# Patient Record
Sex: FEMALE | Race: WHITE | NOT HISPANIC OR LATINO | Employment: FULL TIME | URBAN - METROPOLITAN AREA
[De-identification: names, ages, dates, MRNs, and addresses within clinical notes are randomized per-mention and may not be internally consistent; named-entity substitution may affect disease eponyms.]

---

## 2018-06-21 NOTE — PROGRESS NOTES
Subjective:      Patient ID: Cheryl Ramirez is a 73 y.o. female.    Chief Complaint: Low-back Pain    Referred by: SelfIraj     Ms ramirez is a 72 yo female here for evaluation of back pain.  She has not been seen at [facility] since 2013.  She has had back pain off and on for the past 15 years.  Then 5 years ago she was working out and doing hamstring exercises and had left leg pain after a pop.  She tore her hamstring off her ischial tuberosity.  She feels like the back pain has been worse since then and could not get out of bed for 3 weeks and she had steroid injections which helped.  Her current back pain has been worse over the past year.  She has been helping her daughter with kids.  It is hard to hold the baby, and lift the baby.  She cannot stand too long, and the mornings are the worse.  She feels like it takes a long time too loosen up.  The pain is in the back and it is intermittent, worse right more than left.  The pain will go down the right leg at times, across the thigh to inner knee  and the outside of the calf.  It hurts to get up in the middle of the night, morning, and lifting and stairs.  The pain will go away with sitting.  The pain is an ache in the butt, and it will grab her.  She feels like it is different muscles.  The pain is 0/10 now, worst 8/10 in the middle of the night getting out of the bed, best 0/10.  She has been to PT twice this year which helped some.  She has been to over 20 visits this year.  She does some of the exercises at home.  She had some injections, she had caudal in the fall, and it didn't help.  She has not been to chiropractor.  She has had imaging but cannot find the report.  It was done at [facility].      Past Medical History:  No date: Diabetes mellitus, type 2  No date: High blood pressure  No date: Scoliosis    History reviewed. No pertinent surgical history.    History reviewed.  No pertinent family history.      Social History    Marital status:               Spouse name:                       Years of education:                 Number of children:               Social History Main Topics    Smoking status: Former Smoker                                                                Packs/day: 0.00      Years: 0.00        Smokeless tobacco: Never Used                          Current Outpatient Prescriptions:  ACCU-CHEK SMARTVIEW TEST STRIP Strp, , Disp: , Rfl:   alendronate (FOSAMAX) 70 MG tablet, , Disp: , Rfl:   ALPRAZolam (XANAX) 0.5 MG tablet, Take 0.5 mg by mouth 3 (three) times daily., Disp: , Rfl:   amLODIPine (NORVASC) 10 MG tablet, , Disp: , Rfl:   aspirin (ECOTRIN) 81 MG EC tablet, Take 81 mg by mouth once daily., Disp: , Rfl:   buPROPion (WELLBUTRIN SR) 150 MG TBSR 12 hr tablet, , Disp: , Rfl:   BYSTOLIC 5 mg Tab, , Disp: , Rfl:   clopidogrel (PLAVIX) 75 mg tablet, , Disp: , Rfl:   fish oil-omega-3 fatty acids 300-1,000 mg capsule, Take by mouth once daily., Disp: , Rfl:   insulin lispro (HUMALOG) 100 unit/mL injection, Inject into the skin 3 (three) times daily before meals., Disp: , Rfl:   JUBLIA 10 % Marilyn, APPLY ONE DROP TO ALL SMALLER TOES AND 2 DROPS TO GREATER TOES DAILY, Disp: , Rfl: 10  levothyroxine (SYNTHROID) 50 MCG tablet, Take 50 mcg by mouth once daily., Disp: , Rfl:   losartan (COZAAR) 50 MG tablet, , Disp: , Rfl:   MULTIVIT-IRON-MIN-FOLIC ACID 3,500-18-0.4 UNIT-MG-MG ORAL CHEW, Take by mouth., Disp: , Rfl:   OZEMPIC 1 mg/0.75 mL (2 mg/1.5 mL) PnIj, INJECT 1MG EVERY WEEK, Disp: , Rfl: 5  pantoprazole (PROTONIX) 40 MG tablet, Take 40 mg by mouth once daily., Disp: , Rfl:   risedronate (ACTONEL) 150 MG Tab, , Disp: , Rfl:   rosuvastatin (CRESTOR) 10 MG tablet, , Disp: , Rfl:     No current facility-administered medications for this visit.       Review of patient's allergies indicates:   -- Pcn (penicillins)                 Review of Systems   Constitution: Negative for weight gain and weight loss.   Cardiovascular: Negative for chest  pain.   Respiratory: Negative for shortness of breath.    Musculoskeletal: Positive for back pain (right leg pain). Negative for joint pain and joint swelling.   Gastrointestinal: Negative for abdominal pain and bowel incontinence.   Genitourinary: Negative for bladder incontinence.   Neurological: Negative for numbness.           Objective:          General    Vitals reviewed.  Constitutional: She is oriented to person, place, and time. She appears well-developed and well-nourished.   HENT:   Head: Normocephalic and atraumatic.   Pulmonary/Chest: Effort normal.   Neurological: She is alert and oriented to person, place, and time.   Psychiatric: She has a normal mood and affect. Her behavior is normal. Judgment and thought content normal.     General Musculoskeletal Exam   Gait: abnormal (flexed posture and lack of arm swing)     Right Ankle/Foot Exam     Tests   Heel Walk: able to perform  Tiptoe Walk: able to perform    Left Ankle/Foot Exam     Tests   Heel Walk: able to perform  Tiptoe Walk: able to perform  Back (L-Spine & T-Spine) / Neck (C-Spine) Exam     Tenderness Right paramedian tenderness of the Sacrum. Left paramedian tenderness of the Sacrum.     Back (L-Spine & T-Spine) Range of Motion   Extension: 20   Flexion: 90   Lateral Bend Right: 20   Lateral Bend Left: 20   Rotation Right: 40   Rotation Left: 40     Spinal Sensation   Right Side Sensation  C-Spine Level: normal   L-Spine Level: normal  S-Spine Level: normal  Left Side Sensation  C-Spine Level: normal  L-Spine Level: normal  S-Spine Level: normal    Back (L-Spine & T-Spine) Tests   Right Side Tests  Straight leg raise:      Sitting SLR: > 70 degrees      Left Side Tests  Straight leg raise:     Sitting SLR: > 70 degrees          Other She has no scoliosis .  Spinal Kyphosis:  Absent    Comments:  Neg RICA  Groin pain with piriformis stretch on right      Muscle Strength   Right Upper Extremity   Biceps: 5/5/5   Deltoid:  5/5  Triceps:   5/5  Wrist Extension: 5/5/5   Finger Flexors:  5/5  Left Upper Extremity  Biceps: 5/5/5   Deltoid:  5/5  Triceps:  5/5  Wrist Extension: 5/5/5   Finger Flexors:  5/5  Right Lower Extremity   Hip Flexion: 5/5   Quadriceps:  5/5   Anterior tibial:  5/5/5  EHL:  5/5  Left Lower Extremity   Hip Flexion: 5/5   Quadriceps:  5/5   Anterior tibial:  5/5/5   EHL:  5/5    Reflexes     Left Side  Biceps:  2+  Triceps:  2+  Brachioradialis:  2+  Quadriceps:  2+  Achilles:  2+  Left Steele's Sign:  Absent  Babinski Sign:  absent    Right Side   Biceps:  2+  Triceps:  2+  Brachioradialis:  2+  Quadriceps:  2+  Achilles:  2+  Right Steele's Sign:  absent  Babinski Sign:  absent    Vascular Exam     Right Pulses        Carotid:                  2+    Left Pulses        Carotid:                  2+        Assessment:       Encounter Diagnosis   Name Primary?    Chronic bilateral low back pain with right-sided sciatica Yes         Plan:       Cheryl was seen today for low-back pain.    Diagnoses and all orders for this visit:    Chronic bilateral low back pain with right-sided sciatica  -     X-Ray Lumbar Complete With Flex And Ext; Future  -     X-Ray Hips Bilateral 2 View Inc AP Pelvis; Future  -     Ambulatory Referral to Physical/Occupational Therapy    Other orders  -     tiZANidine (ZANAFLEX) 2 MG tablet; Take 1-2 tablets (2-4 mg total) by mouth every 8 (eight) hours as needed.         More than 50% of the total time of 45 minutes was spent in counseling on diagnosis and treatment options. We discussed back pain and the nature of back pain.  We discussed that it is not one thing that causes the pain but an accumulation of multiple things that we do.  We discussed her slightly flexed posture and not swinging her arms when she walks.  We discussed that can happen in parkinsons.  She wants a good exercise program, she has been to PT twice for a total of 24 visits, but is not doing the exercises.  We discussed doing the program.   We did discuss the healthy back program, but has already done a lot of PT this year.  We discussed posture sitting and the importance of trying to sit better.  She feels better when she sits straight.  We discussed the benefits of therapy and exercise and continuing to move.  I encourage her to join and gym, do pool exercises, and stay active.    1.  PT for core and back strengthening, and flexion exercises to be done at home.  Mamie davenport  2.  Tizanidine 2-4 TID  3.  X-ray of the lumbar spine and pelvis  4.  Discuss gait with PCP, she does have lack of arm swing and masked expression  5.  MRI from outside, she will bring copy:    6.  RTC 4-6 weeks

## 2018-06-22 ENCOUNTER — HOSPITAL ENCOUNTER (OUTPATIENT)
Dept: RADIOLOGY | Facility: OTHER | Age: 73
Discharge: HOME | End: 2018-06-22
Attending: PHYSICAL MEDICINE & REHABILITATION
Payer: MEDICARE

## 2018-06-22 ENCOUNTER — OFFICE VISIT (OUTPATIENT)
Dept: SPINE | Facility: CLINIC | Age: 73
End: 2018-06-22
Attending: PHYSICAL MEDICINE & REHABILITATION
Payer: MEDICARE

## 2018-06-22 VITALS
HEART RATE: 88 BPM | WEIGHT: 152 LBS | DIASTOLIC BLOOD PRESSURE: 70 MMHG | BODY MASS INDEX: 25.95 KG/M2 | HEIGHT: 64 IN | SYSTOLIC BLOOD PRESSURE: 133 MMHG

## 2018-06-22 DIAGNOSIS — G89.29 CHRONIC BILATERAL LOW BACK PAIN WITH RIGHT-SIDED SCIATICA: ICD-10-CM

## 2018-06-22 DIAGNOSIS — M54.41 CHRONIC BILATERAL LOW BACK PAIN WITH RIGHT-SIDED SCIATICA: Primary | ICD-10-CM

## 2018-06-22 DIAGNOSIS — M54.41 CHRONIC BILATERAL LOW BACK PAIN WITH RIGHT-SIDED SCIATICA: ICD-10-CM

## 2018-06-22 DIAGNOSIS — G89.29 CHRONIC BILATERAL LOW BACK PAIN WITH RIGHT-SIDED SCIATICA: Primary | ICD-10-CM

## 2018-06-22 PROCEDURE — 99203 OFFICE O/P NEW LOW 30 MIN: CPT | Mod: PBBFAC | Performed by: PHYSICAL MEDICINE & REHABILITATION

## 2018-06-22 PROCEDURE — 72114 X-RAY EXAM L-S SPINE BENDING: CPT | Mod: 26,,, | Performed by: INTERNAL MEDICINE

## 2018-06-22 PROCEDURE — 72114 X-RAY EXAM L-S SPINE BENDING: CPT | Mod: TC,FY

## 2018-06-22 PROCEDURE — 99999 PR PBB SHADOW E&M-NEW PATIENT-LVL III: CPT | Mod: PBBFAC,,, | Performed by: PHYSICAL MEDICINE & REHABILITATION

## 2018-06-22 PROCEDURE — 99204 OFFICE O/P NEW MOD 45 MIN: CPT | Mod: S$GLB,,, | Performed by: PHYSICAL MEDICINE & REHABILITATION

## 2018-06-22 PROCEDURE — 73521 X-RAY EXAM HIPS BI 2 VIEWS: CPT | Mod: 26,,, | Performed by: INTERNAL MEDICINE

## 2018-06-22 PROCEDURE — 73521 X-RAY EXAM HIPS BI 2 VIEWS: CPT | Mod: TC,FY

## 2018-06-22 RX ORDER — LIRAGLUTIDE 6 MG/ML
INJECTION SUBCUTANEOUS
COMMUNITY
Start: 2018-03-16 | End: 2018-06-22

## 2018-06-22 RX ORDER — CLOPIDOGREL BISULFATE 75 MG/1
75 TABLET ORAL DAILY
Status: ON HOLD | COMMUNITY
Start: 2018-06-02 | End: 2021-02-26 | Stop reason: HOSPADM

## 2018-06-22 RX ORDER — LEVOTHYROXINE SODIUM 50 UG/1
50 TABLET ORAL DAILY
COMMUNITY

## 2018-06-22 RX ORDER — SEMAGLUTIDE 1.34 MG/ML
INJECTION, SOLUTION SUBCUTANEOUS
Refills: 5 | COMMUNITY
Start: 2018-06-15 | End: 2019-03-07

## 2018-06-22 RX ORDER — ASPIRIN 81 MG/1
81 TABLET ORAL DAILY
Status: ON HOLD | COMMUNITY
End: 2021-02-26 | Stop reason: HOSPADM

## 2018-06-22 RX ORDER — AMOXICILLIN 500 MG
CAPSULE ORAL DAILY
COMMUNITY
End: 2020-08-18

## 2018-06-22 RX ORDER — INSULIN LISPRO 100 [IU]/ML
INJECTION, SOLUTION INTRAVENOUS; SUBCUTANEOUS
COMMUNITY

## 2018-06-22 RX ORDER — PANTOPRAZOLE SODIUM 40 MG/1
40 TABLET, DELAYED RELEASE ORAL DAILY
COMMUNITY
End: 2018-07-02

## 2018-06-22 RX ORDER — ALPRAZOLAM 0.5 MG/1
0.5 TABLET ORAL 3 TIMES DAILY
COMMUNITY
End: 2020-08-18

## 2018-06-22 RX ORDER — ROSUVASTATIN CALCIUM 10 MG/1
10 TABLET, COATED ORAL NIGHTLY
COMMUNITY
Start: 2018-06-02

## 2018-06-22 RX ORDER — EFINACONAZOLE 100 MG/ML
SOLUTION TOPICAL
Refills: 10 | COMMUNITY
Start: 2018-06-11 | End: 2019-03-07

## 2018-06-22 RX ORDER — LOSARTAN POTASSIUM 50 MG/1
50 TABLET ORAL 2 TIMES DAILY
COMMUNITY
Start: 2018-06-02

## 2018-06-22 RX ORDER — BLOOD SUGAR DIAGNOSTIC
STRIP MISCELLANEOUS
Status: ON HOLD | COMMUNITY
Start: 2018-06-04 | End: 2021-02-26 | Stop reason: HOSPADM

## 2018-06-22 RX ORDER — AMLODIPINE BESYLATE 10 MG/1
10 TABLET ORAL DAILY
COMMUNITY
Start: 2018-06-02

## 2018-06-22 RX ORDER — NEBIVOLOL HYDROCHLORIDE 5 MG/1
5 TABLET ORAL DAILY
COMMUNITY
Start: 2018-06-02

## 2018-06-22 RX ORDER — DICLOFENAC SODIUM 1 MG/ML
1 SOLUTION/ DROPS OPHTHALMIC 4 TIMES DAILY
COMMUNITY
End: 2018-06-22

## 2018-06-22 RX ORDER — TIZANIDINE 2 MG/1
2-4 TABLET ORAL EVERY 8 HOURS PRN
Qty: 180 TABLET | Refills: 2 | Status: SHIPPED | OUTPATIENT
Start: 2018-06-22 | End: 2018-10-07 | Stop reason: SDUPTHER

## 2018-06-22 RX ORDER — BUPROPION HYDROCHLORIDE 150 MG/1
150 TABLET, EXTENDED RELEASE ORAL 2 TIMES DAILY
COMMUNITY
Start: 2018-06-02

## 2018-06-22 RX ORDER — ALENDRONATE SODIUM 70 MG/1
70 TABLET ORAL
COMMUNITY
Start: 2018-03-16 | End: 2020-11-30

## 2018-06-22 RX ORDER — RISEDRONATE SODIUM 150 MG/1
TABLET, FILM COATED ORAL
COMMUNITY
Start: 2018-05-18 | End: 2018-08-03

## 2018-06-25 ENCOUNTER — TELEPHONE (OUTPATIENT)
Dept: SPINE | Facility: CLINIC | Age: 73
End: 2018-06-25

## 2018-06-25 NOTE — TELEPHONE ENCOUNTER
Called patient results given.  ----- Message from Brisa Boyle MD sent at 6/25/2018  7:59 AM CDT -----  Please let her know there is some arthritis in the lower back and scoliosis.  There is good alignment when bend and extend

## 2018-07-02 ENCOUNTER — OFFICE VISIT (OUTPATIENT)
Dept: RHEUMATOLOGY | Facility: CLINIC | Age: 73
End: 2018-07-02
Payer: MEDICARE

## 2018-07-02 VITALS
SYSTOLIC BLOOD PRESSURE: 140 MMHG | HEART RATE: 82 BPM | WEIGHT: 151.88 LBS | HEIGHT: 65 IN | BODY MASS INDEX: 25.3 KG/M2 | DIASTOLIC BLOOD PRESSURE: 82 MMHG

## 2018-07-02 DIAGNOSIS — M81.0 OSTEOPOROSIS, UNSPECIFIED OSTEOPOROSIS TYPE, UNSPECIFIED PATHOLOGICAL FRACTURE PRESENCE: ICD-10-CM

## 2018-07-02 DIAGNOSIS — Z79.4 TYPE 2 DIABETES MELLITUS WITH COMPLICATION, WITH LONG-TERM CURRENT USE OF INSULIN: ICD-10-CM

## 2018-07-02 DIAGNOSIS — E11.8 TYPE 2 DIABETES MELLITUS WITH COMPLICATION, WITH LONG-TERM CURRENT USE OF INSULIN: ICD-10-CM

## 2018-07-02 DIAGNOSIS — M47.26 OSTEOARTHRITIS OF SPINE WITH RADICULOPATHY, LUMBAR REGION: Primary | ICD-10-CM

## 2018-07-02 DIAGNOSIS — M79.672 PAIN IN BOTH FEET: ICD-10-CM

## 2018-07-02 DIAGNOSIS — M79.671 PAIN IN BOTH FEET: ICD-10-CM

## 2018-07-02 DIAGNOSIS — R53.83 FATIGUE, UNSPECIFIED TYPE: ICD-10-CM

## 2018-07-02 PROBLEM — E11.9 TYPE 2 DIABETES MELLITUS, WITH LONG-TERM CURRENT USE OF INSULIN: Status: ACTIVE | Noted: 2018-07-02

## 2018-07-02 PROCEDURE — 99999 PR PBB SHADOW E&M-EST. PATIENT-LVL IV: CPT | Mod: PBBFAC,,, | Performed by: INTERNAL MEDICINE

## 2018-07-02 PROCEDURE — 99204 OFFICE O/P NEW MOD 45 MIN: CPT | Mod: S$GLB,,, | Performed by: INTERNAL MEDICINE

## 2018-07-02 ASSESSMENT — ROUTINE ASSESSMENT OF PATIENT INDEX DATA (RAPID3)
FATIGUE SCORE: 1
MDHAQ FUNCTION SCORE: .9
PATIENT GLOBAL ASSESSMENT SCORE: 4.5
WHEN YOU AWAKENED IN THE MORNING OVER THE LAST WEEK, PLEASE INDICATE THE AMOUNT OF TIME IT TAKES UNTIL YOU ARE AS LIMBER AS YOU WILL BE FOR THE DAY: 3 HRS
AM STIFFNESS SCORE: 1, YES
PSYCHOLOGICAL DISTRESS SCORE: 3.3
TOTAL RAPID3 SCORE: 4
PAIN SCORE: 4.5

## 2018-07-02 NOTE — PROGRESS NOTES
"Subjective:       Patient ID: Cheryl Abreu is a 73 y.o. female.    Chief Complaint: bilateral hip pain    HPI:  Cheryl Abreu is a 73 y.o. female with history of renal artery stenosis requiring stent at age 62, osteoporosis treated with Forteo and now takes Actonel  arthritis in hips, left thumb and back.  She has trouble getting out of bed due to pain.  She had back pains since 2013 or so and was referred to me but did not come since had to wait.  She had a pop in her back while working out.  She tore her hamstring on left from ischial tuberosity.  She has since received injection in her back.  She had 2 additional injection in the back.  She is unable to go for walks due to severe pain.   She started a muscle relaxant from Dr. Boyle which helped.    She has bilateral foot pain that ortho wanted to do surgery on due to calcium build up. As well as Dr. Shaffer.  She has 3 hours of morning stiffness.  Saw Dr. Doss (rheumatologist) 2012 or 2013 who found inflammation on labs but no specific disease.     Review of Systems   Constitutional: Negative for fever and unexpected weight change.   HENT: Negative for trouble swallowing.    Eyes: Negative for redness.   Respiratory: Negative for cough and shortness of breath.    Cardiovascular: Negative for chest pain.   Gastrointestinal: Negative for constipation and diarrhea.        Heartburn   Endocrine: Negative.    Genitourinary: Negative for dysuria and genital sores.   Musculoskeletal: Positive for back pain.   Skin: Negative for rash.   Allergic/Immunologic: Negative.    Neurological: Negative for headaches.   Hematological: Bruises/bleeds easily.   Psychiatric/Behavioral: Negative.          Objective:   BP (!) 140/82   Pulse 82   Ht 5' 4.8" (1.646 m)   Wt 68.9 kg (151 lb 14.4 oz)   BMI 25.43 kg/m²      Physical Exam   Constitutional: She is oriented to person, place, and time and well-developed, well-nourished, and in no distress.   HENT:   Head: " Normocephalic and atraumatic.   Eyes: Conjunctivae and EOM are normal.   Neck: Neck supple.   Cardiovascular: Normal rate, regular rhythm and normal heart sounds.    Pulmonary/Chest: Effort normal and breath sounds normal.   Abdominal: Soft. Bowel sounds are normal.   Neurological: She is alert and oriented to person, place, and time. Gait normal.   Skin: Skin is warm and dry.     Psychiatric: Mood and affect normal.   Musculoskeletal:   Prominent right 1st MTP and deviation of feet       CMP and CBC reviewed and scanned  MRI 5/19/2017 at outside facility report reviewed and scanned into system  Assessment:       1.  Back pain.    2.  Osteoporosis.  Treated with Forteo in the past and now on Actonel.  3.  Elevated inflammatory markers  4.  Foot pain bilateral  5.  Fatigue  6.  Multiple medical problems. Diabetes, HTN, elevated cholesterol, hypothyroidism  Plan:       1.  Continue tizanidine  2.  Proceed with PT  3.  Follow with Dr. Boyle   4.  Check ESR, CRP, RF, CCP and FRANCISCO with [facility].    5.  Tylenol not to exceed 3,000 mg   6.  Avoid NSAID  7.  Trial capsaicin    RTO 4 months/prn

## 2018-07-02 NOTE — PATIENT INSTRUCTIONS
Capsaicin topical cream, lotion, solution  What is this medicine?  CAPSAICIN (cap SAY sin) is a pain reliever. It is used to treat pain in muscles or joints.  How should I use this medicine?  Use this medicine on the skin. Do not take by mouth. Follow the directions on the package or prescription label. Rub into painful area until there is little or no visible medicine left on the skin surface. Wash hands with soap and water after applying. If you are using this medicine for pain in the hands, do not wash your hands for at least 30 minutes after using this medicine. After using this medicine do not use a bandage, a heating pad, or expose the affected area to direct sun. Use your medicine at regular intervals. Do not use your medicine more often than directed.  Talk to your pediatrician regarding the use of this medicine in children. Special care may be needed.  What side effects may I notice from receiving this medicine?  Side effects that you should report to your doctor or health care professional as soon as possible:  · allergic reactions like skin rash, itching or hives, swelling of the face, lips, or tongue  · burning pain, redness that does not go away  · cough  · skin sores or thinning  Side effects that usually do not require medical attention (report to your doctor or health care professional if they continue or are bothersome):  · mild stinging or warmth where used  What may interact with this medicine?  Interactions are not expected. Do not use any other skin products on the affected area without asking your doctor or health care professional.  What if I miss a dose?  If you miss a dose, use it as soon as you can. If it is almost time for your next dose, use only that dose. Do not use double or extra doses.  Where should I keep my medicine?  Keep out of the reach of young children.  Store at room temperature, between 15 and 30 degrees C (59 and 86 degrees F). Do not freeze. Protect from light. Throw  away any unused medicine after the expiration date.  What should I tell my health care provider before I take this medicine?  They need to know if you have any of these conditions:  · broken or irritated skin  · an unusual or allergic reaction to capsaicin, hot peppers, other medicines, foods, dyes, or preservatives  · pregnant or trying to get pregnant  · breast-feeding  What should I watch for while using this medicine?  Tell your doctor or healthcare professional if your symptoms do not start to get better or if they get worse.  NOTE:This sheet is a summary. It may not cover all possible information. If you have questions about this medicine, talk to your doctor, pharmacist, or health care provider. Copyright© 2017 [employer]

## 2018-07-02 NOTE — PROGRESS NOTES
Rapid3 Question Responses and Scores 7/1/2018   MDHAQ Score 0.9   Psychologic Score 3.3   Pain Score 4.5   When you awakened in the morning OVER THE LAST WEEK, did you feel stiff? Yes   If Yes, please indicate the number of hours until you are as limber as you will be for the day 3   Fatigue Score 1   Global Health Score 4.5   RAPID3 Score 4       Answers for HPI/ROS submitted by the patient on 7/1/2018   fever: No  eye redness: No  headaches: No  shortness of breath: No  chest pain: No  trouble swallowing: No  diarrhea: No  constipation: No  unexpected weight change: No  genital sore: No  dysuria: No  During the last 3 days, have you had a skin rash?: No  Bruises or bleeds easily: Yes  cough: No

## 2018-07-03 ENCOUNTER — PATIENT MESSAGE (OUTPATIENT)
Dept: RHEUMATOLOGY | Facility: CLINIC | Age: 73
End: 2018-07-03

## 2018-07-11 ENCOUNTER — CLINICAL SUPPORT (OUTPATIENT)
Dept: REHABILITATION | Facility: HOSPITAL | Age: 73
End: 2018-07-11
Attending: PHYSICAL MEDICINE & REHABILITATION
Payer: MEDICARE

## 2018-07-11 ENCOUNTER — PATIENT MESSAGE (OUTPATIENT)
Dept: RHEUMATOLOGY | Facility: CLINIC | Age: 73
End: 2018-07-11

## 2018-07-11 DIAGNOSIS — M53.86 DECREASED ROM OF LUMBAR SPINE: ICD-10-CM

## 2018-07-11 DIAGNOSIS — M62.81 MUSCLE WEAKNESS: ICD-10-CM

## 2018-07-11 DIAGNOSIS — G89.29 CHRONIC BILATERAL LOW BACK PAIN WITHOUT SCIATICA: ICD-10-CM

## 2018-07-11 DIAGNOSIS — M62.89 MUSCLE TIGHTNESS: ICD-10-CM

## 2018-07-11 DIAGNOSIS — M54.50 CHRONIC BILATERAL LOW BACK PAIN WITHOUT SCIATICA: ICD-10-CM

## 2018-07-11 PROCEDURE — 97110 THERAPEUTIC EXERCISES: CPT | Mod: PN

## 2018-07-11 PROCEDURE — 97161 PT EVAL LOW COMPLEX 20 MIN: CPT | Mod: PN

## 2018-07-11 PROCEDURE — G8979 MOBILITY GOAL STATUS: HCPCS | Mod: CJ,PN

## 2018-07-11 PROCEDURE — G8978 MOBILITY CURRENT STATUS: HCPCS | Mod: CK,PN

## 2018-07-11 NOTE — PLAN OF CARE
Physical Therapy Initial Evaluation     Name: Cheryl Abreu  Clinic Number: [id number]    Diagnosis:    Encounter Diagnoses   Name Primary?    Chronic bilateral low back pain without sciatica     Muscle weakness     Muscle tightness     Decreased ROM of lumbar spine      Physician: Brisa Boyle, *  Treatment Orders: PT Eval and Treat  Past Medical History:   Diagnosis Date    Diabetes mellitus, type 2     High blood pressure     Hyperlipidemia     Osteoporosis     Scoliosis     Thyroid disease      Current Outpatient Prescriptions   Medication Sig    ACCU-CHEK SMARTVIEW TEST STRIP Strp     alendronate (FOSAMAX) 70 MG tablet     ALPRAZolam (XANAX) 0.5 MG tablet Take 0.5 mg by mouth 3 (three) times daily.    amLODIPine (NORVASC) 10 MG tablet     aspirin (ECOTRIN) 81 MG EC tablet Take 81 mg by mouth once daily.    buPROPion (WELLBUTRIN SR) 150 MG TBSR 12 hr tablet     BYSTOLIC 5 mg Tab     clopidogrel (PLAVIX) 75 mg tablet     fish oil-omega-3 fatty acids 300-1,000 mg capsule Take by mouth once daily.    insulin lispro (HUMALOG) 100 unit/mL injection Inject into the skin 3 (three) times daily before meals.    JUBLIA 10 % Marilyn APPLY ONE DROP TO ALL SMALLER TOES AND 2 DROPS TO GREATER TOES DAILY    levothyroxine (SYNTHROID) 50 MCG tablet Take 50 mcg by mouth once daily.    losartan (COZAAR) 50 MG tablet     MULTIVIT-IRON-MIN-FOLIC ACID 3,500-18-0.4 UNIT-MG-MG ORAL CHEW Take by mouth.    OZEMPIC 1 mg/0.75 mL (2 mg/1.5 mL) PnIj INJECT 1MG EVERY WEEK    risedronate (ACTONEL) 150 MG Tab     rosuvastatin (CRESTOR) 10 MG tablet     tiZANidine (ZANAFLEX) 2 MG tablet Take 1-2 tablets (2-4 mg total) by mouth every 8 (eight) hours as needed.     No current facility-administered medications for this visit.      Review of patient's allergies indicates:   Allergen Reactions    Nickel Itching and Other (See Comments)    Pcn [penicillins]   "    Precautions: Fall    Subjective     Patient states:  Low back pain 5+ years, began when she was exercising at gym with  2-3x/week. Was doing hamstring strengthening and heard a "pop" and was unable to use R LE afterwards. She began to sit on L side, felt "grapefruit sized bruise on R buttocks". Initially felt deep ache R buttocks, R anterior thigh, and R lateral calf; no longer feeling distal leg pain, however still has R buttocks pain primarily with weight-bearing/walking. Feels pain in lumbar spine while standing/walking with erect trunk posture. Pt denies numbness/tightness. Denies surgeries to lumbar spine/LE. Had cortisone injection last year, was performed at [facility], no relief. Had previous PT episode at another location, with last treatment session performed in beginning of March 2018. Believed she was "50% functional level" after last session. Believes she is at 30% now. Occasionally performs HEP. Has L knee issues previously, with L meniscectomy performed. Has to sit while doing garden due to difficulty bending. Outside MRI report shows scoliosis, moderate facet arthropathy, foraminal stenosis, and multi-level posterior disc bulging. Reports mild impairments in balance while walking recently. Acknowledges importance of HEP adherence following this episode of care.    X-ray 6/22/18  "There is an S shaped scoliosis which is moderate. Moderate degenerative changes"    Pain Scale: Cheryl rates pain on a scale of 0-10 to be 9 at worst; 0 currently; 0 at best .  Onset: gradual  Radicular symptoms:  Yes - R LE pain  Aggravating factors:   Standing, walking, lifting, bending  Easing factors:  Lying on back, sitting, resting  Prior Therapy: Yes - Last session in March 2018  Home Environment (Steps/Adaptations): 1-story house, 1 step threshold, no difficulty; does have difficulty with stairs  Functional Deficits Leading to Referral: Difficulty walking, exercising, weight-bearing  Prior functional " "status: Independent  DME owned/used: None  Occupation:  Retired - 3-4 years; primarily walking and desk-work                       Pts goals:  Reduce pain, improve strength in LE and back, establish HEP    Objective     Posture Alignment: slouched posture, R iliac crest higher than L, L LE longer than R LE in supine    LUMBAR SPINE AROM:   Flexion: 105/80 - 25 p! R buttocks   Extension: 20/10 - 10 p! R buttocks   Left Sidebend: 15 p! R   Right Sidebend: 25 p! R    Left Rotation: Decreased   Right Rotation: Decreased     SEGMENTAL MOBILITY: Joint Hypomobility L1-S1    LOWER EXTREMITY STRENGTH:   Left Right   Quadriceps 4+/5 4+/5   Hamstrings 4+/5 4+/5     Iliopsoas 4+/5 4/5   PGM 4-/5 4-/5   Hip IR 4+/5 4+/5   Hip ER 4+/5 4+/5   Hip Ext 4-/5 4-/5   Ankle DF 5/5 5/5   Ankle PF 5/5 5/5     Dermatomes: Sensation: Light Touch: Intact  Saddle Sensation: Intact  Myotomes: Intact  Flexibility: Decreased B Hip Flexor, Gluteals, Good HS flexibility    Special Tests:   Left Right   Slump Negative Negative   SLR Negative Negative   Crossed SLR Negative Negative   Sacral Thrust Negative Negative   RICA Negative Negative     GAIT: Cheryl ambulates with no assistive device with independently.     GAIT DEVIATIONS: Cheryl displays decreased stride length, decreased gait speed, no unsteadiness noted    Pt/family was provided educational information, including: role of PT, goals for PT, scheduling - pt verbalized understanding. Discussed insurance limitations with pt.     TREATMENT     Time In: 900  Time Out: 1000    PT Evaluation Completed? Yes  Discussed Plan of Care with patient: Yes    Cheryl received 10 minutes of therapeutic exercise & instruction including:    Piriformis Str 2x30" Ea  DKTC 5x 15" hold  LTR 10x ea  Post Pelvic Tilt 10x  Bridge 10x  TrA Brace 10x    Cheryl received 0 minutes of manual therapy including:    Written Home Exercises Provided: Yes - Piriformis, DKTC, LTR, PPT, TrA Brace, Bridge  Cheryl demo good " understanding of the education provided. Patient demo good return demo of skill of exercises.    Assessment     Patient presents to Physical Therapy Evaluation with diagnosis of Low Back Pain, with signs and symptoms including: increased low back and LE pain, decreased lumbar ROM, decreased LE strength, impaired joint mobility, impaired posture, decreased flexibility, and decreased tolerance to functional activity. Pt with pain primarily localized at lumbar spine and B gluteal musculature this session, no significant distal LE pain provocation. Pt with no signs of neural tension this session, negative SLR/Slump testing noted during. Fair maintenance of knee/ankle strength bilaterally, significant deficits in hip musculature including hip abd/ext; no significant strength asymmetries noted. Pt with light touch intact, no sensory abnormalities noted. Pt with thoracolumbar flexion preference during mobility testing, with pain exacerbation and significant motion restriction in extension plane. Pt educated on importance of HEP adherence for optimal results; Pt understood and agreed. Pt with good motivation to perform physical activity and responds well to cueing.    Pt prognosis is Good.  Pt will benefit from skilled outpatient physical therapy to address the above stated deficits, provide pt/family education and to maximize pt's level of independence.     Medical necessity is demonstrated by the following IMPAIRMENTS/PROBLEMS:  weakness, impaired endurance, impaired self care skills, impaired functional mobility, gait instability, impaired balance, decreased coordination, decreased lower extremity function, decreased safety awareness, pain, abnormal tone, decreased ROM, impaired joint extensibility and impaired muscle length    History  Co-morbidities and personal factors that may impact the plan of care Examination  Body Structures and Functions, activity limitations and participation restrictions that may impact the  plan of care Clinical Presentation   Decision Making/ Complexity Score   Co-morbidities:       T2DM  HTN          Personal Factors:    Body Regions: BLE, trunk/core     Body Systems: musculoskeletal (ROM, strength, endurance, flexibility, gait); neuromuscular (balance, posture, motor control, coordination)          Activity limitations: walking, standing, bending, lifting, carrying, reaching, pushing/pulling      Participation Restrictions: heavy household activities, traveling, exercising, gardening, community interaction, social participation               Stable, uncomplicated         Low Complexity    FOTO Lumbar Survey  Score: 53% Limitation     Pt's spiritual, cultural and educational needs considered and pt agreeable to plan of care and goals as stated below:     Short Term GOALS: 4 weeks. Pt agrees with goals set.  1. Patient demonstrates independence with HEP.   2. Patient demonstrates independence with Postural Awareness.   3. Patient demonstrates independence with body mechanics.   4. Patient will report pain of 5/10 at worst, on 0-10 pain scale, with all activity  5. Patient demonstrates increase thoracolumbar ROM by 5 degrees in all planes to improve tolerance to functional activities pain free.   6. Patient demonstrates increased strength BLE's by 1/3 muscle grade or greater to improve tolerance to functional activities pain free.     Long Term GOALS: 8 weeks. Pt agrees with goals set.  1. Patient demonstrates increased thoracolumbar ROM by 10 degrees in all planes to improve tolerance to functional activities pain free.   2. Patient demonstrates increased strength BLE's to 4+/5 or greater to improve tolerance to functional activities pain free.   3. Patient demonstrates improved overall function per FOTO Lumbar Survey to 40% Limitation or less.   4. Patient will report pain of 2/10 at worst, on 0-10 pain scale, with all activity  5. Patient demonstrates ability to walk 1/2 mile, appropriate gait  pattern, no pain provocation  6. Patient demonstrates ability to climb 1 flight of stairs, use of rail, reciprocal step pattern, no pain provocation    Functional Limitations Reports - G Codes  Category: Mobility  Tool: FOTO Lumbar Survey  Score: 53% Limitation   Modifier  Impairment Limitation Restriction    CH  0 % impaired, limited or restricted    CI  @ least 1% but less than 20% impaired, limited or restricted    CJ  @ least 20%<40% impaired, limited or restricted    CK  @ least 40%<60% impaired, limited or restricted    CL  @ least 60% <80% impaired, limited or restricted    CM  @ least 80%<100% impaired limited or restricted    CN  100% impaired, limited or restricted     Current/: CK = 40-60% Limitation   Goal/ : CJ = 20-40% Limitation    PLAN     Certification Period: 7/11/18 - 10/11/18    Outpatient physical therapy 1-2 times weekly to include: pt ed, hep, therapeutic exercises, neuromuscular re-education/ balance exercises, dry needling, manual therapy, joint mobilizations, aquatic therapy and modalities prn. Cont PT for 10-12 weeks. Pt may be seen by PTA as part of the rehabilitation team.     Therapist: Abisai Payne, PT  7/11/2018    I have seen the patient, reviewed the therapist's plan of care, and I agree with the plan of care.      I certify the need for these services furnished under this plan of treatment and while under my care.     ___________________ ________ Physician/Referring Practitioner            ___________________________ Date of Signature    ___________________________ Date of Signature l

## 2018-07-11 NOTE — PROGRESS NOTES
"  Please See Full Physical Therapy Evaluation in Plan of Care                                                      Physical Therapy Initial Evaluation     Name: Cheryl Abreu  Clinic Number: [id number]    Diagnosis:    Encounter Diagnoses   Name Primary?    Chronic bilateral low back pain without sciatica     Muscle weakness     Muscle tightness     Decreased ROM of lumbar spine      Physician: Brisa Boyle, *  Treatment Orders: PT Eval and Treat    Precautions: Fall    TREATMENT     Time In: 900  Time Out: 1000    PT Evaluation Completed? Yes  Discussed Plan of Care with patient: Yes    Cheryl received 10 minutes of therapeutic exercise & instruction including:    Piriformis Str 2x30" Ea  DKTC 5x 15" hold  LTR 10x ea  Post Pelvic Tilt 10x  Bridge 10x  TrA Brace 10x    Cheryl received 0 minutes of manual therapy including:    Written Home Exercises Provided: Yes - Piriformis, DKTC, LTR, PPT, TrA Brace, Bridge  Cheryl demo good understanding of the education provided. Patient demo good return demo of skill of exercises.    Assessment     Pt's spiritual, cultural and educational needs considered and pt agreeable to plan of care and goals as stated below:     Short Term GOALS: 4 weeks. Pt agrees with goals set.  1. Patient demonstrates independence with HEP.   2. Patient demonstrates independence with Postural Awareness.   3. Patient demonstrates independence with body mechanics.   4. Patient will report pain of 5/10 at worst, on 0-10 pain scale, with all activity  5. Patient demonstrates increase thoracolumbar ROM by 5 degrees in all planes to improve tolerance to functional activities pain free.   6. Patient demonstrates increased strength BLE's by 1/3 muscle grade or greater to improve tolerance to functional activities pain free.     Long Term GOALS: 8 weeks. Pt agrees with goals set.  1. Patient demonstrates increased thoracolumbar ROM by 10 degrees in all planes to improve tolerance to functional " activities pain free.   2. Patient demonstrates increased strength BLE's to 4+/5 or greater to improve tolerance to functional activities pain free.   3. Patient demonstrates improved overall function per FOTO Lumbar Survey to 40% Limitation or less.   4. Patient will report pain of 2/10 at worst, on 0-10 pain scale, with all activity  5. Patient demonstrates ability to walk 1/2 mile, appropriate gait pattern, no pain provocation  6. Patient demonstrates ability to climb 1 flight of stairs, use of rail, reciprocal step pattern, no pain provocation    Functional Limitations Reports - G Codes  Category: Mobility  Tool: FOTO Lumbar Survey  Score: 53% Limitation   Modifier  Impairment Limitation Restriction    CH  0 % impaired, limited or restricted    CI  @ least 1% but less than 20% impaired, limited or restricted    CJ  @ least 20%<40% impaired, limited or restricted    CK  @ least 40%<60% impaired, limited or restricted    CL  @ least 60% <80% impaired, limited or restricted    CM  @ least 80%<100% impaired limited or restricted    CN  100% impaired, limited or restricted     Current/: CK = 40-60% Limitation   Goal/ : CJ = 20-40% Limitation    PLAN     Certification Period: 7/11/18 - 10/11/18    Outpatient physical therapy 1-2 times weekly to include: pt ed, hep, therapeutic exercises, neuromuscular re-education/ balance exercises, dry needling, manual therapy, joint mobilizations, aquatic therapy and modalities prn. Cont PT for 10-12 weeks. Pt may be seen by PTA as part of the rehabilitation team.     Therapist: Abisai Payne, PT  7/11/2018    I have seen the patient, reviewed the therapist's plan of care, and I agree with the plan of care.      I certify the need for these services furnished under this plan of treatment and while under my care.     ___________________ ________ Physician/Referring Practitioner            ___________________________ Date of Signature    ___________________________ Date  of Signature l

## 2018-07-16 ENCOUNTER — CLINICAL SUPPORT (OUTPATIENT)
Dept: REHABILITATION | Facility: HOSPITAL | Age: 73
End: 2018-07-16
Attending: PHYSICAL MEDICINE & REHABILITATION
Payer: MEDICARE

## 2018-07-16 DIAGNOSIS — G89.29 CHRONIC BILATERAL LOW BACK PAIN WITHOUT SCIATICA: ICD-10-CM

## 2018-07-16 DIAGNOSIS — M62.81 MUSCLE WEAKNESS: ICD-10-CM

## 2018-07-16 DIAGNOSIS — M53.86 DECREASED ROM OF LUMBAR SPINE: ICD-10-CM

## 2018-07-16 DIAGNOSIS — M54.50 CHRONIC BILATERAL LOW BACK PAIN WITHOUT SCIATICA: ICD-10-CM

## 2018-07-16 DIAGNOSIS — M62.89 MUSCLE TIGHTNESS: ICD-10-CM

## 2018-07-16 PROCEDURE — 97110 THERAPEUTIC EXERCISES: CPT | Mod: PN

## 2018-07-16 NOTE — PROGRESS NOTES
"                                                    Physical Therapy Daily Note     Name: Cheryl Abreu  Clinic Number: [id number]  Diagnosis:   Encounter Diagnoses   Name Primary?    Chronic bilateral low back pain without sciatica     Muscle weakness     Muscle tightness     Decreased ROM of lumbar spine      Physician: Brisa Boyle, *    Visit #: 2 of 20  LI: 12/31/18    Time In: 1000  Time Out: 1110  Total Treatment Time 1:1: 70 min     Subjective     Pt reports: Has increase soreness in low back following busy weekend/lots of walking. Denies pain currently, only painful with increased activity  Pain Scale: Cheryl rates pain on a scale of 0-10 to be 1 currently.    Objective     Cheryl received individual therapeutic exercises to develop strength, endurance, ROM, flexibility, posture and core stabilization for 60 minutes including:    Upright Bike 6 min  Calf Str c/ incline board 2x30"  Trunk Flexion 3-way c/ ball 3 min    Shuttle B LE Squat 1-black 3x10    HS Str c/ strap 3x30" ea  Piriformis Str 3x30" Ea  DKTC c/ ball 3x10  LTR c/ ball 2x10 ea  Post Pelvic Tilt 2x10  Bridge 2x10  TrA Brace 15x  Open book 10x ea    Cheryl received the following manual therapy techniques: Myofacial release and Soft tissue Mobilization were applied to the: B Gluteal for 5 minutes including:  STM roller to B gluteal     Written Home Exercises Provided: Reviewed - Piriformis, DKTC, LTR, PPT, TrA Brace, Bridge  Pt demo good understanding of the education provided. Cheryl demonstrated good return demonstration of activities.     PT/PTA face to face conference performed during this session    Assessment     Patient tolerated treatment session very well. Pt with no significant symptom exacerbation throughout treatment session. Cueing provided for increase glute activation and decreased lumbar lordosis during bridging activity, with improved movement pattern noted. Greater tenderness/soft tissue restriction in L gluteal " compared to R during manual therapy.    This is a 73 y.o. female referred to outpatient physical therapy and presents with a medical diagnosis of Low Back Pain and demonstrates limitations as described in the problem list. Pt prognosis is Good. Pt will continue to benefit from skilled outpatient physical therapy to address the deficits listed in the problem list, provide pt/family education and to maximize pt's level of independence in the home and community environment.     Goals as follows:  Short Term GOALS: 4 weeks. Pt agrees with goals set.  1. Patient demonstrates independence with HEP.   2. Patient demonstrates independence with Postural Awareness.   3. Patient demonstrates independence with body mechanics.   4. Patient will report pain of 5/10 at worst, on 0-10 pain scale, with all activity  5. Patient demonstrates increase thoracolumbar ROM by 5 degrees in all planes to improve tolerance to functional activities pain free.   6. Patient demonstrates increased strength BLE's by 1/3 muscle grade or greater to improve tolerance to functional activities pain free.      Plan     Certification Period: 7/11/18 - 10/11/18    Continue with established Plan of Care towards PT goals.    Therapist: Abisai Payne, PT  7/16/2018

## 2018-07-20 ENCOUNTER — CLINICAL SUPPORT (OUTPATIENT)
Dept: REHABILITATION | Facility: HOSPITAL | Age: 73
End: 2018-07-20
Attending: PHYSICAL MEDICINE & REHABILITATION
Payer: MEDICARE

## 2018-07-20 DIAGNOSIS — G89.29 CHRONIC BILATERAL LOW BACK PAIN WITHOUT SCIATICA: ICD-10-CM

## 2018-07-20 DIAGNOSIS — M54.50 CHRONIC BILATERAL LOW BACK PAIN WITHOUT SCIATICA: ICD-10-CM

## 2018-07-20 DIAGNOSIS — M53.86 DECREASED ROM OF LUMBAR SPINE: ICD-10-CM

## 2018-07-20 DIAGNOSIS — M62.81 MUSCLE WEAKNESS: ICD-10-CM

## 2018-07-20 DIAGNOSIS — M62.89 MUSCLE TIGHTNESS: ICD-10-CM

## 2018-07-20 PROCEDURE — 97110 THERAPEUTIC EXERCISES: CPT | Mod: PN

## 2018-07-20 NOTE — PROGRESS NOTES
"                                                    Physical Therapy Daily Note     Name: Cheryl Abreu  Clinic Number: [id number]  Diagnosis:   Encounter Diagnoses   Name Primary?    Chronic bilateral low back pain without sciatica     Muscle weakness     Muscle tightness     Decreased ROM of lumbar spine      Physician: Brisa Boyle, *    Visit #: 2 of 20  LI: 12/31/18    Time In: 0934  Time Out: 1030  Total Treatment Time 1:1: 56 minutes (1:1 with PTA 30 minutes of treatment session)     Subjective     Pt reports: that her lower back is a little sore this morning. Patient states her feet are the most painful.   Pain Scale: Cheryl rates pain on a scale of 0-10 to be 1 currently.    Objective     Cheryl received individual therapeutic exercises to develop strength, endurance, ROM, flexibility, posture and core stabilization for 60 minutes including:    Upright Bike 6 min  Calf Str c/ incline board 2x30"  Trunk Flexion 3-way c/ ball x 3 minutes    Shuttle B LE Squat 2-black 3x10    HS Str c/ strap 3x30" ea  Piriformis Str 3x30" Ea  DKTC c/ ball 3x10  LTR c/ ball 2x10 ea  Post Pelvic Tilt 2x10  Bridge 2x10  TrA Brace 20x (+with mini march)  +Hip adduction squeeze x 2 minutes  +Hip abduction (GTB) x 2 minutes   +SL clamshells (GTB) 2x10 ea   Open book 10x ea    Cheryl received the following manual therapy techniques: Myofacial release and Soft tissue Mobilization were applied to the: B Gluteal for 5 minutes including:  STM roller to B gluteal     Written Home Exercises Provided: Reviewed - Piriformis, DKTC, LTR, PPT, TrA Brace, Bridge  Pt demo good understanding of the education provided. Cheryl demonstrated good return demonstration of activities.     PT/PTA face to face conference performed during this session    Assessment     Cheryl tolerated treatment well today. Patient with very good tolerance to hip stabilization exercises today with appropriate training effect achieved especially in lateral glut " musculature. Patient able to perform all exercises without complaints of lower back pain.     This is a 73 y.o. female referred to outpatient physical therapy and presents with a medical diagnosis of Low Back Pain and demonstrates limitations as described in the problem list. Pt prognosis is Good. Pt will continue to benefit from skilled outpatient physical therapy to address the deficits listed in the problem list, provide pt/family education and to maximize pt's level of independence in the home and community environment.     Goals as follows:  Short Term GOALS: 4 weeks. Pt agrees with goals set.  1. Patient demonstrates independence with HEP.   2. Patient demonstrates independence with Postural Awareness.   3. Patient demonstrates independence with body mechanics.   4. Patient will report pain of 5/10 at worst, on 0-10 pain scale, with all activity  5. Patient demonstrates increase thoracolumbar ROM by 5 degrees in all planes to improve tolerance to functional activities pain free.   6. Patient demonstrates increased strength BLE's by 1/3 muscle grade or greater to improve tolerance to functional activities pain free.      Plan     Certification Period: 7/11/18 - 10/11/18    Continue with established Plan of Care towards PT goals.    Therapist: Gwendolyn Cuello, PTA  7/20/2018

## 2018-07-23 ENCOUNTER — CLINICAL SUPPORT (OUTPATIENT)
Dept: REHABILITATION | Facility: HOSPITAL | Age: 73
End: 2018-07-23
Attending: PHYSICAL MEDICINE & REHABILITATION
Payer: MEDICARE

## 2018-07-23 DIAGNOSIS — M62.81 MUSCLE WEAKNESS: ICD-10-CM

## 2018-07-23 DIAGNOSIS — M54.50 CHRONIC BILATERAL LOW BACK PAIN WITHOUT SCIATICA: ICD-10-CM

## 2018-07-23 DIAGNOSIS — M53.86 DECREASED ROM OF LUMBAR SPINE: ICD-10-CM

## 2018-07-23 DIAGNOSIS — G89.29 CHRONIC BILATERAL LOW BACK PAIN WITHOUT SCIATICA: ICD-10-CM

## 2018-07-23 DIAGNOSIS — M62.89 MUSCLE TIGHTNESS: ICD-10-CM

## 2018-07-23 PROCEDURE — 97110 THERAPEUTIC EXERCISES: CPT | Mod: PN

## 2018-07-23 NOTE — PROGRESS NOTES
"                                                    Physical Therapy Daily Note     Name: Cheryl Abreu  Clinic Number: [id number]  Diagnosis:   Encounter Diagnoses   Name Primary?    Chronic bilateral low back pain without sciatica     Muscle weakness     Muscle tightness     Decreased ROM of lumbar spine      Physician: Brisa Boyle, *    Visit #: 4 of 20  LI: 12/31/18    Time In: 0930  Time Out: 1030  Total Treatment Time 1:1: 70 minutes (1:1 with PT 30 minutes of treatment session; 10 min heat)     Subjective     Pt reports: woke up this morning with pain/stiffness.  Occasionally feels pain down R LE  Pain Scale: Cheryl rates pain on a scale of 0-10 to be 4 currently.    Objective     Cheryl received individual therapeutic exercises to develop strength, endurance, ROM, flexibility, posture and core stabilization for 60 minutes including:    Upright Bike 6 min  Calf Str c/ incline board 2x30"  Trunk Flexion 3-way c/ ball x 3 minutes    Shuttle B LE Squat 2-black 3x10    HS Str c/ strap 3x30" ea  Piriformis Str 3x30" Ea  DKTC c/ ball 3x10  LTR c/ ball 2x10 ea  Post Pelvic Tilt 2x10  Bridge 2x10  TrA Brace 20x (+with mini march)  +Hip adduction squeeze x 2 minutes  +Hip abduction (GTB) x 2 minutes   +SL clamshells (GTB) 2x10 ea   +Prone Hip Ext 2x10 ea  Open book 10x ea    Cheryl received the following manual therapy techniques: Myofacial release and Soft tissue Mobilization were applied to the: B Gluteal for 5 minutes including:  STM roller to B gluteal     Written Home Exercises Provided: Reviewed - Piriformis, DKTC, LTR, PPT, TrA Brace, Bridge  Pt demo good understanding of the education provided. Cheryl demonstrated good return demonstration of activities.     PT/PTA face to face conference performed during this session    Assessment     Cheryl tolerated treatment well today. Pt with mild radiation of symptoms into distal LE during weight-bearing activities, Pt with good response to Sciatic N flossing " activity, no pain provoked, full motion excursion performed. Pt with good response to prone hip ext, appropriate movement pattern, no pain provoked.    This is a 73 y.o. female referred to outpatient physical therapy and presents with a medical diagnosis of Low Back Pain and demonstrates limitations as described in the problem list. Pt prognosis is Good. Pt will continue to benefit from skilled outpatient physical therapy to address the deficits listed in the problem list, provide pt/family education and to maximize pt's level of independence in the home and community environment.     Goals as follows:  Short Term GOALS: 4 weeks. Pt agrees with goals set.  1. Patient demonstrates independence with HEP.   2. Patient demonstrates independence with Postural Awareness.   3. Patient demonstrates independence with body mechanics.   4. Patient will report pain of 5/10 at worst, on 0-10 pain scale, with all activity  5. Patient demonstrates increase thoracolumbar ROM by 5 degrees in all planes to improve tolerance to functional activities pain free.   6. Patient demonstrates increased strength BLE's by 1/3 muscle grade or greater to improve tolerance to functional activities pain free.      Plan     Certification Period: 7/11/18 - 10/11/18    Continue with established Plan of Care towards PT goals.    Therapist: Abisai Payne, PT  7/23/2018

## 2018-07-26 ENCOUNTER — CLINICAL SUPPORT (OUTPATIENT)
Dept: REHABILITATION | Facility: HOSPITAL | Age: 73
End: 2018-07-26
Attending: PHYSICAL MEDICINE & REHABILITATION
Payer: MEDICARE

## 2018-07-26 DIAGNOSIS — G89.29 CHRONIC BILATERAL LOW BACK PAIN WITHOUT SCIATICA: ICD-10-CM

## 2018-07-26 DIAGNOSIS — M62.89 MUSCLE TIGHTNESS: ICD-10-CM

## 2018-07-26 DIAGNOSIS — M53.86 DECREASED ROM OF LUMBAR SPINE: ICD-10-CM

## 2018-07-26 DIAGNOSIS — M62.81 MUSCLE WEAKNESS: ICD-10-CM

## 2018-07-26 DIAGNOSIS — M54.50 CHRONIC BILATERAL LOW BACK PAIN WITHOUT SCIATICA: ICD-10-CM

## 2018-07-26 PROCEDURE — 97110 THERAPEUTIC EXERCISES: CPT | Mod: PN

## 2018-07-26 NOTE — PROGRESS NOTES
"                                                    Physical Therapy Daily Note     Name: Cheryl Abreu  Clinic Number: [id number]  Diagnosis:   Encounter Diagnoses   Name Primary?    Chronic bilateral low back pain without sciatica     Muscle weakness     Muscle tightness     Decreased ROM of lumbar spine      Physician: Brisa Boyle, *    Visit #: 5 of 20  LI: 12/31/18    Time In: 0930  Time Out: 1030  Total Treatment Time 1:1: 70 minutes (1:1 with PT 30 minutes of treatment session; 10 min heat)     Subjective     Pt reports: significant pain expereinced in R gluteal musculature and deep into posterior R hip joint.  Pain Scale: Cheryl rates pain on a scale of 0-10 to be 4 currently.    Objective     Cheryl received individual therapeutic exercises to develop strength, endurance, ROM, flexibility, posture and core stabilization for 60 minutes including:    Upright Bike 6 min  Calf Str c/ incline board 2x30"  Trunk Flexion 3-way c/ ball x 3 minutes  Shuttle B LE Squat 2-black 3x10  +Shuttle Piriformis Str 3x30" ea    HS Str c/ strap 3x30" ea  +Sciatic N Glide R LE 30x  DKTC c/ ball 3x10  LTR c/ ball 2x10 ea  Post Pelvic Tilt 2x10  Bridge 2x10  TrA Brace 20x (+with mini march)  Hip adduction squeeze  3 x 10  Hip abduction (GTB)  2x10 ea  SL clamshells (GTB) 2x10 ea   Prone Hip Ext 2x10 ea  Open book 10x ea  Piriformis Str 3x30" Ea    Cheryl received the following manual therapy techniques: Myofacial release and Soft tissue Mobilization were applied to the: B Gluteal for 5 minutes including:  STM roller to B gluteal     Written Home Exercises Provided: Reviewed - Piriformis, DKTC, LTR, PPT, TrA Brace, Bridge  Pt demo good understanding of the education provided. Cheryl demonstrated good return demonstration of activities.     PT/PTA face to face conference performed during this session    Assessment     Cheryl tolerated treatment well today. Mild R posterior hip pain presented at initiation of session, with " relief of symptoms as therex program progressed. No significant motion restriction noted during HS stretch, minimal tissue stretch reported during piriformis stretching on shuttle. No signs of neural tension during Sciatic N glide on R LE. Symptoms likely related to muscle imbalance throughout core and posterior chain    This is a 73 y.o. female referred to outpatient physical therapy and presents with a medical diagnosis of Low Back Pain and demonstrates limitations as described in the problem list. Pt prognosis is Good. Pt will continue to benefit from skilled outpatient physical therapy to address the deficits listed in the problem list, provide pt/family education and to maximize pt's level of independence in the home and community environment.     Goals as follows:  Short Term GOALS: 4 weeks. Pt agrees with goals set.  1. Patient demonstrates independence with HEP.   2. Patient demonstrates independence with Postural Awareness.   3. Patient demonstrates independence with body mechanics.   4. Patient will report pain of 5/10 at worst, on 0-10 pain scale, with all activity  5. Patient demonstrates increase thoracolumbar ROM by 5 degrees in all planes to improve tolerance to functional activities pain free.   6. Patient demonstrates increased strength BLE's by 1/3 muscle grade or greater to improve tolerance to functional activities pain free.      Plan     Certification Period: 7/11/18 - 10/11/18    Continue with established Plan of Care towards PT goals.    Therapist: Abisai Payne, PT  7/26/2018

## 2018-07-31 ENCOUNTER — CLINICAL SUPPORT (OUTPATIENT)
Dept: REHABILITATION | Facility: HOSPITAL | Age: 73
End: 2018-07-31
Attending: PHYSICAL MEDICINE & REHABILITATION
Payer: MEDICARE

## 2018-07-31 DIAGNOSIS — M62.81 MUSCLE WEAKNESS: ICD-10-CM

## 2018-07-31 DIAGNOSIS — M53.86 DECREASED ROM OF LUMBAR SPINE: ICD-10-CM

## 2018-07-31 DIAGNOSIS — G89.29 CHRONIC BILATERAL LOW BACK PAIN WITHOUT SCIATICA: ICD-10-CM

## 2018-07-31 DIAGNOSIS — M62.89 MUSCLE TIGHTNESS: ICD-10-CM

## 2018-07-31 DIAGNOSIS — M54.50 CHRONIC BILATERAL LOW BACK PAIN WITHOUT SCIATICA: ICD-10-CM

## 2018-07-31 PROCEDURE — 97110 THERAPEUTIC EXERCISES: CPT | Mod: PN

## 2018-07-31 NOTE — PROGRESS NOTES
"                                                    Physical Therapy Daily Note     Name: Cheryl Abreu  Clinic Number: [id number]  Diagnosis:   Encounter Diagnoses   Name Primary?    Chronic bilateral low back pain without sciatica     Muscle weakness     Muscle tightness     Decreased ROM of lumbar spine      Physician: Brisa Boyle, *    Visit #: 6 of 20  LI: 12/31/18    Time In: 1000  Time Out: 1105  Total Treatment Time: 55 minutes (1:1 with PTA for duration of treatment session)     Subjective     Pt reports: that she has noticed improvement since beginning therapy. Patient reports some pain today, but states it will feel better once she starts exercising. Patient reports that she has one more visit with us and then will be going out of town for a month. Patient states that she would like to continue with us when she returns.   Pain Scale: Cheryl rates pain on a scale of 0-10 to be 2-3 currently.    Objective     Cheryl received individual therapeutic exercises to develop strength, endurance, ROM, flexibility, posture and core stabilization for 55 minutes including:    Upright Bike x 6 min  Calf Str c/ incline board 2 x 30"  Trunk Flexion 3-way c/ ball x 3 minutes  Shuttle B LE Squat 2-black 3 x 10  Shuttle Piriformis Str 3 x 30" ea    HS Str c/ strap 3 x 30" ea  Sciatic N Glide R LE 30x  DKTC c/ ball 3 x 10  LTR c/ ball 2 x 10 ea  Post Pelvic Tilt 2 x 10  Bridge 2 x 10  TrA Brace 20x (+with mini march)  Hip adduction squeeze 3 x 10  Hip abduction x Green TB x 2 x 10 ea  SL clamshells x Green TB x 2 x 10 ea   Prone Hip Ext 2 x 10 ea  Open book 10x ea  Piriformis Str 3 x 30" Ea    Cheryl received the following manual therapy techniques: Myofacial release and Soft tissue Mobilization were applied to the: B Gluteal for 00 minutes including:  STM roller to B gluteal     Written Home Exercises Provided: Reviewed - Piriformis, DKTC, LTR, PPT, TrA Brace, Bridge  Pt demo good understanding of the education " provided. Cheryl demonstrated good return demonstration of activities.     Assessment     Patient tolerated treatment session well today. Good tolerance to exercises performed reporting no exacerbation of pain. Patient requires cueing to maintain TrA activation with dual activity.     This is a 73 y.o. female referred to outpatient physical therapy and presents with a medical diagnosis of Low Back Pain and demonstrates limitations as described in the problem list. Pt prognosis is Good. Pt will continue to benefit from skilled outpatient physical therapy to address the deficits listed in the problem list, provide pt/family education and to maximize pt's level of independence in the home and community environment.     Goals as follows:  Short Term GOALS: 4 weeks. Pt agrees with goals set.  1. Patient demonstrates independence with HEP.   2. Patient demonstrates independence with Postural Awareness.   3. Patient demonstrates independence with body mechanics.   4. Patient will report pain of 5/10 at worst, on 0-10 pain scale, with all activity  5. Patient demonstrates increase thoracolumbar ROM by 5 degrees in all planes to improve tolerance to functional activities pain free.   6. Patient demonstrates increased strength BLE's by 1/3 muscle grade or greater to improve tolerance to functional activities pain free.      Plan     Certification Period: 7/11/18 - 10/11/18    Continue with established Plan of Care towards PT goals.    Therapist: Lisandra Abernathy, PTA  7/31/2018

## 2018-08-02 ENCOUNTER — TELEPHONE (OUTPATIENT)
Dept: SPINE | Facility: CLINIC | Age: 73
End: 2018-08-02

## 2018-08-02 ENCOUNTER — CLINICAL SUPPORT (OUTPATIENT)
Dept: REHABILITATION | Facility: HOSPITAL | Age: 73
End: 2018-08-02
Attending: PHYSICAL MEDICINE & REHABILITATION
Payer: MEDICARE

## 2018-08-02 DIAGNOSIS — M62.89 MUSCLE TIGHTNESS: ICD-10-CM

## 2018-08-02 DIAGNOSIS — M62.81 MUSCLE WEAKNESS: ICD-10-CM

## 2018-08-02 DIAGNOSIS — M53.86 DECREASED ROM OF LUMBAR SPINE: ICD-10-CM

## 2018-08-02 DIAGNOSIS — G89.29 CHRONIC BILATERAL LOW BACK PAIN WITHOUT SCIATICA: ICD-10-CM

## 2018-08-02 DIAGNOSIS — M54.50 CHRONIC BILATERAL LOW BACK PAIN WITHOUT SCIATICA: ICD-10-CM

## 2018-08-02 PROCEDURE — 97110 THERAPEUTIC EXERCISES: CPT | Mod: PN

## 2018-08-02 NOTE — PATIENT INSTRUCTIONS
Top 10 tips for back and neck pain    The spine is the pillar of the body, providing the foundation for the upper and lower extremities to attach.  Our spines withstand significant forces all day long.  There are many ways in which we can take care of our backs.  Here are a couple tips to help you keep your back in action.    1. Watch your posture in sitting.     Sit in chairs with back supports, and use a lumbar roll to maintain the normal curve of your low back. Ensure the height of your chair is such that your feet rest flat on the floor with your knees and hips level.  The average American sits 9 hours a day.  Do not sit longer than 1 hour without getting up to stretch or move.     2. Watch your posture in standing.   Maintain the normal curves of your spine in standing.   When standing tall, you should be able to draw a line down through your ear, shoulder, hip, and ankle.  Wear good shoes and consider using a standing desk mat if you stand a lot during work.  Take breaks from standing.    3. Lift correctly   Lift objects by using the strong muscles in your legs.  Get close to the object, bend your knees and your hips, and maintain the normal curve of your low back. Do not twist when lifting or carrying items. Think about the tasks you perform daily at work or home, and minimize lifting and carrying objects.  Use rolling carts or other strategies to reduce back strain.    4. Exercise regularly  Individuals who exercise regularly generally experience better health, reduced back pain, and less stress.  A good exercise program has a stretching component, a strengthening component, and an aerobic component.   Maintain the mobility of your spine by stretching daily. Strengthen your core and extremities several times a week.   Get regular cardiovascular exercise, 3-5/week.  Choose activities you like such as walking, swimming, dancing, or riding a bike.     5. Quit Smoking  Smoking increases the likelihood of back  pain.  It is thought that smoking reduces the blood supply to the discs between the vertebrae and this may lead to degeneration of these discs.  Talk to your Physician about quitting.  There are many smoking cessation options that may work for you.    6. Keep moving even when you have pain  Motion is lotion.   The majority of back pain is mechanical in nature, and will likely reduce with gentle movements, stretching, and walking.  As tempting as it may be to stay in bed when you are hurting, remember that you will likely feel better by getting up and gently moving and walking.  Limit bed rest.      7. Maintain a healthy diet  Try to maintain a healthy diet and weight.        8. Stay hydrated  The average adult is approximately 60 % water.  Staying hydrated is beneficial for all aspects of health.  In general, an adult should drink half of their body weight in ounces.  For example, if you weight 180 lbs, you should drink 90 ounces of water daily.     9. Get regular sleep   Ensure that you get a good nights rest on a regular basis. The discs in your spine hydrate when you lie down to sleep. Your spine needs the rest too.     10. See Your Physician    Make an appointment to see your Physician for back pain that is progressively worsening, and for back pain that is no better or worse with changing positions and activities.    Z LIE POSITION  Z Lie is a position that you can use to unload your back and assist with pain reduction.  Lie on your back and rest your calves on the seat on a chair or a bench.  Viewed from the side, you should resemble a Z.  Your therapist may suggest sliding the chair closer to you, so your knees are over your stomach.   Your therapist may also suggest a pillow under your buttock if needed.  Follow the directions from your therapist.  The goal of this position is to reduce your symptoms.    Z lie can be done in a variety of ways.  It can be done on a bed resting your legs on a light and  easy to lift chair          Bending        Bend at hips and knees, not back. Keep feet shoulder-width apart.    Childcare - Carrying        Keep baby close and as upright as possible.      Childcare - Holding        Use pillows to help maintain position during feeding.    Childcare - In / Out of Car        Stand close and keep back straight. Bend knees to put baby in or take baby out of car seat.    Childcare - In / Out of Tub        Squat or kneel down close to edge of tub to lower child into tub or to lift out. Be sure there is a safety mat inside.      Childcare - Picking Up from Floor        Squat down to  baby, and bring close before standing up. Use knees and keep back straight.      Copyright © [employer]. All rights reserved.                 Copyright © 1493-0201 [employer]

## 2018-08-02 NOTE — PROGRESS NOTES
Subjective:      Patient ID: Cheryl Ramirez is a 73 y.o. female.    Chief Complaint: Low-back Pain    Referred by: No ref. provider found     Ms ramirez is a 74 yo female here for follow up of back pain.  She has had back pain off and on for the past 15 years.  Then 5 years ago she was working out and doing hamstring exercises and had left leg pain after a pop.  She tore her hamstring off her ischial tuberosity.  She feels like the back pain has been worse since then and could not get out of bed for 3 weeks and she had steroid injections which helped.  She was last seen by me on 6/22/2018 and her current back pain had been worse over the past year.  She was sent to PT.  She was going to bring MRI and we did x-rays.  Today, she feels better.  She feels like PT helped.  She has all of her instructions to do at home.  She will be out of town for a month.  She still has back pain in the morning when she gets up with an hour too loosen up.  The pain is worse on right hip.  She still has some pain all the time.  She has pain with walking and has to stop and then can continue going.  She is not having leg pain.  She will have it sporadically.  She is taking the tizanidine.  She feels like that helps her sleep.      MRI lumbar 5/2017 outside report  L1-2 moderate broad based disc bulge and left greater than right facet arthropathy  L2-3 DDD with left greater than right moderate facet arthopathy with NF narrowing  L3-4 broad based posterior disc bulge with left paracentral disc protrusion and moderate facet arthropathy with moderate central stenosis and moderate left NF narrowing  L4-5 disc dessication and moderate facet arthropathy with moderate right formainal narrowing  L5-S1 disc dessication and severe facet arthropathy and moderate central stenosis and moderate left NF narrowing    X-ray lumbar 6/22/2018  There is an S shaped scoliosis which is moderate.  The vertebral bodies are normal in height.  There is disc space  narrowing throughout and mild to moderate osteophytic spurring.  Overall moderate facet degenerative changes are present in the mid to lower spine.  There is no significant alignment abnormality or change in alignment with flexion and extension.    Incidental note is made of a small vascular stent overlying the spine likely relating to the left renal artery.  Vascular calcification present along the wall of the aorta.    Impression      Scoliosis and moderate degenerative changes      X-ray hips 6/22/2018  There is degenerative change within the lower spine.  The hips demonstrate no significant degenerative change.  There is no evidence of fracture.    Impression      As above      Past Medical History:  No date: Diabetes mellitus, type 2  No date: High blood pressure  No date: Scoliosis    History reviewed. No pertinent surgical history.    History reviewed.  No pertinent family history.      Social History    Marital status:              Spouse name:                       Years of education:                 Number of children:               Social History Main Topics    Smoking status: Former Smoker                                                                Packs/day: 0.00      Years: 0.00        Smokeless tobacco: Never Used                          Current Outpatient Prescriptions:  ACCU-CHEK SMARTVIEW TEST STRIP Strp, , Disp: , Rfl:   alendronate (FOSAMAX) 70 MG tablet, , Disp: , Rfl:   ALPRAZolam (XANAX) 0.5 MG tablet, Take 0.5 mg by mouth 3 (three) times daily., Disp: , Rfl:   amLODIPine (NORVASC) 10 MG tablet, , Disp: , Rfl:   aspirin (ECOTRIN) 81 MG EC tablet, Take 81 mg by mouth once daily., Disp: , Rfl:   buPROPion (WELLBUTRIN SR) 150 MG TBSR 12 hr tablet, , Disp: , Rfl:   BYSTOLIC 5 mg Tab, , Disp: , Rfl:   clopidogrel (PLAVIX) 75 mg tablet, , Disp: , Rfl:   fish oil-omega-3 fatty acids 300-1,000 mg capsule, Take by mouth once daily., Disp: , Rfl:   insulin lispro (HUMALOG) 100 unit/mL  injection, Inject into the skin 3 (three) times daily before meals., Disp: , Rfl:   JUBLIA 10 % Marilyn, APPLY ONE DROP TO ALL SMALLER TOES AND 2 DROPS TO GREATER TOES DAILY, Disp: , Rfl: 10  levothyroxine (SYNTHROID) 50 MCG tablet, Take 50 mcg by mouth once daily., Disp: , Rfl:   losartan (COZAAR) 50 MG tablet, , Disp: , Rfl:   MULTIVIT-IRON-MIN-FOLIC ACID 3,500-18-0.4 UNIT-MG-MG ORAL CHEW, Take by mouth., Disp: , Rfl:   OZEMPIC 1 mg/0.75 mL (2 mg/1.5 mL) PnIj, INJECT 1MG EVERY WEEK, Disp: , Rfl: 5  pantoprazole (PROTONIX) 40 MG tablet, Take 40 mg by mouth once daily., Disp: , Rfl:   risedronate (ACTONEL) 150 MG Tab, , Disp: , Rfl:   rosuvastatin (CRESTOR) 10 MG tablet, , Disp: , Rfl:     No current facility-administered medications for this visit.       Review of patient's allergies indicates:   -- Pcn (penicillins)                 Review of Systems   Constitution: Negative for weight gain and weight loss.   Cardiovascular: Negative for chest pain.   Respiratory: Negative for shortness of breath.    Musculoskeletal: Positive for back pain (right leg pain). Negative for joint pain and joint swelling.   Gastrointestinal: Negative for abdominal pain and bowel incontinence.   Genitourinary: Negative for bladder incontinence.   Neurological: Negative for numbness.           Objective:          General    Vitals reviewed.  Constitutional: She is oriented to person, place, and time. She appears well-developed and well-nourished.   HENT:   Head: Normocephalic and atraumatic.   Pulmonary/Chest: Effort normal.   Neurological: She is alert and oriented to person, place, and time.   Psychiatric: She has a normal mood and affect. Her behavior is normal. Judgment and thought content normal.     General Musculoskeletal Exam   Gait: abnormal (flexed posture and lack of arm swing)     Right Ankle/Foot Exam     Tests   Heel Walk: able to perform  Tiptoe Walk: able to perform    Left Ankle/Foot Exam     Tests   Heel Walk: able to  perform  Tiptoe Walk: able to perform  Back (L-Spine & T-Spine) / Neck (C-Spine) Exam     Tenderness Right paramedian tenderness of the Sacrum. Left paramedian tenderness of the Sacrum.     Back (L-Spine & T-Spine) Range of Motion   Extension: 20   Flexion: 90   Lateral Bend Right: 20   Lateral Bend Left: 20   Rotation Right: 40   Rotation Left: 40     Spinal Sensation   Right Side Sensation  C-Spine Level: normal   L-Spine Level: normal  S-Spine Level: normal  Left Side Sensation  C-Spine Level: normal  L-Spine Level: normal  S-Spine Level: normal    Back (L-Spine & T-Spine) Tests   Right Side Tests  Straight leg raise:      Sitting SLR: > 70 degrees      Left Side Tests  Straight leg raise:     Sitting SLR: > 70 degrees          Other She has no scoliosis .  Spinal Kyphosis:  Absent    Comments:  Neg RICA        Muscle Strength   Right Upper Extremity   Biceps: 5/5/5   Deltoid:  5/5  Triceps:  5/5  Wrist Extension: 5/5/5   Finger Flexors:  5/5  Left Upper Extremity  Biceps: 5/5/5   Deltoid:  5/5  Triceps:  5/5  Wrist Extension: 5/5/5   Finger Flexors:  5/5  Right Lower Extremity   Hip Flexion: 5/5   Quadriceps:  5/5   Anterior tibial:  5/5/5  EHL:  5/5  Left Lower Extremity   Hip Flexion: 5/5   Quadriceps:  5/5   Anterior tibial:  5/5/5   EHL:  5/5    Reflexes     Left Side  Biceps:  2+  Triceps:  2+  Brachioradialis:  2+  Quadriceps:  2+  Achilles:  2+  Left Steele's Sign:  Absent  Babinski Sign:  absent    Right Side   Biceps:  2+  Triceps:  2+  Brachioradialis:  2+  Quadriceps:  2+  Achilles:  2+  Right Steele's Sign:  absent  Babinski Sign:  absent    Vascular Exam     Right Pulses        Carotid:                  2+    Left Pulses        Carotid:                  2+        Assessment:       Encounter Diagnoses   Name Primary?    Chronic bilateral low back pain with right-sided sciatica Yes    Spondylosis of lumbar region without myelopathy or radiculopathy     Spinal stenosis of lumbar region without  neurogenic claudication          Plan:       Cheryl was seen today for low-back pain.    Diagnoses and all orders for this visit:    Chronic bilateral low back pain with right-sided sciatica    Spondylosis of lumbar region without myelopathy or radiculopathy    Spinal stenosis of lumbar region without neurogenic claudication           1.  Continue PT HEP, she will be out of town for a month.  She will continue PT when she gets back.  We discussed the importance of exercise and strengthening.  She wants to walk more we discussed walking more  2.  Tizanidine 2-4 TID, taking 1 at night  3.  X-ray lumbar spine was reviewed and showed DJD. No significant arthritis in hips  4.  Discuss gait with PCP, she does have lack of arm swing and masked expression  5.  MRI report of the lumbar spine was reviewed.  She does have facet arthropathy and spinal stenosis  6.  We discussed injections.  Would try facet injections first, but might also need FAM due to spinal stenosis, but no leg pain  7.  Continue tylenol as needed  8.  RTC 3 month

## 2018-08-02 NOTE — PROGRESS NOTES
"                                                    Physical Therapy Daily Note     Name: Cheryl Abreu  Clinic Number: [id number]  Diagnosis:   Encounter Diagnoses   Name Primary?    Chronic bilateral low back pain without sciatica     Muscle weakness     Muscle tightness     Decreased ROM of lumbar spine      Physician: Brisa Boyle, *    Visit #: 7 of 20  LI: 12/31/18    Time In: 1030  Time Out: 1140  Total Treatment Time: 60 minutes (1:1 with PTA for duration of treatment session)     Subjective     Pt reports: that she woke up this morning and moved around a little bit so she has minimal pain. Patient notes getting an injection in her foot yesterday. Patient is going out of town for a month and will return to PT after.  Pain Scale: Cheryl rates pain on a scale of 0-10 to be 1-2 currently.    Objective     Cheryl received individual therapeutic exercises to develop strength, endurance, ROM, flexibility, posture and core stabilization for 60 minutes including:    Upright Bike x 7 min  Calf Str c/ incline board 2 x 30" - performed at the wall today for HEP education  Trunk Flexion 3-way c/ ball x 3 minutes  Shuttle B LE Squat 2-black 3 x 10  Shuttle Piriformis Str 3 x 30" ea - performed supine on mat today for HEP education    HS Str c/ strap 3 x 30" ea  Sciatic N Glide R LE 30x  DKTC c/ ball 3 x 10  LTR c/ ball 2 x 10 ea  Post Pelvic Tilt 2 x 10  Bridge 2 x 10  TrA Brace 20x (+with mini march)  Hip adduction squeeze 3 x 10  Hip abduction x Green TB x 2 x 10   SL clamshells x Green TB x 2 x 10 ea   Prone Hip Ext 2 x 10 ea.  Open book 10x ea  Piriformis Str 3 x 30" Ea    Cheryl received the following manual therapy techniques: Myofacial release and Soft tissue Mobilization were applied to the: B Gluteal for 00 minutes including:  STM roller to B gluteal     Written Home Exercises Provided: 8/2/2018: Patient was given an updated HEP including: calf stretch, piriformis stretch, hamstring stretch, sciatic " nerve glide, DKTC, LTR, posterior pelvic tilt, bridges, TrA brace with march, hip adduction ball squeeze, hip abduction, SL clamshells, prone hip extension. See EMR for printout provided.   Pt demo good understanding of the education provided. Cheryl demonstrated good return demonstration of activities.     Eduction Provided: 8/2/2018: Patient was provided a handout regarding: Top 10 tips for back and neck pain, proper body mechanics for bending, carrying a child, holding a child, putting a child in/out of a car, getting a child in/out of tub, and picking a child up from the floor. See EMR for printout provided.    CMS Impairment/Limitation/Restriction for FOTO Lumbar Spine Survey  Status Limitation G-Code CMS Severity Modifier  Intake 47% 53%  Predicted 56% 44% Goal Status+ CK - At least 40 percent but less than 60 percent  8/2/2018 49% 51% Current Status CK - At least 40 percent but less than 60 percent    Assessment     Patient tolerated treatment session well today. Good tolerance to exercises performed with no exacerbation of low back pain. Patient was given an updated HEP this visit and encouraged to continuing performing exercises while out of town. Patient educated on importance of a walking program and proper body mechanics for holding and picking up her grandchildren. Patient verbal understanding of education provided.     This is a 73 y.o. female referred to outpatient physical therapy and presents with a medical diagnosis of Low Back Pain and demonstrates limitations as described in the problem list. Pt prognosis is Good. Pt will continue to benefit from skilled outpatient physical therapy to address the deficits listed in the problem list, provide pt/family education and to maximize pt's level of independence in the home and community environment.     Goals as follows:  Short Term GOALS: 4 weeks. Pt agrees with goals set.  1. Patient demonstrates independence with HEP.   2. Patient demonstrates  independence with Postural Awareness.   3. Patient demonstrates independence with body mechanics.   4. Patient will report pain of 5/10 at worst, on 0-10 pain scale, with all activity  5. Patient demonstrates increase thoracolumbar ROM by 5 degrees in all planes to improve tolerance to functional activities pain free.   6. Patient demonstrates increased strength BLE's by 1/3 muscle grade or greater to improve tolerance to functional activities pain free.      Plan     Certification Period: 7/11/18 - 10/11/18    Continue with established Plan of Care towards PT goals.    Therapist: Lisandra Abernathy, PTA  8/2/2018

## 2018-08-03 ENCOUNTER — OFFICE VISIT (OUTPATIENT)
Dept: SPINE | Facility: CLINIC | Age: 73
End: 2018-08-03
Attending: PHYSICAL MEDICINE & REHABILITATION
Payer: MEDICARE

## 2018-08-03 VITALS
BODY MASS INDEX: 26.12 KG/M2 | SYSTOLIC BLOOD PRESSURE: 139 MMHG | HEART RATE: 83 BPM | WEIGHT: 153 LBS | DIASTOLIC BLOOD PRESSURE: 74 MMHG | HEIGHT: 64 IN

## 2018-08-03 DIAGNOSIS — M47.816 SPONDYLOSIS OF LUMBAR REGION WITHOUT MYELOPATHY OR RADICULOPATHY: ICD-10-CM

## 2018-08-03 DIAGNOSIS — M48.061 SPINAL STENOSIS OF LUMBAR REGION WITHOUT NEUROGENIC CLAUDICATION: ICD-10-CM

## 2018-08-03 DIAGNOSIS — M54.41 CHRONIC BILATERAL LOW BACK PAIN WITH RIGHT-SIDED SCIATICA: Primary | ICD-10-CM

## 2018-08-03 DIAGNOSIS — G89.29 CHRONIC BILATERAL LOW BACK PAIN WITH RIGHT-SIDED SCIATICA: Primary | ICD-10-CM

## 2018-08-03 PROCEDURE — 99214 OFFICE O/P EST MOD 30 MIN: CPT | Mod: S$GLB,,, | Performed by: PHYSICAL MEDICINE & REHABILITATION

## 2018-08-03 PROCEDURE — 99999 PR PBB SHADOW E&M-EST. PATIENT-LVL III: CPT | Mod: PBBFAC,,, | Performed by: PHYSICAL MEDICINE & REHABILITATION

## 2018-08-03 RX ORDER — GLUCOSAMINE/CHONDR SU A SOD 750-600 MG
1 TABLET ORAL DAILY
COMMUNITY

## 2018-10-08 RX ORDER — TIZANIDINE 2 MG/1
2-4 TABLET ORAL EVERY 8 HOURS PRN
Qty: 180 TABLET | Refills: 2 | Status: SHIPPED | OUTPATIENT
Start: 2018-10-08 | End: 2019-10-01 | Stop reason: SDUPTHER

## 2018-10-29 ENCOUNTER — OFFICE VISIT (OUTPATIENT)
Dept: RHEUMATOLOGY | Facility: CLINIC | Age: 73
End: 2018-10-29
Payer: MEDICARE

## 2018-10-29 VITALS
HEART RATE: 89 BPM | HEIGHT: 65 IN | BODY MASS INDEX: 25.86 KG/M2 | DIASTOLIC BLOOD PRESSURE: 65 MMHG | SYSTOLIC BLOOD PRESSURE: 112 MMHG

## 2018-10-29 DIAGNOSIS — M81.0 OSTEOPOROSIS, UNSPECIFIED OSTEOPOROSIS TYPE, UNSPECIFIED PATHOLOGICAL FRACTURE PRESENCE: ICD-10-CM

## 2018-10-29 DIAGNOSIS — R29.6 MULTIPLE FALLS: ICD-10-CM

## 2018-10-29 DIAGNOSIS — M47.26 OSTEOARTHRITIS OF SPINE WITH RADICULOPATHY, LUMBAR REGION: Primary | ICD-10-CM

## 2018-10-29 DIAGNOSIS — R53.83 FATIGUE, UNSPECIFIED TYPE: ICD-10-CM

## 2018-10-29 PROCEDURE — 99999 PR PBB SHADOW E&M-EST. PATIENT-LVL III: CPT | Mod: PBBFAC,,, | Performed by: INTERNAL MEDICINE

## 2018-10-29 PROCEDURE — 99214 OFFICE O/P EST MOD 30 MIN: CPT | Mod: S$PBB,,, | Performed by: INTERNAL MEDICINE

## 2018-10-29 PROCEDURE — 99213 OFFICE O/P EST LOW 20 MIN: CPT | Mod: PBBFAC | Performed by: INTERNAL MEDICINE

## 2018-10-29 PROCEDURE — 1101F PT FALLS ASSESS-DOCD LE1/YR: CPT | Mod: CPTII,,, | Performed by: INTERNAL MEDICINE

## 2018-10-29 ASSESSMENT — ROUTINE ASSESSMENT OF PATIENT INDEX DATA (RAPID3)
AM STIFFNESS SCORE: 1, YES
PAIN SCORE: 2
MDHAQ FUNCTION SCORE: .5
TOTAL RAPID3 SCORE: 1.56
PSYCHOLOGICAL DISTRESS SCORE: 0
PATIENT GLOBAL ASSESSMENT SCORE: 1
WHEN YOU AWAKENED IN THE MORNING OVER THE LAST WEEK, PLEASE INDICATE THE AMOUNT OF TIME IT TAKES UNTIL YOU ARE AS LIMBER AS YOU WILL BE FOR THE DAY: 1 HOUR
FATIGUE SCORE: .5

## 2018-10-29 NOTE — PROGRESS NOTES
"Subjective:       Patient ID: Cheryl Abreu is a 73 y.o. female.    Chief Complaint: joint pain    HPI:  Cheryl Abreu is a 73 y.o. female with history of renal artery stenosis requiring stent at age 62, osteoporosis treated with Forteo and now takes Actonel  arthritis in hips, left thumb and back.  She has trouble getting out of bed due to pain.  She had back pains since 2013 or so and was referred to me but did not come since had to wait.  She had a pop in her back while working out.  She tore her hamstring on left from ischial tuberosity.  She received injection in her back.  She had 2 additional injection in the back.       Interval History:  Fell a couple months ago after carrying baby upstairs.  Dr. Boyle put her in PT which helped.    Pain today is 1/10 ache that is worse after walking 1/2 mile.    She has 30 minutes of morning stiffness.  Saw Dr. Doss (rheumatologist) 2012 or 2013 who found inflammation on labs but no specific disease.     Review of Systems   Constitutional: Negative for fever and unexpected weight change.   HENT: Negative for trouble swallowing.    Eyes: Negative for redness.   Respiratory: Negative for cough and shortness of breath.    Cardiovascular: Negative for chest pain.   Gastrointestinal: Negative for constipation and diarrhea.        Heartburn   Endocrine: Negative.    Genitourinary: Negative for dysuria and genital sores.   Musculoskeletal: Positive for back pain.   Skin: Negative for rash.   Allergic/Immunologic: Negative.    Neurological: Negative for headaches.   Hematological: Bruises/bleeds easily.   Psychiatric/Behavioral: Negative.          Objective:   /65   Pulse 89   Ht 5' 4.5" (1.638 m)   BMI 25.86 kg/m²      Physical Exam   Constitutional: She is oriented to person, place, and time and well-developed, well-nourished, and in no distress.   HENT:   Head: Normocephalic and atraumatic.   Eyes: Conjunctivae and EOM are normal.   Neck: Neck supple. "   Cardiovascular: Normal rate, regular rhythm and normal heart sounds.    Pulmonary/Chest: Effort normal and breath sounds normal.   Abdominal: Soft. Bowel sounds are normal.   Neurological: She is alert and oriented to person, place, and time. Gait normal.   Skin: Skin is warm and dry.     Psychiatric: Mood and affect normal.   Musculoskeletal:   Prominent right 1st MTP and deviation of feet       LABS    Component      Latest Ref Rng & Units 7/2/2018   Anti-SSA Antibody      0.00 - 19.99 EU 0.54   Anti-SSA Interpretation      Negative Negative   Sed Rate      0 - 20 mm/Hr 20   CRP      0.0 - 8.2 mg/L 5.3   Uric Acid      2.4 - 5.7 mg/dL 4.1   CCP Antibodies      <5.0 U/mL <0.5   Rheumatoid Factor      0.0 - 15.0 IU/mL 11.0   FRANCISCO Screen      Negative <1:160 Negative <1:160       Assessment:       1.  Back pain.    2.  Osteoporosis.  Treated with Forteo in the past and still on Actonel.  3.  Elevated inflammatory markers  4.  Foot pain bilateral.  Patient considering surgery (fusion of toe)  5.  Fatigue  6.  Falls.  Two to three in last 12 months  7.  Multiple medical problems. Diabetes, HTN, elevated cholesterol, hypothyroidism  Plan:       1.  Continue tizanidine  2.  Proceed with home exercises from PT  3.  Follow with Dr. Boyle   4.  Tylenol not to exceed 3,000 mg   5.  Avoid NSAID  6.  Trial capsaicin  7.   Gait analysis for falls    RTO 12 months/prn

## 2018-10-29 NOTE — PROGRESS NOTES
Rapid3 Question Responses and Scores 10/28/2018   MDHAQ Score 0.5   Psychologic Score 0   Pain Score 2   When you awakened in the morning OVER THE LAST WEEK, did you feel stiff? Yes   If Yes, please indicate the number of hours until you are as limber as you will be for the day 1   Fatigue Score 0.5   Global Health Score 1   RAPID3 Score 1.55       Answers for HPI/ROS submitted by the patient on 10/28/2018   fever: No  eye redness: No  headaches: No  shortness of breath: No  chest pain: No  trouble swallowing: No  diarrhea: No  constipation: Yes  unexpected weight change: No  genital sore: No  dysuria: No  During the last 3 days, have you had a skin rash?: No  Bruises or bleeds easily: Yes  cough: Yes

## 2018-11-06 ENCOUNTER — TELEPHONE (OUTPATIENT)
Dept: SPINE | Facility: CLINIC | Age: 73
End: 2018-11-06

## 2018-11-06 NOTE — PROGRESS NOTES
Subjective:      Patient ID: Cheryl Ramirez is a 73 y.o. female.    Chief Complaint: Low-back Pain    Referred by: No ref. provider found     Ms ramirez is a 74 yo female here for follow up of back pain.  She has had back pain off and on for the past 15 years.  Then 5 years ago she was working out and doing hamstring exercises and had left leg pain after a pop.  She tore her hamstring off her ischial tuberosity.  She feels like the back pain has been worse since then and could not get out of bed for 3 weeks and she had steroid injections which helped.  She was last seen by me on 8/3/2018 and she felt better after PT.  Today, she is doing good.  She was doing really well, but then slacked off on her HEP.  She has started doing it again.  She is sleeping better.  She has only had leg cramps 2 x and they were not bad.  She feels like the tizanidine helps.  She taking one to one and a half, but does have dry mouth.  She is comfortable with her HEP.  She is not a member of gym, but looking in to.  The pain is 3/10, the pain is not constat. She feels like she does not have strength.  She has pain across the back.      MRI lumbar 5/2017 outside report  L1-2 moderate broad based disc bulge and left greater than right facet arthropathy  L2-3 DDD with left greater than right moderate facet arthopathy with NF narrowing  L3-4 broad based posterior disc bulge with left paracentral disc protrusion and moderate facet arthropathy with moderate central stenosis and moderate left NF narrowing  L4-5 disc dessication and moderate facet arthropathy with moderate right formainal narrowing  L5-S1 disc dessication and severe facet arthropathy and moderate central stenosis and moderate left NF narrowing    X-ray lumbar 6/22/2018  There is an S shaped scoliosis which is moderate.  The vertebral bodies are normal in height.  There is disc space narrowing throughout and mild to moderate osteophytic spurring.  Overall moderate facet  degenerative changes are present in the mid to lower spine.  There is no significant alignment abnormality or change in alignment with flexion and extension.    Incidental note is made of a small vascular stent overlying the spine likely relating to the left renal artery.  Vascular calcification present along the wall of the aorta.    Impression      Scoliosis and moderate degenerative changes      X-ray hips 6/22/2018  There is degenerative change within the lower spine.  The hips demonstrate no significant degenerative change.  There is no evidence of fracture.    Impression      As above      Past Medical History:  No date: Diabetes mellitus, type 2  No date: High blood pressure  No date: Scoliosis    History reviewed. No pertinent surgical history.    History reviewed.  No pertinent family history.      Social History    Marital status:              Spouse name:                       Years of education:                 Number of children:               Social History Main Topics    Smoking status: Former Smoker                                                                Packs/day: 0.00      Years: 0.00        Smokeless tobacco: Never Used                          Current Outpatient Prescriptions:  ACCU-CHEK SMARTVIEW TEST STRIP Strp, , Disp: , Rfl:   alendronate (FOSAMAX) 70 MG tablet, , Disp: , Rfl:   ALPRAZolam (XANAX) 0.5 MG tablet, Take 0.5 mg by mouth 3 (three) times daily., Disp: , Rfl:   amLODIPine (NORVASC) 10 MG tablet, , Disp: , Rfl:   aspirin (ECOTRIN) 81 MG EC tablet, Take 81 mg by mouth once daily., Disp: , Rfl:   buPROPion (WELLBUTRIN SR) 150 MG TBSR 12 hr tablet, , Disp: , Rfl:   BYSTOLIC 5 mg Tab, , Disp: , Rfl:   clopidogrel (PLAVIX) 75 mg tablet, , Disp: , Rfl:   fish oil-omega-3 fatty acids 300-1,000 mg capsule, Take by mouth once daily., Disp: , Rfl:   insulin lispro (HUMALOG) 100 unit/mL injection, Inject into the skin 3 (three) times daily before meals., Disp: , Rfl:   JUBLIA 10 %  Marilyn, APPLY ONE DROP TO ALL SMALLER TOES AND 2 DROPS TO GREATER TOES DAILY, Disp: , Rfl: 10  levothyroxine (SYNTHROID) 50 MCG tablet, Take 50 mcg by mouth once daily., Disp: , Rfl:   losartan (COZAAR) 50 MG tablet, , Disp: , Rfl:   MULTIVIT-IRON-MIN-FOLIC ACID 3,500-18-0.4 UNIT-MG-MG ORAL CHEW, Take by mouth., Disp: , Rfl:   OZEMPIC 1 mg/0.75 mL (2 mg/1.5 mL) PnIj, INJECT 1MG EVERY WEEK, Disp: , Rfl: 5  pantoprazole (PROTONIX) 40 MG tablet, Take 40 mg by mouth once daily., Disp: , Rfl:   risedronate (ACTONEL) 150 MG Tab, , Disp: , Rfl:   rosuvastatin (CRESTOR) 10 MG tablet, , Disp: , Rfl:     No current facility-administered medications for this visit.       Review of patient's allergies indicates:   -- Pcn (penicillins)             Review of Systems   Constitution: Negative for weight gain and weight loss.   Cardiovascular: Negative for chest pain.   Respiratory: Negative for shortness of breath.    Musculoskeletal: Positive for back pain (right leg pain). Negative for joint pain and joint swelling.   Gastrointestinal: Negative for abdominal pain and bowel incontinence.   Genitourinary: Negative for bladder incontinence.   Neurological: Negative for numbness.           Objective:          General    Vitals reviewed.  Constitutional: She is oriented to person, place, and time. She appears well-developed and well-nourished.   HENT:   Head: Normocephalic and atraumatic.   Pulmonary/Chest: Effort normal.   Neurological: She is alert and oriented to person, place, and time.   Psychiatric: She has a normal mood and affect. Her behavior is normal. Judgment and thought content normal.     General Musculoskeletal Exam   Gait: abnormal (flexed posture and lack of arm swing)     Back (L-Spine & T-Spine) / Neck (C-Spine) Exam     Tenderness Right paramedian tenderness of the Sacrum. Left paramedian tenderness of the Sacrum.     Back (L-Spine & T-Spine) Range of Motion   Extension: 20   Flexion: 90   Lateral bend right: 20    Lateral bend left: 20   Rotation right: 40   Rotation left: 40     Spinal Sensation   Right Side Sensation  C-Spine Level: normal   L-Spine Level: normal  S-Spine Level: normal  Left Side Sensation  C-Spine Level: normal  L-Spine Level: normal  S-Spine Level: normal    Back (L-Spine & T-Spine) Tests   Right Side Tests  Straight leg raise:      Sitting SLR: > 70 degrees      Left Side Tests  Straight leg raise:     Sitting SLR: > 70 degrees          Other She has no scoliosis .  Spinal Kyphosis:  Absent    Comments:  Neg RICA        Muscle Strength   Right Upper Extremity   Biceps: 5/5/5   Deltoid:  5/5  Triceps:  5/5  Wrist extension: 5/5/5   Finger Flexors:  5/5  Left Upper Extremity  Biceps: 5/5/5   Deltoid:  5/5  Triceps:  5/5  Wrist extension: 5/5/5   Finger Flexors:  5/5  Right Lower Extremity   Hip Flexion: 5/5   Quadriceps:  5/5   Anterior tibial:  5/5/5  EHL:  5/5  Left Lower Extremity   Hip Flexion: 5/5   Quadriceps:  5/5   Anterior tibial:  5/5/5   EHL:  5/5    Reflexes     Left Side  Biceps:  2+  Triceps:  2+  Brachioradialis:  2+  Quadriceps:  2+  Achilles:  2+  Left Steele's Sign:  Absent  Babinski Sign:  absent    Right Side   Biceps:  2+  Triceps:  2+  Brachioradialis:  2+  Quadriceps:  2+  Achilles:  2+  Right Steele's Sign:  absent  Babinski Sign:  absent    Vascular Exam     Right Pulses        Carotid:                  2+    Left Pulses        Carotid:                  2+        Assessment:       Encounter Diagnoses   Name Primary?    Chronic bilateral low back pain with right-sided sciatica Yes    Spondylosis of lumbar region without myelopathy or radiculopathy     Spinal stenosis of lumbar region without neurogenic claudication          Plan:       Cheryl was seen today for low-back pain.    Diagnoses and all orders for this visit:    Chronic bilateral low back pain with right-sided sciatica  -     Ambulatory consult to [facility]    Spondylosis of lumbar region without  myelopathy or radiculopathy  -     Ambulatory consult to [facility]    Spinal stenosis of lumbar region without neurogenic claudication  -     Ambulatory consult to [facility]           1.  Continue PT HEP, she  Was doing well and then stopped her HEP, she has restarted.  She is also considering going back to yoga.  She does feel like she needs to be stronger.  [facility] will [facility]  2.  Tizanidine 2-4 TID, taking 1 at night  3. PT for progressive resistance exercise pattern 1  4.  Discuss gait with PCP, she does have lack of arm swing and masked expression  5.  MRI report of the lumbar spine was reviewed.  She does have facet arthropathy and spinal stenosis  6.  We discussed injections.  Would try facet injections first, but might also need FAM due to spinal stenosis, but no leg pain  7.  Continue tylenol as needed  8.  She brought her outside labs, will scan the report into media  9.  RTC 4 months

## 2018-11-07 ENCOUNTER — OFFICE VISIT (OUTPATIENT)
Dept: SPINE | Facility: CLINIC | Age: 73
End: 2018-11-07
Attending: PHYSICAL MEDICINE & REHABILITATION
Payer: MEDICARE

## 2018-11-07 VITALS
WEIGHT: 147 LBS | SYSTOLIC BLOOD PRESSURE: 114 MMHG | DIASTOLIC BLOOD PRESSURE: 59 MMHG | BODY MASS INDEX: 25.1 KG/M2 | HEART RATE: 93 BPM | HEIGHT: 64 IN

## 2018-11-07 DIAGNOSIS — M48.061 SPINAL STENOSIS OF LUMBAR REGION WITHOUT NEUROGENIC CLAUDICATION: ICD-10-CM

## 2018-11-07 DIAGNOSIS — G89.29 CHRONIC BILATERAL LOW BACK PAIN WITH RIGHT-SIDED SCIATICA: Primary | ICD-10-CM

## 2018-11-07 DIAGNOSIS — M54.41 CHRONIC BILATERAL LOW BACK PAIN WITH RIGHT-SIDED SCIATICA: Primary | ICD-10-CM

## 2018-11-07 DIAGNOSIS — M47.816 SPONDYLOSIS OF LUMBAR REGION WITHOUT MYELOPATHY OR RADICULOPATHY: ICD-10-CM

## 2018-11-07 PROCEDURE — 99214 OFFICE O/P EST MOD 30 MIN: CPT | Mod: S$GLB,,, | Performed by: PHYSICAL MEDICINE & REHABILITATION

## 2018-11-07 PROCEDURE — 99999 PR PBB SHADOW E&M-EST. PATIENT-LVL III: CPT | Mod: PBBFAC,,, | Performed by: PHYSICAL MEDICINE & REHABILITATION

## 2018-11-07 PROCEDURE — 1101F PT FALLS ASSESS-DOCD LE1/YR: CPT | Mod: CPTII,S$GLB,, | Performed by: PHYSICAL MEDICINE & REHABILITATION

## 2018-11-08 ENCOUNTER — CLINICAL SUPPORT (OUTPATIENT)
Dept: REHABILITATION | Facility: HOSPITAL | Age: 73
End: 2018-11-08
Attending: PHYSICAL MEDICINE & REHABILITATION
Payer: MEDICARE

## 2018-11-08 DIAGNOSIS — M62.89 MUSCLE TIGHTNESS: ICD-10-CM

## 2018-11-08 DIAGNOSIS — G89.29 CHRONIC BILATERAL LOW BACK PAIN WITHOUT SCIATICA: ICD-10-CM

## 2018-11-08 DIAGNOSIS — M62.81 MUSCLE WEAKNESS: ICD-10-CM

## 2018-11-08 DIAGNOSIS — M53.86 DECREASED ROM OF LUMBAR SPINE: ICD-10-CM

## 2018-11-08 DIAGNOSIS — M54.50 CHRONIC BILATERAL LOW BACK PAIN WITHOUT SCIATICA: ICD-10-CM

## 2018-11-08 PROCEDURE — 97161 PT EVAL LOW COMPLEX 20 MIN: CPT | Mod: PN

## 2018-11-08 PROCEDURE — G8982 BODY POS GOAL STATUS: HCPCS | Mod: CJ,PN

## 2018-11-08 PROCEDURE — G8981 BODY POS CURRENT STATUS: HCPCS | Mod: CK,PN

## 2018-11-08 PROCEDURE — 97110 THERAPEUTIC EXERCISES: CPT | Mod: PN

## 2018-11-08 NOTE — PLAN OF CARE
[facility] - PHYSICAL THERAPY EVALUATION     Name: Cheryl Abreu  Clinic Number: [id number]      Diagnosis:   Encounter Diagnoses   Name Primary?    Decreased ROM of lumbar spine     Muscle tightness     Chronic bilateral low back pain without sciatica     Muscle weakness      Physician: Brisa Boyle, *  Treatment Orders: PT Eval and Treat    Past Medical History:   Diagnosis Date    Diabetes mellitus, type 2     High blood pressure     Hyperlipidemia     Osteoporosis     Scoliosis     Thyroid disease      Current Outpatient Medications   Medication Sig    ACCU-CHEK SMARTVIEW TEST STRIP Strp     alendronate (FOSAMAX) 70 MG tablet     ALPRAZolam (XANAX) 0.5 MG tablet Take 0.5 mg by mouth 3 (three) times daily.    amLODIPine (NORVASC) 10 MG tablet     aspirin (ECOTRIN) 81 MG EC tablet Take 81 mg by mouth once daily.    biotin 2,500 mcg Cap     buPROPion (WELLBUTRIN SR) 150 MG TBSR 12 hr tablet     BYSTOLIC 5 mg Tab     clopidogrel (PLAVIX) 75 mg tablet     fish oil-omega-3 fatty acids 300-1,000 mg capsule Take by mouth once daily.    insulin lispro (HUMALOG) 100 unit/mL injection Inject into the skin 3 (three) times daily before meals.    JUBLIA 10 % Marilyn APPLY ONE DROP TO ALL SMALLER TOES AND 2 DROPS TO GREATER TOES DAILY    levothyroxine (SYNTHROID) 50 MCG tablet Take 50 mcg by mouth once daily.    losartan (COZAAR) 50 MG tablet     MULTIVIT-IRON-MIN-FOLIC ACID 3,500-18-0.4 UNIT-MG-MG ORAL CHEW Take by mouth.    OZEMPIC 1 mg/0.75 mL (2 mg/1.5 mL) PnIj INJECT 1MG EVERY WEEK    rosuvastatin (CRESTOR) 10 MG tablet     tiZANidine (ZANAFLEX) 2 MG tablet TAKE 1-2 TABLETS (2-4 MG TOTAL) BY MOUTH EVERY 8 (EIGHT) HOURS AS NEEDED.     No current facility-administered medications for this visit.      Review of patient's allergies indicates:   Allergen Reactions    Nickel Itching and Other (See Comments)    Pcn [penicillins]        Precautions: Fall     Pattern of pain  "determined: 1 PEN    Evaluation Date: 11/8/2018  Authorization Period Expiration: 12/31/18  Plan of Care Expiration: 2/8/19  Reassessment Due: 12/8/19  Visit # / Visits authorized: 1/ 20    Time In: 0730   Time Out: 0850  Total Billable Time: 80 minutes     HISTORY   History of Present Illness: Pt has been dx'd with arthritis, DDD, and sciatica which has been going on for around 10 years.  Pt was doing hamstring exercises around that time with , heard pop while doing one leg and while sitting off to one side due to swelling she pinched nerve in low back.  Pt has tried several injections which help at times, most recent 1 year ago.  Pt has also had PT for current complaint 1 month ago at this clinic; reports that pain is coming back because she has not been completing stretching.  Pt current c/o "sciatica" that travels from hip to lateral thigh that is worse when picking up something heavy, walking to restroom in the middle of the night.      Diagnostic Tests: From EPIC none    Pain Scale: Cheryl rates pain on a scale of 0-10 to be 8 at worst; 0 currently; 0 at best using VAS.   Pain location: B low back described as grabbing, tightening    Aggravating factors: walking, vacuuming, cooking/dishes in kitchen, standing putting on makeup or fixing hair  Easing Factors: rest, tylenol arthritis strength  Disturbed Sleep: no    Pattern of pain questions:  1.  Where is your pain the worst? back  2.  Is your pain constant or intermittent? Intermittent   3.  Does bending forward make your typical pain worse? yes  4.  Since the start of your back pain, has there been a change in your bowel or bladder? no  5.  What can't you do now that you use to be able to do? Walking around block, playing with grandchildren    Prior Treatment: PT 1 month ago  Prior functional status: independent  DME owned/used: none  Occupation:  retired   Leisure: watching grandchildren, walking, exercising                     Pts goals:  " Walk around block, play with grandchildren    Red Flag Screening:   Cough  Sneeze  Strain: (--)  Bladder/ bowel: (--)  Falls: (+) 1 month ago into car outside, 3 months ago carrying granddaughter up stairs  Night pain: (--)  Unexplained weight loss: (--)  General health: good    OBJECTIVE     Postural examination/scapula alignment: Rounded shoulder and Head forward, lumbar L concave scoliosis mild, L iliac crest height increased in standing and sitting  Joint integrity: hypomobility noted globally lumbar spine  Skin integrity: no deficits  Edema: none  Sitting: B UE forward/IR, slouched  Standing: decreased lumbar lordosis  Correction of posture: better with lumbar roll    MOVEMENT LOSS    ROM Loss   Flexion minimal loss   Extension moderate loss   Side bending Right minimal loss   Side bending Left minimal loss   Rotation Right minimal loss   Rotation Left minimal loss     Lower Extremity Strength  Right LE  Left LE    Hip flexion: 4/5 Hip flexion: 4+/5   Hip extension:  4/5 Hip extension: 4/5   Hip abduction: 4/5 Hip abduction: 4/5   Hip adduction:  4/5 Hip adduction:  4+/5   Hip Internal rotation   4-/5 Hip Internal rotation 4/5   Knee Flexion 4+/5 Knee Flexion 4+/5   Knee Extension 5/5 Knee Extension 5/5   Ankle dorsiflexion: 5/5 Ankle dorsiflexion: 5/5   Ankle plantarflexion: 5/5 Ankle plantarflexion: 5/5       GAIT:  Assistive Device used: none  Level of Assistance: independent  Patient displays the following gait deviations:  decreased step length and decreased B foot clearance intermittently.     Special Tests:   Test Name  Test Result   Prone Instability Test (--)   SI Joint Provocation Test (--)   Straight Leg Raise (--)   Neural Tension Test (--)   Crossed Straight Leg Raise (--)   Walking on toes (--)   Walking on heels  (--)       NEUROLOGICAL SCREENING     Sensory deficit: SILT    Reflexes:    Left Right   Patella Tendon 2+ 2+   Achilles Tendon 2+ 2+   Babinski  (--) (--)   Clonus (--) (--)      REPEATED TEST MOVEMENTS:  Repeated Flexion in Standing no effect   Repeated Extension in Standing no effect   Repeated Flexion in lying no effect   Repeated Extension in lying  no effect       STATIC TESTS   Sitting slouched  no worse   Sitting erect no better   Standing slouched no worse   Standing erect  no better   Lying prone in extension  worse, stiffness       Baseline Isometric Testing on Med X equipment: Testing administered by PT  Date of testin18  ROM 0-48 deg   Max Peak Torque 89    Min Peak Torque 19    Flex/Ext Ratio 4.6/1   % below normative data 44   Counter weight 160   femur 5   Seat pad 2           CMS Impairment/Limitation/Restriction for FOTO Survey    Therapist reviewed FOTO scores for Cheryl Abreu on 2018.   FOTO documents entered into [employer] - see Media section.    CMS Impairment/Limitation/Restriction for FOTO Lumbar Spine Survey  Status Limitation G-Code CMS Severity Modifier  Intake 50% 50% Current Status CK - At least 40 percent but less than 60 percent  Predicted 60% 40% Goal Status+ CK - At least 40 percent but less than 60 percent  D/C Status CK **only report if this is a one time visit  +Based on FOTO predicted change score         Treatment   Time In: 730  Time Out: 0850    PT Evaluation Completed? Yes  Discussed Plan of Care with patient: Yes      Home Exercise Program as follows:   Handouts were given to the patient. Pt demo good understanding of the education provided. Cheryl demonstrated good return demonstration of activities.   -ITB stretch, supine with strap  -glute stretch, figure 4 with pull across  -bridging  -SL hip abduction    - Patient received education regarding proper posture and body mechanics.  Patient was given top 10 tips handout which discusses posture seated, standing, lifting correctly, components of exercise, importance of nutrition and hydration, and importance of sleep.  - [place] cao tried, recommended, and purchase information was  provided.    - Patient received a handout regarding anticipated muscular soreness following the isometric test and strategies for management were reviewed with patient including stretching, using ice and scheduled rest.       Pt was instructed in and performed the following:   Cardiovascular exercise and therapeutic exercise to improve posture, lumbar/cervical ROM, strength, and muscular endurance as follows:     Cheryl received therapeutic exercises to develop/improve posture, lumbar/cervical ROM, strength and muscular endurance for 10 minutes including the following exercises:   -ITB stretch, supine with strap  -glute stretch, figure 4 with pull across  -bridging  -SL hip abduction    Cheryl received the following manual therapy techniques: Joint mobilizations, Myofacial release and Soft tissue Mobilization were applied to the: lumbar spine for 0 minutes.     Assessment   This is a 73 y.o. female referred to [facility] and presents with a medical diagnosis of   Encounter Diagnoses   Name Primary?    Decreased ROM of lumbar spine     Muscle tightness     Chronic bilateral low back pain without sciatica     Muscle weakness     and demonstrates limitations as described below in the problem list. Pt rehab potential is Good. Pt presents with impaired posture (increased height L iliac crest in sitting and standing) most likely caused by mild lumbar scoliosis (L concave lumbar), decreased spinal ROM, decreased global strength, and decreased spinal strength as measured on MedX lumbar extension machine.  Pt demonstrates max torque of 89 with min torque of 19 placing pt 44% below normative data.    Pain Pattern: 1 PEN       Patient received education on the [facility] program, purpose of the isometric test, progression of back strengthening as well as wellness approach and systemic strengthening.  Details of the program were discussed.  Reviewed that patient should feel support/pressure from med ex restraints  but no pain or discomfort and patient expressed understanding.    Based on the above history and physical examination an active physical therapy program is recommended.  Pt will continue to benefit from skilled outpatient physical therapy to address the deficits listed below in the chart, provide pt/family education and to maximize pt's level of independence in the home and community environment.     No environmental, cultural, spiritual, developmental or education needs expressed or noted    Medical necessity is demonstrated by the following problem list.    Pt presents with the following impairments:     History  Co-morbidities and personal factors that may impact the plan of care Co-morbidities:   diabetes    Personal Factors:   no deficits     low   Examination  Body Structures and Functions, activity limitations and participation restrictions that may impact the plan of care Body Regions:   back  lower extremities  trunk    Body Systems:    ROM  strength  gross coordinated movement  motor control    Participation Restrictions:   Standing, walking, lifting, bending    Activity limitations:   Learning and applying knowledge  no deficits    General Tasks and Commands  no deficits    Communication  no deficits    Mobility  lifting and carrying objects  walking    Self care  no deficits    Domestic Life  shopping  cooking  doing house work (cleaning house, washing dishes, laundry)    Interactions/Relationships  no deficits    Life Areas  no deficits    Community and Social Life  community life  recreation and leisure         low   Clinical Presentation stable and uncomplicated low   Decision Making/ Complexity Score: low       GOALS: Pt is in agreement with the following goals.    Short term goals:  6 weeks or 10 visits   1.  Pt will demonstrate increased lumbar ROM by at least 3 degrees from the initial ROM value with improvements noted in functional ROM and ability to perform ADLs  2.  Pt will demonstrate  "increased maximum isometric torque value by 20% when compared to the initial value resulting in improved ability to perform bending, lifting, and carrying activities safely, confidently.    3.  Patient report a reduction in worst pain score by 1-2 points for improved tolerance during work and recreational activities  4.  Pt able to perform HEP correctly with minimal cueing or supervision for therapist      Long term goals: 13 weeks or 20 visits   1. Pt will demonstrate increased lumbar ROM by at least 6 degrees from initial ROM value, resulting in improved ability to perform functional fwd bending while standing and sitting.   2. Pt will demonstrate increased maximum isometric torque value by 45% when compared to the initial value resulting in improved ability to perform bending, lifting, and carrying activities safely, confidently.  3. Pt to demonstrate ability to independently control and reduce their pain through posture positioning and mechanical movements throughout a typical day.  4.  Patient will demonstrate improved overall function per FOTO Survey to CJ = at least 20% but < 40% impaired, limited or restricted score or less.      Plan   Outpatient physical therapy 2x week for 13 weeks or 20 visits to include the following:   - Patient education  - Therapeutic exercise  - Manual therapy  - Performance testing   - Neuromuscular Re-education  - Therapeutic activity   - Modalities    Pt may be seen by PTA as part of the rehabilitation team.     Therapist: Anyi Schmidt, PT  11/8/2018    "I certify the need for these services furnished under this plan of treatment and while under my care."    ____________________________________  Physician/Referring Practitioner    _______________  Date of Signature          "

## 2018-11-08 NOTE — PATIENT INSTRUCTIONS
Top 10 tips for back and neck pain    The spine is the pillar of the body, providing the foundation for the upper and lower extremities to attach.  Our spines withstand significant forces all day long.  There are many ways in which we can take care of our backs.  Here are a couple tips to help you keep your back in action.    1. Watch your posture in sitting.     Sit in chairs with back supports, and use a lumbar roll to maintain the normal curve of your low back. Ensure the height of your chair is such that your feet rest flat on the floor with your knees and hips level.  The average American sits 9 hours a day.  Do not sit longer than 1 hour without getting up to stretch or move.     2. Watch your posture in standing.   Maintain the normal curves of your spine in standing.   When standing tall, you should be able to draw a line down through your ear, shoulder, hip, and ankle.  Wear good shoes and consider using a standing desk mat if you stand a lot during work.  Take breaks from standing.    3. Lift correctly   Lift objects by using the strong muscles in your legs.  Get close to the object, bend your knees and your hips, and maintain the normal curve of your low back. Do not twist when lifting or carrying items. Think about the tasks you perform daily at work or home, and minimize lifting and carrying objects.  Use rolling carts or other strategies to reduce back strain.    4. Exercise regularly  Individuals who exercise regularly generally experience better health, reduced back pain, and less stress.  A good exercise program has a stretching component, a strengthening component, and an aerobic component.   Maintain the mobility of your spine by stretching daily. Strengthen your core and extremities several times a week.   Get regular cardiovascular exercise, 3-5/week.  Choose activities you like such as walking, swimming, dancing, or riding a bike.     5. Quit Smoking  Smoking increases the likelihood of  back pain.  It is thought that smoking reduces the blood supply to the discs between the vertebrae and this may lead to degeneration of these discs.  Talk to your Physician about quitting.  There are many smoking cessation options that may work for you.    6. Keep moving even when you have pain  Motion is lotion.   The majority of back pain is mechanical in nature, and will likely reduce with gentle movements, stretching, and walking.  As tempting as it may be to stay in bed when you are hurting, remember that you will likely feel better by getting up and gently moving and walking.  Limit bed rest.      7. Maintain a healthy diet  Try to maintain a healthy diet and weight.        8. Stay hydrated  The average adult is approximately 60 % water.  Staying hydrated is beneficial for all aspects of health.  In general, an adult should drink half of their body weight in ounces.  For example, if you weight 180 lbs, you should drink 90 ounces of water daily.     9. Get regular sleep   Ensure that you get a good nights rest on a regular basis. The discs in your spine hydrate when you lie down to sleep. Your spine needs the rest too.     10. See Your Physician    Make an appointment to see your Physician for back pain that is progressively worsening, and for back pain that is no better or worse with changing positions and activities.        Z LIE POSITION  Z Lie is a position that you can use to unload your back and assist with pain reduction.  Lie on your back and rest your calves on the seat on a chair or a bench.  Viewed from the side, you should resemble a Z.  Your therapist may suggest sliding the chair closer to you, so your knees are over your stomach.   Your therapist may also suggest a pillow under your buttock if needed.  Follow the directions from your therapist.  The goal of this position is to reduce your symptoms.    Z lie can be done in a variety of ways.  It can be done on a bed resting your legs on a  light and easy to lift chair

## 2018-11-15 ENCOUNTER — CLINICAL SUPPORT (OUTPATIENT)
Dept: REHABILITATION | Facility: HOSPITAL | Age: 73
End: 2018-11-15
Attending: INTERNAL MEDICINE
Payer: MEDICARE

## 2018-11-15 DIAGNOSIS — M62.89 MUSCLE TIGHTNESS: ICD-10-CM

## 2018-11-15 DIAGNOSIS — M62.81 MUSCLE WEAKNESS: ICD-10-CM

## 2018-11-15 DIAGNOSIS — G89.29 CHRONIC BILATERAL LOW BACK PAIN WITHOUT SCIATICA: ICD-10-CM

## 2018-11-15 DIAGNOSIS — M53.86 DECREASED ROM OF LUMBAR SPINE: ICD-10-CM

## 2018-11-15 DIAGNOSIS — M54.50 CHRONIC BILATERAL LOW BACK PAIN WITHOUT SCIATICA: ICD-10-CM

## 2018-11-15 PROCEDURE — 97110 THERAPEUTIC EXERCISES: CPT | Mod: PN

## 2018-11-15 NOTE — PROGRESS NOTES
[facility] Back Physical Therapy Treatment      Name: Cheryl Abreu  Clinic Number: [id number]    Date of Treatment: 2018     Diagnosis:   Encounter Diagnoses   Name Primary?    Decreased ROM of lumbar spine     Muscle tightness     Chronic bilateral low back pain without sciatica     Muscle weakness      Physician: Brisa Boyle, *    Precautions: standard     Pattern of pain determined: 1    Evaluation Date: 2018  Authorization Period Expiration: 18  Plan of Care Expiration: 19  Reassessment Due: 19  Visit # / Visits authorized:     Time In: 1130  Time Out: 1230  Total Billable Time: 60 minutes     Subjective   Cheryl reports improvement of symptoms since initial evaluation.  Pt completing exercises which have seemed to help a little.  No new complaints.     Patient reports tolerating previous visit well  Patient reports their pain to be 5/10 on a 0-10 scale with 0 being no pain and 10 being the worst pain imaginable.  Pain Location: lumbar spine     Occupation:  retired   Leisure: watching grandchildren, walking, exercising                     Pts goals:  Walk around block, play with grandchildren      Objective   Baseline Isometric Testing on Med X equipment: Testing administered by PT  Date of testin18  ROM 0-48 deg   Max Peak Torque 89    Min Peak Torque 19    Flex/Ext Ratio 4.6/1   % below normative data 44   Counter weight 160   femur 5   Seat pad 2     CMS Impairment/Limitation/Restriction for FOTO Survey     Therapist reviewed FOTO scores for Cheryl Abreu on 2018.   FOTO documents entered into [employer] - see Media section.     CMS Impairment/Limitation/Restriction for FOTO Lumbar Spine Survey  Status Limitation G-Code CMS Severity Modifier  Intake 50% 50% Current Status CK - At least 40 percent but less than 60 percent  Predicted 60% 40% Goal Status+ CK - At least 40 percent but less than 60 percent  D/C Status CK **only report if this is a one  "time visit  +Based on FOTO predicted change score      Treatment    Pt was instructed in and performed the following:     Cheryl received therapeutic exercises to develop/improved posture, cardiovascular endurance, muscular endurance, lumbar/cervical ROM, strength and muscular endurance for 50 minutes including the following exercises:   -ITB stretch with strap, 3x15"  -figure 4 glute stretch 3x15"  -DKTC x10 with PB  -LTR x10 to each side with PB  -bridges x10, push through heels    [facility] 11/16/2018   Visit Number 2   Lumbar Extension Seat Pad 2   Femur Restraint 5   Top Dead Center 24   Counterweight 160   Lumbar Flexion 48   Lumbar Extension 0   Lumbar Peak Torque 89   Min Torque 19   Test Percent Below Normative Data 44   Lumbar Weight 44   Repetitions 20   Rating of Perceived Exertion 3   Ice - Z Lie (in min.) 10     Peripheral muscle strengthening which included 1 set of 15-20 repetitions at a slow, controlled 7 second per rep pace focused on strengthening supporting musculature for improved body mechanics and functional mobility.  Pt and therapist focused on proper form during treatment to ensure optimal strengthening of each targeted muscle group.  Machines were utilized including torso rotation, leg extension, leg curl, chest press, upright row. Tricep extension, bicep curl, leg press, and hip abduction added visit 3    Cheryl received the following manual therapy techniques: Joint mobilizations, Myofacial release and Soft tissue Mobilization were applied to the: lumbar spine for 0 minutes.     Home Exercise Program as follows:   Handouts were given to the patient. Pt demo good understanding of the education provided. Cheryl demonstrated good return demonstration of activities.   Lumbar roll use compliance: has not trialed at this time    Assessment   Pt shows good tolerance to treatment this visit with performance of warm up and stretching per Med ex extension machine.  Pt initiated " strengthening at 44ft.lbs with ability to complete 20 reps at an RPE of 3.  UE peripheral strengthening initiated this visit without complaint.  Cont per POC.     Patient is making good progress towards established goals.  Pt will continue to benefit from skilled outpatient physical therapy to address the deficits stated in the impairment chart, provide pt/family education and to maximize pt's level of independence in the home and community environment.       Pt's spiritual, cultural and educational needs considered and pt agreeable to plan of care and goals as stated below:     Medical necessity is demonstrated by the following problem list.    Pt presents with the following impairments:      History  Co-morbidities and personal factors that may impact the plan of care Co-morbidities:   diabetes     Personal Factors:   no deficits       low   Examination  Body Structures and Functions, activity limitations and participation restrictions that may impact the plan of care Body Regions:   back  lower extremities  trunk     Body Systems:    ROM  strength  gross coordinated movement  motor control     Participation Restrictions:   Standing, walking, lifting, bending     Activity limitations:   Learning and applying knowledge  no deficits     General Tasks and Commands  no deficits     Communication  no deficits     Mobility  lifting and carrying objects  walking     Self care  no deficits     Domestic Life  shopping  cooking  doing house work (cleaning house, washing dishes, laundry)     Interactions/Relationships  no deficits     Life Areas  no deficits     Community and Social Life  community life  recreation and leisure             low   Clinical Presentation stable and uncomplicated low   Decision Making/ Complexity Score: low         GOALS: Pt is in agreement with the following goals.     Short term goals:  6 weeks or 10 visits   1.  Pt will demonstrate increased lumbar ROM by at least 3 degrees from the initial ROM  value with improvements noted in functional ROM and ability to perform ADLs  2.  Pt will demonstrate increased maximum isometric torque value by 20% when compared to the initial value resulting in improved ability to perform bending, lifting, and carrying activities safely, confidently.     3.  Patient report a reduction in worst pain score by 1-2 points for improved tolerance during work and recreational activities  4.  Pt able to perform HEP correctly with minimal cueing or supervision for therapist        Long term goals: 13 weeks or 20 visits   1. Pt will demonstrate increased lumbar ROM by at least 6 degrees from initial ROM value, resulting in improved ability to perform functional fwd bending while standing and sitting.   2. Pt will demonstrate increased maximum isometric torque value by 45% when compared to the initial value resulting in improved ability to perform bending, lifting, and carrying activities safely, confidently.  3. Pt to demonstrate ability to independently control and reduce their pain through posture positioning and mechanical movements throughout a typical day.  4.  Patient will demonstrate improved overall function per FOTO Survey to CJ = at least 20% but < 40% impaired, limited or restricted score or less.         Anyi Schmidt, PT DPT       Plan   Continue with established Plan of Care towards established PT goals.

## 2018-11-21 ENCOUNTER — CLINICAL SUPPORT (OUTPATIENT)
Dept: REHABILITATION | Facility: HOSPITAL | Age: 73
End: 2018-11-21
Attending: INTERNAL MEDICINE
Payer: MEDICARE

## 2018-11-21 DIAGNOSIS — M54.50 CHRONIC BILATERAL LOW BACK PAIN WITHOUT SCIATICA: ICD-10-CM

## 2018-11-21 DIAGNOSIS — M62.81 MUSCLE WEAKNESS: ICD-10-CM

## 2018-11-21 DIAGNOSIS — M53.86 DECREASED ROM OF LUMBAR SPINE: ICD-10-CM

## 2018-11-21 DIAGNOSIS — M62.89 MUSCLE TIGHTNESS: ICD-10-CM

## 2018-11-21 DIAGNOSIS — G89.29 CHRONIC BILATERAL LOW BACK PAIN WITHOUT SCIATICA: ICD-10-CM

## 2018-11-21 PROCEDURE — 97110 THERAPEUTIC EXERCISES: CPT | Mod: PN

## 2018-11-21 NOTE — PROGRESS NOTES
[facility] Back Physical Therapy Treatment      Name: Cheryl Abreu  Clinic Number: [id number]    Date of Treatment: 2018     Diagnosis:   Encounter Diagnoses   Name Primary?    Decreased ROM of lumbar spine     Muscle tightness     Chronic bilateral low back pain without sciatica     Muscle weakness      Physician: Brisa Boyle, *    Precautions: Standard     Pattern of pain determined: 1    Evaluation Date: 2018  Authorization Period Expiration: 18  Plan of Care Expiration: 19  Reassessment Due: 18  Visit # / Visits authorized: 3 / 20    Time In: 0900  Time Out: 1010  Total Billable Time: 35 minutes     Subjective     Cheryl reports that she felt so good after last visit that she was able to go home and trim her hedges.    Patient reports tolerating previous visit well.  Patient reports their pain to be 3/10 on a 0-10 scale with 0 being no pain and 10 being the worst pain imaginable.  Pain Location: lumbar spine     Occupation:  retired   Leisure: watching grandchildren, walking, exercising                     Pts goals:  Walk around block, play with grandchildren    Objective     Baseline Isometric Testing on Med X equipment: Testing administered by PT  Date of testin18  ROM 0-48 deg   Max Peak Torque 89    Min Peak Torque 19    Flex/Ext Ratio 4.6/1   % below normative data 44   Counter weight 160   femur 5   Seat pad 2     Therapist reviewed FOTO scores for Cheryl Abreu on 2018.   FOTO documents entered into [employer] - see Media section.     CMS Impairment/Limitation/Restriction for FOTO Lumbar Spine Survey  Status Limitation G-Code CMS Severity Modifier  Intake 50% 50% Current Status CK - At least 40 percent but less than 60 percent  Predicted 60% 40% Goal Status+ CK - At least 40 percent but less than 60 percent  D/C Status CK **only report if this is a one time visit  +Based on FOTO predicted change score    Treatment      Pt was instructed in and  "performed the following:     Cheryl received therapeutic exercises to develop/improved posture, cardiovascular endurance, muscular endurance, lumbar/cervical ROM, strength and muscular endurance for 60 minutes including the following exercises:     ITB stretch with strap, 3 x 15"  Figure 4 glute stretch, 3 x 15"  DKTC x 10 with PB  LTR x 10 to each side with PB  Bridges x 10, push through heels    [facility] 11/21/2018   Visit Number 3   VAS Pain Rating 3   Recumbent Bike Seat Pos. 2   Time 10   Flexion in Lying 10   Manual Therapy 0   Lumbar Weight 46   Repetitions 20   Rating of Perceived Exertion 3   Ice - Z Lie (in min.) 10     Peripheral muscle strengthening which included 1 set of 15-20 repetitions at a slow, controlled 7 second per rep pace focused on strengthening supporting musculature for improved body mechanics and functional mobility.  Pt and therapist focused on proper form during treatment to ensure optimal strengthening of each targeted muscle group.  Machines were utilized including torso rotation, leg extension, leg curl, chest press, upright row, tricep extension, bicep curl, leg press, and hip abduction/adduction.    Cheryl received the following manual therapy techniques: Joint mobilizations, Myofacial release and Soft tissue Mobilization were applied to the: lumbar spine for 0 minutes.     Home Exercise Program as follows:   Handouts were given to the patient. Pt demo good understanding of the education provided. Cheryl demonstrated good return demonstration of activities.   -ITB stretch, supine with strap  -glute stretch, figure 4 with pull across  -bridging  -SL hip abduction    Lumbar roll use compliance: has not trialed at this time    Assessment     Patient tolerated treatment session well today. Good tolerance to 5% increase in ft/lbs on MedX Lumbar Extension machine reporting appropriate muscle fatigue after 20 repetitions. Patient able to complete all peripheral machines reporting " appropriate muscle response at end of session. Continue progressing patient per POC.     Patient is making good progress towards established goals.  Pt will continue to benefit from skilled outpatient physical therapy to address the deficits stated in the impairment chart, provide pt/family education and to maximize pt's level of independence in the home and community environment.     Pt's spiritual, cultural and educational needs considered and pt agreeable to plan of care and goals as stated below:     Medical necessity is demonstrated by the following problem list.    Pt presents with the following impairments:      History  Co-morbidities and personal factors that may impact the plan of care Co-morbidities:   diabetes     Personal Factors:   no deficits       low   Examination  Body Structures and Functions, activity limitations and participation restrictions that may impact the plan of care Body Regions:   back  lower extremities  trunk     Body Systems:    ROM  strength  gross coordinated movement  motor control     Participation Restrictions:   Standing, walking, lifting, bending     Activity limitations:   Learning and applying knowledge  no deficits     General Tasks and Commands  no deficits     Communication  no deficits     Mobility  lifting and carrying objects  walking     Self care  no deficits     Domestic Life  shopping  cooking  doing house work (cleaning house, washing dishes, laundry)     Interactions/Relationships  no deficits     Life Areas  no deficits     Community and Social Life  community life  recreation and leisure             low   Clinical Presentation stable and uncomplicated low   Decision Making/ Complexity Score: low      GOALS: Pt is in agreement with the following goals.     Short term goals:  6 weeks or 10 visits   1.  Pt will demonstrate increased lumbar ROM by at least 3 degrees from the initial ROM value with improvements noted in functional ROM and ability to perform  ADLs  2.  Pt will demonstrate increased maximum isometric torque value by 20% when compared to the initial value resulting in improved ability to perform bending, lifting, and carrying activities safely, confidently.  3.  Patient report a reduction in worst pain score by 1-2 points for improved tolerance during work and recreational activities  4.  Pt able to perform HEP correctly with minimal cueing or supervision for therapist     Long term goals: 13 weeks or 20 visits   1. Pt will demonstrate increased lumbar ROM by at least 6 degrees from initial ROM value, resulting in improved ability to perform functional fwd bending while standing and sitting.   2. Pt will demonstrate increased maximum isometric torque value by 45% when compared to the initial value resulting in improved ability to perform bending, lifting, and carrying activities safely, confidently.  3. Pt to demonstrate ability to independently control and reduce their pain through posture positioning and mechanical movements throughout a typical day.  4.  Patient will demonstrate improved overall function per FOTO Survey to CJ = at least 20% but < 40% impaired, limited or restricted score or less.    Plan     Continue with established Plan of Care towards established PT goals.     Therapist: Lisandra Abernathy, PTA  11/21/2018

## 2018-11-27 ENCOUNTER — CLINICAL SUPPORT (OUTPATIENT)
Dept: REHABILITATION | Facility: HOSPITAL | Age: 73
End: 2018-11-27
Attending: INTERNAL MEDICINE
Payer: MEDICARE

## 2018-11-27 DIAGNOSIS — M62.81 MUSCLE WEAKNESS: ICD-10-CM

## 2018-11-27 DIAGNOSIS — G89.29 CHRONIC BILATERAL LOW BACK PAIN WITHOUT SCIATICA: ICD-10-CM

## 2018-11-27 DIAGNOSIS — M62.89 MUSCLE TIGHTNESS: ICD-10-CM

## 2018-11-27 DIAGNOSIS — M53.86 DECREASED ROM OF LUMBAR SPINE: ICD-10-CM

## 2018-11-27 DIAGNOSIS — M54.50 CHRONIC BILATERAL LOW BACK PAIN WITHOUT SCIATICA: ICD-10-CM

## 2018-11-27 PROCEDURE — 97110 THERAPEUTIC EXERCISES: CPT | Mod: PN

## 2018-11-27 NOTE — PROGRESS NOTES
[facility]      Name: Cheryl Abreu  Clinic Number: [id number]    Date of Treatment: 2018     Diagnosis:   Encounter Diagnoses   Name Primary?    Decreased ROM of lumbar spine     Muscle tightness     Chronic bilateral low back pain without sciatica     Muscle weakness      Physician: Brisa Boyle, *    Precautions: Standard     Pattern of pain determined: 1    Evaluation Date: 2018  Authorization Period Expiration: 18  Plan of Care Expiration: 19  Reassessment Due: 18  Visit # / Visits authorized: 3 / 20    Time In: 1130  Time Out: 1230  Total Billable Time: minutes     Subjective     Cheryl reports that she continues to feel well.  Improvement in low back sx.  Pt reports that she has been stretching at home on her floor which feels good.     Patient reports tolerating previous visit well.  Patient reports their pain to be 2/10 on a 0-10 scale with 0 being no pain and 10 being the worst pain imaginable.  Pain Location: lumbar spine     Occupation:  retired   Leisure: watching grandchildren, walking, exercising                     Pts goals:  Walk around block, play with grandchildren    Objective     Baseline Isometric Testing on Med X equipment: Testing administered by PT  Date of testin18  ROM 0-48 deg   Max Peak Torque 89    Min Peak Torque 19    Flex/Ext Ratio 4.6/1   % below normative data 44   Counter weight 160   femur 5   Seat pad 2     Therapist reviewed FOTO scores for Cheryl Abreu on 2018.   FOTO documents entered into [employer] - see Media section.     CMS Impairment/Limitation/Restriction for FOTO Lumbar Spine Survey  Status Limitation G-Code CMS Severity Modifier  Intake 50% 50% Current Status CK - At least 40 percent but less than 60 percent  Predicted 60% 40% Goal Status+ CK - At least 40 percent but less than 60 percent  D/C Status CK **only report if this is a one time visit  +Based on FOTO predicted change  "score    Treatment      Pt was instructed in and performed the following:     Cheryl received therapeutic exercises to develop/improved posture, cardiovascular endurance, muscular endurance, lumbar/cervical ROM, strength and muscular endurance for 60 minutes including the following exercises:     ITB stretch with strap, 3 x 15"  Figure 4 glute stretch, 3 x 15"  DKTC x 10 with PB  LTR x 10 to each side with PB  Bridges x 15, push through heels    [facility] 11/27/2018   Visit Number 4   VAS Pain Rating 3   Treadmill Time (in min.) 10   Speed 1.8   Lumbar Weight 48   Repetitions 16   Rating of Perceived Exertion 6   Ice - Z Lie (in min.) 10     Peripheral muscle strengthening which included 1 set of 15-20 repetitions at a slow, controlled 7 second per rep pace focused on strengthening supporting musculature for improved body mechanics and functional mobility.  Pt and therapist focused on proper form during treatment to ensure optimal strengthening of each targeted muscle group.  Machines were utilized including torso rotation, leg extension, leg curl, chest press, upright row, tricep extension, bicep curl, leg press, and hip abduction/adduction.    Cheryl received the following manual therapy techniques: Joint mobilizations, Myofacial release and Soft tissue Mobilization were applied to the: lumbar spine for 0 minutes.     Home Exercise Program as follows:   Handouts were given to the patient. Pt demo good understanding of the education provided. Cheryl demonstrated good return demonstration of activities.   -ITB stretch, supine with strap  -glute stretch, figure 4 with pull across  -bridging  -SL hip abduction    Lumbar roll use compliance: has not trialed at this time    Assessment     Patient showing improved sx as well as tolerance to medX extension and peripheral strengthening program.  Pt able to complete 20 repetitions for peripheral strengthening at all UE and LE machines today without complaint and " appropriate challenge.  Able to complete 16 repetitions at 48ft.lbs which was increased from 46ft.lbs last session; much difficulty reported with performance.  Pt to continue as indicated with progression.     Patient is making good progress towards established goals.  Pt will continue to benefit from skilled outpatient physical therapy to address the deficits stated in the impairment chart, provide pt/family education and to maximize pt's level of independence in the home and community environment.     Pt's spiritual, cultural and educational needs considered and pt agreeable to plan of care and goals as stated below:     Medical necessity is demonstrated by the following problem list.    Pt presents with the following impairments:      History  Co-morbidities and personal factors that may impact the plan of care Co-morbidities:   diabetes     Personal Factors:   no deficits       low   Examination  Body Structures and Functions, activity limitations and participation restrictions that may impact the plan of care Body Regions:   back  lower extremities  trunk     Body Systems:    ROM  strength  gross coordinated movement  motor control     Participation Restrictions:   Standing, walking, lifting, bending     Activity limitations:   Learning and applying knowledge  no deficits     General Tasks and Commands  no deficits     Communication  no deficits     Mobility  lifting and carrying objects  walking     Self care  no deficits     Domestic Life  shopping  cooking  doing house work (cleaning house, washing dishes, laundry)     Interactions/Relationships  no deficits     Life Areas  no deficits     Community and Social Life  community life  recreation and leisure             low   Clinical Presentation stable and uncomplicated low   Decision Making/ Complexity Score: low      GOALS: Pt is in agreement with the following goals.     Short term goals:  6 weeks or 10 visits   1.  Pt will demonstrate increased lumbar ROM  by at least 3 degrees from the initial ROM value with improvements noted in functional ROM and ability to perform ADLs  2.  Pt will demonstrate increased maximum isometric torque value by 20% when compared to the initial value resulting in improved ability to perform bending, lifting, and carrying activities safely, confidently.  3.  Patient report a reduction in worst pain score by 1-2 points for improved tolerance during work and recreational activities  4.  Pt able to perform HEP correctly with minimal cueing or supervision for therapist     Long term goals: 13 weeks or 20 visits   1. Pt will demonstrate increased lumbar ROM by at least 6 degrees from initial ROM value, resulting in improved ability to perform functional fwd bending while standing and sitting.   2. Pt will demonstrate increased maximum isometric torque value by 45% when compared to the initial value resulting in improved ability to perform bending, lifting, and carrying activities safely, confidently.  3. Pt to demonstrate ability to independently control and reduce their pain through posture positioning and mechanical movements throughout a typical day.  4.  Patient will demonstrate improved overall function per FOTO Survey to CJ = at least 20% but < 40% impaired, limited or restricted score or less.    Plan     Continue with established Plan of Care towards established PT goals.     Therapist: Anyi Schmidt, PT  11/27/2018

## 2018-11-29 ENCOUNTER — CLINICAL SUPPORT (OUTPATIENT)
Dept: REHABILITATION | Facility: HOSPITAL | Age: 73
End: 2018-11-29
Attending: INTERNAL MEDICINE
Payer: MEDICARE

## 2018-11-29 DIAGNOSIS — M62.81 MUSCLE WEAKNESS: ICD-10-CM

## 2018-11-29 DIAGNOSIS — M53.86 DECREASED ROM OF LUMBAR SPINE: ICD-10-CM

## 2018-11-29 DIAGNOSIS — M62.89 MUSCLE TIGHTNESS: ICD-10-CM

## 2018-11-29 DIAGNOSIS — M54.50 CHRONIC BILATERAL LOW BACK PAIN WITHOUT SCIATICA: ICD-10-CM

## 2018-11-29 DIAGNOSIS — G89.29 CHRONIC BILATERAL LOW BACK PAIN WITHOUT SCIATICA: ICD-10-CM

## 2018-11-29 NOTE — PROGRESS NOTES
[facility]      Name: Cheryl Abreu  Clinic Number: [id number]    Date of Treatment: 2018     Diagnosis:   Encounter Diagnoses   Name Primary?    Decreased ROM of lumbar spine     Muscle tightness     Chronic bilateral low back pain without sciatica     Muscle weakness      Physician: Brisa Boyle, *    Precautions: Standard     Pattern of pain determined: 1    Evaluation Date: 2018  Authorization Period Expiration: 18  Plan of Care Expiration: 19  Reassessment Due: 18  Visit # / Visits authorized: 3 / 20    Time In: 0900  Time Out: 1010  Total Billable Time: 70 minutes     Subjective     Cheryl reports that her back is a little sore today.  Seems like it has gotten more sore daily since last visit.  Pt reports improvement in sx after treadmill walking.     Patient reports tolerating previous visit well.  Patient reports their pain to be 4/10 on a 0-10 scale with 0 being no pain and 10 being the worst pain imaginable.  Pain Location: lumbar spine     Occupation:  retired   Leisure: watching grandchildren, walking, exercising                     Pts goals:  Walk around block, play with grandchildren    Objective     Baseline Isometric Testing on Med X equipment: Testing administered by PT  Date of testin18  ROM 0-48 deg   Max Peak Torque 89    Min Peak Torque 19    Flex/Ext Ratio 4.6/1   % below normative data 44   Counter weight 160   femur 5   Seat pad 2     Therapist reviewed FOTO scores for Cheryl Abreu on 2018.   FOTO documents entered into [employer] - see Media section.     CMS Impairment/Limitation/Restriction for FOTO Lumbar Spine Survey  Status Limitation G-Code CMS Severity Modifier  Intake 50% 50% Current Status CK - At least 40 percent but less than 60 percent  Predicted 60% 40% Goal Status+ CK - At least 40 percent but less than 60 percent  D/C Status CK **only report if this is a one time visit  +Based on FOTO  "predicted change score    Treatment      Pt was instructed in and performed the following:     Cheryl received therapeutic exercises to develop/improved posture, cardiovascular endurance, muscular endurance, lumbar/cervical ROM, strength and muscular endurance for 60 minutes including the following exercises:     ITB stretch with strap, 3 x 15"  Figure 4 glute stretch, 3 x 15"  DKTC x 10 with PB  LTR x 10 to each side with PB  Bridges x 20, push through heels    [facility] 11/29/2018   Visit Number 5   VAS Pain Rating 4   Treadmill Time (in min.) 10   Speed 2   Lumbar Weight 48   Repetitions 18   Rating of Perceived Exertion 4   Ice - Z Lie (in min.) 10     Peripheral muscle strengthening which included 1 set of 15-20 repetitions at a slow, controlled 7 second per rep pace focused on strengthening supporting musculature for improved body mechanics and functional mobility.  Pt and therapist focused on proper form during treatment to ensure optimal strengthening of each targeted muscle group.  Machines were utilized including torso rotation, leg extension, leg curl, chest press, upright row, tricep extension, bicep curl, leg press, and hip abduction/adduction.    Cheryl received the following manual therapy techniques: Joint mobilizations, Myofacial release and Soft tissue Mobilization were applied to the: lumbar spine for 0 minutes.     Home Exercise Program as follows:   Handouts were given to the patient. Pt demo good understanding of the education provided. Cheryl demonstrated good return demonstration of activities.   -ITB stretch, supine with strap  -glute stretch, figure 4 with pull across  -bridging  -SL hip abduction    Lumbar roll use compliance: has not trialed at this time    Assessment     Although sore at beginning of session, pt able to perform all exercises with good demonstration and without complaint.  Pt able to complete 20 repetitions of all peripheral machines for first time today.  MedX " weight remains at 48ft.lbs today with completion of 18 repetitions with moderate difficulty; cont with progression as tolerated.  Pt making good progress with current program with overall improvement in sx and functional mobility.     Patient is making good progress towards established goals.  Pt will continue to benefit from skilled outpatient physical therapy to address the deficits stated in the impairment chart, provide pt/family education and to maximize pt's level of independence in the home and community environment.     Pt's spiritual, cultural and educational needs considered and pt agreeable to plan of care and goals as stated below:     Medical necessity is demonstrated by the following problem list.    Pt presents with the following impairments:      History  Co-morbidities and personal factors that may impact the plan of care Co-morbidities:   diabetes     Personal Factors:   no deficits       low   Examination  Body Structures and Functions, activity limitations and participation restrictions that may impact the plan of care Body Regions:   back  lower extremities  trunk     Body Systems:    ROM  strength  gross coordinated movement  motor control     Participation Restrictions:   Standing, walking, lifting, bending     Activity limitations:   Learning and applying knowledge  no deficits     General Tasks and Commands  no deficits     Communication  no deficits     Mobility  lifting and carrying objects  walking     Self care  no deficits     Domestic Life  shopping  cooking  doing house work (cleaning house, washing dishes, laundry)     Interactions/Relationships  no deficits     Life Areas  no deficits     Community and Social Life  community life  recreation and leisure             low   Clinical Presentation stable and uncomplicated low   Decision Making/ Complexity Score: low      GOALS: Pt is in agreement with the following goals.     Short term goals:  6 weeks or 10 visits   1.  Pt will  demonstrate increased lumbar ROM by at least 3 degrees from the initial ROM value with improvements noted in functional ROM and ability to perform ADLs  2.  Pt will demonstrate increased maximum isometric torque value by 20% when compared to the initial value resulting in improved ability to perform bending, lifting, and carrying activities safely, confidently.  3.  Patient report a reduction in worst pain score by 1-2 points for improved tolerance during work and recreational activities  4.  Pt able to perform HEP correctly with minimal cueing or supervision for therapist     Long term goals: 13 weeks or 20 visits   1. Pt will demonstrate increased lumbar ROM by at least 6 degrees from initial ROM value, resulting in improved ability to perform functional fwd bending while standing and sitting.   2. Pt will demonstrate increased maximum isometric torque value by 45% when compared to the initial value resulting in improved ability to perform bending, lifting, and carrying activities safely, confidently.  3. Pt to demonstrate ability to independently control and reduce their pain through posture positioning and mechanical movements throughout a typical day.  4.  Patient will demonstrate improved overall function per FOTO Survey to CJ = at least 20% but < 40% impaired, limited or restricted score or less.    Plan     Continue with established Plan of Care towards established PT goals.     Therapist: Anyi Schmidt, PT  11/29/2018

## 2018-12-04 ENCOUNTER — CLINICAL SUPPORT (OUTPATIENT)
Dept: REHABILITATION | Facility: HOSPITAL | Age: 73
End: 2018-12-04
Attending: INTERNAL MEDICINE
Payer: MEDICARE

## 2018-12-04 DIAGNOSIS — M62.89 MUSCLE TIGHTNESS: ICD-10-CM

## 2018-12-04 DIAGNOSIS — G89.29 CHRONIC BILATERAL LOW BACK PAIN WITHOUT SCIATICA: ICD-10-CM

## 2018-12-04 DIAGNOSIS — M62.81 MUSCLE WEAKNESS: ICD-10-CM

## 2018-12-04 DIAGNOSIS — M54.50 CHRONIC BILATERAL LOW BACK PAIN WITHOUT SCIATICA: ICD-10-CM

## 2018-12-04 DIAGNOSIS — M53.86 DECREASED ROM OF LUMBAR SPINE: ICD-10-CM

## 2018-12-04 PROCEDURE — 97110 THERAPEUTIC EXERCISES: CPT | Mod: PN

## 2018-12-04 NOTE — PROGRESS NOTES
[facility]      Name: Cheryl Abreu  Clinic Number: [id number]    Date of Treatment: 2018     Diagnosis:   Encounter Diagnoses   Name Primary?    Decreased ROM of lumbar spine     Muscle tightness     Chronic bilateral low back pain without sciatica     Muscle weakness      Physician: Brisa Boyle, *    Precautions: Standard     Pattern of pain determined: 1    Evaluation Date: 2018  Authorization Period Expiration: 18  Plan of Care Expiration: 19  Reassessment Due: 18  Visit # / Visits authorized:     Time In: 935  Time Out: 1035  Total Billable Time: 60 minutes (1 on 1x30 minutes)    Subjective     Cheryl reports that she had a shot in her toe a few days ago for arthritis and this has slowed her down a little, however, back continues to feel well without complaint today.      Patient reports tolerating previous visit well.  Patient reports their pain to be 2/10 on a 0-10 scale with 0 being no pain and 10 being the worst pain imaginable.  Pain Location: lumbar spine     Occupation:  retired   Leisure: watching grandchildren, walking, exercising                     Pts goals:  Walk around block, play with grandchildren    Objective     Baseline Isometric Testing on Med X equipment: Testing administered by PT  Date of testin18  ROM 0-48 deg   Max Peak Torque 89    Min Peak Torque 19    Flex/Ext Ratio 4.6/1   % below normative data 44   Counter weight 160   femur 5   Seat pad 2     Therapist reviewed FOTO scores for Cheryl Abreu on 2018.   FOTO documents entered into [employer] - see Media section.     CMS Impairment/Limitation/Restriction for FOTO Lumbar Spine Survey  Status Limitation G-Code CMS Severity Modifier  Intake 50% 50% Current Status CK - At least 40 percent but less than 60 percent  Predicted 60% 40% Goal Status+ CK - At least 40 percent but less than 60 percent  D/C Status CK **only report if this is a one time  "visit  +Based on FOTO predicted change score    Treatment      Pt was instructed in and performed the following:     Cheryl received therapeutic exercises to develop/improved posture, cardiovascular endurance, muscular endurance, lumbar/cervical ROM, strength and muscular endurance for 60 minutes including the following exercises:     ITB stretch with strap, 3 x 15"  Figure 4 glute stretch, LE on SB, 3 x 15"  DKTC x 10 with PB  LTR x 10 to each side with PB  Bridges x 20, push through heels    [facility] 12/4/2018   Visit Number 6   VAS Pain Rating 2   Treadmill Time (in min.) 10   Speed 2   Lumbar Weight 48   Repetitions 20   Rating of Perceived Exertion 5   Ice - Z Lie (in min.) 10     Peripheral muscle strengthening which included 1 set of 15-20 repetitions at a slow, controlled 7 second per rep pace focused on strengthening supporting musculature for improved body mechanics and functional mobility.  Pt and therapist focused on proper form during treatment to ensure optimal strengthening of each targeted muscle group.  Machines were utilized including torso rotation, leg extension, leg curl, chest press, upright row, tricep extension, bicep curl, leg press, and hip abduction/adduction.    Cheryl received the following manual therapy techniques: Joint mobilizations, Myofacial release and Soft tissue Mobilization were applied to the: lumbar spine for 0 minutes.     Home Exercise Program as follows:   Handouts were given to the patient. Pt demo good understanding of the education provided. Cheryl demonstrated good return demonstration of activities.   -ITB stretch, supine with strap  -glute stretch, figure 4 with pull across  -bridging  -SL hip abduction    Lumbar roll use compliance: has not trialed at this time    Assessment     Pt continues to show improvement in strength as evidenced by ability to perform 20 repetitions on [employer] lumbar extension machine this visit with lower reported RPE and increased reps " at 48ft.lb.  Pt required cues this visit for proper performance as it was noted that pt was performing exercises with strong focus on UE's to perform motion; improved ease after cues to push through posterior shoulders and trunk.  Cont per POC with increased resistance and progression of core stab exercises as indicated.     Patient is making good progress towards established goals.  Pt will continue to benefit from skilled outpatient physical therapy to address the deficits stated in the impairment chart, provide pt/family education and to maximize pt's level of independence in the home and community environment.     Pt's spiritual, cultural and educational needs considered and pt agreeable to plan of care and goals as stated below:     Medical necessity is demonstrated by the following problem list.    Pt presents with the following impairments:      History  Co-morbidities and personal factors that may impact the plan of care Co-morbidities:   diabetes     Personal Factors:   no deficits       low   Examination  Body Structures and Functions, activity limitations and participation restrictions that may impact the plan of care Body Regions:   back  lower extremities  trunk     Body Systems:    ROM  strength  gross coordinated movement  motor control     Participation Restrictions:   Standing, walking, lifting, bending     Activity limitations:   Learning and applying knowledge  no deficits     General Tasks and Commands  no deficits     Communication  no deficits     Mobility  lifting and carrying objects  walking     Self care  no deficits     Domestic Life  shopping  cooking  doing house work (cleaning house, washing dishes, laundry)     Interactions/Relationships  no deficits     Life Areas  no deficits     Community and Social Life  community life  recreation and leisure             low   Clinical Presentation stable and uncomplicated low   Decision Making/ Complexity Score: low      GOALS: Pt is in agreement  with the following goals.     Short term goals:  6 weeks or 10 visits   1.  Pt will demonstrate increased lumbar ROM by at least 3 degrees from the initial ROM value with improvements noted in functional ROM and ability to perform ADLs  2.  Pt will demonstrate increased maximum isometric torque value by 20% when compared to the initial value resulting in improved ability to perform bending, lifting, and carrying activities safely, confidently.  3.  Patient report a reduction in worst pain score by 1-2 points for improved tolerance during work and recreational activities  4.  Pt able to perform HEP correctly with minimal cueing or supervision for therapist     Long term goals: 13 weeks or 20 visits   1. Pt will demonstrate increased lumbar ROM by at least 6 degrees from initial ROM value, resulting in improved ability to perform functional fwd bending while standing and sitting.   2. Pt will demonstrate increased maximum isometric torque value by 45% when compared to the initial value resulting in improved ability to perform bending, lifting, and carrying activities safely, confidently.  3. Pt to demonstrate ability to independently control and reduce their pain through posture positioning and mechanical movements throughout a typical day.  4.  Patient will demonstrate improved overall function per FOTO Survey to CJ = at least 20% but < 40% impaired, limited or restricted score or less.    Plan     Continue with established Plan of Care towards established PT goals.     Therapist: Anyi Schmidt, PT  12/04/2018

## 2018-12-06 ENCOUNTER — CLINICAL SUPPORT (OUTPATIENT)
Dept: REHABILITATION | Facility: HOSPITAL | Age: 73
End: 2018-12-06
Attending: INTERNAL MEDICINE
Payer: MEDICARE

## 2018-12-06 DIAGNOSIS — G89.29 CHRONIC BILATERAL LOW BACK PAIN WITHOUT SCIATICA: ICD-10-CM

## 2018-12-06 DIAGNOSIS — M54.50 CHRONIC BILATERAL LOW BACK PAIN WITHOUT SCIATICA: ICD-10-CM

## 2018-12-06 DIAGNOSIS — M62.81 MUSCLE WEAKNESS: ICD-10-CM

## 2018-12-06 DIAGNOSIS — M53.86 DECREASED ROM OF LUMBAR SPINE: ICD-10-CM

## 2018-12-06 DIAGNOSIS — M62.89 MUSCLE TIGHTNESS: ICD-10-CM

## 2018-12-06 PROCEDURE — 97110 THERAPEUTIC EXERCISES: CPT | Mod: PN

## 2018-12-06 NOTE — PROGRESS NOTES
[facility]      Name: Cheryl Abreu  Clinic Number: [id number]    Date of Treatment: 2018     Diagnosis:   Encounter Diagnoses   Name Primary?    Decreased ROM of lumbar spine     Muscle tightness     Chronic bilateral low back pain without sciatica     Muscle weakness      Physician: Brisa Boyle, *    Precautions: Standard     Pattern of pain determined: 1    Evaluation Date: 2018  Authorization Period Expiration: 18  Plan of Care Expiration: 19  Reassessment Due: 18  Visit # / Visits authorized:     Time In: 935  Time Out: 1035  Total Billable Time: 60 minutes (1 on 1x30 minutes)    Subjective     Cheryl reports that she is feeling very good today.  She has little to no pain in her back and has been having an easy time walking and getting ready for [place].     Patient reports tolerating previous visit well.  Patient reports their pain to be 1/10 on a 0-10 scale with 0 being no pain and 10 being the worst pain imaginable.  Pain Location: lumbar spine     Occupation:  retired   Leisure: watching grandchildren, walking, exercising                     Pts goals:  Walk around block, play with grandchildren    Objective     Baseline Isometric Testing on Med X equipment: Testing administered by PT  Date of testin18  ROM 0-48 deg   Max Peak Torque 89    Min Peak Torque 19    Flex/Ext Ratio 4.6/1   % below normative data 44   Counter weight 160   femur 5   Seat pad 2     Therapist reviewed FOTO scores for Cheryl Abreu on 2018.   FOTO documents entered into [employer] - see Media section.     CMS Impairment/Limitation/Restriction for FOTO Lumbar Spine Survey  Status Limitation G-Code CMS Severity Modifier  Intake 50% 50% Current Status CK - At least 40 percent but less than 60 percent  Predicted 60% 40% Goal Status+ CK - At least 40 percent but less than 60 percent  D/C Status CK **only report if this is a one time visit  +Based  "on FOTO predicted change score    Treatment      Pt was instructed in and performed the following:     Cheryl received therapeutic exercises to develop/improved posture, cardiovascular endurance, muscular endurance, lumbar/cervical ROM, strength and muscular endurance for 60 minutes including the following exercises:     ITB stretch with strap, 3 x 15"  Figure 4 glute stretch, LE on SB, 3 x 15"  DKTC x 10 with PB  LTR x 10 to each side with PB  Bridges x 20, push through heels    [facility] 12/6/2018   Visit Number 7   VAS Pain Rating 1   Treadmill Time (in min.) 10   Speed 2   Incline 0   Lumbar Weight 50   Repetitions 20   Rating of Perceived Exertion 3   Ice - Z Lie (in min.) 10       Peripheral muscle strengthening which included 1 set of 15-20 repetitions at a slow, controlled 7 second per rep pace focused on strengthening supporting musculature for improved body mechanics and functional mobility.  Pt and therapist focused on proper form during treatment to ensure optimal strengthening of each targeted muscle group.  Machines were utilized including torso rotation, leg extension, leg curl, chest press, upright row, tricep extension, bicep curl, leg press, and hip abduction/adduction.    Cheryl received the following manual therapy techniques: Joint mobilizations, Myofacial release and Soft tissue Mobilization were applied to the: lumbar spine for 0 minutes.     Home Exercise Program as follows:   Handouts were given to the patient. Pt demo good understanding of the education provided. Cheryl demonstrated good return demonstration of activities.   -ITB stretch, supine with strap  -glute stretch, figure 4 with pull across  -bridging  -SL hip abduction    Lumbar roll use compliance: has not trialed at this time    Assessment     Increase in MedX extension weight by 5% this visit with ability to perform 20 repetitions at RPE of 3; reports great difference in performance since last visit when she was able to " perform correctly.  Pt without complaint this visit and shows appropriate challenge to exercise.  Cont per POC.     Patient is making good progress towards established goals.  Pt will continue to benefit from skilled outpatient physical therapy to address the deficits stated in the impairment chart, provide pt/family education and to maximize pt's level of independence in the home and community environment.     Pt's spiritual, cultural and educational needs considered and pt agreeable to plan of care and goals as stated below:     Medical necessity is demonstrated by the following problem list.    Pt presents with the following impairments:      History  Co-morbidities and personal factors that may impact the plan of care Co-morbidities:   diabetes     Personal Factors:   no deficits       low   Examination  Body Structures and Functions, activity limitations and participation restrictions that may impact the plan of care Body Regions:   back  lower extremities  trunk     Body Systems:    ROM  strength  gross coordinated movement  motor control     Participation Restrictions:   Standing, walking, lifting, bending     Activity limitations:   Learning and applying knowledge  no deficits     General Tasks and Commands  no deficits     Communication  no deficits     Mobility  lifting and carrying objects  walking     Self care  no deficits     Domestic Life  shopping  cooking  doing house work (cleaning house, washing dishes, laundry)     Interactions/Relationships  no deficits     Life Areas  no deficits     Community and Social Life  community life  recreation and leisure             low   Clinical Presentation stable and uncomplicated low   Decision Making/ Complexity Score: low      GOALS: Pt is in agreement with the following goals.     Short term goals:  6 weeks or 10 visits   1.  Pt will demonstrate increased lumbar ROM by at least 3 degrees from the initial ROM value with improvements noted in functional ROM and  ability to perform ADLs  2.  Pt will demonstrate increased maximum isometric torque value by 20% when compared to the initial value resulting in improved ability to perform bending, lifting, and carrying activities safely, confidently.  3.  Patient report a reduction in worst pain score by 1-2 points for improved tolerance during work and recreational activities  4.  Pt able to perform HEP correctly with minimal cueing or supervision for therapist     Long term goals: 13 weeks or 20 visits   1. Pt will demonstrate increased lumbar ROM by at least 6 degrees from initial ROM value, resulting in improved ability to perform functional fwd bending while standing and sitting.   2. Pt will demonstrate increased maximum isometric torque value by 45% when compared to the initial value resulting in improved ability to perform bending, lifting, and carrying activities safely, confidently.  3. Pt to demonstrate ability to independently control and reduce their pain through posture positioning and mechanical movements throughout a typical day.  4.  Patient will demonstrate improved overall function per FOTO Survey to CJ = at least 20% but < 40% impaired, limited or restricted score or less.    Plan     Continue with established Plan of Care towards established PT goals.     Therapist: Anyi Schmidt, PT  12/06/2018

## 2018-12-11 ENCOUNTER — CLINICAL SUPPORT (OUTPATIENT)
Dept: REHABILITATION | Facility: HOSPITAL | Age: 73
End: 2018-12-11
Attending: INTERNAL MEDICINE
Payer: MEDICARE

## 2018-12-11 DIAGNOSIS — M53.86 DECREASED ROM OF LUMBAR SPINE: ICD-10-CM

## 2018-12-11 DIAGNOSIS — M62.89 MUSCLE TIGHTNESS: ICD-10-CM

## 2018-12-11 DIAGNOSIS — M62.81 MUSCLE WEAKNESS: ICD-10-CM

## 2018-12-11 DIAGNOSIS — G89.29 CHRONIC BILATERAL LOW BACK PAIN WITHOUT SCIATICA: ICD-10-CM

## 2018-12-11 DIAGNOSIS — M54.50 CHRONIC BILATERAL LOW BACK PAIN WITHOUT SCIATICA: ICD-10-CM

## 2018-12-11 PROCEDURE — 97110 THERAPEUTIC EXERCISES: CPT | Mod: PN

## 2018-12-11 NOTE — PROGRESS NOTES
[facility]      Name: Cheryl Abreu  Clinic Number: [id number]    Date of Treatment: 2018     Diagnosis:   Encounter Diagnoses   Name Primary?    Decreased ROM of lumbar spine     Muscle tightness     Chronic bilateral low back pain without sciatica     Muscle weakness      Physician: Brisa Boyle, *    Precautions: Standard     Pattern of pain determined: 1    Evaluation Date: 2018  Authorization Period Expiration: 18  Plan of Care Expiration: 19  Reassessment Due: 18  Visit # / Visits authorized:     Time In: 1235   Time Out: 1338  Total Billable Time: 60 minutes (1 on 1x30 minutes)    Subjective     Cheryl reports her back isn't bothering her today but that she is a little tired; overall stressed due to traveling at [place] time.      Patient reports tolerating previous visit well.  Patient reports their pain to be 0/10 on a 0-10 scale with 0 being no pain and 10 being the worst pain imaginable.  Pain Location: lumbar spine     Occupation:  retired   Leisure: watching grandchildren, walking, exercising                     Pts goals:  Walk around block, play with grandchildren    Objective     Baseline Isometric Testing on Med X equipment: Testing administered by PT  Date of testin18  ROM 0-48 deg   Max Peak Torque 89    Min Peak Torque 19    Flex/Ext Ratio 4.6/1   % below normative data 44   Counter weight 160   femur 5   Seat pad 2     Therapist reviewed FOTO scores for Cheryl Abreu on 2018.   FOTO documents entered into [employer] - see Media section.     CMS Impairment/Limitation/Restriction for FOTO Lumbar Spine Survey  Status Limitation G-Code CMS Severity Modifier  Intake 50% 50% Current Status CK - At least 40 percent but less than 60 percent  Predicted 60% 40% Goal Status+ CK - At least 40 percent but less than 60 percent  D/C Status CK **only report if this is a one time visit  +Based on FOTO predicted change  "score    Treatment      Pt was instructed in and performed the following:     Cheryl received therapeutic exercises to develop/improved posture, cardiovascular endurance, muscular endurance, lumbar/cervical ROM, strength and muscular endurance for 60 minutes including the following exercises:     ITB stretch with strap, 3 x 15"  Figure 4 glute stretch, LE on SB, 3 x 15"  DKTC x 10 with PB  LTR x 10 to each side with PB  Bridges x 10, push through heels with active abd at top, YTB    [facility] 12/11/2018   Visit Number 8   VAS Pain Rating 0   Treadmill Time (in min.) 10   Speed 2   Incline 0   Lumbar Weight 50   Repetitions 20   Rating of Perceived Exertion 3   Ice - Z Lie (in min.) 10       Peripheral muscle strengthening which included 1 set of 15-20 repetitions at a slow, controlled 7 second per rep pace focused on strengthening supporting musculature for improved body mechanics and functional mobility.  Pt and therapist focused on proper form during treatment to ensure optimal strengthening of each targeted muscle group.  Machines were utilized including torso rotation, leg extension, leg curl, chest press, upright row, tricep extension, bicep curl, leg press, and hip abduction/adduction.    Cheryl received the following manual therapy techniques: Joint mobilizations, Myofacial release and Soft tissue Mobilization were applied to the: lumbar spine for 0 minutes.     Home Exercise Program as follows:   Handouts were given to the patient. Pt demo good understanding of the education provided. Cheryl demonstrated good return demonstration of activities.   -ITB stretch, supine with strap  -glute stretch, figure 4 with pull across  -bridging  -SL hip abduction    Lumbar roll use compliance: has not trialed at this time    Assessment     Pt is making good progress with skilled program; addition of YTB to bridging this visit with active abd at max extension point which pt able to complete without cues after " initial demonstration.  Lumbar extension weight kept consistent from last session; able to complete 20 repetitions at 50ft.lbs with appropriate fatigue following exercise.  Pt to benefit from weight increase next session.  Cont per POC.     Patient is making good progress towards established goals.  Pt will continue to benefit from skilled outpatient physical therapy to address the deficits stated in the impairment chart, provide pt/family education and to maximize pt's level of independence in the home and community environment.     Pt's spiritual, cultural and educational needs considered and pt agreeable to plan of care and goals as stated below:     Medical necessity is demonstrated by the following problem list.    Pt presents with the following impairments:      History  Co-morbidities and personal factors that may impact the plan of care Co-morbidities:   diabetes     Personal Factors:   no deficits       low   Examination  Body Structures and Functions, activity limitations and participation restrictions that may impact the plan of care Body Regions:   back  lower extremities  trunk     Body Systems:    ROM  strength  gross coordinated movement  motor control     Participation Restrictions:   Standing, walking, lifting, bending     Activity limitations:   Learning and applying knowledge  no deficits     General Tasks and Commands  no deficits     Communication  no deficits     Mobility  lifting and carrying objects  walking     Self care  no deficits     Domestic Life  shopping  cooking  doing house work (cleaning house, washing dishes, laundry)     Interactions/Relationships  no deficits     Life Areas  no deficits     Community and Social Life  community life  recreation and leisure             low   Clinical Presentation stable and uncomplicated low   Decision Making/ Complexity Score: low      GOALS: Pt is in agreement with the following goals.     Short term goals:  6 weeks or 10 visits   1.  Pt will  demonstrate increased lumbar ROM by at least 3 degrees from the initial ROM value with improvements noted in functional ROM and ability to perform ADLs  2.  Pt will demonstrate increased maximum isometric torque value by 20% when compared to the initial value resulting in improved ability to perform bending, lifting, and carrying activities safely, confidently.  3.  Patient report a reduction in worst pain score by 1-2 points for improved tolerance during work and recreational activities  4.  Pt able to perform HEP correctly with minimal cueing or supervision for therapist     Long term goals: 13 weeks or 20 visits   1. Pt will demonstrate increased lumbar ROM by at least 6 degrees from initial ROM value, resulting in improved ability to perform functional fwd bending while standing and sitting.   2. Pt will demonstrate increased maximum isometric torque value by 45% when compared to the initial value resulting in improved ability to perform bending, lifting, and carrying activities safely, confidently.  3. Pt to demonstrate ability to independently control and reduce their pain through posture positioning and mechanical movements throughout a typical day.  4.  Patient will demonstrate improved overall function per FOTO Survey to CJ = at least 20% but < 40% impaired, limited or restricted score or less.    Plan     Continue with established Plan of Care towards established PT goals.     Therapist: Anyi Schmidt, PT  12/11/2018

## 2018-12-13 ENCOUNTER — CLINICAL SUPPORT (OUTPATIENT)
Dept: REHABILITATION | Facility: HOSPITAL | Age: 73
End: 2018-12-13
Attending: INTERNAL MEDICINE
Payer: MEDICARE

## 2018-12-13 DIAGNOSIS — M53.86 DECREASED ROM OF LUMBAR SPINE: ICD-10-CM

## 2018-12-13 DIAGNOSIS — M54.50 CHRONIC BILATERAL LOW BACK PAIN WITHOUT SCIATICA: ICD-10-CM

## 2018-12-13 DIAGNOSIS — M62.89 MUSCLE TIGHTNESS: ICD-10-CM

## 2018-12-13 DIAGNOSIS — M62.81 MUSCLE WEAKNESS: ICD-10-CM

## 2018-12-13 DIAGNOSIS — G89.29 CHRONIC BILATERAL LOW BACK PAIN WITHOUT SCIATICA: ICD-10-CM

## 2018-12-13 PROCEDURE — 97110 THERAPEUTIC EXERCISES: CPT | Mod: PN

## 2018-12-13 NOTE — PROGRESS NOTES
[facility]      Name: Cheryl Abreu  Clinic Number: [id number]    Date of Treatment: 2018     Diagnosis:   Encounter Diagnoses   Name Primary?    Decreased ROM of lumbar spine     Muscle tightness     Chronic bilateral low back pain without sciatica     Muscle weakness      Physician: Brisa Boyle, *    Precautions: Standard     Pattern of pain determined: 1    Evaluation Date: 2018  Authorization Period Expiration: 18  Plan of Care Expiration: 19  Reassessment Due: 18  Visit # / Visits authorized:     Time In: 1235   Time Out: 1338  Total Billable Time: 63 minutes (1 on 1 entire session)    Subjective     Cheryl reports that she didn't sleep well last night after breaking a glass in kitchen and cutting foot late in the evening.  Nervous about traveling for  holiday but determined to continue with stretching program as she notices improved strength overall.     Patient reports tolerating previous visit well.  Patient reports their pain to be 0/10 on a 0-10 scale with 0 being no pain and 10 being the worst pain imaginable.  Pain Location: lumbar spine     Occupation:  retired   Leisure: watching grandchildren, walking, exercising                     Pts goals:  Walk around block, play with grandchildren    Objective     Baseline Isometric Testing on Med X equipment: Testing administered by PT  Date of testin18  ROM 0-48 deg   Max Peak Torque 89    Min Peak Torque 19    Flex/Ext Ratio 4.6/1   % below normative data 44   Counter weight 160   femur 5   Seat pad 2     Therapist reviewed FOTO scores for Cheryl Abreu on 2018.   FOTO documents entered into [employer] - see Media section.     CMS Impairment/Limitation/Restriction for FOTO Lumbar Spine Survey  Status Limitation G-Code CMS Severity Modifier  Intake 50% 50% Current Status CK - At least 40 percent but less than 60 percent  Predicted 60% 40% Goal Status+ CK - At  "least 40 percent but less than 60 percent  D/C Status CK **only report if this is a one time visit  +Based on FOTO predicted change score    Treatment      Pt was instructed in and performed the following:     Cheryl received therapeutic exercises to develop/improved posture, cardiovascular endurance, muscular endurance, lumbar/cervical ROM, strength and muscular endurance for 60 minutes including the following exercises:     ITB stretch with strap, 3 x 15"  Figure 4 glute stretch, LE on SB, 3 x 15"  DKTC x 10 with PB  LTR x 10 to each side with PB  Bridges x 10, push through heels with active abd at top, YTB    [facility] 12/13/2018   Visit Number 9   VAS Pain Rating 0   Treadmill Time (in min.) 10   Speed 2   Incline 0   Lumbar Weight 52   Repetitions 20   Rating of Perceived Exertion 5   Ice - Z Lie (in min.) 10       Peripheral muscle strengthening which included 1 set of 15-20 repetitions at a slow, controlled 7 second per rep pace focused on strengthening supporting musculature for improved body mechanics and functional mobility.  Pt and therapist focused on proper form during treatment to ensure optimal strengthening of each targeted muscle group.  Machines were utilized including torso rotation, leg extension, leg curl, chest press, upright row, tricep extension, bicep curl, leg press, and hip abduction/adduction.    Cheryl received the following manual therapy techniques: Joint mobilizations, Myofacial release and Soft tissue Mobilization were applied to the: lumbar spine for 0 minutes.     Home Exercise Program as follows:   Handouts were given to the patient. Pt demo good understanding of the education provided. Cheryl demonstrated good return demonstration of activities.   -ITB stretch, supine with strap  -glute stretch, figure 4 with pull across  -bridging  -SL hip abduction    Lumbar roll use compliance: has not trialed at this time    Assessment     Pt has attended 9 visits of skilled PT and " making good progress with overall strength and subjective reports of improvement in function.  Pt to be reassessed next session on MedX extension machine.  Pt tolerated 5% increase in weight well today without complaint and appropriate challenge to increase giving an RPE of 5.  Cont with progression of strength and stability training as indicated.     Patient is making good progress towards established goals.  Pt will continue to benefit from skilled outpatient physical therapy to address the deficits stated in the impairment chart, provide pt/family education and to maximize pt's level of independence in the home and community environment.     Pt's spiritual, cultural and educational needs considered and pt agreeable to plan of care and goals as stated below:     Medical necessity is demonstrated by the following problem list.    Pt presents with the following impairments:      History  Co-morbidities and personal factors that may impact the plan of care Co-morbidities:   diabetes     Personal Factors:   no deficits       low   Examination  Body Structures and Functions, activity limitations and participation restrictions that may impact the plan of care Body Regions:   back  lower extremities  trunk     Body Systems:    ROM  strength  gross coordinated movement  motor control     Participation Restrictions:   Standing, walking, lifting, bending     Activity limitations:   Learning and applying knowledge  no deficits     General Tasks and Commands  no deficits     Communication  no deficits     Mobility  lifting and carrying objects  walking     Self care  no deficits     Domestic Life  shopping  cooking  doing house work (cleaning house, washing dishes, laundry)     Interactions/Relationships  no deficits     Life Areas  no deficits     Community and Social Life  community life  recreation and leisure             low   Clinical Presentation stable and uncomplicated low   Decision Making/ Complexity Score: low       GOALS: Pt is in agreement with the following goals.     Short term goals:  6 weeks or 10 visits   1.  Pt will demonstrate increased lumbar ROM by at least 3 degrees from the initial ROM value with improvements noted in functional ROM and ability to perform ADLs  2.  Pt will demonstrate increased maximum isometric torque value by 20% when compared to the initial value resulting in improved ability to perform bending, lifting, and carrying activities safely, confidently.  3.  Patient report a reduction in worst pain score by 1-2 points for improved tolerance during work and recreational activities  4.  Pt able to perform HEP correctly with minimal cueing or supervision for therapist     Long term goals: 13 weeks or 20 visits   1. Pt will demonstrate increased lumbar ROM by at least 6 degrees from initial ROM value, resulting in improved ability to perform functional fwd bending while standing and sitting.   2. Pt will demonstrate increased maximum isometric torque value by 45% when compared to the initial value resulting in improved ability to perform bending, lifting, and carrying activities safely, confidently.  3. Pt to demonstrate ability to independently control and reduce their pain through posture positioning and mechanical movements throughout a typical day.  4.  Patient will demonstrate improved overall function per FOTO Survey to CJ = at least 20% but < 40% impaired, limited or restricted score or less.    Plan     Continue with established Plan of Care towards established PT goals.     Therapist: Anyi Schmidt, PT  12/13/2018

## 2019-01-08 ENCOUNTER — CLINICAL SUPPORT (OUTPATIENT)
Dept: REHABILITATION | Facility: HOSPITAL | Age: 74
End: 2019-01-08
Attending: INTERNAL MEDICINE
Payer: MEDICARE

## 2019-01-08 DIAGNOSIS — G89.29 CHRONIC BILATERAL LOW BACK PAIN WITHOUT SCIATICA: ICD-10-CM

## 2019-01-08 DIAGNOSIS — M62.89 MUSCLE TIGHTNESS: ICD-10-CM

## 2019-01-08 DIAGNOSIS — M54.50 CHRONIC BILATERAL LOW BACK PAIN WITHOUT SCIATICA: ICD-10-CM

## 2019-01-08 DIAGNOSIS — M62.81 MUSCLE WEAKNESS: ICD-10-CM

## 2019-01-08 DIAGNOSIS — M53.86 DECREASED ROM OF LUMBAR SPINE: ICD-10-CM

## 2019-01-08 PROCEDURE — 97110 THERAPEUTIC EXERCISES: CPT | Mod: PN

## 2019-01-08 NOTE — PROGRESS NOTES
[facility]      Name: Cheryl Abreu  Clinic Number: [id number]    Date of Treatment: 2019     Diagnosis:   No diagnosis found.  Physician: Brisa Boyle, *    Precautions: Standard     Pattern of pain determined: 1    Evaluation Date: 2018  Authorization Period Expiration: 18  Plan of Care Expiration: 19  Reassessment Due: 18  Visit # / Visits authorized:     Time In: 1504  Time Out: 1620  Total Billable Time: 76 minutes (1 on 1 x40 minutes)    Subjective     Cheryl reports that she spent around 2 weeks in [place] over the holidays and was very busy with family; didn't do many exercises.  Pt reports that she did feel very well with minimal back pain overall.  Pt reports that she was able to  grandchildren, help daughter cook and clean, and sleep well.  Pt came back last week and and has tried to pick back up exercises.      Patient reports tolerating previous visit well.  Patient reports their pain to be 0/10 on a 0-10 scale with 0 being no pain and 10 being the worst pain imaginable.  Pain Location: lumbar spine     Occupation:  retired   Leisure: watching grandchildren, walking, exercising                     Pts goals:  Walk around block, play with grandchildren    Objective     Baseline Isometric Testing on Med X equipment: Testing administered by PT  Date of testin18  ROM 0-48 deg   Max Peak Torque 89    Min Peak Torque 19    Flex/Ext Ratio 4.6/1   % below normative data 44   Counter weight 160   femur 5   Seat pad 2     Therapist reviewed FOTO scores for Cheryl Abreu on 2018.   FOTO documents entered into [employer] - see Media section.     CMS Impairment/Limitation/Restriction for FOTO Lumbar Spine Survey  Status Limitation G-Code CMS Severity Modifier  Intake 50% 50% Current Status CK - At least 40 percent but less than 60 percent  Predicted 60% 40% Goal Status+ CK - At least 40 percent but less than 60 percent  D/C  "Status CK **only report if this is a one time visit  +Based on FOTO predicted change score    Treatment      Pt was instructed in and performed the following:     Cheryl received therapeutic exercises to develop/improved posture, cardiovascular endurance, muscular endurance, lumbar/cervical ROM, strength and muscular endurance for 60 minutes including the following exercises:     ITB stretch with strap, 3 x 15"  Figure 4 glute stretch, LE on SB, 3 x 15"  DKTC x 10 with PB  LTR x 10 to each side with PB  Bridges x 10, push through heels with active abd at top, YTB    [facility] 1/8/2019   Visit Number 10   VAS Pain Rating 0   Treadmill Time (in min.) 10   Speed 2   Lumbar Weight 52   Repetitions 20   Rating of Perceived Exertion 3   Ice - Z Lie (in min.) 10     Peripheral muscle strengthening which included 1 set of 15-20 repetitions at a slow, controlled 7 second per rep pace focused on strengthening supporting musculature for improved body mechanics and functional mobility.  Pt and therapist focused on proper form during treatment to ensure optimal strengthening of each targeted muscle group.  Machines were utilized including torso rotation, leg extension, leg curl, chest press, upright row, tricep extension, bicep curl, leg press, and hip abduction/adduction.    Cheryl received the following manual therapy techniques: Joint mobilizations, Myofacial release and Soft tissue Mobilization were applied to the: lumbar spine for 0 minutes.     Home Exercise Program as follows:   Handouts were given to the patient. Pt demo good understanding of the education provided. Cheryl demonstrated good return demonstration of activities.   -ITB stretch, supine with strap  -glute stretch, figure 4 with pull across  -bridging  -SL hip abduction    Lumbar roll use compliance: has not trialed at this time    Assessment     Pt spent several weeks away from therapy due to being out of town with minimal compliance with HEP therefore " exercises were maintained from last session without progression.  Pt has attended 10 sessions of skilled PT to date and to be re assessed on MedX extension machine next visit.  Pt showed good tolerance to exercise this visit and was able to complete 20 repetitions at 52ft.lb with RPE of 3.  Cont per POC with reassessment next session.     Patient is making good progress towards established goals.  Pt will continue to benefit from skilled outpatient physical therapy to address the deficits stated in the impairment chart, provide pt/family education and to maximize pt's level of independence in the home and community environment.     Pt's spiritual, cultural and educational needs considered and pt agreeable to plan of care and goals as stated below:     Medical necessity is demonstrated by the following problem list.    Pt presents with the following impairments:      History  Co-morbidities and personal factors that may impact the plan of care Co-morbidities:   diabetes     Personal Factors:   no deficits       low   Examination  Body Structures and Functions, activity limitations and participation restrictions that may impact the plan of care Body Regions:   back  lower extremities  trunk     Body Systems:    ROM  strength  gross coordinated movement  motor control     Participation Restrictions:   Standing, walking, lifting, bending     Activity limitations:   Learning and applying knowledge  no deficits     General Tasks and Commands  no deficits     Communication  no deficits     Mobility  lifting and carrying objects  walking     Self care  no deficits     Domestic Life  shopping  cooking  doing house work (cleaning house, washing dishes, laundry)     Interactions/Relationships  no deficits     Life Areas  no deficits     Community and Social Life  community life  recreation and leisure             low   Clinical Presentation stable and uncomplicated low   Decision Making/ Complexity Score: low      GOALS: Pt is  in agreement with the following goals.     Short term goals:  6 weeks or 10 visits   1.  Pt will demonstrate increased lumbar ROM by at least 3 degrees from the initial ROM value with improvements noted in functional ROM and ability to perform ADLs  2.  Pt will demonstrate increased maximum isometric torque value by 20% when compared to the initial value resulting in improved ability to perform bending, lifting, and carrying activities safely, confidently.  3.  Patient report a reduction in worst pain score by 1-2 points for improved tolerance during work and recreational activities  4.  Pt able to perform HEP correctly with minimal cueing or supervision for therapist     Long term goals: 13 weeks or 20 visits   1. Pt will demonstrate increased lumbar ROM by at least 6 degrees from initial ROM value, resulting in improved ability to perform functional fwd bending while standing and sitting.   2. Pt will demonstrate increased maximum isometric torque value by 45% when compared to the initial value resulting in improved ability to perform bending, lifting, and carrying activities safely, confidently.  3. Pt to demonstrate ability to independently control and reduce their pain through posture positioning and mechanical movements throughout a typical day.  4.  Patient will demonstrate improved overall function per FOTO Survey to CJ = at least 20% but < 40% impaired, limited or restricted score or less.    Plan     Continue with established Plan of Care towards established PT goals.     Therapist: Anyi Schmidt, PT  01/08/2019

## 2019-01-11 ENCOUNTER — CLINICAL SUPPORT (OUTPATIENT)
Dept: REHABILITATION | Facility: HOSPITAL | Age: 74
End: 2019-01-11
Attending: INTERNAL MEDICINE
Payer: MEDICARE

## 2019-01-11 DIAGNOSIS — M62.89 MUSCLE TIGHTNESS: ICD-10-CM

## 2019-01-11 DIAGNOSIS — M62.81 MUSCLE WEAKNESS: ICD-10-CM

## 2019-01-11 DIAGNOSIS — M54.50 CHRONIC BILATERAL LOW BACK PAIN WITHOUT SCIATICA: ICD-10-CM

## 2019-01-11 DIAGNOSIS — M53.86 DECREASED ROM OF LUMBAR SPINE: ICD-10-CM

## 2019-01-11 DIAGNOSIS — G89.29 CHRONIC BILATERAL LOW BACK PAIN WITHOUT SCIATICA: ICD-10-CM

## 2019-01-11 PROCEDURE — 97750 PHYSICAL PERFORMANCE TEST: CPT | Mod: PN

## 2019-01-11 PROCEDURE — G8982 BODY POS GOAL STATUS: HCPCS | Mod: CJ,PN

## 2019-01-11 PROCEDURE — 97110 THERAPEUTIC EXERCISES: CPT | Mod: PN

## 2019-01-11 PROCEDURE — G8981 BODY POS CURRENT STATUS: HCPCS | Mod: CK,PN

## 2019-01-11 NOTE — PROGRESS NOTES
[facility]      Name: Cheryl Abreu  Clinic Number: [id number]    Date of Treatment: 2019     Diagnosis:   Encounter Diagnoses   Name Primary?    Decreased ROM of lumbar spine     Muscle tightness     Chronic bilateral low back pain without sciatica     Muscle weakness      Physician: Brisa Boyle, *    Precautions: Standard     Pattern of pain determined: 1    Evaluation Date: 2018  Authorization Period Expiration: 18  Plan of Care Expiration: 19  Reassessment Due: 18  Visit # / Visits authorized:    Time In: 830  Time Out: 930  Total Billable Time: 60 minutes (1 on 1 x30 minutes)    Subjective     Cheryl reports that she was a little sore in her shoulders after last session but expected to be more sore in back since she had taken several weeks off.  Pt reports that she is feeling well today without any pain in back.  Pt continues with stretching as rx'd but overall notices a huge improvement in overall mobility and tolerance to home and community tasks.      Patient reports tolerating previous visit well.  Patient reports their pain to be 0/10 on a 0-10 scale with 0 being no pain and 10 being the worst pain imaginable.  Pain Location: lumbar spine     Occupation:  retired   Leisure: watching grandchildren, walking, exercising                     Pts goals:  Walk around block, play with grandchildren    Objective     Baseline Isometric Testing on Med X equipment: Testing administered by PT  Date of testin18  ROM 0-48 deg   Max Peak Torque 89    Min Peak Torque 19    Flex/Ext Ratio 4.6/1   % below normative data 44   Counter weight 160   femur 5   Seat pad 2     midpoint Isometric Testing on Med X equipment: Testing administered by PT  Date of testin19  ROM 0-51 deg   Max Peak Torque 172   Min Peak Torque 28   Flex/Ext Ratio 6.14/1   % below normative data 26   Counter weight 160   femur 5   Seat pad 2     Therapist  "reviewed FOTO scores for Cheryl Abreu on 11/8/2018.   FOTO documents entered into [employer] - see Media section.    CMS Impairment/Limitation/Restriction for FOTO Lumbar Spine Survey  Status Limitation G-Code CMS Severity Modifier  Intake 50% 50%  Predicted 60% 40% Goal Status+ CK - At least 40 percent but less than 60 percent  11/29/2018 57% 43% Current Status CK - At least 40 percent but less than 60 percent  D/C Status CK **only report if this is discharge survey  +Based on FOTO predicted change score    Treatment      Pt was instructed in and performed the following:     Cheryl received therapeutic exercises to develop/improved posture, cardiovascular endurance, muscular endurance, lumbar/cervical ROM, strength and muscular endurance for 60 minutes including the following exercises:     ITB stretch with strap, 3 x 15"  Figure 4 glute stretch, LE on SB, 3 x 15"  DKTC x 10 with PB  LTR x 10 to each side with PB  Bridges x 10, push through heels with active abd at top, YTB  +SL clams x10    [facility] 1/11/2019   Visit Number 11   VAS Pain Rating 0   Treadmill Time (in min.) 10   Speed 2   Time -   Flexion in Lying 10   Manual Therapy -   Lumbar Extension Seat Pad 2   Femur Restraint 5   Top Dead Center 24   Counterweight 160   Lumbar Flexion 51   Lumbar Extension 0   Lumbar Peak Torque 172   Min Torque 28   Test Percent Below Normative Data 26   Test Percent Gain in Strength from Initial  31   Ice - Z Lie (in min.) 0     Peripheral muscle strengthening which included 1 set of 15-20 repetitions at a slow, controlled 7 second per rep pace focused on strengthening supporting musculature for improved body mechanics and functional mobility.  Pt and therapist focused on proper form during treatment to ensure optimal strengthening of each targeted muscle group.  Machines were utilized including torso rotation, leg extension, leg curl, chest press, upright row, tricep extension, bicep curl, leg press, and hip " abduction/adduction.    Cheryl received the following manual therapy techniques: Joint mobilizations, Myofacial release and Soft tissue Mobilization were applied to the: lumbar spine for 0 minutes.     Home Exercise Program as follows:   Handouts were given to the patient. Pt demo good understanding of the education provided. Cheryl demonstrated good return demonstration of activities.   -ITB stretch, supine with strap  -glute stretch, figure 4 with pull across  -bridging  -SL hip abduction    Lumbar roll use compliance: has not trialed at this time    Assessment     Patient has attended 11 visits at [facility] which included MD evaluation, PT evaluation with isometric testing, and physical therapy treatment including HEP instruction, education, aerobic work, dynamic strengthening on med ex equipment for the spine, and whole body strengthening on med ex equipment with increasing weight loads.  Patient  is demonstrating increased ability to reduce symptoms, improved posture, improved  ROM by 3 degrees in pain free range, and improved lumbar strength on med ex test by 31% average.  Pt with reports of increased mobility 2/2 decreased sx as well as improved tolerance to walking and standing tasks in home.  Pt to benefit from continuation of program for full 20 visits to maximize potential.  Cont progression of strength training as tolerated.     Patient is making good progress towards established goals.  Pt will continue to benefit from skilled outpatient physical therapy to address the deficits stated in the impairment chart, provide pt/family education and to maximize pt's level of independence in the home and community environment.     Pt's spiritual, cultural and educational needs considered and pt agreeable to plan of care and goals as stated below:     Medical necessity is demonstrated by the following problem list.    Pt presents with the following impairments:      History  Co-morbidities and personal  factors that may impact the plan of care Co-morbidities:   diabetes     Personal Factors:   no deficits       low   Examination  Body Structures and Functions, activity limitations and participation restrictions that may impact the plan of care Body Regions:   back  lower extremities  trunk     Body Systems:    ROM  strength  gross coordinated movement  motor control     Participation Restrictions:   Standing, walking, lifting, bending     Activity limitations:   Learning and applying knowledge  no deficits     General Tasks and Commands  no deficits     Communication  no deficits     Mobility  lifting and carrying objects  walking     Self care  no deficits     Domestic Life  shopping  cooking  doing house work (cleaning house, washing dishes, laundry)     Interactions/Relationships  no deficits     Life Areas  no deficits     Community and Social Life  community life  recreation and leisure             low   Clinical Presentation stable and uncomplicated low   Decision Making/ Complexity Score: low      GOALS: Pt is in agreement with the following goals.     Short term goals:  6 weeks or 10 visits   1.  Pt will demonstrate increased lumbar ROM by at least 3 degrees from the initial ROM value with improvements noted in functional ROM and ability to perform ADLs-met  2.  Pt will demonstrate increased maximum isometric torque value by 20% when compared to the initial value resulting in improved ability to perform bending, lifting, and carrying activities safely, confidently.-met  3.  Patient report a reduction in worst pain score by 1-2 points for improved tolerance during work and recreational activities-met  4.  Pt able to perform HEP correctly with minimal cueing or supervision for therapist-met     Long term goals: 13 weeks or 20 visits   1. Pt will demonstrate increased lumbar ROM by at least 6 degrees from initial ROM value, resulting in improved ability to perform functional fwd bending while standing and  sitting. -ongoing  2. Pt will demonstrate increased maximum isometric torque value by 45% when compared to the initial value resulting in improved ability to perform bending, lifting, and carrying activities safely, confidently.-ongoing  3. Pt to demonstrate ability to independently control and reduce their pain through posture positioning and mechanical movements throughout a typical day.-met  4.  Patient will demonstrate improved overall function per FOTO Survey to CJ = at least 20% but < 40% impaired, limited or restricted score or less.-ongoing    Plan     Continue with established Plan of Care towards established PT goals. Progression as tolerated per POC.    Therapist: Anyi Schmidt, PT  01/11/2019

## 2019-01-14 ENCOUNTER — CLINICAL SUPPORT (OUTPATIENT)
Dept: REHABILITATION | Facility: HOSPITAL | Age: 74
End: 2019-01-14
Attending: INTERNAL MEDICINE
Payer: MEDICARE

## 2019-01-14 DIAGNOSIS — M53.86 DECREASED ROM OF LUMBAR SPINE: ICD-10-CM

## 2019-01-14 DIAGNOSIS — M62.81 MUSCLE WEAKNESS: ICD-10-CM

## 2019-01-14 DIAGNOSIS — M54.50 CHRONIC BILATERAL LOW BACK PAIN WITHOUT SCIATICA: ICD-10-CM

## 2019-01-14 DIAGNOSIS — M62.89 MUSCLE TIGHTNESS: ICD-10-CM

## 2019-01-14 DIAGNOSIS — G89.29 CHRONIC BILATERAL LOW BACK PAIN WITHOUT SCIATICA: ICD-10-CM

## 2019-01-14 PROCEDURE — 97110 THERAPEUTIC EXERCISES: CPT | Mod: PN

## 2019-01-14 NOTE — PROGRESS NOTES
[facility]      Name: Cheryl Arbeu  Clinic Number: [id number]    Date of Treatment: 2019     Diagnosis:   Encounter Diagnoses   Name Primary?    Decreased ROM of lumbar spine     Muscle tightness     Chronic bilateral low back pain without sciatica     Muscle weakness      Physician: Brisa Boyle, *    Precautions: Standard     Pattern of pain determined: 1    Evaluation Date: 2018  Authorization Period Expiration: 18  Plan of Care Expiration: 19  Reassessment Due: 18  Visit # / Visits authorized:    Time In: 1000  Time Out: 1110  Total Billable Time: 70 minutes (1 on 1 x40 minutes)    Subjective     Cheryl reports that overall she is feeling well.  She reports that she still gets most pain in back with prolonged standing and walking as well as trying to carry things in home.      Patient reports tolerating previous visit well.  Patient reports their pain to be 0/10 on a 0-10 scale with 0 being no pain and 10 being the worst pain imaginable.  Pain Location: lumbar spine     Occupation:  retired   Leisure: watching grandchildren, walking, exercising                     Pts goals:  Walk around block, play with grandchildren    Objective     Baseline Isometric Testing on Med X equipment: Testing administered by PT  Date of testin18  ROM 0-48 deg   Max Peak Torque 89    Min Peak Torque 19    Flex/Ext Ratio 4.6/1   % below normative data 44   Counter weight 160   femur 5   Seat pad 2     midpoint Isometric Testing on Med X equipment: Testing administered by PT  Date of testin19  ROM 0-51 deg   Max Peak Torque 172   Min Peak Torque 28   Flex/Ext Ratio 6.14/1   % below normative data 26   Counter weight 160   femur 5   Seat pad 2     Therapist reviewed FOTO scores for Cheryl Abreu on 2018.   FOTO documents entered into [employer] - see Media section.    CMS Impairment/Limitation/Restriction for FOTO Lumbar Spine  "Survey  Status Limitation G-Code CMS Severity Modifier  Intake 50% 50%  Predicted 60% 40% Goal Status+ CK - At least 40 percent but less than 60 percent  11/29/2018 57% 43% Current Status CK - At least 40 percent but less than 60 percent  D/C Status CK **only report if this is discharge survey  +Based on FOTO predicted change score    Treatment      Pt was instructed in and performed the following:     Cheryl received therapeutic exercises to develop/improved posture, cardiovascular endurance, muscular endurance, lumbar/cervical ROM, strength and muscular endurance for 60 minutes including the following exercises:     DKTC x 10 with PB  LTR x 10 to each side with PB  SL clams x10  +weighted KB carry, 7.5# (down and back turf =1 round) x2 rounds with R UE front rack carry then L     [facility] 1/14/2019   Visit Number 12   VAS Pain Rating 0   Treadmill Time (in min.) 10   Speed 2   Incline -   Recumbent Bike Seat Pos. -   Time -   Flexion in Lying 10   Lumbar Weight 55   Repetitions 20   Rating of Perceived Exertion 3   Ice - Z Lie (in min.) 10       Not completed:   Bridges x 10, push through heels with active abd at top, YTB  ITB stretch with strap, 3 x 15"  Figure 4 glute stretch, LE on SB, 3 x 15"    Peripheral muscle strengthening which included 1 set of 15-20 repetitions at a slow, controlled 7 second per rep pace focused on strengthening supporting musculature for improved body mechanics and functional mobility.  Pt and therapist focused on proper form during treatment to ensure optimal strengthening of each targeted muscle group.  Machines were utilized including torso rotation, leg extension, leg curl, chest press, upright row, tricep extension, bicep curl, leg press, and hip abduction/adduction.    Cheryl received the following manual therapy techniques: Joint mobilizations, Myofacial release and Soft tissue Mobilization were applied to the: lumbar spine for 0 minutes.     Home Exercise Program as " follows:   Handouts were given to the patient. Pt demo good understanding of the education provided. Cheryl demonstrated good return demonstration of activities.   -ITB stretch, supine with strap  -glute stretch, figure 4 with pull across  -bridging  -SL hip abduction    Lumbar roll use compliance: has not trialed at this time    Assessment     Patient shows good tolerance to 5% increase in weight on MedX extension machine this visit; able to complete 20 repetitions at 55ft.lb without complaint.  Addition of front rack carry today with R and L hand to assist with improved tolerance to walking in home and carrying items as needed as this is most problematic issue currently; pt with appropriate challenge with exercise, cues to maintain tight core throughout.  Cont per POC.     Patient is making good progress towards established goals.  Pt will continue to benefit from skilled outpatient physical therapy to address the deficits stated in the impairment chart, provide pt/family education and to maximize pt's level of independence in the home and community environment.     Pt's spiritual, cultural and educational needs considered and pt agreeable to plan of care and goals as stated below:     Medical necessity is demonstrated by the following problem list.    Pt presents with the following impairments:      History  Co-morbidities and personal factors that may impact the plan of care Co-morbidities:   diabetes     Personal Factors:   no deficits       low   Examination  Body Structures and Functions, activity limitations and participation restrictions that may impact the plan of care Body Regions:   back  lower extremities  trunk     Body Systems:    ROM  strength  gross coordinated movement  motor control     Participation Restrictions:   Standing, walking, lifting, bending     Activity limitations:   Learning and applying knowledge  no deficits     General Tasks and Commands  no deficits     Communication  no  deficits     Mobility  lifting and carrying objects  walking     Self care  no deficits     Domestic Life  shopping  cooking  doing house work (cleaning house, washing dishes, laundry)     Interactions/Relationships  no deficits     Life Areas  no deficits     Community and Social Life  community life  recreation and leisure             low   Clinical Presentation stable and uncomplicated low   Decision Making/ Complexity Score: low      GOALS: Pt is in agreement with the following goals.     Short term goals:  6 weeks or 10 visits   1.  Pt will demonstrate increased lumbar ROM by at least 3 degrees from the initial ROM value with improvements noted in functional ROM and ability to perform ADLs-met  2.  Pt will demonstrate increased maximum isometric torque value by 20% when compared to the initial value resulting in improved ability to perform bending, lifting, and carrying activities safely, confidently.-met  3.  Patient report a reduction in worst pain score by 1-2 points for improved tolerance during work and recreational activities-met  4.  Pt able to perform HEP correctly with minimal cueing or supervision for therapist-met     Long term goals: 13 weeks or 20 visits   1. Pt will demonstrate increased lumbar ROM by at least 6 degrees from initial ROM value, resulting in improved ability to perform functional fwd bending while standing and sitting. -ongoing  2. Pt will demonstrate increased maximum isometric torque value by 45% when compared to the initial value resulting in improved ability to perform bending, lifting, and carrying activities safely, confidently.-ongoing  3. Pt to demonstrate ability to independently control and reduce their pain through posture positioning and mechanical movements throughout a typical day.-met  4.  Patient will demonstrate improved overall function per FOTO Survey to CJ = at least 20% but < 40% impaired, limited or restricted score or less.-ongoing    Plan     Continue with  established Plan of Care towards established PT goals. Progression as tolerated per POC.    Therapist: Anyi Schmidt, PT  01/14/2019

## 2019-01-16 ENCOUNTER — CLINICAL SUPPORT (OUTPATIENT)
Dept: REHABILITATION | Facility: HOSPITAL | Age: 74
End: 2019-01-16
Attending: INTERNAL MEDICINE
Payer: MEDICARE

## 2019-01-16 DIAGNOSIS — M62.81 MUSCLE WEAKNESS: ICD-10-CM

## 2019-01-16 DIAGNOSIS — M54.50 CHRONIC BILATERAL LOW BACK PAIN WITHOUT SCIATICA: ICD-10-CM

## 2019-01-16 DIAGNOSIS — M53.86 DECREASED ROM OF LUMBAR SPINE: ICD-10-CM

## 2019-01-16 DIAGNOSIS — G89.29 CHRONIC BILATERAL LOW BACK PAIN WITHOUT SCIATICA: ICD-10-CM

## 2019-01-16 DIAGNOSIS — M62.89 MUSCLE TIGHTNESS: ICD-10-CM

## 2019-01-16 PROCEDURE — 97110 THERAPEUTIC EXERCISES: CPT | Mod: PN

## 2019-01-16 NOTE — PROGRESS NOTES
[facility]      Name: Cheryl Abreu  Clinic Number: [id number]    Date of Treatment: 2019     Diagnosis:   Encounter Diagnoses   Name Primary?    Decreased ROM of lumbar spine     Muscle tightness     Chronic bilateral low back pain without sciatica     Muscle weakness      Physician: Brisa Boyle, *    Precautions: Standard     Pattern of pain determined: 1    Evaluation Date: 2018  Authorization Period Expiration: 18  Plan of Care Expiration: 19  Reassessment Due: 18  Visit # / Visits authorized:    Time In: 1005  Time Out: 1115   Total Billable Time: 70 minutes (1 on 1 x40 minutes)    Subjective     Cheryl reports that she was a little sore after last session but she is feeling very strong today.  Pt is having an easier time getting in and out of the shower/tub.      Patient reports tolerating previous visit well.  Patient reports their pain to be 0/10 on a 0-10 scale with 0 being no pain and 10 being the worst pain imaginable.  Pain Location: lumbar spine     Occupation:  retired   Leisure: watching grandchildren, walking, exercising                     Pts goals:  Walk around block, play with grandchildren    Objective     Baseline Isometric Testing on Med X equipment: Testing administered by PT  Date of testin18  ROM 0-48 deg   Max Peak Torque 89    Min Peak Torque 19    Flex/Ext Ratio 4.6/1   % below normative data 44   Counter weight 160   femur 5   Seat pad 2     midpoint Isometric Testing on Med X equipment: Testing administered by PT  Date of testin19  ROM 0-51 deg   Max Peak Torque 172   Min Peak Torque 28   Flex/Ext Ratio 6.14/1   % below normative data 26   Counter weight 160   femur 5   Seat pad 2     Therapist reviewed FOTO scores for Cheryl Abreu on 2018.   FOTO documents entered into [employer] - see Media section.    CMS Impairment/Limitation/Restriction for FOTO Lumbar Spine Survey  Status  "Limitation G-Code CMS Severity Modifier  Intake 50% 50%  Predicted 60% 40% Goal Status+ CK - At least 40 percent but less than 60 percent  11/29/2018 57% 43% Current Status CK - At least 40 percent but less than 60 percent  D/C Status CK **only report if this is discharge survey  +Based on FOTO predicted change score    Treatment      Pt was instructed in and performed the following:     Cheryl received therapeutic exercises to develop/improved posture, cardiovascular endurance, muscular endurance, lumbar/cervical ROM, strength and muscular endurance for 60 minutes including the following exercises:     DKTC x 10 with PB  LTR x 10 to each side with PB  SL clams x10  +weighted KB carry, 7.5# (down and back turf =1 round) x2 rounds with R UE front rack carry then L   +weighted KB carry, 7.5# holding B UE's at midline x1 round (down and back)     [facility] 1/16/2019   Visit Number 13   VAS Pain Rating 0   Treadmill Time (in min.) 10   Speed 2   Incline -   Recumbent Bike Seat Pos. -   Time -   Flexion in Lying 10   Lumbar Weight 57   Repetitions 20   Rating of Perceived Exertion 5   Ice - Z Lie (in min.) 10     Not completed:   Bridges x 10, push through heels with active abd at top, YTB  ITB stretch with strap, 3 x 15"  Figure 4 glute stretch, LE on SB, 3 x 15"    Peripheral muscle strengthening which included 1 set of 15-20 repetitions at a slow, controlled 7 second per rep pace focused on strengthening supporting musculature for improved body mechanics and functional mobility.  Pt and therapist focused on proper form during treatment to ensure optimal strengthening of each targeted muscle group.  Machines were utilized including torso rotation, leg extension, leg curl, chest press, upright row, tricep extension, bicep curl, leg press, and hip abduction/adduction.    Cheryl received the following manual therapy techniques: Joint mobilizations, Myofacial release and Soft tissue Mobilization were applied to " the: lumbar spine for 0 minutes.     Home Exercise Program as follows:   Handouts were given to the patient. Pt demo good understanding of the education provided. Cheryl demonstrated good return demonstration of activities.   -ITB stretch, supine with strap  -glute stretch, figure 4 with pull across  -bridging  -SL hip abduction    Lumbar roll use compliance: has not trialed at this time    Assessment     Addition of front KB carry this visit, holding with B UE at midline, to load spinal extensors and assist with improved muscular endurance.  Pt tolerated well increase in medX extension weight to 57ft.lb for 20 repetitions with greater difficulty than last session; weight to remain consistent next session to further improve strength.  Pt to cont per POC.     Patient is making good progress towards established goals.  Pt will continue to benefit from skilled outpatient physical therapy to address the deficits stated in the impairment chart, provide pt/family education and to maximize pt's level of independence in the home and community environment.     Pt's spiritual, cultural and educational needs considered and pt agreeable to plan of care and goals as stated below:     Medical necessity is demonstrated by the following problem list.    Pt presents with the following impairments:      History  Co-morbidities and personal factors that may impact the plan of care Co-morbidities:   diabetes     Personal Factors:   no deficits       low   Examination  Body Structures and Functions, activity limitations and participation restrictions that may impact the plan of care Body Regions:   back  lower extremities  trunk     Body Systems:    ROM  strength  gross coordinated movement  motor control     Participation Restrictions:   Standing, walking, lifting, bending     Activity limitations:   Learning and applying knowledge  no deficits     General Tasks and Commands  no deficits     Communication  no  deficits     Mobility  lifting and carrying objects  walking     Self care  no deficits     Domestic Life  shopping  cooking  doing house work (cleaning house, washing dishes, laundry)     Interactions/Relationships  no deficits     Life Areas  no deficits     Community and Social Life  community life  recreation and leisure             low   Clinical Presentation stable and uncomplicated low   Decision Making/ Complexity Score: low      GOALS: Pt is in agreement with the following goals.     Short term goals:  6 weeks or 10 visits   1.  Pt will demonstrate increased lumbar ROM by at least 3 degrees from the initial ROM value with improvements noted in functional ROM and ability to perform ADLs-met  2.  Pt will demonstrate increased maximum isometric torque value by 20% when compared to the initial value resulting in improved ability to perform bending, lifting, and carrying activities safely, confidently.-met  3.  Patient report a reduction in worst pain score by 1-2 points for improved tolerance during work and recreational activities-met  4.  Pt able to perform HEP correctly with minimal cueing or supervision for therapist-met     Long term goals: 13 weeks or 20 visits   1. Pt will demonstrate increased lumbar ROM by at least 6 degrees from initial ROM value, resulting in improved ability to perform functional fwd bending while standing and sitting. -ongoing  2. Pt will demonstrate increased maximum isometric torque value by 45% when compared to the initial value resulting in improved ability to perform bending, lifting, and carrying activities safely, confidently.-ongoing  3. Pt to demonstrate ability to independently control and reduce their pain through posture positioning and mechanical movements throughout a typical day.-met  4.  Patient will demonstrate improved overall function per FOTO Survey to CJ = at least 20% but < 40% impaired, limited or restricted score or less.-ongoing    Plan     Continue with  established Plan of Care towards established PT goals. Progression as tolerated per POC.    Therapist: Anyi Schmidt, PT  01/16/2019

## 2019-01-22 ENCOUNTER — CLINICAL SUPPORT (OUTPATIENT)
Dept: REHABILITATION | Facility: HOSPITAL | Age: 74
End: 2019-01-22
Attending: INTERNAL MEDICINE
Payer: MEDICARE

## 2019-01-22 DIAGNOSIS — M62.81 MUSCLE WEAKNESS: ICD-10-CM

## 2019-01-22 DIAGNOSIS — M53.86 DECREASED ROM OF LUMBAR SPINE: ICD-10-CM

## 2019-01-22 DIAGNOSIS — M62.89 MUSCLE TIGHTNESS: ICD-10-CM

## 2019-01-22 DIAGNOSIS — G89.29 CHRONIC BILATERAL LOW BACK PAIN WITHOUT SCIATICA: ICD-10-CM

## 2019-01-22 DIAGNOSIS — M54.50 CHRONIC BILATERAL LOW BACK PAIN WITHOUT SCIATICA: ICD-10-CM

## 2019-01-22 PROCEDURE — 97110 THERAPEUTIC EXERCISES: CPT | Mod: PN

## 2019-01-22 NOTE — PROGRESS NOTES
[facility]      Name: Cheryl Abreu  Clinic Number: [id number]    Date of Treatment: 2019     Diagnosis:   Encounter Diagnoses   Name Primary?    Decreased ROM of lumbar spine     Muscle tightness     Chronic bilateral low back pain without sciatica     Muscle weakness      Physician: Brisa Boyle, *    Precautions: Standard     Pattern of pain determined: 1    Evaluation Date: 2018  Authorization Period Expiration: 18  Plan of Care Expiration: 19  Reassessment Due: 18  Visit # / Visits authorized:    Time In: 1342  Time Out: 1440   Total Billable Time: 58 minutes (1 on 1 x30 minutes)    Subjective     Cheryl reports that her back is feeling ok today, still notices that she feels like she is weak in her legs and that sometimes they won't work (when walking or going up stairs); no pain associated with this.  Pt reports that she was a little sore in her neck after last session.     Patient reports tolerating previous visit well.  Patient reports their pain to be 0/10 on a 0-10 scale with 0 being no pain and 10 being the worst pain imaginable.  Pain Location: lumbar spine     Occupation:  retired   Leisure: watching grandchildren, walking, exercising                     Pts goals:  Walk around block, play with grandchildren    Objective     Baseline Isometric Testing on Med X equipment: Testing administered by PT  Date of testin18  ROM 0-48 deg   Max Peak Torque 89    Min Peak Torque 19    Flex/Ext Ratio 4.6/1   % below normative data 44   Counter weight 160   femur 5   Seat pad 2     midpoint Isometric Testing on Med X equipment: Testing administered by PT  Date of testin19  ROM 0-51 deg   Max Peak Torque 172   Min Peak Torque 28   Flex/Ext Ratio 6.14/1   % below normative data 26   Counter weight 160   femur 5   Seat pad 2     Therapist reviewed FOTO scores for Cheryl Abreu on 2018.   FOTO documents entered into  "[employer] - see Media section.    CMS Impairment/Limitation/Restriction for FOTO Lumbar Spine Survey  Status Limitation G-Code CMS Severity Modifier  Intake 50% 50%  Predicted 60% 40% Goal Status+ CK - At least 40 percent but less than 60 percent  11/29/2018 57% 43% Current Status CK - At least 40 percent but less than 60 percent  D/C Status CK **only report if this is discharge survey  +Based on FOTO predicted change score    Treatment      Pt was instructed in and performed the following:     Cheryl received therapeutic exercises to develop/improved posture, cardiovascular endurance, muscular endurance, lumbar/cervical ROM, strength and muscular endurance for 60 minutes including the following exercises:     DKTC x 10 with PB  LTR x 10 to each side with PB  SL clams x10  +weighted KB carry, 7.5# (down and back turf =1 round) x2 rounds with R UE front rack carry then L   +weighted KB carry, 7.5# holding B UE's at midline x1 round (down and back)     [facility] 1/22/2019   Visit Number 14   VAS Pain Rating 0   Treadmill Time (in min.) 10   Speed 2   Incline -   Recumbent Bike Seat Pos. -   Time -   Flexion in Lying 10   Lumbar Weight 57   Repetitions 20   Rating of Perceived Exertion 3   Ice - Z Lie (in min.) 10       Not completed:   Bridges x 10, push through heels with active abd at top, YTB  ITB stretch with strap, 3 x 15"  Figure 4 glute stretch, LE on SB, 3 x 15"    Peripheral muscle strengthening which included 1 set of 15-20 repetitions at a slow, controlled 7 second per rep pace focused on strengthening supporting musculature for improved body mechanics and functional mobility.  Pt and therapist focused on proper form during treatment to ensure optimal strengthening of each targeted muscle group.  Machines were utilized including torso rotation, leg extension, leg curl, chest press, upright row, tricep extension, bicep curl, leg press, and hip abduction/adduction.    Cheryl received the following manual " therapy techniques: Joint mobilizations, Myofacial release and Soft tissue Mobilization were applied to the: lumbar spine for 0 minutes.     Home Exercise Program as follows:   Handouts were given to the patient. Pt demo good understanding of the education provided. Cheryl demonstrated good return demonstration of activities.   -ITB stretch, supine with strap  -glute stretch, figure 4 with pull across  -bridging  -SL hip abduction    Lumbar roll use compliance: has not trialed at this time    Assessment     Pt performs exercises well in clinic today with noted fatigue in back after weighted carry.  Pt encouraged to perform at home to build muscular endurance.  Pt weight maintained from last session with MedX strengthening due to cervical soreness; able to perform 20 repetitions at 57 ft.lb with decreased RPE.  Continue per pt tolerance.     Patient is making good progress towards established goals.  Pt will continue to benefit from skilled outpatient physical therapy to address the deficits stated in the impairment chart, provide pt/family education and to maximize pt's level of independence in the home and community environment.     Pt's spiritual, cultural and educational needs considered and pt agreeable to plan of care and goals as stated below:     Medical necessity is demonstrated by the following problem list.    Pt presents with the following impairments:      History  Co-morbidities and personal factors that may impact the plan of care Co-morbidities:   diabetes     Personal Factors:   no deficits       low   Examination  Body Structures and Functions, activity limitations and participation restrictions that may impact the plan of care Body Regions:   back  lower extremities  trunk     Body Systems:    ROM  strength  gross coordinated movement  motor control     Participation Restrictions:   Standing, walking, lifting, bending     Activity limitations:   Learning and applying knowledge  no  deficits     General Tasks and Commands  no deficits     Communication  no deficits     Mobility  lifting and carrying objects  walking     Self care  no deficits     Domestic Life  shopping  cooking  doing house work (cleaning house, washing dishes, laundry)     Interactions/Relationships  no deficits     Life Areas  no deficits     Community and Social Life  community life  recreation and leisure             low   Clinical Presentation stable and uncomplicated low   Decision Making/ Complexity Score: low      GOALS: Pt is in agreement with the following goals.     Short term goals:  6 weeks or 10 visits   1.  Pt will demonstrate increased lumbar ROM by at least 3 degrees from the initial ROM value with improvements noted in functional ROM and ability to perform ADLs-met  2.  Pt will demonstrate increased maximum isometric torque value by 20% when compared to the initial value resulting in improved ability to perform bending, lifting, and carrying activities safely, confidently.-met  3.  Patient report a reduction in worst pain score by 1-2 points for improved tolerance during work and recreational activities-met  4.  Pt able to perform HEP correctly with minimal cueing or supervision for therapist-met     Long term goals: 13 weeks or 20 visits   1. Pt will demonstrate increased lumbar ROM by at least 6 degrees from initial ROM value, resulting in improved ability to perform functional fwd bending while standing and sitting. -ongoing  2. Pt will demonstrate increased maximum isometric torque value by 45% when compared to the initial value resulting in improved ability to perform bending, lifting, and carrying activities safely, confidently.-ongoing  3. Pt to demonstrate ability to independently control and reduce their pain through posture positioning and mechanical movements throughout a typical day.-met  4.  Patient will demonstrate improved overall function per FOTO Survey to CJ = at least 20% but < 40%  impaired, limited or restricted score or less.-ongoing    Plan     Continue with established Plan of Care towards established PT goals. Progression as tolerated per POC.    Therapist: Anyi Schmidt, PT  01/22/2019

## 2019-01-24 ENCOUNTER — CLINICAL SUPPORT (OUTPATIENT)
Dept: REHABILITATION | Facility: HOSPITAL | Age: 74
End: 2019-01-24
Attending: INTERNAL MEDICINE
Payer: MEDICARE

## 2019-01-24 DIAGNOSIS — M62.89 MUSCLE TIGHTNESS: ICD-10-CM

## 2019-01-24 DIAGNOSIS — M54.50 CHRONIC BILATERAL LOW BACK PAIN WITHOUT SCIATICA: ICD-10-CM

## 2019-01-24 DIAGNOSIS — M62.81 MUSCLE WEAKNESS: ICD-10-CM

## 2019-01-24 DIAGNOSIS — M53.86 DECREASED ROM OF LUMBAR SPINE: ICD-10-CM

## 2019-01-24 DIAGNOSIS — G89.29 CHRONIC BILATERAL LOW BACK PAIN WITHOUT SCIATICA: ICD-10-CM

## 2019-01-24 PROCEDURE — 97110 THERAPEUTIC EXERCISES: CPT | Mod: PN

## 2019-01-24 NOTE — PROGRESS NOTES
[facility]      Name: Cheryl Abreu  Clinic Number: [id number]    Date of Treatment: 2019     Diagnosis:   Encounter Diagnoses   Name Primary?    Decreased ROM of lumbar spine     Muscle tightness     Chronic bilateral low back pain without sciatica     Muscle weakness      Physician: Brisa Boyle, *    Precautions: Standard     Pattern of pain determined: 1    Evaluation Date: 2018  Authorization Period Expiration: 18  Plan of Care Expiration: 19  Reassessment Due: 18  Visit # / Visits authorized:15/ 20    Time In: 1130  Time Out: 1240  Total Billable Time: 58 minutes (1 on 1 x30 minutes)    Subjective     Cheryl reports feeling well today.  No complaints at this time but did have some R hip pain this morning.     Patient reports tolerating previous visit well.  Patient reports their pain to be 0/10 on a 0-10 scale with 0 being no pain and 10 being the worst pain imaginable.  Pain Location: lumbar spine     Occupation:  retired   Leisure: watching grandchildren, walking, exercising                     Pts goals:  Walk around block, play with grandchildren    Objective     Baseline Isometric Testing on Med X equipment: Testing administered by PT  Date of testin18  ROM 0-48 deg   Max Peak Torque 89    Min Peak Torque 19    Flex/Ext Ratio 4.6/1   % below normative data 44   Counter weight 160   femur 5   Seat pad 2     midpoint Isometric Testing on Med X equipment: Testing administered by PT  Date of testin19  ROM 0-51 deg   Max Peak Torque 172   Min Peak Torque 28   Flex/Ext Ratio 6.14/1   % below normative data 26   Counter weight 160   femur 5   Seat pad 2     Therapist reviewed FOTO scores for Cheryl Abreu on 2018.   FOTO documents entered into [employer] - see Media section.    CMS Impairment/Limitation/Restriction for FOTO Lumbar Spine Survey  Status Limitation G-Code CMS Severity Modifier  Intake 50% 50%  Predicted 60%  "40% Goal Status+ CK - At least 40 percent but less than 60 percent  11/29/2018 57% 43% Current Status CK - At least 40 percent but less than 60 percent  D/C Status CK **only report if this is discharge survey  +Based on FOTO predicted change score    Treatment      Pt was instructed in and performed the following:     Cheryl received therapeutic exercises to develop/improved posture, cardiovascular endurance, muscular endurance, lumbar/cervical ROM, strength and muscular endurance for 60 minutes including the following exercises:     DKTC x 10 with PB  LTR x 10 to each side with PB  SL clams x10  weighted KB carry, 7.5# (down and back turf =1 round) x2 rounds with R UE front rack carry then L   weighted KB carry, 7.5# holding B UE's at midline x1 round (down and back)   +sit to stand, holding 7.5# KB at front 2x5    [facility] 1/24/2019   Visit Number 15   VAS Pain Rating 1   Treadmill Time (in min.) 10   Speed 2   Incline -   Recumbent Bike Seat Pos. -   Time -   Flexion in Lying 10   Lumbar Weight 60   Repetitions 26   Rating of Perceived Exertion 5   Ice - Z Lie (in min.) 10       Not completed:   Bridges x 10, push through heels with active abd at top, YTB  ITB stretch with strap, 3 x 15"  Figure 4 glute stretch, LE on SB, 3 x 15"    Peripheral muscle strengthening which included 1 set of 15-20 repetitions at a slow, controlled 7 second per rep pace focused on strengthening supporting musculature for improved body mechanics and functional mobility.  Pt and therapist focused on proper form during treatment to ensure optimal strengthening of each targeted muscle group.  Machines were utilized including torso rotation, leg extension, leg curl, chest press, upright row, tricep extension, bicep curl, leg press, and hip abduction/adduction.    Cheryl received the following manual therapy techniques: Joint mobilizations, Myofacial release and Soft tissue Mobilization were applied to the: lumbar spine for 0 " minutes.     Home Exercise Program as follows:   Handouts were given to the patient. Pt demo good understanding of the education provided. Cheryl demonstrated good return demonstration of activities.   -ITB stretch, supine with strap  -glute stretch, figure 4 with pull across  -bridging  -SL hip abduction    Lumbar roll use compliance: has not trialed at this time    Assessment     Pt tolerated well 5% increase in medX extension weight today to 60ft.lbs for 16 repetitions and appropriate muscular challenge.  Addition of sit to stand from mid sized box today while holding KB to further challenge posterior chain; no adverse reaction noted to additional loading.  Continue per pt tolerance with progression as indicated.     Patient is making good progress towards established goals.  Pt will continue to benefit from skilled outpatient physical therapy to address the deficits stated in the impairment chart, provide pt/family education and to maximize pt's level of independence in the home and community environment.     Pt's spiritual, cultural and educational needs considered and pt agreeable to plan of care and goals as stated below:     Medical necessity is demonstrated by the following problem list.    Pt presents with the following impairments:      History  Co-morbidities and personal factors that may impact the plan of care Co-morbidities:   diabetes     Personal Factors:   no deficits       low   Examination  Body Structures and Functions, activity limitations and participation restrictions that may impact the plan of care Body Regions:   back  lower extremities  trunk     Body Systems:    ROM  strength  gross coordinated movement  motor control     Participation Restrictions:   Standing, walking, lifting, bending     Activity limitations:   Learning and applying knowledge  no deficits     General Tasks and Commands  no deficits     Communication  no deficits     Mobility  lifting and carrying  objects  walking     Self care  no deficits     Domestic Life  shopping  cooking  doing house work (cleaning house, washing dishes, laundry)     Interactions/Relationships  no deficits     Life Areas  no deficits     Community and Social Life  community life  recreation and leisure             low   Clinical Presentation stable and uncomplicated low   Decision Making/ Complexity Score: low      GOALS: Pt is in agreement with the following goals.     Short term goals:  6 weeks or 10 visits   1.  Pt will demonstrate increased lumbar ROM by at least 3 degrees from the initial ROM value with improvements noted in functional ROM and ability to perform ADLs-met  2.  Pt will demonstrate increased maximum isometric torque value by 20% when compared to the initial value resulting in improved ability to perform bending, lifting, and carrying activities safely, confidently.-met  3.  Patient report a reduction in worst pain score by 1-2 points for improved tolerance during work and recreational activities-met  4.  Pt able to perform HEP correctly with minimal cueing or supervision for therapist-met     Long term goals: 13 weeks or 20 visits   1. Pt will demonstrate increased lumbar ROM by at least 6 degrees from initial ROM value, resulting in improved ability to perform functional fwd bending while standing and sitting. -ongoing  2. Pt will demonstrate increased maximum isometric torque value by 45% when compared to the initial value resulting in improved ability to perform bending, lifting, and carrying activities safely, confidently.-ongoing  3. Pt to demonstrate ability to independently control and reduce their pain through posture positioning and mechanical movements throughout a typical day.-met  4.  Patient will demonstrate improved overall function per FOTO Survey to CJ = at least 20% but < 40% impaired, limited or restricted score or less.-ongoing    Plan     Continue with established Plan of Care towards established  PT goals. Progression as tolerated per POC.    Therapist: Anyi Schmidt, PT  01/24/2019

## 2019-02-01 ENCOUNTER — CLINICAL SUPPORT (OUTPATIENT)
Dept: REHABILITATION | Facility: HOSPITAL | Age: 74
End: 2019-02-01
Attending: INTERNAL MEDICINE
Payer: MEDICARE

## 2019-02-01 DIAGNOSIS — M53.86 DECREASED ROM OF LUMBAR SPINE: ICD-10-CM

## 2019-02-01 DIAGNOSIS — G89.29 CHRONIC BILATERAL LOW BACK PAIN WITHOUT SCIATICA: ICD-10-CM

## 2019-02-01 DIAGNOSIS — M62.81 MUSCLE WEAKNESS: ICD-10-CM

## 2019-02-01 DIAGNOSIS — M54.50 CHRONIC BILATERAL LOW BACK PAIN WITHOUT SCIATICA: ICD-10-CM

## 2019-02-01 DIAGNOSIS — M62.89 MUSCLE TIGHTNESS: ICD-10-CM

## 2019-02-01 PROCEDURE — 97110 THERAPEUTIC EXERCISES: CPT | Mod: PN

## 2019-02-01 NOTE — PROGRESS NOTES
[facility]      Name: Cheryl Abreu  Clinic Number: [id number]    Date of Treatment: 2019     Diagnosis:   Encounter Diagnoses   Name Primary?    Decreased ROM of lumbar spine     Muscle tightness     Chronic bilateral low back pain without sciatica     Muscle weakness      Physician: Brisa Boyle, *    Precautions: Standard     Pattern of pain determined: 1    Evaluation Date: 2018  Authorization Period Expiration: 18  Plan of Care Expiration: 19  Reassessment Due: 18  Visit # / Visits authorized:    Time In: 3:00 pm  Time Out: 4:00 pm  Total Billable Time: 60  minutes      Subjective     Cheryl reports feeling well today. Pt states she had a little lower back pain early today, but pain went away.    Patient reports tolerating previous visit well.  Patient reports their pain to be 0/10 on a 0-10 scale with 0 being no pain and 10 being the worst pain imaginable.  Pain Location: lumbar spine     Occupation:  retired   Leisure: watching grandchildren, walking, exercising                     Pts goals:  Walk around block, play with grandchildren    Objective     Baseline Isometric Testing on Med X equipment: Testing administered by PT  Date of testin18  ROM 0-48 deg   Max Peak Torque 89    Min Peak Torque 19    Flex/Ext Ratio 4.6/1   % below normative data 44   Counter weight 160   femur 5   Seat pad 2     midpoint Isometric Testing on Med X equipment: Testing administered by PT  Date of testin19  ROM 0-51 deg   Max Peak Torque 172   Min Peak Torque 28   Flex/Ext Ratio 6.14/1   % below normative data 26   Counter weight 160   femur 5   Seat pad 2     Therapist reviewed FOTO scores for Cheryl Abreu on 2018.   FOTO documents entered into [employer] - see Media section.    CMS Impairment/Limitation/Restriction for FOTO Lumbar Spine Survey  Status Limitation G-Code CMS Severity Modifier  Intake 50% 50%  Predicted 60% 40% Goal  "Status+ CK - At least 40 percent but less than 60 percent  11/29/2018 57% 43% Current Status CK - At least 40 percent but less than 60 percent  D/C Status CK **only report if this is discharge survey  +Based on FOTO predicted change score    Treatment      Pt was instructed in and performed the following:     Cheryl received therapeutic exercises to develop/improved posture, cardiovascular endurance, muscular endurance, lumbar/cervical ROM, strength and muscular endurance for 60 minutes including the following exercises:     DKTC x 10 with PB  LTR x 10 to each side with PB  SL clams x10  weighted KB carry, 7.5# (down and back turf =1 round) x2 rounds with R UE front rack carry then L   weighted KB carry, 7.5# holding B UE's at midline x1 round (down and back)   +sit to stand, holding 7.5# KB at front 2x5    Not completed:   Bridges x 10, push through heels with active abd at top, YTB  ITB stretch with strap, 3 x 15"  Figure 4 glute stretch, LE on SB, 3 x 15"    [facility] 2/1/2019   Visit Number 16   VAS Pain Rating 0   Treadmill Time (in min.) 10   Speed 2   Incline 0   Recumbent Bike Seat Pos. -   Time -   Flexion in Lying -   Manual Therapy -   Lumbar Extension Seat Pad -   Femur Restraint -   Top Dead Center -   Counterweight -   Lumbar Flexion -   Lumbar Extension -   Lumbar Peak Torque -   Min Torque -   Test Percent Below Normative Data -   Test Percent Gain in Strength from Initial  -   Lumbar Weight 60   Repetitions 20   Rating of Perceived Exertion 5   Ice - Z Lie (in min.) 10         Peripheral muscle strengthening which included 1 set of 15-20 repetitions at a slow, controlled 7 second per rep pace focused on strengthening supporting musculature for improved body mechanics and functional mobility.  Pt and therapist focused on proper form during treatment to ensure optimal strengthening of each targeted muscle group.  Machines were utilized including torso rotation, leg extension, leg curl, chest " press, upright row, tricep extension, bicep curl, leg press, and hip abduction/adduction.    Cheryl received the following manual therapy techniques: Joint mobilizations, Myofacial release and Soft tissue Mobilization were applied to the: lumbar spine for 0 minutes.     Home Exercise Program as follows:   Handouts were given to the patient. Pt demo good understanding of the education provided. Cheryl demonstrated good return demonstration of activities.   -ITB stretch, supine with strap  -glute stretch, figure 4 with pull across  -bridging  -SL hip abduction    Lumbar roll use compliance: has not trialed at this time    Assessment     Pt tolerated Pt session well today. Pt tolerated 60 ft.lbs today on Medx lumbar extension. Pt was able to tolerated 20 reps with PRE of 5. Pt cont to tolerated peripheral muscle strengthening. Continue per pt tolerance with progression as indicated. Plan to progress weight on Medx lumbar extension and peripheral machines as tolerated.     Patient is making good progress towards established goals.  Pt will continue to benefit from skilled outpatient physical therapy to address the deficits stated in the impairment chart, provide pt/family education and to maximize pt's level of independence in the home and community environment.     Pt's spiritual, cultural and educational needs considered and pt agreeable to plan of care and goals as stated below:     Medical necessity is demonstrated by the following problem list.    Pt presents with the following impairments:      GOALS: Pt is in agreement with the following goals.     Short term goals:  6 weeks or 10 visits   1.  Pt will demonstrate increased lumbar ROM by at least 3 degrees from the initial ROM value with improvements noted in functional ROM and ability to perform ADLs-met  2.  Pt will demonstrate increased maximum isometric torque value by 20% when compared to the initial value resulting in improved ability to perform bending,  lifting, and carrying activities safely, confidently.-met  3.  Patient report a reduction in worst pain score by 1-2 points for improved tolerance during work and recreational activities-met  4.  Pt able to perform HEP correctly with minimal cueing or supervision for therapist-met     Long term goals: 13 weeks or 20 visits   1. Pt will demonstrate increased lumbar ROM by at least 6 degrees from initial ROM value, resulting in improved ability to perform functional fwd bending while standing and sitting. -ongoing  2. Pt will demonstrate increased maximum isometric torque value by 45% when compared to the initial value resulting in improved ability to perform bending, lifting, and carrying activities safely, confidently.-ongoing  3. Pt to demonstrate ability to independently control and reduce their pain through posture positioning and mechanical movements throughout a typical day.-met  4.  Patient will demonstrate improved overall function per FOTO Survey to CJ = at least 20% but < 40% impaired, limited or restricted score or less.-ongoing    Plan     Continue with established Plan of Care towards established PT goals. Progression as tolerated per POC.    Therapist: Willie Langley, PT  02/01/2019

## 2019-02-04 ENCOUNTER — CLINICAL SUPPORT (OUTPATIENT)
Dept: REHABILITATION | Facility: HOSPITAL | Age: 74
End: 2019-02-04
Attending: INTERNAL MEDICINE
Payer: MEDICARE

## 2019-02-04 DIAGNOSIS — M62.81 MUSCLE WEAKNESS: ICD-10-CM

## 2019-02-04 DIAGNOSIS — M62.89 MUSCLE TIGHTNESS: ICD-10-CM

## 2019-02-04 DIAGNOSIS — M54.50 CHRONIC BILATERAL LOW BACK PAIN WITHOUT SCIATICA: ICD-10-CM

## 2019-02-04 DIAGNOSIS — G89.29 CHRONIC BILATERAL LOW BACK PAIN WITHOUT SCIATICA: ICD-10-CM

## 2019-02-04 DIAGNOSIS — M53.86 DECREASED ROM OF LUMBAR SPINE: ICD-10-CM

## 2019-02-04 PROCEDURE — 97110 THERAPEUTIC EXERCISES: CPT | Mod: PN

## 2019-02-04 NOTE — PROGRESS NOTES
[facility] Treatment      Name: Cheryl Abreu  Clinic Number: [id number]    Date of Treatment: 2019     Diagnosis:   Encounter Diagnoses   Name Primary?    Decreased ROM of lumbar spine     Muscle tightness     Chronic bilateral low back pain without sciatica     Muscle weakness      Physician: Brisa Boyle, *    Precautions: Standard     Pattern of pain determined: 1    Evaluation Date: 2018  Authorization Period Expiration: 18  Plan of Care Expiration: 19  Reassessment Due: 18  Visit # / Visits authorized:    Time In: 1:30 pm  Time Out: 2:30  pm  Total Billable Time: 40  minutes      Subjective     Cheryl reports that she states she had a little trip when she was going to stairs and landed in knee and hands.  Pt states she could not  her leg and she tripped on the stairs. Pt feels she is regressing, but she improves when she perform HEP.     Patient reports tolerating previous visit well.  Patient reports their pain to be 0/10 on a 0-10 scale with 0 being no pain and 10 being the worst pain imaginable.  Pain Location: lumbar spine     Occupation:  retired   Leisure: watching grandchildren, walking, exercising                     Pts goals:  Walk around block, play with grandchildren    Objective     Baseline Isometric Testing on Med X equipment: Testing administered by PT  Date of testin18  ROM 0-48 deg   Max Peak Torque 89    Min Peak Torque 19    Flex/Ext Ratio 4.6/1   % below normative data 44   Counter weight 160   femur 5   Seat pad 2     midpoint Isometric Testing on Med X equipment: Testing administered by PT  Date of testin19  ROM 0-51 deg   Max Peak Torque 172   Min Peak Torque 28   Flex/Ext Ratio 6.14/1   % below normative data 26   Counter weight 160   femur 5   Seat pad 2     Therapist reviewed FOTO scores for Cheryl Abreu on 2018.   FOTO documents entered into [email or web address] - see Media section.    CMS  "Impairment/Limitation/Restriction for FOTO Lumbar Spine Survey  Status Limitation G-Code CMS Severity Modifier  Intake 50% 50%  Predicted 60% 40% Goal Status+ CK - At least 40 percent but less than 60 percent  11/29/2018 57% 43% Current Status CK - At least 40 percent but less than 60 percent  D/C Status CK **only report if this is discharge survey  +Based on FOTO predicted change score    Treatment      Pt was instructed in and performed the following:     Cheryl received therapeutic exercises to develop/improved posture, cardiovascular endurance, muscular endurance, lumbar/cervical ROM, strength and muscular endurance for 60 minutes including the following exercises:     DKTC x 10 with PB  LTR x 10 to each side with PB  SL clams x10  weighted KB carry, 7.5# (down and back turf =1 round) x2 rounds with R UE front rack carry then L   weighted KB carry, 7.5# holding B UE's at midline x1 round (down and back)   +sit to stand, holding 7.5# KB at front 2x5    Not completed:   Bridges x 10, push through heels with active abd at top, YTB  ITB stretch with strap, 3 x 15"  Figure 4 glute stretch, LE on SB, 3 x 15"    [facility] 2/4/2019   Visit Number 17   VAS Pain Rating 0   Treadmill Time (in min.) 10   Speed 2.5   Incline 0   Recumbent Bike Seat Pos. -   Time -   Flexion in Lying -   Manual Therapy -   Lumbar Extension Seat Pad -   Femur Restraint -   Top Dead Center -   Counterweight -   Lumbar Flexion 51   Lumbar Extension 0   Lumbar Peak Torque -   Min Torque -   Test Percent Below Normative Data -   Test Percent Gain in Strength from Initial  -   Lumbar Weight 63   Repetitions 20   Rating of Perceived Exertion 4   Ice - Z Lie (in min.) 10     Peripheral muscle strengthening which included 1 set of 15-20 repetitions at a slow, controlled 7 second per rep pace focused on strengthening supporting musculature for improved body mechanics and functional mobility.  Pt and therapist focused on proper form during " treatment to ensure optimal strengthening of each targeted muscle group.  Machines were utilized including torso rotation, leg extension, leg curl, chest press, upright row, tricep extension, bicep curl, leg press, and hip abduction/adduction.    Cheryl received the following manual therapy techniques: Joint mobilizations, Myofacial release and Soft tissue Mobilization were applied to the: lumbar spine for 0 minutes.     Home Exercise Program as follows:   Handouts were given to the patient. Pt demo good understanding of the education provided. Cheryl demonstrated good return demonstration of activities.   -ITB stretch, supine with strap  -glute stretch, figure 4 with pull across  -bridging  -SL hip abduction    Lumbar roll use compliance: has not trialed at this time    Assessment     Pt tolerated Pt session well today. Medx lumbar extension was tolerated with 63 ft.lbs, 20 reps and RPE of 4. Pt did demonstrated difficult in the last 5 reps with increase of lower back pain. Pt tolerated well increase of 5 ft in all peripheral muscle strengthening. Pt demonstrated proper LE muscle fatigue. Pt wants to join [facility] program after d/c from health back program. Plan to progress weight on Medx lumbar extension and peripheral machines as tolerated.     Patient is making good progress towards established goals.  Pt will continue to benefit from skilled outpatient physical therapy to address the deficits stated in the impairment chart, provide pt/family education and to maximize pt's level of independence in the home and community environment.     Pt's spiritual, cultural and educational needs considered and pt agreeable to plan of care and goals as stated below:     Medical necessity is demonstrated by the following problem list.    Pt presents with the following impairments:      GOALS: Pt is in agreement with the following goals.     Short term goals:  6 weeks or 10 visits   1.  Pt will demonstrate increased  lumbar ROM by at least 3 degrees from the initial ROM value with improvements noted in functional ROM and ability to perform ADLs-met  2.  Pt will demonstrate increased maximum isometric torque value by 20% when compared to the initial value resulting in improved ability to perform bending, lifting, and carrying activities safely, confidently.-met  3.  Patient report a reduction in worst pain score by 1-2 points for improved tolerance during work and recreational activities-met  4.  Pt able to perform HEP correctly with minimal cueing or supervision for therapist-met     Long term goals: 13 weeks or 20 visits   1. Pt will demonstrate increased lumbar ROM by at least 6 degrees from initial ROM value, resulting in improved ability to perform functional fwd bending while standing and sitting. -ongoing  2. Pt will demonstrate increased maximum isometric torque value by 45% when compared to the initial value resulting in improved ability to perform bending, lifting, and carrying activities safely, confidently.-ongoing  3. Pt to demonstrate ability to independently control and reduce their pain through posture positioning and mechanical movements throughout a typical day.-met  4.  Patient will demonstrate improved overall function per FOTO Survey to CJ = at least 20% but < 40% impaired, limited or restricted score or less.-ongoing    Plan     Continue with established Plan of Care towards established PT goals. Progression as tolerated per POC.    Therapist: Willie Langley, PT  02/04/2019

## 2019-02-06 NOTE — PROGRESS NOTES
[facility] Physical Therapy Treatment      Name: Cheryl Abreu  Clinic Number: [id number]    Date of Treatment: 2019     Diagnosis:   Encounter Diagnoses   Name Primary?    Decreased ROM of lumbar spine     Muscle tightness     Chronic bilateral low back pain without sciatica     Muscle weakness      Physician: Brisa Boyle, *    Precautions: Standard     Pattern of pain determined: 1    Evaluation Date: 2018  Authorization Period Expiration: 18  Plan of Care Expiration: 19  Reassessment Due: 18  Visit # / Visits authorized:    Time In: 9:00am  Time Out: 10:00  am  Total Billable Time: 30  minutes      Subjective     Cheryl reports that she cont with lower back pain during mornings. Pt reports she still have a little bit of shoulder pain after trip in the stairs but she is feeling better know.    Patient reports tolerating previous visit well.  Patient reports their pain to be 7/10 on a 0-10 scale with 0 being no pain and 10 being the worst pain imaginable.  Pain Location: lumbar spine     Occupation:  retired   Leisure: watching grandchildren, walking, exercising                     Pts goals:  Walk around block, play with grandchildren    Objective     Baseline Isometric Testing on Med X equipment: Testing administered by PT  Date of testin18  ROM 0-48 deg   Max Peak Torque 89    Min Peak Torque 19    Flex/Ext Ratio 4.6/1   % below normative data 44   Counter weight 160   femur 5   Seat pad 2     midpoint Isometric Testing on Med X equipment: Testing administered by PT  Date of testin19  ROM 0-51 deg   Max Peak Torque 172   Min Peak Torque 28   Flex/Ext Ratio 6.14/1   % below normative data 26   Counter weight 160   femur 5   Seat pad 2     Therapist reviewed FOTO scores for Cheryl Abreu on 2018.   FOTO documents entered into [employer] - see Media section.    CMS Impairment/Limitation/Restriction for FOTO Lumbar Spine Survey  Status  "Limitation G-Code CMS Severity Modifier  Intake 50% 50%  Predicted 60% 40% Goal Status+ CK - At least 40 percent but less than 60 percent  11/29/2018 57% 43% Current Status CK - At least 40 percent but less than 60 percent  D/C Status CK **only report if this is discharge survey  +Based on FOTO predicted change score    Treatment      Pt was instructed in and performed the following:     Cheryl received therapeutic exercises to develop/improved posture, cardiovascular endurance, muscular endurance, lumbar/cervical ROM, strength and muscular endurance for 60 minutes including the following exercises:     DKTC x 10 with PB  LTR x 10 to each side with PB  SL clams x10  Prone  on elbows   Standing trunk extension  weighted KB carry, 7.5# (down and back turf =1 round) x2 rounds with R UE front rack carry then L   weighted KB carry, 7.5# holding B UE's at midline x1 round (down and back)   +sit to stand, holding 7.5# KB at front 2x5    Not completed:   Bridges x 10, push through heels with active abd at top, YTB  ITB stretch with strap, 3 x 15"  Figure 4 glute stretch, LE on SB, 3 x 15"    [facility] 2/7/2019   Visit Number 18   VAS Pain Rating 7   Treadmill Time (in min.) 10   Speed 2.5   Incline 0   Recumbent Bike Seat Pos. -   Time -   Flexion in Lying -   Manual Therapy -   Lumbar Extension Seat Pad -   Femur Restraint -   Top Dead Center -   Counterweight -   Lumbar Flexion -   Lumbar Extension -   Lumbar Peak Torque -   Min Torque -   Test Percent Below Normative Data -   Test Percent Gain in Strength from Initial  -   Lumbar Weight 63   Repetitions 20   Rating of Perceived Exertion 4   Ice - Z Lie (in min.) 10       Peripheral muscle strengthening which included 1 set of 15-20 repetitions at a slow, controlled 7 second per rep pace focused on strengthening supporting musculature for improved body mechanics and functional mobility.  Pt and therapist focused on proper form during treatment to ensure optimal " strengthening of each targeted muscle group.  Machines were utilized including torso rotation, leg extension, leg curl, chest press, upright row, tricep extension, bicep curl, leg press, and hip abduction/adduction.    Cheryl received the following manual therapy techniques: Joint mobilizations, Myofacial release and Soft tissue Mobilization were applied to the: lumbar spine for 0 minutes.     Home Exercise Program as follows:   Handouts were given to the patient. Pt demo good understanding of the education provided. Cheryl demonstrated good return demonstration of activities.   -ITB stretch, supine with strap  -glute stretch, figure 4 with pull across  -bridging  -SL hip abduction    Lumbar roll use compliance: has not trialed at this time    Assessment     Pt tolerated Pt session well today. Medx lumbar extension was tolerated with 63 ft.lbs, 20 reps and RPE of 4. Pt cont with difficult in the last 5 reps with increase of lower back pain. All peripheral muscle strengthen exercises was remain the same today.Pt wants to join [facility] program after d/c from health back program. Plan to progress weight on Medx lumbar extension and peripheral machines as tolerated.     Patient is making good progress towards established goals.  Pt will continue to benefit from skilled outpatient physical therapy to address the deficits stated in the impairment chart, provide pt/family education and to maximize pt's level of independence in the home and community environment.     Pt's spiritual, cultural and educational needs considered and pt agreeable to plan of care and goals as stated below:     Medical necessity is demonstrated by the following problem list.    Pt presents with the following impairments:      GOALS: Pt is in agreement with the following goals.     Short term goals:  6 weeks or 10 visits   1.  Pt will demonstrate increased lumbar ROM by at least 3 degrees from the initial ROM value with improvements noted in  functional ROM and ability to perform ADLs-met  2.  Pt will demonstrate increased maximum isometric torque value by 20% when compared to the initial value resulting in improved ability to perform bending, lifting, and carrying activities safely, confidently.-met  3.  Patient report a reduction in worst pain score by 1-2 points for improved tolerance during work and recreational activities-met  4.  Pt able to perform HEP correctly with minimal cueing or supervision for therapist-met     Long term goals: 13 weeks or 20 visits   1. Pt will demonstrate increased lumbar ROM by at least 6 degrees from initial ROM value, resulting in improved ability to perform functional fwd bending while standing and sitting. -ongoing  2. Pt will demonstrate increased maximum isometric torque value by 45% when compared to the initial value resulting in improved ability to perform bending, lifting, and carrying activities safely, confidently.-ongoing  3. Pt to demonstrate ability to independently control and reduce their pain through posture positioning and mechanical movements throughout a typical day.-met  4.  Patient will demonstrate improved overall function per FOTO Survey to CJ = at least 20% but < 40% impaired, limited or restricted score or less.-ongoing    Plan     Continue with established Plan of Care towards established PT goals. Progression as tolerated per POC.    Therapist: Willie Langley, PT  02/07/2019

## 2019-02-07 ENCOUNTER — CLINICAL SUPPORT (OUTPATIENT)
Dept: REHABILITATION | Facility: HOSPITAL | Age: 74
End: 2019-02-07
Attending: INTERNAL MEDICINE
Payer: MEDICARE

## 2019-02-07 DIAGNOSIS — M62.81 MUSCLE WEAKNESS: ICD-10-CM

## 2019-02-07 DIAGNOSIS — M54.50 CHRONIC BILATERAL LOW BACK PAIN WITHOUT SCIATICA: ICD-10-CM

## 2019-02-07 DIAGNOSIS — M62.89 MUSCLE TIGHTNESS: ICD-10-CM

## 2019-02-07 DIAGNOSIS — M53.86 DECREASED ROM OF LUMBAR SPINE: ICD-10-CM

## 2019-02-07 DIAGNOSIS — G89.29 CHRONIC BILATERAL LOW BACK PAIN WITHOUT SCIATICA: ICD-10-CM

## 2019-02-07 PROCEDURE — 97110 THERAPEUTIC EXERCISES: CPT | Mod: PN

## 2019-02-13 NOTE — PROGRESS NOTES
[facility] Back Physical Therapy Treatment      Name: Cheryl Abreu  Clinic Number: [id number]    Date of Treatment: 2019     Diagnosis:   Encounter Diagnoses   Name Primary?    Decreased ROM of lumbar spine     Muscle tightness     Chronic bilateral low back pain without sciatica     Muscle weakness      Physician: Brisa Boyle, *    Precautions: Standard     Pattern of pain determined: 1    Evaluation Date: 2018  Authorization Period Expiration: 18  Plan of Care Expiration: 19  Reassessment Due: 18  Visit # / Visits authorized:    Time In: 230 pm  Time Out: 330 pm  Total Billable Time: 30  minutes      Subjective     Cheryl reports that she is motivated to join the monthly program after discharge from the HB program.  She reports that she remains discouraged because if she walks for a longer period of time her legs give out.  She [place] airport [place] she had to stop 5 times because of this weakness in her legs.      Patient reports tolerating previous visit well.  Patient reports their pain to be 7/10 on a 0-10 scale with 0 being no pain and 10 being the worst pain imaginable.  Pain Location: lumbar spine     Occupation:  retired   Leisure: watching grandchildren, walking, exercising                     Pts goals:  Walk around block, play with grandchildren    Objective     Baseline Isometric Testing on Med X equipment: Testing administered by PT  Date of testin18  ROM 0-48 deg   Max Peak Torque 89    Min Peak Torque 19    Flex/Ext Ratio 4.6/1   % below normative data 44   Counter weight 160   femur 5   Seat pad 2     midpoint Isometric Testing on Med X equipment: Testing administered by PT  Date of testin19  ROM 0-51 deg   Max Peak Torque 172   Min Peak Torque 28   Flex/Ext Ratio 6.14/1   % below normative data 26   Counter weight 160   femur 5   Seat pad 2     Therapist reviewed FOTO scores for Cheryl Abreu on 2018.  "  FOTO documents entered into [employer] - see Media section.    CMS Impairment/Limitation/Restriction for FOTO Lumbar Spine Survey  Status Limitation G-Code CMS Severity Modifier  Intake 50% 50%  Predicted 60% 40% Goal Status+ CK - At least 40 percent but less than 60 percent  11/29/2018 57% 43% Current Status CK - At least 40 percent but less than 60 percent  D/C Status CK **only report if this is discharge survey  +Based on FOTO predicted change score    Treatment      Pt was instructed in and performed the following:     Cheryl received therapeutic exercises to develop/improved posture, cardiovascular endurance, muscular endurance, lumbar/cervical ROM, strength and muscular endurance for 50 minutes including the following exercises:     DKTC x 10 with PB  LTR x 10 to each side with PB  SL clams 2x10  Prone  on elbows   Standing trunk extension  weighted KB carry, 7.5# (down and back turf =1 round) x2 rounds with R UE front rack carry then L   weighted KB carry, 7.5# holding B UE's at midline x1 round (down and back)   +sit to stand, holding 7.5# KB at front 2x5    Not completed:   Bridges x 10, push through heels with active abd at top, YTB  ITB stretch with strap, 3 x 15"  Figure 4 glute stretch, LE on SB, 3 x 15"    [facility] 2/14/2019   Visit Number 19   VAS Pain Rating 0   Treadmill Time (in min.) 10   Speed 2.5   Incline 0   Flexion in Lying 10   Lumbar Weight 66   Repetitions 20   Rating of Perceived Exertion 4   Ice - Z Lie (in min.) 10       Peripheral muscle strengthening which included 1 set of 15-20 repetitions at a slow, controlled 7 second per rep pace focused on strengthening supporting musculature for improved body mechanics and functional mobility.  Pt and therapist focused on proper form during treatment to ensure optimal strengthening of each targeted muscle group.  Machines were utilized including torso rotation, leg extension, leg curl, chest press, upright row, tricep extension, bicep " curl, leg press, and hip abduction/adduction.    Cheryl received the following manual therapy techniques: Joint mobilizations, Myofacial release and Soft tissue Mobilization were applied to the: lumbar spine for 0 minutes.     Home Exercise Program as follows:   Handouts were given to the patient. Pt demo good understanding of the education provided. Cheryl demonstrated good return demonstration of activities.   -ITB stretch, supine with strap  -glute stretch, figure 4 with pull across  -bridging  -SL hip abduction    Lumbar roll use compliance: has not trialed at this time    Assessment     Pt reported fatigue today with session, but was still able to participate in all exercises. Medx lumbar extension was increased to 66 ft/lbs and rated as RPE of 4 after 20 repetitions.  Patient has made excellent progress with the program and will be reassessed for her goal progress next visit.  Pt will be ready for discharge next visit and is in agreement with this plan.   Patient is making good progress towards established goals.  Pt will continue to benefit from skilled outpatient physical therapy to address the deficits stated in the impairment chart, provide pt/family education and to maximize pt's level of independence in the home and community environment.     Pt's spiritual, cultural and educational needs considered and pt agreeable to plan of care and goals as stated below:     Medical necessity is demonstrated by the following problem list.    Pt presents with the following impairments:      GOALS: Pt is in agreement with the following goals.     Short term goals:  6 weeks or 10 visits   1.  Pt will demonstrate increased lumbar ROM by at least 3 degrees from the initial ROM value with improvements noted in functional ROM and ability to perform ADLs-met  2.  Pt will demonstrate increased maximum isometric torque value by 20% when compared to the initial value resulting in improved ability to perform bending, lifting, and  carrying activities safely, confidently.-met  3.  Patient report a reduction in worst pain score by 1-2 points for improved tolerance during work and recreational activities-met  4.  Pt able to perform HEP correctly with minimal cueing or supervision for therapist-met     Long term goals: 13 weeks or 20 visits   1. Pt will demonstrate increased lumbar ROM by at least 6 degrees from initial ROM value, resulting in improved ability to perform functional fwd bending while standing and sitting. -ongoing  2. Pt will demonstrate increased maximum isometric torque value by 45% when compared to the initial value resulting in improved ability to perform bending, lifting, and carrying activities safely, confidently.-ongoing  3. Pt to demonstrate ability to independently control and reduce their pain through posture positioning and mechanical movements throughout a typical day.-met  4.  Patient will demonstrate improved overall function per FOTO Survey to CJ = at least 20% but < 40% impaired, limited or restricted score or less.-ongoing    Plan     DC next visit    Therapist: Loli Moore, PT  02/14/2019

## 2019-02-14 ENCOUNTER — CLINICAL SUPPORT (OUTPATIENT)
Dept: REHABILITATION | Facility: HOSPITAL | Age: 74
End: 2019-02-14
Attending: INTERNAL MEDICINE
Payer: MEDICARE

## 2019-02-14 DIAGNOSIS — M62.81 MUSCLE WEAKNESS: ICD-10-CM

## 2019-02-14 DIAGNOSIS — M54.50 CHRONIC BILATERAL LOW BACK PAIN WITHOUT SCIATICA: ICD-10-CM

## 2019-02-14 DIAGNOSIS — G89.29 CHRONIC BILATERAL LOW BACK PAIN WITHOUT SCIATICA: ICD-10-CM

## 2019-02-14 DIAGNOSIS — M62.89 MUSCLE TIGHTNESS: ICD-10-CM

## 2019-02-14 DIAGNOSIS — M53.86 DECREASED ROM OF LUMBAR SPINE: ICD-10-CM

## 2019-02-14 PROCEDURE — 97110 THERAPEUTIC EXERCISES: CPT | Mod: PN

## 2019-02-20 ENCOUNTER — CLINICAL SUPPORT (OUTPATIENT)
Dept: REHABILITATION | Facility: HOSPITAL | Age: 74
End: 2019-02-20
Attending: INTERNAL MEDICINE
Payer: MEDICARE

## 2019-02-20 DIAGNOSIS — M54.50 CHRONIC BILATERAL LOW BACK PAIN WITHOUT SCIATICA: ICD-10-CM

## 2019-02-20 DIAGNOSIS — M62.89 MUSCLE TIGHTNESS: ICD-10-CM

## 2019-02-20 DIAGNOSIS — G89.29 CHRONIC BILATERAL LOW BACK PAIN WITHOUT SCIATICA: ICD-10-CM

## 2019-02-20 DIAGNOSIS — M53.86 DECREASED ROM OF LUMBAR SPINE: ICD-10-CM

## 2019-02-20 DIAGNOSIS — M62.81 MUSCLE WEAKNESS: ICD-10-CM

## 2019-02-20 PROCEDURE — 97110 THERAPEUTIC EXERCISES: CPT | Mod: PN

## 2019-02-20 NOTE — PROGRESS NOTES
[facility]      Name: Cheryl Abreu  Clinic Number: [id number]    Date of Treatment: 2019     Diagnosis:   No diagnosis found.  Physician: Brisa Boyle, *    Precautions: Standard     Pattern of pain determined: 1    Evaluation Date: 2018  Authorization Period Expiration: 18  Plan of Care Expiration: 19  Reassessment Due: 18  Visit # / Visits authorized:    Time In: 230 pm  Time Out: 330 pm  Total Billable Time: 60  minutes      Subjective     Cheryl reports today she feels good and felt good yesterday.  She states she did not have any difficulty getting up this morning.  States mainly just sore. Pt still interested in participating in the wellness program.    Patient reports tolerating previous visit well.  Patient reports their pain to be 0-1/10 on a 0-10 scale with 0 being no pain and 10 being the worst pain imaginable.  Pain Location: lumbar spine     Occupation:  retired   Leisure: watching grandchildren, walking, exercising                     Pts goals:  Walk around block, play with grandchildren    Objective     Baseline Isometric Testing on Med X equipment: Testing administered by PT  Date of testin18  ROM 0-48 deg   Max Peak Torque 89    Min Peak Torque 19    Flex/Ext Ratio 4.6/1   % below normative data 44   Counter weight 160   femur 5   Seat pad 2     midpoint Isometric Testing on Med X equipment: Testing administered by PT  Date of testin19  ROM 0-51 deg   Max Peak Torque 172   Min Peak Torque 28   Flex/Ext Ratio 6.14/1   % below normative data 26   Counter weight 160   femur 5   Seat pad 2     Discharge Isometric Testing on Med X equipment: Testing administered by PT  Date of testin19  ROM 0-54 deg   Max Peak Torque 125   Min Peak Torque 19   Flex/Ext Ratio 6.6   % below normative data 27   Counter weight 160   femur 5   Seat pad 2     Therapist reviewed FOTO scores for Cheryl Abreu on 2018.  "  FOTO documents entered into [employer] - see Media section.    CMS Impairment/Limitation/Restriction for FOTO Lumbar Spine Survey  Status Limitation G-Code CMS Severity Modifier  Intake 50% 50%  Predicted 60% 40% Goal Status+ CK - At least 40 percent but less than 60 percent  11/29/2018 57% 43% Current Status CK - At least 40 percent but less than 60 percent  D/C Status CK **only report if this is discharge survey  +Based on FOTO predicted change score    Treatment      Pt was instructed in and performed the following:     Cheryl received therapeutic exercises to develop/improved posture, cardiovascular endurance, muscular endurance, lumbar/cervical ROM, strength and muscular endurance for 50 minutes including the following exercises:     DKTC x 10 with PB  LTR x 10 to each side with PB  SL clams 2x10  Prone  on elbows   Standing trunk extension  weighted KB carry, 7.5# (down and back turf =1 round) x2 rounds with R UE front rack carry then L   weighted KB carry, 7.5# holding B UE's at midline x1 round (down and back)   +sit to stand, holding 7.5# KB at front 2x5    Not completed:   Bridges x 10, push through heels with active abd at top, YTB  ITB stretch with strap, 3 x 15"  Figure 4 glute stretch, LE on SB, 3 x 15"    [facility] 2/20/2019   Visit Number 20   VAS Pain Rating 0   Treadmill Time (in min.) 10   Speed 2.1   Incline 0   Lumbar Extension Seat Pad 2   Femur Restraint 5   Top Dead Center 24   Counterweight 160   Lumbar Flexion 54   Lumbar Extension 0   Lumbar Peak Torque 125   Min Torque 19   Test Percent Below Normative Data 27   Test Percent Gain in Strength from Initial  30         Peripheral muscle strengthening which included 1 set of 15-20 repetitions at a slow, controlled 7 second per rep pace focused on strengthening supporting musculature for improved body mechanics and functional mobility.  Pt and therapist focused on proper form during treatment to ensure optimal strengthening of each " targeted muscle group.  Machines were utilized including torso rotation, leg extension, leg curl, chest press, upright row, tricep extension, bicep curl, leg press, and hip abduction/adduction.    Cheryl received the following manual therapy techniques: Joint mobilizations, Myofacial release and Soft tissue Mobilization were applied to the: lumbar spine for 0 minutes.     Home Exercise Program as follows:   Handouts were given to the patient. Pt demo good understanding of the education provided. Chreyl demonstrated good return demonstration of activities.   -ITB stretch, supine with strap  -glute stretch, figure 4 with pull across  -bridging  -SL hip abduction    Lumbar roll use compliance: has not trialed at this time    Assessment     Pt will be discharged to the wellness program today.  She has plateaued in regards to progress.  Her strength remains similar to that at her midterm assessment, but overall patient reports significant reduction of symptoms and improved mobility since starting this program.  She demonstrates a 3 degree improvement in her lumbar ROM since last reassessment which assists in ADL function.  Patient is in agreement with her discharge today and is motivated to continue her program.     Patient is making good progress towards established goals.    Pt's spiritual, cultural and educational needs considered and pt agreeable to plan of care and goals as stated below:     Medical necessity is demonstrated by the following problem list.    Pt presents with the following impairments:      GOALS: Pt is in agreement with the following goals.     Short term goals:  6 weeks or 10 visits   1.  Pt will demonstrate increased lumbar ROM by at least 3 degrees from the initial ROM value with improvements noted in functional ROM and ability to perform ADLs-met  2.  Pt will demonstrate increased maximum isometric torque value by 20% when compared to the initial value resulting in improved ability to perform  bending, lifting, and carrying activities safely, confidently.-met  3.  Patient report a reduction in worst pain score by 1-2 points for improved tolerance during work and recreational activities-met  4.  Pt able to perform HEP correctly with minimal cueing or supervision for therapist-met     Long term goals: 13 weeks or 20 visits   1. Pt will demonstrate increased lumbar ROM by at least 6 degrees from initial ROM value, resulting in improved ability to perform functional fwd bending while standing and sitting. -ongoing  2. Pt will demonstrate increased maximum isometric torque value by 45% when compared to the initial value resulting in improved ability to perform bending, lifting, and carrying activities safely, confidently.-ongoing  3. Pt to demonstrate ability to independently control and reduce their pain through posture positioning and mechanical movements throughout a typical day.-met  4.  Patient will demonstrate improved overall function per FOTO Survey to CJ = at least 20% but < 40% impaired, limited or restricted score or less.-ongoing    Plan     DC to wellness program    Therapist: Loli Moore, PT  02/20/2019

## 2019-03-06 ENCOUNTER — DOCUMENTATION ONLY (OUTPATIENT)
Dept: REHABILITATION | Facility: HOSPITAL | Age: 74
End: 2019-03-06
Attending: INTERNAL MEDICINE
Payer: MEDICARE

## 2019-03-06 NOTE — PROGRESS NOTES
Subjective:      Patient ID: Cheryl Ramirez is a 73 y.o. female.    Chief Complaint: Low-back Pain    Referred by: No ref. provider found     Ms ramirez is a 72 yo female here for follow up of back pain.  She has had back pain off and on for the past 15 years.  Then 5 years ago she was working out and doing hamstring exercises and had left leg pain after a pop.  She tore her hamstring off her ischial tuberosity.  She feels like the back pain has been worse since then and could not get out of bed for 3 weeks and she had steroid injections which helped.  She was last seen by me on 11/7/2018 and we sent her to PT at [facility]. She has completed 20 visits with improved pain.  She is going to continue in wellness. She feels like doing well, and enjoyed PT.  She does not have any pain in the morning.  She does feels like walking distances is hard.  She feels tired and has pain.  She feels like there are times when she wants to take a step and her legs do not want to move.  It has often when she is tired, and she does not feel like she can make it.  It is not pain, it just doesn't want to work.  She has had one or two incidents since starting therapy.  She does not know if more or less since doing therapy.  Pain is 0/10 now, worst 3/10 with walking and exercise, best 0/10 sitting.  She has not had any right leg pain.  The pain is all in the back    MRI lumbar 5/2017 outside report  L1-2 moderate broad based disc bulge and left greater than right facet arthropathy  L2-3 DDD with left greater than right moderate facet arthopathy with NF narrowing  L3-4 broad based posterior disc bulge with left paracentral disc protrusion and moderate facet arthropathy with moderate central stenosis and moderate left NF narrowing  L4-5 disc dessication and moderate facet arthropathy with moderate right formainal narrowing  L5-S1 disc dessication and severe facet arthropathy and moderate central stenosis and moderate left NF  narrowing    X-ray lumbar 6/22/2018  There is an S shaped scoliosis which is moderate.  The vertebral bodies are normal in height.  There is disc space narrowing throughout and mild to moderate osteophytic spurring.  Overall moderate facet degenerative changes are present in the mid to lower spine.  There is no significant alignment abnormality or change in alignment with flexion and extension.    Incidental note is made of a small vascular stent overlying the spine likely relating to the left renal artery.  Vascular calcification present along the wall of the aorta.    Impression      Scoliosis and moderate degenerative changes      X-ray hips 6/22/2018  There is degenerative change within the lower spine.  The hips demonstrate no significant degenerative change.  There is no evidence of fracture.    Impression      As above      Past Medical History:  No date: Diabetes mellitus, type 2  No date: High blood pressure  No date: Scoliosis    History reviewed. No pertinent surgical history.    History reviewed.  No pertinent family history.      Social History    Marital status:              Spouse name:                       Years of education:                 Number of children:               Social History Main Topics    Smoking status: Former Smoker                                                                Packs/day: 0.00      Years: 0.00        Smokeless tobacco: Never Used                          Current Outpatient Prescriptions:  ACCU-CHEK SMARTVIEW TEST STRIP Strp, , Disp: , Rfl:   alendronate (FOSAMAX) 70 MG tablet, , Disp: , Rfl:   ALPRAZolam (XANAX) 0.5 MG tablet, Take 0.5 mg by mouth 3 (three) times daily., Disp: , Rfl:   amLODIPine (NORVASC) 10 MG tablet, , Disp: , Rfl:   aspirin (ECOTRIN) 81 MG EC tablet, Take 81 mg by mouth once daily., Disp: , Rfl:   buPROPion (WELLBUTRIN SR) 150 MG TBSR 12 hr tablet, , Disp: , Rfl:   BYSTOLIC 5 mg Tab, , Disp: , Rfl:   clopidogrel (PLAVIX) 75 mg tablet, ,  Disp: , Rfl:   fish oil-omega-3 fatty acids 300-1,000 mg capsule, Take by mouth once daily., Disp: , Rfl:   insulin lispro (HUMALOG) 100 unit/mL injection, Inject into the skin 3 (three) times daily before meals., Disp: , Rfl:   JUBLIA 10 % Marilyn, APPLY ONE DROP TO ALL SMALLER TOES AND 2 DROPS TO GREATER TOES DAILY, Disp: , Rfl: 10  levothyroxine (SYNTHROID) 50 MCG tablet, Take 50 mcg by mouth once daily., Disp: , Rfl:   losartan (COZAAR) 50 MG tablet, , Disp: , Rfl:   MULTIVIT-IRON-MIN-FOLIC ACID 3,500-18-0.4 UNIT-MG-MG ORAL CHEW, Take by mouth., Disp: , Rfl:   OZEMPIC 1 mg/0.75 mL (2 mg/1.5 mL) PnIj, INJECT 1MG EVERY WEEK, Disp: , Rfl: 5  pantoprazole (PROTONIX) 40 MG tablet, Take 40 mg by mouth once daily., Disp: , Rfl:   risedronate (ACTONEL) 150 MG Tab, , Disp: , Rfl:   rosuvastatin (CRESTOR) 10 MG tablet, , Disp: , Rfl:     No current facility-administered medications for this visit.       Review of patient's allergies indicates:   -- Pcn (penicillins)             Review of Systems   Constitution: Negative for weight gain and weight loss.   Cardiovascular: Negative for chest pain.   Respiratory: Negative for shortness of breath.    Musculoskeletal: Positive for back pain (right back). Negative for joint pain and joint swelling.   Gastrointestinal: Negative for abdominal pain and bowel incontinence.   Genitourinary: Negative for bladder incontinence.   Neurological: Negative for numbness.           Objective:          General    Vitals reviewed.  Constitutional: She is oriented to person, place, and time. She appears well-developed and well-nourished.   HENT:   Head: Normocephalic and atraumatic.   Pulmonary/Chest: Effort normal.   Neurological: She is alert and oriented to person, place, and time.   Psychiatric: She has a normal mood and affect. Her behavior is normal. Judgment and thought content normal.     General Musculoskeletal Exam   Gait: abnormal (lack of arm swing)     Back (L-Spine & T-Spine) / Neck  (C-Spine) Exam     Back (L-Spine & T-Spine) Range of Motion   Extension: 20   Flexion: 90   Lateral bend right: 20   Lateral bend left: 20   Rotation right: 40   Rotation left: 40     Spinal Sensation   Right Side Sensation  C-Spine Level: normal   L-Spine Level: normal  S-Spine Level: normal  Left Side Sensation  C-Spine Level: normal  L-Spine Level: normal  S-Spine Level: normal    Other She has no scoliosis .  Spinal Kyphosis:  Absent    Comments:  Neg RICA        Muscle Strength   Right Upper Extremity   Biceps: 5/5/5   Deltoid:  5/5  Triceps:  5/5  Wrist extension: 5/5/5   Finger Flexors:  5/5  Left Upper Extremity  Biceps: 5/5/5   Deltoid:  5/5  Triceps:  5/5  Wrist extension: 5/5/5   Finger Flexors:  5/5  Right Lower Extremity   Hip Flexion: 5/5   Quadriceps:  5/5   Anterior tibial:  5/5/5  EHL:  5/5  Left Lower Extremity   Hip Flexion: 5/5   Quadriceps:  5/5   Anterior tibial:  5/5/5   EHL:  5/5    Reflexes     Left Side  Biceps:  2+  Triceps:  2+  Brachioradialis:  2+  Quadriceps:  2+  Achilles:  2+  Left Steele's Sign:  Absent  Babinski Sign:  absent    Right Side   Biceps:  2+  Triceps:  2+  Brachioradialis:  2+  Quadriceps:  2+  Achilles:  2+  Right Steele's Sign:  absent  Babinski Sign:  absent    Vascular Exam     Right Pulses        Carotid:                  2+    Left Pulses        Carotid:                  2+        Assessment:       Encounter Diagnoses   Name Primary?    Chronic bilateral low back pain with right-sided sciatica Yes    Spondylosis of lumbar region without myelopathy or radiculopathy     Spinal stenosis of lumbar region without neurogenic claudication          Plan:       Cheryl was seen today for low-back pain.    Diagnoses and all orders for this visit:    Chronic bilateral low back pain with right-sided sciatica    Spondylosis of lumbar region without myelopathy or radiculopathy    Spinal stenosis of lumbar region without neurogenic claudication           1.  Continue PT HEP,  she feels like healthy back at [facility] was really helpful.  She is continuing wellness.  We discussed watching posture  2.  Tizanidine 2-4 TID, taking 1 at night  3. She is having foot surgery in 2 weeks  4.  Discuss gait with PCP, she does have lack of arm swing and masked expression and feeling like cannot advance the legs.  I mentioned parkinsons to her.  She is going to think about gait.  A message will be sent to her PCP  5.  MRI report of the lumbar spine was reviewed.  She does have facet arthropathy and spinal stenosis  6.  We discussed injections.  Would try facet injections first, but might also need FAM due to spinal stenosis, but no leg pain.  She does not need injection at this time  7.  Continue tylenol as needed  8.  We discussed walking program and starting with what she can do and then increasing.  She will work on it.    9.  RTC 3 months

## 2019-03-06 NOTE — PROGRESS NOTES
Health  Wellness Visit Note    Name: Cheryl Abreu  Clinic Number: [id number]  Physician: Brisa Boyle, *  Diagnosis: No diagnosis found.  Past Medical History:   Diagnosis Date    Diabetes mellitus, type 2     High blood pressure     Hyperlipidemia     Osteoporosis     Scoliosis     Thyroid disease      Visit Number: 21  Precautions: Standard  6 Month:  12 Month:  Time In: 1:00 PM  Time Out: 1:55 PM  Total Treatment Time: 55 Minutes    Subjective:   Patient reports feeling pretty good today. She went to several parades and says that her back held up well; says she needs to be better at completing her stretches everyday; says she usually only does them every other day. She is not walking or exercising outside of wellness; she says she would like to start walking again and may start back after day light savings time. She was feeling some pain in her rotator cuff today; says she will be seeing a doctor about it soon.    Objective:   Cheryl completed therapeutic stretches (EIL, STARLA) and the following MedX exercise machines: core lumbar, torso rotation l/r, leg extension, leg curl, upright row, chest press, biceps curl, triceps extension, leg press    See exercise log in patient folder for rate of exertion and repetitions completed.     Assessment:   Patient tolerated all med-X machines without an increase in pain.    Plan:  Continue with established plan of care towards wellness goals.     Health  : Rick Conn, PCT  3/6/2019

## 2019-03-07 ENCOUNTER — HOSPITAL ENCOUNTER (OUTPATIENT)
Dept: PREADMISSION TESTING | Facility: OTHER | Age: 74
Discharge: HOME | End: 2019-03-07
Attending: ORTHOPAEDIC SURGERY
Payer: MEDICARE

## 2019-03-07 ENCOUNTER — OFFICE VISIT (OUTPATIENT)
Dept: SPINE | Facility: CLINIC | Age: 74
End: 2019-03-07
Attending: PHYSICAL MEDICINE & REHABILITATION
Payer: MEDICARE

## 2019-03-07 VITALS
TEMPERATURE: 98 F | WEIGHT: 148 LBS | HEIGHT: 64 IN | HEART RATE: 81 BPM | OXYGEN SATURATION: 99 % | SYSTOLIC BLOOD PRESSURE: 135 MMHG | BODY MASS INDEX: 25.27 KG/M2 | DIASTOLIC BLOOD PRESSURE: 62 MMHG

## 2019-03-07 VITALS
DIASTOLIC BLOOD PRESSURE: 64 MMHG | BODY MASS INDEX: 25.31 KG/M2 | TEMPERATURE: 99 F | HEART RATE: 74 BPM | WEIGHT: 148.25 LBS | HEIGHT: 64 IN | SYSTOLIC BLOOD PRESSURE: 139 MMHG

## 2019-03-07 DIAGNOSIS — G89.29 CHRONIC BILATERAL LOW BACK PAIN WITH RIGHT-SIDED SCIATICA: Primary | ICD-10-CM

## 2019-03-07 DIAGNOSIS — M54.41 CHRONIC BILATERAL LOW BACK PAIN WITH RIGHT-SIDED SCIATICA: Primary | ICD-10-CM

## 2019-03-07 DIAGNOSIS — M47.816 SPONDYLOSIS OF LUMBAR REGION WITHOUT MYELOPATHY OR RADICULOPATHY: ICD-10-CM

## 2019-03-07 DIAGNOSIS — M48.061 SPINAL STENOSIS OF LUMBAR REGION WITHOUT NEUROGENIC CLAUDICATION: ICD-10-CM

## 2019-03-07 PROCEDURE — 1101F PT FALLS ASSESS-DOCD LE1/YR: CPT | Mod: CPTII,S$GLB,, | Performed by: PHYSICAL MEDICINE & REHABILITATION

## 2019-03-07 PROCEDURE — 99214 OFFICE O/P EST MOD 30 MIN: CPT | Mod: S$GLB,,, | Performed by: PHYSICAL MEDICINE & REHABILITATION

## 2019-03-07 PROCEDURE — 99214 PR OFFICE/OUTPT VISIT, EST, LEVL IV, 30-39 MIN: ICD-10-PCS | Mod: S$GLB,,, | Performed by: PHYSICAL MEDICINE & REHABILITATION

## 2019-03-07 PROCEDURE — 1101F PR PT FALLS ASSESS DOC 0-1 FALLS W/OUT INJ PAST YR: ICD-10-PCS | Mod: CPTII,S$GLB,, | Performed by: PHYSICAL MEDICINE & REHABILITATION

## 2019-03-07 PROCEDURE — 99999 PR PBB SHADOW E&M-EST. PATIENT-LVL III: ICD-10-PCS | Mod: PBBFAC,,, | Performed by: PHYSICAL MEDICINE & REHABILITATION

## 2019-03-07 PROCEDURE — 99999 PR PBB SHADOW E&M-EST. PATIENT-LVL III: CPT | Mod: PBBFAC,,, | Performed by: PHYSICAL MEDICINE & REHABILITATION

## 2019-03-07 RX ORDER — FAMOTIDINE 20 MG/1
20 TABLET, FILM COATED ORAL
Status: CANCELLED | OUTPATIENT
Start: 2019-03-07 | End: 2019-03-07

## 2019-03-07 RX ORDER — SODIUM CHLORIDE, SODIUM LACTATE, POTASSIUM CHLORIDE, CALCIUM CHLORIDE 600; 310; 30; 20 MG/100ML; MG/100ML; MG/100ML; MG/100ML
INJECTION, SOLUTION INTRAVENOUS CONTINUOUS
Status: CANCELLED | OUTPATIENT
Start: 2019-03-07

## 2019-03-07 RX ORDER — LIDOCAINE HYDROCHLORIDE 10 MG/ML
0.5 INJECTION, SOLUTION EPIDURAL; INFILTRATION; INTRACAUDAL; PERINEURAL ONCE
Status: CANCELLED | OUTPATIENT
Start: 2019-03-07 | End: 2019-03-07

## 2019-03-07 NOTE — DISCHARGE INSTRUCTIONS
PRE-ADMIT TESTING -  [phone]    [address]  [facility]          Your surgery has been scheduled at [facility]. We are pleased to have the opportunity to serve you. For Further Information please call [phone].    On the day of surgery please report to the Information Desk on the 1st floor.    · CONTACT YOUR PHYSICIAN'S OFFICE THE DAY PRIOR TO YOUR SURGERY TO OBTAIN YOUR ARRIVAL TIME.     · The evening before surgery do not eat anything after 9 p.m. ( this includes hard candy, chewing gum and mints).  You may only have GATORADE, POWERADE AND WATER  from 9 p.m. until you leave your home.   DO NOT DRINK ANY LIQUIDS ON THE WAY TO THE HOSPITAL.      SPECIAL MEDICATION INSTRUCTIONS: TAKE medications checked off by the Anesthesiologist on your Medication List.    Angiogram Patients: Take medications as instructed by your physician, including aspirin.     Surgery Patients:    If you take ASPIRIN - Your PHYSICIAN/SURGEON will need to inform you IF/OR when you need to stop taking aspirin prior to your surgery.     Do Not take any medications containing IBUPROFEN.  Do Not Wear any make-up or dark nail polish   (especially eye make-up) to surgery. If you come to surgery with makeup on you will be required to remove the makeup or nail polish.    Do not shave your surgical area at least 5 days prior to your surgery. The surgical prep will be performed at the hospital according to Infection Control regulations.    Leave all valuables at home.   Do Not wear any jewelry or watches, including any metal in body piercings. Jewelry must be removed prior to coming to the hospital.  There is a possibility that rings that are unable to be removed may be cut off if they are on the surgical extremity.    Contact Lens must be removed before surgery. Either do not wear the contact lens or bring a case and solution for storage.  Please bring a container for eyeglasses or dentures as required.  Bring  any paperwork your physician has provided, such as consent forms,  history and physicals, doctor's orders, etc.   Bring comfortable clothes that are loose fitting to wear upon discharge. Take into consideration the type of surgery being performed.  Maintain your diet as advised per your physician the day prior to surgery.      Adequate rest the night before surgery is advised.   Park in the Parking lot behind the hospital or in the [facility] across the street from the parking lot. Parking is complimentary.  If you will be discharged the same day as your procedure, please arrange for a responsible adult to drive you home or to accompany you if traveling by taxi.   YOU WILL NOT BE PERMITTED TO DRIVE OR TO LEAVE THE HOSPITAL ALONE AFTER SURGERY.   It is strongly recommended that you arrange for someone to remain with you for the first 24 hrs following your surgery.       Thank you for your cooperation.  The Staff of [facility].        Bathing Instructions                                                                 Please shower the evening before and morning of your procedure with    ANTIBACTERIAL SOAP. ( DIAL, etc )  Concentrate on the surgical area   for at least 3 minutes and rinse completely. Dry off as usual.   Do not use any deodorant, powder, body lotions, perfume, after shave or    cologne.

## 2019-03-07 NOTE — LETTER
March 7, 2019        Tomás Clark MD  [address]  [facility] [address]             [facility]  [address], [address]  [facility] [address]  Phone: [phone]  Fax: [phone]   Patient: Cheryl Abreu   MR Number: [id number]   YOB: 1945   Date of Visit: 3/7/2019     Dear Dr. Clark,     I have been seeing her for her back pain which is better.  She has some gait changes that are not from her back.  She has lack of arm swing, a masked face, and some feelings that she cannot move her legs.  All of these can be seen in parkinsons.  I did mention that to her, but also encourage her to try to be aware of when these things happen    Sincerely,      Brisa Boyle MD            CC  No Recipients    Enclosure

## 2019-03-11 ENCOUNTER — DOCUMENTATION ONLY (OUTPATIENT)
Dept: REHABILITATION | Facility: HOSPITAL | Age: 74
End: 2019-03-11
Attending: INTERNAL MEDICINE
Payer: MEDICARE

## 2019-03-11 NOTE — PROGRESS NOTES
Health  Wellness Visit Note    Name: Cheryl Abreu  Clinic Number: [id number]  Physician: Brisa Boyle, *  Diagnosis: No diagnosis found.  Past Medical History:   Diagnosis Date    Arthritis     Diabetes mellitus, type 2     Hearing loss     High blood pressure     Hyperlipidemia     Osteoporosis     Scoliosis     Thyroid disease      Visit Number: 22  Precautions: Standard  6 Month:  12 Month:  Time In: 1:00 PM  Time Out: 1:55 PM  Total Treatment Time: 55 Minutes    Subjective:   Patient reports feeling pretty good today. She took it easy this weekend and says she didn't have any flare ups this weekend. She will be going to [place] soon; HC encouraged her to bring a back support pillow and to pack light so she is not carrying any heavy luggage. She is not walking or exercising outside of wellness; she says she would like to start walking again and may start back after day light savings time. She was feeling some pain in her rotator cuff today; says she will be seeing a doctor about it soon.    Objective:   Cheryl completed therapeutic stretches (EIL, STARLA) and the following MedX exercise machines: core lumbar, torso rotation l/r, leg extension, leg curl, upright row, chest press, biceps curl, triceps extension, leg press    See exercise log in patient folder for rate of exertion and repetitions completed.     Assessment:   Patient tolerated all med-X machines without an increase in pain.    Plan:  Continue with established plan of care towards wellness goals.     Health  : Rick Conn, PCT  3/11/2019

## 2019-03-27 ENCOUNTER — DOCUMENTATION ONLY (OUTPATIENT)
Dept: REHABILITATION | Facility: HOSPITAL | Age: 74
End: 2019-03-27
Attending: INTERNAL MEDICINE
Payer: MEDICARE

## 2019-03-27 ENCOUNTER — DOCUMENTATION ONLY (OUTPATIENT)
Dept: PREADMISSION TESTING | Facility: OTHER | Age: 74
End: 2019-03-27

## 2019-03-27 NOTE — PRE ADMISSION SCREENING
Spoke with Yvette in Dr. Yost's office.  States patient's cardiologist, Dr. Mathis recommends cardiac work up prior to surgery.  In addition, patient has upper respiratory illness completing antibiotics on 3/28.  Surgery postponed.

## 2019-03-27 NOTE — PROGRESS NOTES
Health  Wellness Visit Note    Name: Cheryl Abreu  Clinic Number: [id number]  Physician: Brisa Boyle, *  Diagnosis: No diagnosis found.  Past Medical History:   Diagnosis Date    Arthritis     Diabetes mellitus, type 2     Hearing loss     High blood pressure     Hyperlipidemia     Osteoporosis     Scoliosis     Thyroid disease      Visit Number: 23  Precautions: Standard  6 Month:  12 Month:  Time In: 9:15 AM  Time Out: 10:20 AM  Total Treatment Time: 65 Minutes    Subjective:   Patient reports feeling pretty good today. She did a lot of cleaning this past weekend and is proud of her progress. She went to [place] this past week; her back did well with the trip. She is not walking or exercising outside of wellness; she says she would like to start walking again and may start back in the coming weeks.    Objective:   Cheryl completed therapeutic stretches (EIL, STARLA) and the following MedX exercise machines: core lumbar, torso rotation l/r, leg extension, leg curl, upright row, chest press, biceps curl, triceps extension, leg press    See exercise log in patient folder for rate of exertion and repetitions completed.     Assessment:   Patient tolerated all med-X machines without an increase in pain.    Plan:  Continue with established plan of care towards wellness goals.     Health  : Rick Conn, PCT  3/27/2019

## 2019-04-01 ENCOUNTER — DOCUMENTATION ONLY (OUTPATIENT)
Dept: REHABILITATION | Facility: HOSPITAL | Age: 74
End: 2019-04-01
Attending: INTERNAL MEDICINE
Payer: MEDICARE

## 2019-04-01 NOTE — PROGRESS NOTES
Health  Wellness Visit Note    Name: Cheryl Abreu  Clinic Number: [id number]  Physician: Brisa Boyle, *  Diagnosis: No diagnosis found.  Past Medical History:   Diagnosis Date    Arthritis     Diabetes mellitus, type 2     Hearing loss     High blood pressure     Hyperlipidemia     Osteoporosis     Scoliosis     Thyroid disease      Visit Number: 24  Precautions: Standard  6 Month:  12 Month:  Time In: 12:30 PM  Time Out: 1:25 PM  Total Treatment Time: 55 Minutes    Subjective:   Patient reports feeling pretty good today. She will not be having her surgery until August after her trip. She is not walking or exercising outside of wellness; she says she would like to start walking again and may start back in the coming weeks.    Objective:   Cheryl completed therapeutic stretches (EIL, STARLA) and the following MedX exercise machines: core lumbar, torso rotation l/r, leg extension, leg curl, upright row, chest press, biceps curl, triceps extension, leg press    See exercise log in patient folder for rate of exertion and repetitions completed.     Assessment:   Patient tolerated all med-X machines without an increase in pain.    Plan:  Continue with established plan of care towards wellness goals.     Health  : Rick Conn, PCT  4/1/2019

## 2019-04-08 ENCOUNTER — DOCUMENTATION ONLY (OUTPATIENT)
Dept: REHABILITATION | Facility: HOSPITAL | Age: 74
End: 2019-04-08
Attending: INTERNAL MEDICINE
Payer: MEDICARE

## 2019-04-08 NOTE — PROGRESS NOTES
Health  Wellness Visit Note    Name: Cheryl Abreu  Clinic Number: [id number]  Physician: Brisa Boyle, *  Diagnosis: No diagnosis found.  Past Medical History:   Diagnosis Date    Arthritis     Diabetes mellitus, type 2     Hearing loss     High blood pressure     Hyperlipidemia     Osteoporosis     Scoliosis     Thyroid disease      Visit Number: 25  Precautions: Standard  6 Month:  12 Month:  Time In: 12:30 PM  Time Out: 1:25 PM  Total Treatment Time: 55 Minutes    Subjective:   Patient reports feeling pretty good today. She will not be having her surgery until August after her trip. She has to take a stress  Test for her surgery that she is a little concerned about; will postpone until closer to her surgery. She is not walking or exercising outside of wellness; she says she would like to start walking again and may start back in the coming weeks. She took a trip to [place] over the weekend; says she will look into getting a lumbar pillow for her car.    Objective:   Cheryl completed therapeutic stretches (EIL, STARLA) and the following MedX exercise machines: core lumbar, torso rotation l/r, leg extension, leg curl, upright row, chest press, biceps curl, triceps extension, leg press    See exercise log in patient folder for rate of exertion and repetitions completed.     Assessment:   Patient tolerated all med-X machines without an increase in pain.    Plan:  Continue with established plan of care towards wellness goals.     Health  : Rick Conn, PCT  4/8/2019

## 2019-04-15 ENCOUNTER — DOCUMENTATION ONLY (OUTPATIENT)
Dept: REHABILITATION | Facility: HOSPITAL | Age: 74
End: 2019-04-15
Attending: INTERNAL MEDICINE
Payer: MEDICARE

## 2019-04-15 NOTE — PROGRESS NOTES
Health  Wellness Visit Note    Name: Cheryl Abreu  Clinic Number: [id number]  Physician: Brisa Boyle, *  Diagnosis: No diagnosis found.  Past Medical History:   Diagnosis Date    Arthritis     Diabetes mellitus, type 2     Hearing loss     High blood pressure     Hyperlipidemia     Osteoporosis     Scoliosis     Thyroid disease      Visit Number: 26  Precautions: Standard  6 Month:  12 Month:  Time In: 12:30 PM  Time Out: 1:25 PM  Total Treatment Time: 55 Minutes    Subjective:   Patient reports feeling pretty good today; having some trouble with her knee. She asked for a recommendation for a knee doctor; HC will have a list for her on next visit. She will not be having her surgery until August after her trip. She has to take a stress test for her surgery that she is a little concerned about; will postpone until closer to her surgery. She is not walking or exercising outside of wellness; she says she would like to start walking again and may start back in the coming weeks. She has her grandchildren coming in town for 2 weeks; very happy that she is able to clean up around her house; hasn't been able to do that for a long time.     Objective:   Cheryl completed therapeutic stretches (EIL, STARLA) and the following MedX exercise machines: core lumbar, torso rotation l/r, leg extension, leg curl, upright row, chest press, biceps curl, triceps extension, leg press    See exercise log in patient folder for rate of exertion and repetitions completed.     Assessment:   Patient tolerated all med-X machines without an increase in pain.    Plan:  Continue with established plan of care towards wellness goals.     Health  : Rick Conn, PCT  4/15/2019

## 2019-04-22 ENCOUNTER — DOCUMENTATION ONLY (OUTPATIENT)
Dept: REHABILITATION | Facility: HOSPITAL | Age: 74
End: 2019-04-22
Attending: INTERNAL MEDICINE
Payer: MEDICARE

## 2019-04-22 NOTE — PROGRESS NOTES
Health  Wellness Visit Note    Name: Cheryl Abreu  Clinic Number: [id number]  Physician: Brisa Boyle, *  Diagnosis: No diagnosis found.  Past Medical History:   Diagnosis Date    Arthritis     Diabetes mellitus, type 2     Hearing loss     High blood pressure     Hyperlipidemia     Osteoporosis     Scoliosis     Thyroid disease      Visit Number: 27  Precautions: Standard  6 Month:  12 Month:  Time In: 12:35 PM  Time Out: 1:35 PM  Total Treatment Time: 60 Minutes    Subjective:   Patient reports her back is feeling pretty good today; having some trouble with her knee. She asked for a recommendation for a knee doctor; she got another recommendation so she will be trying to schedule that visit soon. She will not be having her surgery until August after her trip. She has to take a stress test for her surgery that she is a little concerned about; will postpone until closer to her surgery. She is not walking or exercising outside of wellness; she says she would like to start walking again and may start back in the coming weeks. She has her grandchildren coming in town for 2 weeks; very happy that she is able to clean up around her house; hasn't been able to do that for a long time.     Objective:   Cheryl completed therapeutic stretches (EIL, STARLA) and the following MedX exercise machines: core lumbar, torso rotation l/r, leg extension, leg curl, upright row, chest press, biceps curl, triceps extension, leg press    See exercise log in patient folder for rate of exertion and repetitions completed.     Assessment:   Patient tolerated all med-X machines without an increase in pain.    Plan:  Continue with established plan of care towards wellness goals.     Health  : Rick Conn, PCT  4/22/2019

## 2019-04-29 ENCOUNTER — DOCUMENTATION ONLY (OUTPATIENT)
Dept: REHABILITATION | Facility: HOSPITAL | Age: 74
End: 2019-04-29
Attending: INTERNAL MEDICINE
Payer: MEDICARE

## 2019-04-29 NOTE — PROGRESS NOTES
Health  Wellness Visit Note    Name: Cheryl Abreu  Clinic Number: [id number]  Physician: Brisa Boyle, *  Diagnosis: No diagnosis found.  Past Medical History:   Diagnosis Date    Arthritis     Diabetes mellitus, type 2     Hearing loss     High blood pressure     Hyperlipidemia     Osteoporosis     Scoliosis     Thyroid disease      Visit Number: 28  Precautions: Standard  6 Month:  12 Month:  Time In: 12:35 PM  Time Out: 1:35 PM  Total Treatment Time: 60 Minutes    Subjective:   Patient reports her back is feeling pretty good today; having a little trouble with her knee. [place]; says she did well with the walking. Tried helping her  with some work around the house and was not pleased with her strength; wants to get stronger. She will not be having her surgery until August after her trip. She has to take a stress test for her surgery that she is a little concerned about; will postpone until closer to her surgery. She is not walking or exercising outside of wellness; she says she would like to start walking again and may start back in the coming weeks.    Objective:   Cheryl completed therapeutic stretches (EIL, STARLA) and the following MedX exercise machines: core lumbar, torso rotation l/r, leg extension, leg curl, upright row, chest press, biceps curl, triceps extension, leg press    See exercise log in patient folder for rate of exertion and repetitions completed.     Assessment:   Patient tolerated all med-X machines without an increase in pain.    Plan:  Continue with established plan of care towards wellness goals.     Health  : Rick Conn, PCT  4/29/2019

## 2019-05-02 ENCOUNTER — OFFICE VISIT (OUTPATIENT)
Dept: SPORTS MEDICINE | Facility: CLINIC | Age: 74
End: 2019-05-02
Payer: MEDICARE

## 2019-05-02 ENCOUNTER — HOSPITAL ENCOUNTER (OUTPATIENT)
Dept: RADIOLOGY | Facility: HOSPITAL | Age: 74
Discharge: HOME | End: 2019-05-02
Attending: ORTHOPAEDIC SURGERY
Payer: MEDICARE

## 2019-05-02 VITALS
SYSTOLIC BLOOD PRESSURE: 134 MMHG | DIASTOLIC BLOOD PRESSURE: 71 MMHG | HEIGHT: 64 IN | WEIGHT: 148 LBS | BODY MASS INDEX: 25.27 KG/M2 | HEART RATE: 76 BPM

## 2019-05-02 DIAGNOSIS — M25.562 LEFT KNEE PAIN, UNSPECIFIED CHRONICITY: ICD-10-CM

## 2019-05-02 DIAGNOSIS — M17.12 PRIMARY OSTEOARTHRITIS OF LEFT KNEE: Primary | ICD-10-CM

## 2019-05-02 PROCEDURE — 73562 XR KNEE ORTHO LEFT WITH FLEXION: ICD-10-PCS | Mod: 26,59,LT, | Performed by: RADIOLOGY

## 2019-05-02 PROCEDURE — 73564 X-RAY EXAM KNEE 4 OR MORE: CPT | Mod: 26,LT,, | Performed by: RADIOLOGY

## 2019-05-02 PROCEDURE — 99203 OFFICE O/P NEW LOW 30 MIN: CPT | Mod: S$GLB,,, | Performed by: ORTHOPAEDIC SURGERY

## 2019-05-02 PROCEDURE — 99999 PR PBB SHADOW E&M-EST. PATIENT-LVL III: ICD-10-PCS | Mod: PBBFAC,,, | Performed by: ORTHOPAEDIC SURGERY

## 2019-05-02 PROCEDURE — 1101F PR PT FALLS ASSESS DOC 0-1 FALLS W/OUT INJ PAST YR: ICD-10-PCS | Mod: CPTII,S$GLB,, | Performed by: ORTHOPAEDIC SURGERY

## 2019-05-02 PROCEDURE — 73562 X-RAY EXAM OF KNEE 3: CPT | Mod: 59,TC,FY,PO,RT

## 2019-05-02 PROCEDURE — 73562 X-RAY EXAM OF KNEE 3: CPT | Mod: 26,59,LT, | Performed by: RADIOLOGY

## 2019-05-02 PROCEDURE — 99999 PR PBB SHADOW E&M-EST. PATIENT-LVL III: CPT | Mod: PBBFAC,,, | Performed by: ORTHOPAEDIC SURGERY

## 2019-05-02 PROCEDURE — 99203 PR OFFICE/OUTPT VISIT, NEW, LEVL III, 30-44 MIN: ICD-10-PCS | Mod: S$GLB,,, | Performed by: ORTHOPAEDIC SURGERY

## 2019-05-02 PROCEDURE — 1101F PT FALLS ASSESS-DOCD LE1/YR: CPT | Mod: CPTII,S$GLB,, | Performed by: ORTHOPAEDIC SURGERY

## 2019-05-02 PROCEDURE — 73564 XR KNEE ORTHO LEFT WITH FLEXION: ICD-10-PCS | Mod: 26,LT,, | Performed by: RADIOLOGY

## 2019-05-02 NOTE — PROGRESS NOTES
CC: LEFT knee pain    74 y.o. Female who presents as a new patient to me. She has retired and previously worked as a  in [place].  She is here for a 1 year history of worsening left knee pain. She has had previous issues with the left knee and had arthroscopic surgery in 2011.  Reports some relief with that procedure but has not had any recent follow-up and now reports recurrent pain. She states that she has pain that is worse with prolonged weight-bearing and walking - located behind the patella as well as on the medial aspect of the knee. She cannot take NSAIDs due to heart issues.  She has had corticosteroid injections prior to 2011 but none recently.  She has not done any sort of bracing for the knee. She denies mechanical symptoms or feelings of instability.  She does endorse effusions.    PMHx notable for HTN, T2DM, osteoporosis, degenerative lumbar scoliosis.   Negative for tobacco.   Positive for diabetes. Last A1C not in Epic and pt unsure.    Pain Score: 10-Worst pain ever    REVIEW OF SYSTEMS:   Constitution: Negative. Negative for chills, fever and night sweats.    Hematologic/Lymphatic: Negative for bleeding problem. Does not bruise/bleed easily.   Skin: Negative for dry skin, itching and rash.   Musculoskeletal: Negative for falls. Positive for left knee pain and  muscle weakness.     All other review of symptoms were reviewed and found to be noncontributory.     PAST MEDICAL HISTORY:   Past Medical History:   Diagnosis Date    Arthritis     Diabetes mellitus, type 2     Hearing loss     High blood pressure     Hyperlipidemia     Osteoporosis     Scoliosis     Thyroid disease        PAST SURGICAL HISTORY:   Past Surgical History:   Procedure Laterality Date    arthroscopic left knee Left 2012    EYE SURGERY Bilateral     cataract    OOPHORECTOMY Right     RENAL ARTERY STENT Left 2001    WISDOM TOOTH EXTRACTION         FAMILY HISTORY:   Family History   Problem Relation  Age of Onset    Inflammatory bowel disease Neg Hx     Kidney disease Neg Hx     Osteoarthritis Neg Hx     Rheum arthritis Neg Hx     Psoriasis Neg Hx     Thyroid disease Neg Hx        SOCIAL HISTORY:   Social History     Socioeconomic History    Marital status:      Spouse name: Not on file    Number of children: Not on file    Years of education: Not on file    Highest education level: Not on file   Occupational History    Not on file   Social Needs    Financial resource strain: Not on file    Food insecurity:     Worry: Not on file     Inability: Not on file    Transportation needs:     Medical: Not on file     Non-medical: Not on file   Tobacco Use    Smoking status: Former Smoker     Packs/day: 2.00     Years: 25.00     Pack years: 50.00     Last attempt to quit: 3/7/2001     Years since quittin.1    Smokeless tobacco: Never Used    Tobacco comment:  3 children   Substance and Sexual Activity    Alcohol use: Yes     Comment: 1-2 drinks a week    Drug use: No    Sexual activity: Not on file   Lifestyle    Physical activity:     Days per week: Not on file     Minutes per session: Not on file    Stress: Not on file   Relationships    Social connections:     Talks on phone: Not on file     Gets together: Not on file     Attends Quaker service: Not on file     Active member of club or organization: Not on file     Attends meetings of clubs or organizations: Not on file     Relationship status: Not on file   Other Topics Concern    Not on file   Social History Narrative    Not on file       MEDICATIONS:     Current Outpatient Medications:     ACCU-CHEK SMARTVIEW TEST STRIP Strp, , Disp: , Rfl:     alendronate (FOSAMAX) 70 MG tablet, Take 70 mg by mouth every 7 days. , Disp: , Rfl:     ALPRAZolam (XANAX) 0.5 MG tablet, Take 0.5 mg by mouth 3 (three) times daily., Disp: , Rfl:     amLODIPine (NORVASC) 10 MG tablet, Take 10 mg by mouth once daily. , Disp: , Rfl:      aspirin (ECOTRIN) 81 MG EC tablet, Take 81 mg by mouth once daily., Disp: , Rfl:     biotin 2,500 mcg Cap, Take 1 capsule by mouth once daily. , Disp: , Rfl:     buPROPion (WELLBUTRIN SR) 150 MG TBSR 12 hr tablet, Take 150 mg by mouth 2 (two) times daily. , Disp: , Rfl:     BYSTOLIC 5 mg Tab, Take 5 mg by mouth once daily. , Disp: , Rfl:     clopidogrel (PLAVIX) 75 mg tablet, Take 75 mg by mouth once daily. , Disp: , Rfl:     doxycycline (VIBRA-TABS) 100 MG tablet, Take 100 mg by mouth 2 (two) times daily., Disp: , Rfl:     fish oil-omega-3 fatty acids 300-1,000 mg capsule, Take by mouth once daily., Disp: , Rfl:     fluticasone (FLONASE) 50 mcg/actuation nasal spray, 1 spray by Each Nare route once daily., Disp: , Rfl:     guaiFENesin (MUCINEX) 600 mg 12 hr tablet, Take 1,200 mg by mouth 2 (two) times daily., Disp: , Rfl:     insulin lispro (HUMALOG) 100 unit/mL injection, Inject into the skin 3 (three) times daily before meals., Disp: , Rfl:     levothyroxine (SYNTHROID) 50 MCG tablet, Take 50 mcg by mouth once daily., Disp: , Rfl:     liraglutide 0.6 mg/0.1 mL, 18 mg/3 mL, subq PNIJ (VICTOZA 2-VITO) 0.6 mg/0.1 mL (18 mg/3 mL) PnIj, Inject 1.8 mg into the skin once daily. , Disp: , Rfl:     losartan (COZAAR) 50 MG tablet, Take 50 mg by mouth 2 (two) times daily. , Disp: , Rfl:     MULTIVIT-IRON-MIN-FOLIC ACID 3,500-18-0.4 UNIT-MG-MG ORAL CHEW, Take by mouth., Disp: , Rfl:     mv-min-folic acid-lutein 500-250 mcg Chew, Take 1 tablet by mouth., Disp: , Rfl:     rosuvastatin (CRESTOR) 10 MG tablet, Take 10 mg by mouth every evening. , Disp: , Rfl:     sodium chloride (OCEAN) 0.65 % nasal spray, 1 spray by Nasal route as needed for Congestion., Disp: , Rfl:     tiZANidine (ZANAFLEX) 2 MG tablet, TAKE 1-2 TABLETS (2-4 MG TOTAL) BY MOUTH EVERY 8 (EIGHT) HOURS AS NEEDED., Disp: 180 tablet, Rfl: 2    ALLERGIES:   Review of patient's allergies indicates:   Allergen Reactions    Nickel Itching and Other  "(See Comments)    Pcn [penicillins] Rash        PHYSICAL EXAMINATION:  /71   Pulse 76   Ht 5' 4" (1.626 m)   Wt 67.1 kg (148 lb)   BMI 25.40 kg/m²   General: Well-developed well-nourished 74 y.o. femalein no acute distress   Cardiovascular: Regular rhythm by palpation of distal pulse, normal color and temperature, no concerning varicosities on symptomatic side   Lungs: No labored breathing or wheezing appreciated   Neuro: Alert and oriented ×3   Psychiatric: well oriented to person, place and time, demonstrates normal mood and affect   Skin: No rashes, lesions or ulcers, normal temperature, turgor, and texture on involved extremity    Ortho/SPM Exam  Examination of the left knee demonstrates slight varus alignment. Mild effusion. +TTP medial joint line and inferior pole of patella. Active range of motion 0-130 with increased pain at terminal flexion. Mild crepitance through range of motion.  Positive patellar grind.  Stable to varus and valgus stress. Negative Lachman.  Weak quadriceps.    IMAGING:  X-rays including standing, weight bearing AP and flexion bilateral knees, LEFT knee lateral and sunrise views ordered and images reviewed by me show:    Moderate to advanced tricompartmental osteoarthritis most evident in medial compartment with near bone on bone findings w/ standing flexion view. + subchondral sclerosis and osteophyte formation     ASSESSMENT:      ICD-10-CM ICD-9-CM   1. Primary osteoarthritis of left knee M17.12 715.16   2. Left knee pain, unspecified chronicity M25.562 719.46     PLAN:       -Findings and treatment options were discussed with the patient  -We discussed that her symptoms are due to the arthritis in her left knee, and I reviewed the available nonoperative treatment measures which include corticosteroid injections, hyaluronic acid injections, activity modifications, bracing, and NSAIDs  -She elects to try medial  bracing and physical therapy. CSI into the L knee when " she returns in 6 weeks prior to her trip to [place] in mid-June.  No oral NSAIDs due to heart condition.  -PT ordered for [place]   -RTC 6 weeks  -All questions answered      Procedures

## 2019-05-06 ENCOUNTER — DOCUMENTATION ONLY (OUTPATIENT)
Dept: REHABILITATION | Facility: HOSPITAL | Age: 74
End: 2019-05-06
Attending: INTERNAL MEDICINE
Payer: MEDICARE

## 2019-05-06 NOTE — PROGRESS NOTES
Health  Wellness Visit Note    Name: Cheryl Abreu  Clinic Number: [id number]  Physician: Brisa Boyle, *  Diagnosis: No diagnosis found.  Past Medical History:   Diagnosis Date    Arthritis     Diabetes mellitus, type 2     Hearing loss     High blood pressure     Hyperlipidemia     Osteoporosis     Scoliosis     Thyroid disease      Visit Number: 29  Precautions: Standard  6 Month:  12 Month:  Time In: 12:30 PM  Time Out: 1:30 PM  Total Treatment Time: 60 Minutes    Subjective:   Patient reports her back is feeling pretty good today; having a little trouble with her knee. She did some walking with her  over the weekend; says she did well with the walking. She decided to do the walking challenge this month to get her in the routine of walking more; she wants to build her stamina up before her trip. She will not be having her surgery until August after her trip. She has to take a stress test for her surgery that she is a little concerned about; will postpone until closer to her surgery. She is not walking or exercising outside of wellness.    Objective:   Cheryl completed therapeutic stretches (EIL, STARLA) and the following MedX exercise machines: core lumbar, torso rotation l/r, leg extension, leg curl, upright row, chest press, biceps curl, triceps extension, leg press    See exercise log in patient folder for rate of exertion and repetitions completed.     Assessment:   Patient tolerated all med-X machines without an increase in pain.    Plan:  Continue with established plan of care towards wellness goals.     Health  : Rick Conn, PCT  5/6/2019

## 2019-05-13 ENCOUNTER — DOCUMENTATION ONLY (OUTPATIENT)
Dept: REHABILITATION | Facility: HOSPITAL | Age: 74
End: 2019-05-13
Attending: INTERNAL MEDICINE
Payer: MEDICARE

## 2019-05-13 NOTE — PROGRESS NOTES
Health  Wellness Visit Note    Name: Cheryl Abreu  Clinic Number: [id number]  Physician: Brisa Boyle, *  Diagnosis: No diagnosis found.  Past Medical History:   Diagnosis Date    Arthritis     Diabetes mellitus, type 2     Hearing loss     High blood pressure     Hyperlipidemia     Osteoporosis     Scoliosis     Thyroid disease      Visit Number: 31  Precautions: Standard  6 Month:  12 Month:  Time In: 12:30 PM  Time Out: 1:30 PM  Total Treatment Time: 60 Minutes    Subjective:   Patient reports her back is feeling pretty good today; having a little trouble with her knee. She did some walking with her  over the weekend; says she did manage but it was very difficult for her. She decided to do the walking challenge this month to get her in the routine of walking more; she wants to build her stamina up before her trip; HC helped her find an julian on her phone to track her steps. She will not be having her surgery until August after her trip. She has to take a stress test for her surgery that she is a little concerned about; will postpone until closer to her surgery. She is not walking or exercising outside of wellness.    Objective:   Cheryl completed therapeutic stretches (EIL, STARLA) and the following MedX exercise machines: core lumbar, torso rotation l/r, leg extension, leg curl, upright row, chest press, biceps curl, triceps extension, leg press    See exercise log in patient folder for rate of exertion and repetitions completed.     Assessment:   Patient tolerated all med-X machines without an increase in pain.    Plan:  Continue with established plan of care towards wellness goals.     Health  : Rick Conn, PCT  5/13/2019

## 2019-05-20 ENCOUNTER — DOCUMENTATION ONLY (OUTPATIENT)
Dept: REHABILITATION | Facility: HOSPITAL | Age: 74
End: 2019-05-20
Attending: INTERNAL MEDICINE
Payer: MEDICARE

## 2019-05-20 NOTE — PROGRESS NOTES
Health  Wellness Visit Note    Name: Cheryl Abreu  Clinic Number: [id number]  Physician: Brisa Boyle, *  Diagnosis: No diagnosis found.  Past Medical History:   Diagnosis Date    Arthritis     Diabetes mellitus, type 2     Hearing loss     High blood pressure     Hyperlipidemia     Osteoporosis     Scoliosis     Thyroid disease      Visit Number: 33  Precautions: Standard  6 Month:  12 Month:  Time In: 12:30 PM  Time Out: 1:30 PM  Total Treatment Time: 60 Minutes    Subjective:   Patient reports her back is feeling pretty good today; having a little trouble with her knee. She went to a  Graduation over the weekend and says she did ok with the walking. She wanted to increase the core lumbar machine on next visit; she wants to build her stamina up before her trip. She will not be having her surgery until August after her trip. She has to take a stress test for her surgery that she is a little concerned about; will postpone until closer to her surgery. She is not walking or exercising outside of wellness.    Objective:   Cheryl completed therapeutic stretches (EIL, STARLA) and the following MedX exercise machines: core lumbar, torso rotation l/r, leg extension, leg curl, upright row, chest press, biceps curl, triceps extension, leg press    See exercise log in patient folder for rate of exertion and repetitions completed.     Assessment:   Patient tolerated all med-X machines without an increase in pain.    Plan:  Continue with established plan of care towards wellness goals.     Health  : Rick Conn, PCT  5/20/2019

## 2019-05-27 ENCOUNTER — DOCUMENTATION ONLY (OUTPATIENT)
Dept: REHABILITATION | Facility: HOSPITAL | Age: 74
End: 2019-05-27
Attending: INTERNAL MEDICINE
Payer: MEDICARE

## 2019-05-27 NOTE — PROGRESS NOTES
Health  Wellness Visit Note    Name: Cheryl Abreu  Clinic Number: [id number]  Physician: Brisa Boyle, *  Diagnosis: No diagnosis found.  Past Medical History:   Diagnosis Date    Arthritis     Diabetes mellitus, type 2     Hearing loss     High blood pressure     Hyperlipidemia     Osteoporosis     Scoliosis     Thyroid disease      Visit Number: 35  Precautions: Standard  6 Month:  12 Month:  Time In: 12:30 PM  Time Out: 1:30 PM  Total Treatment Time: 60 Minutes    Subjective:   Patient reports her back is feeling pretty good today; having a little trouble with her knee. She went to a  Graduation over the weekend and says she did ok with the walking. She wanted to increase the core lumbar machine on next visit; she wants to build her stamina up before her trip. She will not be having her surgery until August after her trip. She has to take a stress test for her surgery that she is a little concerned about; will postpone until closer to her surgery. She is not walking or exercising outside of wellness.    Objective:   Cheryl completed therapeutic stretches (EIL, STARLA) and the following MedX exercise machines: core lumbar, torso rotation l/r, leg extension, leg curl, upright row, chest press, biceps curl, triceps extension, leg press    See exercise log in patient folder for rate of exertion and repetitions completed.     Assessment:   Patient tolerated all med-X machines without an increase in pain.    Plan:  Continue with established plan of care towards wellness goals.     Health  : Rick Conn, PCT  5/27/2019

## 2019-06-03 ENCOUNTER — DOCUMENTATION ONLY (OUTPATIENT)
Dept: REHABILITATION | Facility: HOSPITAL | Age: 74
End: 2019-06-03
Attending: INTERNAL MEDICINE
Payer: MEDICARE

## 2019-06-03 NOTE — PROGRESS NOTES
Health  Wellness Visit Note    Name: Cheryl Abreu  Clinic Number: [id number]  Physician: Brisa Boyle, *  Diagnosis: No diagnosis found.  Past Medical History:   Diagnosis Date    Arthritis     Diabetes mellitus, type 2     Hearing loss     High blood pressure     Hyperlipidemia     Osteoporosis     Scoliosis     Thyroid disease      Visit Number: 36  Precautions: Standard  6 Month:  12 Month:  Time In: 12:30 PM  Time Out: 1:15 PM  Total Treatment Time: 45 Minutes    Subjective:   Patient reports her back is feeling pretty good today; having a little trouble with her knee. She did well with her increase on the core lumbar machine today; she wants to build her stamina up before her trip. She will not be having her surgery until August after her trip. She has to take a stress test for her surgery that she is a little concerned about; will postpone until closer to her surgery. She is not walking or exercising outside of wellness.    Objective:   Cheryl completed therapeutic stretches (EIL, STARLA) and the following MedX exercise machines: core lumbar, torso rotation l/r, leg extension, leg curl, upright row, chest press, biceps curl, triceps extension, leg press    See exercise log in patient folder for rate of exertion and repetitions completed.     Assessment:   Patient tolerated all med-X machines without an increase in pain.    Plan:  Continue with established plan of care towards wellness goals.     Health  : Rick Conn, PCT  6/3/2019

## 2019-06-10 ENCOUNTER — DOCUMENTATION ONLY (OUTPATIENT)
Dept: REHABILITATION | Facility: HOSPITAL | Age: 74
End: 2019-06-10
Attending: INTERNAL MEDICINE
Payer: MEDICARE

## 2019-06-10 NOTE — PROGRESS NOTES
Health  Wellness Visit Note    Name: Cheryl Abreu  Clinic Number: [id number]  Physician: Brisa Boyle, *  Diagnosis: No diagnosis found.  Past Medical History:   Diagnosis Date    Arthritis     Diabetes mellitus, type 2     Hearing loss     High blood pressure     Hyperlipidemia     Osteoporosis     Scoliosis     Thyroid disease      Visit Number: 37  Precautions: Standard  6 Month:  12 Month:  Time In: 1:00 PM  Time Out: 1:40 PM  Total Treatment Time: 40 Minutes    Subjective:   Patient reports her back is feeling pretty good today; having a little trouble with her knee. She did well with her increase on the core lumbar machine today; she wants to build her stamina up before her trip. She will not be having her surgery until August after her trip; she plans to start back walking after she gets back from the trip;  offered the suggestion of trying to walk in the mall. She has to take a stress test for her surgery that she is a little concerned about; will postpone until closer to August. She is not walking or exercising outside of wellness.    Objective:   Cheryl completed therapeutic stretches (EIL, STARLA) and the following MedX exercise machines: core lumbar, torso rotation l/r, leg extension, leg curl, upright row, chest press, biceps curl, triceps extension, leg press    See exercise log in patient folder for rate of exertion and repetitions completed.     Assessment:   Patient tolerated all med-X machines without an increase in pain.    Plan:  Continue with established plan of care towards wellness goals.     Health  : Rick Conn, PCT  6/10/2019

## 2019-06-12 NOTE — PROGRESS NOTES
Subjective:      Patient ID: Cheryl Ramirez is a 74 y.o. female.    Chief Complaint: Low-back Pain    Referred by: No ref. provider found     Ms ramirez is a 75 yo female here for follow up of back pain.  She has had back pain off and on for the past 15 years.  Then 5 years ago she was working out and doing hamstring exercises and had left leg pain after a pop.  She tore her hamstring off her ischial tuberosity.  She feels like the back pain has been worse since then and could not get out of bed for 3 weeks and she had steroid injections which helped.  She was last seen by me on 3/7/2019 she was feeling better after healthy back. She was going to have foot surgery, but has not had it.  She is not doing as well as she was doing.  She as not been doing her HEP as often as she should.  She is about to travel a lot and worried about the airport.  She feels like it more stamina.  She does have some back pain, but feels like it is endurance.  She has not been exercising like she could.  She does feel short of breath with walking.  The pain is 0-4/10    MRI lumbar 5/2017 outside report  L1-2 moderate broad based disc bulge and left greater than right facet arthropathy  L2-3 DDD with left greater than right moderate facet arthopathy with NF narrowing  L3-4 broad based posterior disc bulge with left paracentral disc protrusion and moderate facet arthropathy with moderate central stenosis and moderate left NF narrowing  L4-5 disc dessication and moderate facet arthropathy with moderate right formainal narrowing  L5-S1 disc dessication and severe facet arthropathy and moderate central stenosis and moderate left NF narrowing    X-ray lumbar 6/22/2018  There is an S shaped scoliosis which is moderate.  The vertebral bodies are normal in height.  There is disc space narrowing throughout and mild to moderate osteophytic spurring.  Overall moderate facet degenerative changes are present in the mid to lower spine.  There is  no significant alignment abnormality or change in alignment with flexion and extension.    Incidental note is made of a small vascular stent overlying the spine likely relating to the left renal artery.  Vascular calcification present along the wall of the aorta.    Impression      Scoliosis and moderate degenerative changes      X-ray hips 6/22/2018  There is degenerative change within the lower spine.  The hips demonstrate no significant degenerative change.  There is no evidence of fracture.    Impression      As above      Past Medical History:  No date: Diabetes mellitus, type 2  No date: High blood pressure  No date: Scoliosis    History reviewed. No pertinent surgical history.    History reviewed.  No pertinent family history.      Social History    Marital status:              Spouse name:                       Years of education:                 Number of children:               Social History Main Topics    Smoking status: Former Smoker                                                                Packs/day: 0.00      Years: 0.00        Smokeless tobacco: Never Used                          Current Outpatient Prescriptions:  ACCU-CHEK SMARTVIEW TEST STRIP Strp, , Disp: , Rfl:   alendronate (FOSAMAX) 70 MG tablet, , Disp: , Rfl:   ALPRAZolam (XANAX) 0.5 MG tablet, Take 0.5 mg by mouth 3 (three) times daily., Disp: , Rfl:   amLODIPine (NORVASC) 10 MG tablet, , Disp: , Rfl:   aspirin (ECOTRIN) 81 MG EC tablet, Take 81 mg by mouth once daily., Disp: , Rfl:   buPROPion (WELLBUTRIN SR) 150 MG TBSR 12 hr tablet, , Disp: , Rfl:   BYSTOLIC 5 mg Tab, , Disp: , Rfl:   clopidogrel (PLAVIX) 75 mg tablet, , Disp: , Rfl:   fish oil-omega-3 fatty acids 300-1,000 mg capsule, Take by mouth once daily., Disp: , Rfl:   insulin lispro (HUMALOG) 100 unit/mL injection, Inject into the skin 3 (three) times daily before meals., Disp: , Rfl:   JUBLIA 10 % Marilyn, APPLY ONE DROP TO ALL SMALLER TOES AND 2 DROPS TO GREATER TOES  DAILY, Disp: , Rfl: 10  levothyroxine (SYNTHROID) 50 MCG tablet, Take 50 mcg by mouth once daily., Disp: , Rfl:   losartan (COZAAR) 50 MG tablet, , Disp: , Rfl:   MULTIVIT-IRON-MIN-FOLIC ACID 3,500-18-0.4 UNIT-MG-MG ORAL CHEW, Take by mouth., Disp: , Rfl:   OZEMPIC 1 mg/0.75 mL (2 mg/1.5 mL) PnIj, INJECT 1MG EVERY WEEK, Disp: , Rfl: 5  pantoprazole (PROTONIX) 40 MG tablet, Take 40 mg by mouth once daily., Disp: , Rfl:   risedronate (ACTONEL) 150 MG Tab, , Disp: , Rfl:   rosuvastatin (CRESTOR) 10 MG tablet, , Disp: , Rfl:     No current facility-administered medications for this visit.       Review of patient's allergies indicates:   -- Pcn (penicillins)           Review of Systems   Constitution: Negative for weight gain and weight loss.   Cardiovascular: Positive for dyspnea on exertion. Negative for chest pain.   Respiratory: Negative for shortness of breath.    Musculoskeletal: Positive for back pain (right back). Negative for joint pain and joint swelling.   Gastrointestinal: Negative for abdominal pain and bowel incontinence.   Genitourinary: Negative for bladder incontinence.   Neurological: Negative for numbness.           Objective:          General    Vitals reviewed.  Constitutional: She is oriented to person, place, and time. She appears well-developed and well-nourished.   HENT:   Head: Normocephalic and atraumatic.   Pulmonary/Chest: Effort normal.   Neurological: She is alert and oriented to person, place, and time.   Psychiatric: She has a normal mood and affect. Her behavior is normal. Judgment and thought content normal.     General Musculoskeletal Exam   Gait: abnormal (lack of arm swing)     Back (L-Spine & T-Spine) / Neck (C-Spine) Exam     Back (L-Spine & T-Spine) Range of Motion   Extension: 20   Flexion: 90   Lateral bend right: 20   Lateral bend left: 20   Rotation right: 40   Rotation left: 40     Spinal Sensation   Right Side Sensation  C-Spine Level: normal   L-Spine Level: normal  S-Spine  Level: normal  Left Side Sensation  C-Spine Level: normal  L-Spine Level: normal  S-Spine Level: normal    Other She has no scoliosis .  Spinal Kyphosis:  Absent    Comments:  Neg RICA        Muscle Strength   Right Upper Extremity   Biceps: 5/5/5   Deltoid:  5/5  Triceps:  5/5  Wrist extension: 5/5/5   Finger Flexors:  5/5  Left Upper Extremity  Biceps: 5/5/5   Deltoid:  5/5  Triceps:  5/5  Wrist extension: 5/5/5   Finger Flexors:  5/5  Right Lower Extremity   Hip Flexion: 5/5   Quadriceps:  5/5   Anterior tibial:  5/5/5  EHL:  5/5  Left Lower Extremity   Hip Flexion: 5/5   Quadriceps:  5/5   Anterior tibial:  5/5/5   EHL:  5/5    Reflexes     Left Side  Biceps:  2+  Triceps:  2+  Brachioradialis:  2+  Quadriceps:  2+  Achilles:  2+  Left Steele's Sign:  Absent  Babinski Sign:  absent    Right Side   Biceps:  2+  Triceps:  2+  Brachioradialis:  2+  Quadriceps:  2+  Achilles:  2+  Right Steele's Sign:  absent  Babinski Sign:  absent    Vascular Exam     Right Pulses        Carotid:                  2+    Left Pulses        Carotid:                  2+        Assessment:       Encounter Diagnoses   Name Primary?    Chronic bilateral low back pain with right-sided sciatica Yes    Spondylosis of lumbar region without myelopathy or radiculopathy     Spinal stenosis of lumbar region without neurogenic claudication          Plan:       Cheryl was seen today for low-back pain.    Diagnoses and all orders for this visit:    Chronic bilateral low back pain with right-sided sciatica    Spondylosis of lumbar region without myelopathy or radiculopathy    Spinal stenosis of lumbar region without neurogenic claudication           1.  Continue PT HEP but needs to do it more than once a week.  She finds it helpful when she does it, she feels like healthy back at [facility] was really helpful.  She is continuing wellness.  We discussed watching posture  2.  Tizanidine 2-4 TID, taking 1 at night  3. She does have some LI she  is scheduled for stress test  4.  Discuss gait with PCP, she does have lack of arm swing and masked expression and feeling like cannot advance the legs.  I mentioned parkinsons to her.  She is going to think about gait.  5.  MRI report of the lumbar spine was reviewed.  She does have facet arthropathy and spinal stenosis.  She does have some back pain that gets better with sitting.  She does not want to try injections  6.  She feels like walking is her issue, but not pain that limits her.  It is more lack of endurance and LI.  She will work on increasing her endurance. We discussed walking for 5 min multiple times a day and increasing it slowly.  She feels like she has to stop and rest and then she can keep going.  She does not have to sit.  We discussed going to the gym to work on strength training  7.  Continue tylenol as needed  8. We discussed CV recommendations for exercise are 150 min a week.  We did discuss she can do it in 10 minute increments    9.  She was given gabapentin for her neuropathy but does not want to take it due to side effects  10.  RTC 3 months

## 2019-06-13 ENCOUNTER — OFFICE VISIT (OUTPATIENT)
Dept: SPINE | Facility: CLINIC | Age: 74
End: 2019-06-13
Attending: PHYSICAL MEDICINE & REHABILITATION
Payer: MEDICARE

## 2019-06-13 ENCOUNTER — OFFICE VISIT (OUTPATIENT)
Dept: SPORTS MEDICINE | Facility: CLINIC | Age: 74
End: 2019-06-13
Payer: MEDICARE

## 2019-06-13 VITALS
HEART RATE: 90 BPM | BODY MASS INDEX: 25.95 KG/M2 | SYSTOLIC BLOOD PRESSURE: 125 MMHG | WEIGHT: 152 LBS | DIASTOLIC BLOOD PRESSURE: 64 MMHG | HEIGHT: 64 IN

## 2019-06-13 VITALS
TEMPERATURE: 99 F | BODY MASS INDEX: 26.1 KG/M2 | WEIGHT: 152.88 LBS | HEART RATE: 75 BPM | DIASTOLIC BLOOD PRESSURE: 69 MMHG | SYSTOLIC BLOOD PRESSURE: 148 MMHG | HEIGHT: 64 IN

## 2019-06-13 DIAGNOSIS — M48.061 SPINAL STENOSIS OF LUMBAR REGION WITHOUT NEUROGENIC CLAUDICATION: ICD-10-CM

## 2019-06-13 DIAGNOSIS — M47.816 SPONDYLOSIS OF LUMBAR REGION WITHOUT MYELOPATHY OR RADICULOPATHY: ICD-10-CM

## 2019-06-13 DIAGNOSIS — M54.41 CHRONIC BILATERAL LOW BACK PAIN WITH RIGHT-SIDED SCIATICA: Primary | ICD-10-CM

## 2019-06-13 DIAGNOSIS — G89.29 CHRONIC BILATERAL LOW BACK PAIN WITH RIGHT-SIDED SCIATICA: Primary | ICD-10-CM

## 2019-06-13 DIAGNOSIS — M17.12 PRIMARY OSTEOARTHRITIS OF LEFT KNEE: Primary | ICD-10-CM

## 2019-06-13 PROCEDURE — 99213 OFFICE O/P EST LOW 20 MIN: CPT | Mod: 25,S$GLB,, | Performed by: ORTHOPAEDIC SURGERY

## 2019-06-13 PROCEDURE — 99214 OFFICE O/P EST MOD 30 MIN: CPT | Mod: S$GLB,,, | Performed by: PHYSICAL MEDICINE & REHABILITATION

## 2019-06-13 PROCEDURE — 99213 PR OFFICE/OUTPT VISIT, EST, LEVL III, 20-29 MIN: ICD-10-PCS | Mod: 25,S$GLB,, | Performed by: ORTHOPAEDIC SURGERY

## 2019-06-13 PROCEDURE — 20610 DRAIN/INJ JOINT/BURSA W/O US: CPT | Mod: LT,S$GLB,, | Performed by: ORTHOPAEDIC SURGERY

## 2019-06-13 PROCEDURE — 99999 PR PBB SHADOW E&M-EST. PATIENT-LVL III: CPT | Mod: PBBFAC,,, | Performed by: ORTHOPAEDIC SURGERY

## 2019-06-13 PROCEDURE — 99214 PR OFFICE/OUTPT VISIT, EST, LEVL IV, 30-39 MIN: ICD-10-PCS | Mod: S$GLB,,, | Performed by: PHYSICAL MEDICINE & REHABILITATION

## 2019-06-13 PROCEDURE — 1101F PT FALLS ASSESS-DOCD LE1/YR: CPT | Mod: CPTII,S$GLB,, | Performed by: ORTHOPAEDIC SURGERY

## 2019-06-13 PROCEDURE — 20610 LARGE JOINT ASPIRATION/INJECTION: L KNEE: ICD-10-PCS | Mod: LT,S$GLB,, | Performed by: ORTHOPAEDIC SURGERY

## 2019-06-13 PROCEDURE — 1101F PT FALLS ASSESS-DOCD LE1/YR: CPT | Mod: CPTII,S$GLB,, | Performed by: PHYSICAL MEDICINE & REHABILITATION

## 2019-06-13 PROCEDURE — 99999 PR PBB SHADOW E&M-EST. PATIENT-LVL III: ICD-10-PCS | Mod: PBBFAC,,, | Performed by: ORTHOPAEDIC SURGERY

## 2019-06-13 PROCEDURE — 99999 PR PBB SHADOW E&M-EST. PATIENT-LVL III: ICD-10-PCS | Mod: PBBFAC,,, | Performed by: PHYSICAL MEDICINE & REHABILITATION

## 2019-06-13 PROCEDURE — 99999 PR PBB SHADOW E&M-EST. PATIENT-LVL III: CPT | Mod: PBBFAC,,, | Performed by: PHYSICAL MEDICINE & REHABILITATION

## 2019-06-13 PROCEDURE — 1101F PR PT FALLS ASSESS DOC 0-1 FALLS W/OUT INJ PAST YR: ICD-10-PCS | Mod: CPTII,S$GLB,, | Performed by: PHYSICAL MEDICINE & REHABILITATION

## 2019-06-13 PROCEDURE — 1101F PR PT FALLS ASSESS DOC 0-1 FALLS W/OUT INJ PAST YR: ICD-10-PCS | Mod: CPTII,S$GLB,, | Performed by: ORTHOPAEDIC SURGERY

## 2019-06-13 RX ORDER — TRIAMCINOLONE ACETONIDE 40 MG/ML
40 INJECTION, SUSPENSION INTRA-ARTICULAR; INTRAMUSCULAR
Status: DISCONTINUED | OUTPATIENT
Start: 2019-06-13 | End: 2019-06-13 | Stop reason: HOSPADM

## 2019-06-13 RX ADMIN — TRIAMCINOLONE ACETONIDE 40 MG: 40 INJECTION, SUSPENSION INTRA-ARTICULAR; INTRAMUSCULAR at 02:06

## 2019-06-13 NOTE — PROGRESS NOTES
CC: LEFT knee pain    74 y.o. Female who returns today for follow up. She has had previous issues with the left knee and had arthroscopic surgery in 2011. Diagnosis L knee osteoarthritis. Has not received her medial  yet. Complaint today is continued L knee pain, worse with prolonged weight-bearing and walking - located behind the patella as well as on the medial aspect of the knee. Occasional swelling episodes. She cannot take NSAIDs due to heart issues. She denies mechanical symptoms or feelings of instability. Here today for the previously discussed steroid injection. She is leaving town for a trip soon.    PMHx notable for HTN, T2DM, osteoporosis, degenerative lumbar scoliosis.   Negative for tobacco.   Positive for diabetes. Last A1C not in Epic and pt unsure.    Pain Score: 0-No pain    REVIEW OF SYSTEMS:   Constitution: Negative. Negative for chills, fever and night sweats.    Hematologic/Lymphatic: Negative for bleeding problem. Does not bruise/bleed easily.   Skin: Negative for dry skin, itching and rash.   Musculoskeletal: Negative for falls. Positive for left knee pain and  muscle weakness.     All other review of symptoms were reviewed and found to be noncontributory.     PAST MEDICAL HISTORY:   Past Medical History:   Diagnosis Date    Arthritis     Diabetes mellitus, type 2     Hearing loss     High blood pressure     Hyperlipidemia     Osteoporosis     Scoliosis     Thyroid disease        PAST SURGICAL HISTORY:   Past Surgical History:   Procedure Laterality Date    arthroscopic left knee Left 2012    EYE SURGERY Bilateral     cataract    OOPHORECTOMY Right     RENAL ARTERY STENT Left 2001    WISDOM TOOTH EXTRACTION         FAMILY HISTORY:   Family History   Problem Relation Age of Onset    Inflammatory bowel disease Neg Hx     Kidney disease Neg Hx     Osteoarthritis Neg Hx     Rheum arthritis Neg Hx     Psoriasis Neg Hx     Thyroid disease Neg Hx        SOCIAL HISTORY:    Social History     Socioeconomic History    Marital status:      Spouse name: Not on file    Number of children: Not on file    Years of education: Not on file    Highest education level: Not on file   Occupational History    Not on file   Social Needs    Financial resource strain: Not on file    Food insecurity:     Worry: Not on file     Inability: Not on file    Transportation needs:     Medical: Not on file     Non-medical: Not on file   Tobacco Use    Smoking status: Former Smoker     Packs/day: 2.00     Years: 25.00     Pack years: 50.00     Last attempt to quit: 3/7/2001     Years since quittin.2    Smokeless tobacco: Never Used    Tobacco comment:  3 children   Substance and Sexual Activity    Alcohol use: Yes     Comment: 1-2 drinks a week    Drug use: No    Sexual activity: Not on file   Lifestyle    Physical activity:     Days per week: Not on file     Minutes per session: Not on file    Stress: Not on file   Relationships    Social connections:     Talks on phone: Not on file     Gets together: Not on file     Attends Advent service: Not on file     Active member of club or organization: Not on file     Attends meetings of clubs or organizations: Not on file     Relationship status: Not on file   Other Topics Concern    Not on file   Social History Narrative    Not on file       MEDICATIONS:     Current Outpatient Medications:     ACCU-CHEK SMARTVIEW TEST STRIP Strp, , Disp: , Rfl:     alendronate (FOSAMAX) 70 MG tablet, Take 70 mg by mouth every 7 days. , Disp: , Rfl:     ALPRAZolam (XANAX) 0.5 MG tablet, Take 0.5 mg by mouth 3 (three) times daily., Disp: , Rfl:     amLODIPine (NORVASC) 10 MG tablet, Take 10 mg by mouth once daily. , Disp: , Rfl:     aspirin (ECOTRIN) 81 MG EC tablet, Take 81 mg by mouth once daily., Disp: , Rfl:     biotin 2,500 mcg Cap, Take 1 capsule by mouth once daily. , Disp: , Rfl:     buPROPion (WELLBUTRIN SR) 150 MG TBSR 12 hr  "tablet, Take 150 mg by mouth 2 (two) times daily. , Disp: , Rfl:     BYSTOLIC 5 mg Tab, Take 5 mg by mouth once daily. , Disp: , Rfl:     clopidogrel (PLAVIX) 75 mg tablet, Take 75 mg by mouth once daily. , Disp: , Rfl:     fish oil-omega-3 fatty acids 300-1,000 mg capsule, Take by mouth once daily., Disp: , Rfl:     fluticasone (FLONASE) 50 mcg/actuation nasal spray, 1 spray by Each Nare route once daily., Disp: , Rfl:     insulin lispro (HUMALOG) 100 unit/mL injection, Inject into the skin 3 (three) times daily before meals., Disp: , Rfl:     levothyroxine (SYNTHROID) 50 MCG tablet, Take 50 mcg by mouth once daily., Disp: , Rfl:     liraglutide 0.6 mg/0.1 mL, 18 mg/3 mL, subq PNIJ (VICTOZA 2-VITO) 0.6 mg/0.1 mL (18 mg/3 mL) PnIj, Inject 1.8 mg into the skin once daily. , Disp: , Rfl:     losartan (COZAAR) 50 MG tablet, Take 50 mg by mouth 2 (two) times daily. , Disp: , Rfl:     MULTIVIT-IRON-MIN-FOLIC ACID 3,500-18-0.4 UNIT-MG-MG ORAL CHEW, Take by mouth., Disp: , Rfl:     rosuvastatin (CRESTOR) 10 MG tablet, Take 10 mg by mouth every evening. , Disp: , Rfl:     sodium chloride (OCEAN) 0.65 % nasal spray, 1 spray by Nasal route as needed for Congestion., Disp: , Rfl:     tiZANidine (ZANAFLEX) 2 MG tablet, TAKE 1-2 TABLETS (2-4 MG TOTAL) BY MOUTH EVERY 8 (EIGHT) HOURS AS NEEDED., Disp: 180 tablet, Rfl: 2    ALLERGIES:   Review of patient's allergies indicates:   Allergen Reactions    Nickel Itching and Other (See Comments)    Pcn [penicillins] Rash        PHYSICAL EXAMINATION:  /64   Pulse 90   Ht 5' 4" (1.626 m)   Wt 68.9 kg (152 lb)   BMI 26.09 kg/m²   General: Well-developed well-nourished 74 y.o. femalein no acute distress   Cardiovascular: Regular rhythm by palpation of distal pulse, normal color and temperature, no concerning varicosities on symptomatic side   Lungs: No labored breathing or wheezing appreciated   Neuro: Alert and oriented ×3   Psychiatric: well oriented to person, place " and time, demonstrates normal mood and affect   Skin: No rashes, lesions or ulcers, normal temperature, turgor, and texture on involved extremity    Ortho/SPM Exam  Examination of the left knee again demonstrates slight varus alignment. Mild effusion. +TTP medial joint line and inferior pole of patella. Active range of motion 0-130 with increased pain at terminal flexion. Mild crepitance through range of motion.  Positive patellar grind.  Stable to varus and valgus stress. Negative Lachman.  Weak quadriceps.    IMAGING:  X-rays including standing, weight bearing AP and flexion bilateral knees, LEFT knee lateral and sunrise views ordered and images reviewed by me show:    Moderate to advanced tricompartmental osteoarthritis most evident in medial compartment with near bone on bone findings w/ standing flexion view. + subchondral sclerosis and osteophyte formation     ASSESSMENT:      ICD-10-CM ICD-9-CM   1. Primary osteoarthritis of left knee M17.12 715.16       PLAN:     -Left knee steroid injection today. Leaves for [facility] next Wednesday.   -No oral NSAIDs due to heart condition  -RTC as needed. If she fails a course of non operative treatment, next step would be to consider total knee arthroplasty.  -All questions answered      Large Joint Aspiration/Injection: L knee  Date/Time: 6/13/2019 2:37 PM  Performed by: ANITA Wiggins MD  Authorized by: ANITA Wiggins MD     Consent Done?:  Yes (Verbal)  Indications:  Pain  Procedure site marked: Yes    Timeout: Prior to procedure the correct patient, procedure, and site was verified      Location:  Knee  Site:  L knee  Prep: Patient was prepped and draped in usual sterile fashion    Ultrasonic Guidance for needle placement: No  Needle size:  22 G  Approach:  Lateral  Medications:  40 mg triamcinolone acetonide 40 mg/mL  Patient tolerance:  Patient tolerated the procedure well with no immediate complications

## 2019-06-17 ENCOUNTER — DOCUMENTATION ONLY (OUTPATIENT)
Dept: REHABILITATION | Facility: HOSPITAL | Age: 74
End: 2019-06-17
Attending: INTERNAL MEDICINE
Payer: MEDICARE

## 2019-06-17 NOTE — PROGRESS NOTES
Health  Wellness Visit Note    Name: Cheryl Abreu  Clinic Number: [id number]  Physician: Brisa Boyel, *  Diagnosis: No diagnosis found.  Past Medical History:   Diagnosis Date    Arthritis     Diabetes mellitus, type 2     Hearing loss     High blood pressure     Hyperlipidemia     Osteoporosis     Scoliosis     Thyroid disease      Visit Number: 38  Precautions: Standard  6 Month:  12 Month:  Time In: 1:00 PM  Time Out: 1:45 PM  Total Treatment Time: 45 Minutes    Subjective:   Patient reports her back is feeling pretty good today; having a little trouble with her knee. She  wants to build her stamina up before her trip. She will not be having her surgery until August after her trip; she plans to start back walking after she gets back from the trip; HC offered the suggestion of trying to walk in the mall. She says she has been going with her ;  Says sometimes she can go the whole block without stopping and other times she has to break at every corner. She has to take a stress test for her surgery that she is a little concerned about; will postpone until closer to August. She is not walking or exercising outside of wellness.    Objective:   Cheryl completed therapeutic stretches (EIL, STARLA) and the following MedX exercise machines: core lumbar, torso rotation l/r, leg extension, leg curl, upright row, chest press, biceps curl, triceps extension, leg press    See exercise log in patient folder for rate of exertion and repetitions completed.     Assessment:   Patient tolerated all med-X machines without an increase in pain.    Plan:  Continue with established plan of care towards wellness goals.     Health  : Rick Conn, PCT  6/17/2019

## 2019-06-24 ENCOUNTER — DOCUMENTATION ONLY (OUTPATIENT)
Dept: REHABILITATION | Facility: HOSPITAL | Age: 74
End: 2019-06-24
Attending: INTERNAL MEDICINE
Payer: MEDICARE

## 2019-06-24 NOTE — PROGRESS NOTES
Health  Wellness Visit Note    Name: Cheryl Abreu  Clinic Number: [id number]  Physician: Brisa Boyle, *  Diagnosis: No diagnosis found.  Past Medical History:   Diagnosis Date    Arthritis     Diabetes mellitus, type 2     Hearing loss     High blood pressure     Hyperlipidemia     Osteoporosis     Scoliosis     Thyroid disease      Visit Number: 40  Precautions: Standard  6 Month:  12 Month:  Time In: 1:00 PM  Time Out: 1:45 PM  Total Treatment Time: 45 Minutes    Subjective:   Patient reports her back is feeling pretty good today; having a little trouble with her knee. She  wants to build her stamina up before her trip. She went to a wedding over the weekend and says her back did well on the flight and during the service. She did mention that she had a little trouble adjusting to the altitude. She will not be having her surgery until August after her trip; she plans to start back walking after she gets back from the trip next month; HC offered the suggestion of trying to walk in the mall so she won't have to lisa the heat. She has to take a stress test for her surgery that she is a little concerned about; will postpone until closer to August.    Objective:   Cheryl completed therapeutic stretches (EIL, STARLA) and the following MedX exercise machines: core lumbar, torso rotation l/r, leg extension, leg curl, upright row, chest press, biceps curl, triceps extension, leg press    See exercise log in patient folder for rate of exertion and repetitions completed.     Assessment:   Patient tolerated all med-X machines without an increase in pain.    Plan:  Continue with established plan of care towards wellness goals.     Health  : Rick Conn, PCT  6/24/2019

## 2019-07-22 ENCOUNTER — DOCUMENTATION ONLY (OUTPATIENT)
Dept: REHABILITATION | Facility: HOSPITAL | Age: 74
End: 2019-07-22
Attending: INTERNAL MEDICINE
Payer: MEDICARE

## 2019-07-22 NOTE — PROGRESS NOTES
Health  Wellness Visit Note    Name: Cheryl Abreu  Clinic Number: [id number]  Physician: Brisa Boyle, *  Diagnosis: No diagnosis found.  Past Medical History:   Diagnosis Date    Arthritis     Diabetes mellitus, type 2     Hearing loss     High blood pressure     Hyperlipidemia     Osteoporosis     Scoliosis     Thyroid disease      Visit Number: 41  Precautions: Standard  6 Month:  12 Month:  Time In: 1:00 PM  Time Out: 1:45 PM  Total Treatment Time: 45 Minutes    Subjective:   Patient reports her back is feeling pretty good today; having a little trouble with her knee. She did well on her trip. She did a lot of flying but says that she did fine with the flights and the worst part was maneuvering through the airports. She also had to walk up and down stairs a lot and was happy to say that she did fine with it and it got easier the more she did it. She will miss again on next week because she will be out of town. HC encouraged her to keep up with her stretches and to stay as active as she can while she is away. She has to take a stress test for her surgery that she is a little concerned about; will postpone until closer to August.    Objective:   Cheryl completed therapeutic stretches (EIL, STARLA) and the following MedX exercise machines: core lumbar, torso rotation l/r, leg extension, leg curl, upright row, chest press, biceps curl, triceps extension, leg press    See exercise log in patient folder for rate of exertion and repetitions completed.     Assessment:   Patient tolerated all med-X machines without an increase in pain.    Plan:  Continue with established plan of care towards wellness goals.     Health  : Rick Conn, PCT  7/22/2019

## 2019-08-05 ENCOUNTER — DOCUMENTATION ONLY (OUTPATIENT)
Dept: REHABILITATION | Facility: HOSPITAL | Age: 74
End: 2019-08-05
Attending: INTERNAL MEDICINE
Payer: MEDICARE

## 2019-08-05 NOTE — PROGRESS NOTES
Health  Wellness Visit Note    Name: Cheryl Abreu  Clinic Number: [id number]  Physician: Brisa Boyle, *  Diagnosis: No diagnosis found.  Past Medical History:   Diagnosis Date    Arthritis     Diabetes mellitus, type 2     Hearing loss     High blood pressure     Hyperlipidemia     Osteoporosis     Scoliosis     Thyroid disease      Visit Number: 38  Precautions: Standard  6 Month:  12 Month:  Time In: 1:00 PM  Time Out: 1:45 PM  Total Treatment Time: 45 Minutes    Subjective:   Cheryl is pain free today. Cheryl is happy that she can do more around the house. Standing for prolonged periods of time doing her make-up used to bother her, but now the pain isn't as severe. Cheryl admits that she has not been diligent in doing her at home exercises. Cheryl says she does plan on doing her exercises whenever she has the chance.    Objective:   Cheryl completed therapeutic stretches (DKTC, LTR, and supine hamstring stretch) and the following MedX exercise machines: core lumbar, torso rotation l/r, leg extension, leg curl, upright row, chest press, biceps curl, triceps extension, leg press    See exercise log in patient folder for rate of exertion and repetitions completed.     Assessment:   Patient tolerated all med-X machines without an increase in pain.    Plan:  Continue with established plan of care towards wellness goals.     Health  : Donte Dsouza, C[place]TON  8/5/2019   Norm Bhatti, PT, DPT was present during wellness session.

## 2019-08-12 ENCOUNTER — DOCUMENTATION ONLY (OUTPATIENT)
Dept: REHABILITATION | Facility: HOSPITAL | Age: 74
End: 2019-08-12
Attending: INTERNAL MEDICINE
Payer: MEDICARE

## 2019-08-12 NOTE — PATIENT INSTRUCTIONS
"                                                                                                                                                                     HEALTHY BACK TOOLS            KEEP YOUR SPINE FEELING FINE                          HEALTHY HABITS    Do your "go to" stretches 2/day                                   Get a good night's rest  Watch your POSTURE in sitting and standing             Drink PLENTY of water  Use a lumbar roll                                                          Eat LOTS of fruits & vegetables  Get up often (walk and stretch)                                    Manage your stress  Make your workplace IDEAL for you                           Don't smoke  Lift correctly                                                                 Exercise                                                       WHAT TO DO WHEN SYMPTOMS FLARE UP    Back and neck pain may occasionally flare up.   When  this happens,   Remember to manage your symptoms using your tools.  Be encouraged,   flare ups will usually pass.    "Go To" Stretches                            Keep Moving    "Go To" positions                            Slowly resume normal activities    Z Lie                                                   Relaxation techniques  Ice                                                         MY EXERCISE PLAN    Go To Stretches  (2/day)  Lie on back-Flexion in lie with ball  Lie on back-Lower trunk rotations with ball  Lie on back-Piriformis/hamstring stretch with ball               Lie on stomach, push up with arms    Strengthening (2-3/week)  bridges  Wellness 1/week       Cardio (3-5/week)  Walking 4/week                                                                                                                                                                                        "

## 2019-08-12 NOTE — PROGRESS NOTES
Health  Wellness Visit Note    Name: Cheryl Abreu  Clinic Number: [id number]  Physician: Brisa Boyle, *  Diagnosis: No diagnosis found.  Past Medical History:   Diagnosis Date    Arthritis     Diabetes mellitus, type 2     Hearing loss     High blood pressure     Hyperlipidemia     Osteoporosis     Scoliosis     Thyroid disease      Visit Number: 38  Precautions: Standard  1st pt visit 11/8/18  6 Month:  May  12 Month:  Time In: 1:00 PM  Time Out: 1:45 PM  Total Treatment Time: 45 Minutes    Subjective:   Cheryl is pain free today. Cheryl is happy that she can do more around the house. Standing for prolonged periods of time doing her make-up used to bother her, but now the pain isn't as severe. Cheryl admits that she has not been diligent in doing her at home exercises. Cheryl says she does plan on doing her exercises whenever she has the chance.    Objective:   Cheryl completed therapeutic stretches (DKTC, LTR, and supine hamstring stretch that is piriformis stretch, ext in lie for back, and bridges) and the following MedX exercise machines: core lumbar 68 ft lbs, 20 reps, exertion 3)    torso rotation l/r, leg extension, leg curl, upright row, chest press, biceps curl, triceps extension, leg press, hip abd, hip add    See exercise log in patient folder for rate of exertion and repetitions completed.     Assessment:   Patient tolerated all med-X machines without an increase in pain.    Plan:  Continue with established plan of care towards wellness goals.    Patient given [facility] magnet handout with exercises on it    Health  : Yue Michael, PT by physical therapist  8/12/2019

## 2019-08-19 ENCOUNTER — DOCUMENTATION ONLY (OUTPATIENT)
Dept: REHABILITATION | Facility: HOSPITAL | Age: 74
End: 2019-08-19
Attending: INTERNAL MEDICINE
Payer: MEDICARE

## 2019-08-19 NOTE — PROGRESS NOTES
Health  Wellness Visit Note    Name: Cheryl Abreu  Clinic Number: [id number]  Physician: Brisa Boyle, *  Diagnosis: No diagnosis found.  Past Medical History:   Diagnosis Date    Arthritis     Diabetes mellitus, type 2     Hearing loss     High blood pressure     Hyperlipidemia     Osteoporosis     Scoliosis     Thyroid disease      Visit Number: 38  Precautions: Standard  1st pt visit 11/8/18  Year of care 11/8/2019  6 Month:  May  12 Month:  Time In: 2:00  PM  Time Out: 2:55  PM  Total Treatment Time: 45 Minutes    Subjective:   Cheryl is pain free today. Cheryl is happy that she can do more around the house. Standing for prolonged periods of time doing her make-up used to bother her, but now the pain isn't as severe. Cheryl admits that she has not been diligent in doing her at home exercises. Cheryl says she does plan on doing her exercises whenever she has the chance.    Objective:   Cheryl completed therapeutic stretches (DKTC, LTR, and supine hamstring stretch that is piriformis stretch, ext in lie for back, and bridges) and the following MedX exercise machines: core lumbar 68 ft lbs, 20 reps, exertion 4)    torso rotation l/r, leg extension, leg curl, upright row, chest press, biceps curl, triceps extension, leg press, hip abd, hip add    See exercise log in patient folder for rate of exertion and repetitions completed.     Assessment:   Patient tolerated all med-X machines without an increase in pain.    Plan:  Continue with established plan of care towards wellness goals.    Patient given [employer] magnet handout with exercises on it    Health  : Yue Michael, PT by physical therapist  8/19/2019

## 2019-08-26 ENCOUNTER — DOCUMENTATION ONLY (OUTPATIENT)
Dept: REHABILITATION | Facility: HOSPITAL | Age: 74
End: 2019-08-26
Attending: INTERNAL MEDICINE
Payer: MEDICARE

## 2019-08-26 NOTE — PROGRESS NOTES
Health  Wellness Visit Note    Name: Cheryl Abreu  Clinic Number: [id number]  Physician: Brisa Boyle, *  Diagnosis: No diagnosis found.  Past Medical History:   Diagnosis Date    Arthritis     Diabetes mellitus, type 2     Hearing loss     High blood pressure     Hyperlipidemia     Osteoporosis     Scoliosis     Thyroid disease      Visit Number: 41  Precautions: Standard  1st pt visit 11/8/18  Year of care 11/8/2019  6 Month:  May  12 Month:  Time In: 1:00  PM  Time Out: 1:55  PM  Total Treatment Time: 45 Minutes    Subjective:   Cheryl is pain free today. Cheryl is happy that she can do more around the house. Standing for prolonged periods of time doing her make-up used to bother her, but now the pain isn't as severe. Cheryl admits that she has not been diligent in doing her at home exercises. Cheryl says she does plan on doing her exercises whenever she has the chance.    Objective:   Cheryl completed therapeutic stretches (DKTC, LTR,pelvic tilts and supine hamstring stretch that is piriformis stretch, ext in lie for back, and bridges) and the following MedX exercise machines: core lumbar 68 ft lbs, 20 reps, exertion 4)    torso rotation l/r, leg extension, leg curl, upright row, chest press, biceps curl, triceps extension, leg press, hip abd, hip add    See exercise log in patient folder for rate of exertion and repetitions completed.       Fitness Machine Education Key:  E-education on equipment initiated and further follow up and education needed  I-independent with  and exercise      Lumbar/Cervical Ext. e Torso Rotation e Leg Press e   Leg Extension e Seated Leg Curl e Chest Press e   Seated Row e Hip ADD e Hip ABD e   Triceps Extension e Bicep Curl e Other:          Assessment:   Patient tolerated all med-X machines without an increase in pain.    Plan:  Continue with established plan of care towards wellness goals.    Patient given fridge magnet handout with exercises  on it    Health  : Yue Michael, PT by physical therapist  8/26/2019

## 2019-08-26 NOTE — PATIENT INSTRUCTIONS
"                                                                                                                                                                     HEALTHY BACK TOOLS            KEEP YOUR SPINE FEELING FINE                          HEALTHY HABITS    Do your "go to" stretches 2/day                                   Get a good night's rest  Watch your POSTURE in sitting and standing             Drink PLENTY of water  Use a lumbar roll                                                          Eat LOTS of fruits & vegetables  Get up often (walk and stretch)                                    Manage your stress  Make your workplace IDEAL for you                           Don't smoke  Lift correctly                                                                 Exercise                                                       WHAT TO DO WHEN SYMPTOMS FLARE UP    Back and neck pain may occasionally flare up.   When  this happens,   Remember to manage your symptoms using your tools.  Be encouraged,   flare ups will usually pass.    "Go To" Stretches                            Keep Moving    "Go To" positions                            Slowly resume normal activities    Z Lie                                                   Relaxation techniques  Ice                                                       MY EXERCISE PLAN    Go To Stretches  (2/day)  Lie on back-Flexion in lie with ball  Lie on back-Lower trunk rotations with ball  Lie on back-Piriformis/hamstring stretch with ball    Pelvic tilts             Lie on stomach, push up with arms    Strengthening (2-3/week)  bridges  Wellness 1/week       Cardio (3-5/week)  Walking 4/week                                                                                                                                                                                        "

## 2019-08-29 ENCOUNTER — DOCUMENTATION ONLY (OUTPATIENT)
Dept: REHABILITATION | Facility: HOSPITAL | Age: 74
End: 2019-08-29
Attending: INTERNAL MEDICINE
Payer: MEDICARE

## 2019-08-29 NOTE — PROGRESS NOTES
Health  Wellness Visit Note    Name: Cheryl Abreu  Clinic Number: [id number]  Physician: Brisa Boyle, *  Diagnosis: No diagnosis found.  Past Medical History:   Diagnosis Date    Arthritis     Diabetes mellitus, type 2     Hearing loss     High blood pressure     Hyperlipidemia     Osteoporosis     Scoliosis     Thyroid disease      Visit Number: 42  Precautions: Standard  1st pt visit 11/8/18  Year of care 11/8/2019  6 Month:  May  12 Month:  Time In: 2:30  PM  Time Out: 3:55  PM  Total Treatment Time: 45 Minutes    Subjective:   Cheryl is pain free today. Cheryl is happy that she can do more around the house. Standing for prolonged periods of time doing her make-up used to bother her, but now the pain isn't as severe. Cheryl admits that she has not been diligent in doing her at home exercises. Cheryl says she does plan on doing her exercises whenever she has the chance.    Objective:   Cheryl completed therapeutic stretches (DKTC, LTR,pelvic tilts and supine hamstring stretch that is piriformis stretch, ext in lie for back, and bridges) and the following MedX exercise machines: core lumbar 68 ft lbs, 20 reps, exertion 4)    torso rotation l/r, leg extension, leg curl, upright row, chest press, biceps curl, triceps extension, leg press, hip abd, hip add    See exercise log in patient folder for rate of exertion and repetitions completed.       Fitness Machine Education Key:  E-education on equipment initiated and further follow up and education needed  I-independent with  and exercise      Lumbar/Cervical Ext. e Torso Rotation e Leg Press e   Leg Extension e Seated Leg Curl e Chest Press e   Seated Row e Hip ADD e Hip ABD e   Triceps Extension e Bicep Curl e Other:          Assessment:   Patient tolerated all med-X machines without an increase in pain.    Plan:  Continue with established plan of care towards wellness goals.    Patient given fridge magnet handout with exercises  on it    Health  : Yue Michael, PT by physical therapist  8/29/2019

## 2019-09-09 ENCOUNTER — DOCUMENTATION ONLY (OUTPATIENT)
Dept: REHABILITATION | Facility: HOSPITAL | Age: 74
End: 2019-09-09
Attending: INTERNAL MEDICINE
Payer: MEDICARE

## 2019-09-09 NOTE — PROGRESS NOTES
Health  Wellness Visit Note    Name: Cheryl Abreu  Clinic Number: [id number]  Physician: Brisa Boyle, *  Diagnosis: No diagnosis found.  Past Medical History:   Diagnosis Date    Arthritis     Diabetes mellitus, type 2     Hearing loss     High blood pressure     Hyperlipidemia     Osteoporosis     Scoliosis     Thyroid disease      Visit Number: 44  Precautions: Standard  1st pt visit 11/8/18  Year of care 11/8/2019  6 Month:  May  12 Month:  Time In: 2:30  PM  Time Out: 3:55  PM  Total Treatment Time: 45 Minutes    Subjective:   Cheryl is pain free today. Cheryl is happy that she can do more around the house. Standing for prolonged periods of time doing her make-up used to bother her, but now the pain isn't as severe.     Objective:   Cheryl completed therapeutic stretches (DKTC, LTR,pelvic tilts and supine hamstring stretch that is piriformis stretch, ext in lie for back, and bridges) and the following MedX exercise machines: core lumbar 68 ft lbs, 20 reps, exertion 4)    torso rotation l/r, leg extension, leg curl, upright row, chest press, biceps curl, triceps extension, leg press, hip abd, hip add    See exercise log in patient folder for rate of exertion and repetitions completed.       Fitness Machine Education Key:  E-education on equipment initiated and further follow up and education needed  I-independent with  and exercise      Lumbar/Cervical Ext. e Torso Rotation e Leg Press e   Leg Extension e Seated Leg Curl e Chest Press e   Seated Row e Hip ADD e Hip ABD e   Triceps Extension e Bicep Curl e Other:          Assessment:   Patient tolerated all med-X machines without an increase in pain.    Plan:  Continue with established plan of care towards wellness goals.    Patient given fridge magnet handout with exercises on it.  Will teach patient HEP  For wellness machines once she is indep with     Health  : Yue Michael, PT by physical  therapist  9/9/2019

## 2019-09-16 ENCOUNTER — DOCUMENTATION ONLY (OUTPATIENT)
Dept: REHABILITATION | Facility: HOSPITAL | Age: 74
End: 2019-09-16
Attending: INTERNAL MEDICINE
Payer: MEDICARE

## 2019-09-16 NOTE — PROGRESS NOTES
Health  Wellness Visit Note    Name: Cheryl Abreu  Clinic Number: [id number]  Physician: Brisa Boyle, *  Diagnosis: No diagnosis found.  Past Medical History:   Diagnosis Date    Arthritis     Diabetes mellitus, type 2     Hearing loss     High blood pressure     Hyperlipidemia     Osteoporosis     Scoliosis     Thyroid disease      Visit Number: 45  Precautions: Standard  1st pt visit 11/8/18  Year of care 11/8/2019  6 Month:  May  12 Month:  Time In: 2:30  PM  Time Out: 3:55  PM  Total Treatment Time: 45 Minutes    Subjective:   Cheryl is pain free today. Cheryl is happy that she can do more around the house. Standing for prolonged periods of time doing her make-up used to bother her, but now the pain isn't as severe.     Objective:   Cheryl completed therapeutic stretches (DKTC, LTR,pelvic tilts and supine hamstring stretch that is piriformis stretch, ext in lie for back, and bridges) and the following MedX exercise machines: core lumbar 68 ft lbs, 20 reps, exertion 4)    torso rotation l/r, leg extension, leg curl, upright row, chest press, biceps curl, triceps extension, leg press, hip abd, hip add    See exercise log in patient folder for rate of exertion and repetitions completed.       Fitness Machine Education Key:  E-education on equipment initiated and further follow up and education needed  I-independent with  and exercise      Lumbar/Cervical Ext. e Torso Rotation e Leg Press e   Leg Extension e Seated Leg Curl e Chest Press e   Seated Row e Hip ADD e Hip ABD e   Triceps Extension e Bicep Curl e Other:          Assessment:   Patient tolerated all med-X machines without an increase in pain.    Plan:  Continue with established plan of care towards wellness goals.    Patient given fridge magnet handout with exercises on it.  Will teach patient HEP  For wellness machines once she is indep with       : Yue Michael, PT by physical therapist  9/16/2019

## 2019-09-17 ENCOUNTER — TELEPHONE (OUTPATIENT)
Dept: SPORTS MEDICINE | Facility: CLINIC | Age: 74
End: 2019-09-17

## 2019-09-17 DIAGNOSIS — M17.12 PRIMARY OSTEOARTHRITIS OF LEFT KNEE: Primary | ICD-10-CM

## 2019-09-17 NOTE — TELEPHONE ENCOUNTER
----- Message from Gwendolyn Campos sent at 9/17/2019 12:03 PM CDT -----  Euflexxa order in. Once signed, call patient to schedule.

## 2019-09-17 NOTE — TELEPHONE ENCOUNTER
Spoke with the patient. Dx is L Knee OA. Steroid injection provided her with minimal relief. Would like to try the Euflexxa series. Order placed. Once signed, will call to schedule.

## 2019-09-23 ENCOUNTER — DOCUMENTATION ONLY (OUTPATIENT)
Dept: REHABILITATION | Facility: HOSPITAL | Age: 74
End: 2019-09-23
Attending: INTERNAL MEDICINE
Payer: MEDICARE

## 2019-09-23 NOTE — PROGRESS NOTES
Health  Wellness Visit Note    Name: Cheryl Abreu  Clinic Number: [id number]  Physician: Brisa Boyle, *  Diagnosis: No diagnosis found.  Past Medical History:   Diagnosis Date    Arthritis     Diabetes mellitus, type 2     Hearing loss     High blood pressure     Hyperlipidemia     Osteoporosis     Scoliosis     Thyroid disease      Visit Number: 44  Precautions: Standard  1st pt visit 11/8/18  Year of care 11/8/2019  6 Month:  May  12 Month:  Time In: 1:30  PM  Time Out: 2:25  PM  Total Treatment Time: 45 Minutes    Subjective:   Cheryl is pain free today. Cheryl is happy that she can do more around the house. Standing for prolonged periods of time doing her make-up used to bother her, but now the pain isn't as severe.     Objective:   Cheryl completed therapeutic stretches (DKTC,EIS LTR,pelvic tilts and supine hamstring stretch that is piriformis stretch, ext in lie for back, and bridges) and the following MedX exercise machines: core lumbar 68 ft lbs, 20 reps, exertion 4)    torso rotation l/r, leg extension, leg curl, upright row, chest press, biceps curl, triceps extension, leg press, hip abd, hip add    See exercise log in patient folder for rate of exertion and repetitions completed.       Fitness Machine Education Key:  E-education on equipment initiated and further follow up and education needed  I-independent with  and exercise      Lumbar/Cervical Ext. e Torso Rotation e Leg Press e   Leg Extension e Seated Leg Curl e Chest Press e   Seated Row e Hip ADD e Hip ABD e   Triceps Extension e Bicep Curl e Other:          Assessment:   Patient tolerated all med-X machines without an increase in pain.    Plan:  Continue with established plan of care towards wellness goals.    Patient given fridge magnet handout with exercises on it.  Will teach patient HEP  For wellness machines once she is indep with       : Yue Michael, PT by physical therapist  9/23/2019

## 2019-09-30 ENCOUNTER — DOCUMENTATION ONLY (OUTPATIENT)
Dept: REHABILITATION | Facility: HOSPITAL | Age: 74
End: 2019-09-30
Attending: INTERNAL MEDICINE
Payer: MEDICARE

## 2019-09-30 NOTE — PROGRESS NOTES
Health  Wellness Visit Note    Name: Cheryl Abreu  Clinic Number: [id number]  Physician: Brisa Boyle, *  Diagnosis: No diagnosis found.  Past Medical History:   Diagnosis Date    Arthritis     Diabetes mellitus, type 2     Hearing loss     High blood pressure     Hyperlipidemia     Osteoporosis     Scoliosis     Thyroid disease      Visit Number: 45  Precautions: Standard  1st pt visit 11/8/18  Year of care 11/8/2019  6 Month:  May  12 Month:  Time In: 1:30  PM  Time Out: 2:25  PM  Total Treatment Time: 45 Minutes    Subjective:   Cheryl is pain free today. Cheryl is happy that she can do more around the house. Standing for prolonged periods of time doing her make-up used to bother her, but now the pain isn't as severe.     Objective:   Cheryl completed therapeutic stretches (DKTC,EIS LTR,pelvic tilts and supine hamstring stretch that is piriformis stretch, ext in lie for back, and bridges) and the following MedX exercise machines: core lumbar 68 ft lbs, 20 reps, exertion 4)    torso rotation l/r, leg extension, leg curl, upright row, chest press, biceps curl, triceps extension, leg press, hip abd, hip add    See exercise log in patient folder for rate of exertion and repetitions completed.       Fitness Machine Education Key:  E-education on equipment initiated and further follow up and education needed  I-independent with  and exercise      Lumbar/ Ext. i Torso Rotation i Leg Press i   Leg Extension i Seated Leg Curl i Chest Press i   Seated Row i Hip ADD i Hip ABD i   Triceps Extension i Bicep Curl i Other:          Assessment:   Patient tolerated all med-X machines without an increase in pain.    Plan:  Continue with established plan of care towards wellness goals.  Continue to teach patient HEP  For wellness machines       : Yue Michael, PT by physical therapist  9/30/2019

## 2019-10-01 DIAGNOSIS — M62.838 MUSCLE SPASM: Primary | ICD-10-CM

## 2019-10-01 RX ORDER — TIZANIDINE 2 MG/1
2-4 TABLET ORAL EVERY 8 HOURS PRN
Qty: 180 TABLET | Refills: 2 | Status: SHIPPED | OUTPATIENT
Start: 2019-10-01 | End: 2019-12-18 | Stop reason: SDUPTHER

## 2019-10-02 ENCOUNTER — OFFICE VISIT (OUTPATIENT)
Dept: SPORTS MEDICINE | Facility: CLINIC | Age: 74
End: 2019-10-02
Payer: MEDICARE

## 2019-10-02 VITALS
WEIGHT: 152 LBS | SYSTOLIC BLOOD PRESSURE: 130 MMHG | DIASTOLIC BLOOD PRESSURE: 75 MMHG | HEART RATE: 82 BPM | BODY MASS INDEX: 25.95 KG/M2 | HEIGHT: 64 IN

## 2019-10-02 DIAGNOSIS — M17.12 PRIMARY OSTEOARTHRITIS OF LEFT KNEE: Primary | ICD-10-CM

## 2019-10-02 PROCEDURE — 20610 DRAIN/INJ JOINT/BURSA W/O US: CPT | Mod: LT,S$GLB,, | Performed by: PHYSICIAN ASSISTANT

## 2019-10-02 PROCEDURE — 20610 PR DRAIN/INJECT LARGE JOINT/BURSA: ICD-10-PCS | Mod: LT,S$GLB,, | Performed by: PHYSICIAN ASSISTANT

## 2019-10-02 PROCEDURE — 99499 NO LOS: ICD-10-PCS | Mod: S$GLB,,, | Performed by: PHYSICIAN ASSISTANT

## 2019-10-02 PROCEDURE — 99499 UNLISTED E&M SERVICE: CPT | Mod: S$GLB,,, | Performed by: PHYSICIAN ASSISTANT

## 2019-10-02 PROCEDURE — 99999 PR PBB SHADOW E&M-EST. PATIENT-LVL IV: CPT | Mod: PBBFAC,,, | Performed by: PHYSICIAN ASSISTANT

## 2019-10-02 PROCEDURE — 99999 PR PBB SHADOW E&M-EST. PATIENT-LVL IV: ICD-10-PCS | Mod: PBBFAC,,, | Performed by: PHYSICIAN ASSISTANT

## 2019-10-02 NOTE — LETTER
October 2, 2019      Cari Bean PA-C  [address]  [facility] [address]           [facility]  [address]  [facility] [address]  Phone: [phone]          Patient: Cheryl Abreu   MR Number: [id number]   YOB: 1945   Date of Visit: 10/2/2019       Dear Cari Bean:    Thank you for referring Cheryl Abreu to me for evaluation. Attached you will find relevant portions of my assessment and plan of care.    If you have questions, please do not hesitate to call me. I look forward to following Cheryl Abreu along with you.    Sincerely,    Ravindra Loera PA-C    Enclosure  CC:  No Recipients    If you would like to receive this communication electronically, please contact [email or web address] or [phone] to request more information on [employer] access.    For providers and/or their staff who would like to refer a patient to [facility], please contact us through our one-stop-shop provider referral line, [facility], at [phone].    If you feel you have received this communication in error or would no longer like to receive these types of communications, please e-mail [email or web address]         
[phone]

## 2019-10-02 NOTE — PROGRESS NOTES
Cheryl Abreu is here for follow up of left knee arthritis. Pt is requesting Euflexxa series injections #1 of 3.  [facility] reviewed per encounter record. Has failed other conservative modalities including NSAIDS, activity modification, weight loss.    The prior shot was tolerated well.    PHYSICAL EXAMINATION:     General: The patient is alert and oriented x 3. Mood is pleasant.   Observation of ears, eyes and nose reveals no gross abnormalities. No   labored breathing observed.     No signs of infection or adverse reaction to knee.    Injection Procedure  A time out was performed, including verification of patient ID, procedure, site and side, availability of information and equipment, review of safety issues, and agreement with consent, the procedure site was marked.    After time out was performed, the patient was prepped aseptically with chloraprep. A diagnostic and therapeutic injection of 2cc Euflexxa was given under sterile technique using a 22g x 1.5 needle from the Superolateral  aspect of the left Knee Joint in the supine position.      Cheryl Abreu had no adverse reactions to the medication. Pain decreased. She was instructed to apply ice to the joint for 20 minutes and avoid strenuous activities for 24-36 hours following the injection. She was warned of possible blood sugar and/or blood pressure changes during that time. Following that time, she can resume regular activities.    She was reminded to call the clinic immediately for any adverse side effects as explained in clinic today.    RTC to see Antony Loera PA-C for Euflexxa series injections #2 of 3.    All of the patient's questions were answered and the patient will contact us if they have any questions or concerns in the interim.

## 2019-10-07 ENCOUNTER — DOCUMENTATION ONLY (OUTPATIENT)
Dept: REHABILITATION | Facility: HOSPITAL | Age: 74
End: 2019-10-07
Attending: INTERNAL MEDICINE
Payer: MEDICARE

## 2019-10-07 NOTE — PATIENT INSTRUCTIONS
Back Extension                          TRUNK ROTATION      Resistance can be added with weights or bands  Can be done on chair or ball          CHEST PRESS    Can be done on bed, floor, or mat  Resting position in arms by site  Push weights up towards ceiling            ROWING      Hold arms by side  Extend shoulders, and then pull shoulder blades together and down        BICEPS                TRICEPS                  LEG EXTENSION                LEG CURL                  HIP ABDUCTORS  (CLAMS)                    HIP ADDUCTORS        Can just squeeze ball  Can also squeeze ball and bridge  (Combine this exercise and one below        BRIDGING

## 2019-10-07 NOTE — PROGRESS NOTES
Health  Wellness Visit Note    Name: Cheryl Abreu  Clinic Number: [id number]  Physician: Brisa Boyle, *  Diagnosis: No diagnosis found.  Past Medical History:   Diagnosis Date    Arthritis     Diabetes mellitus, type 2     Hearing loss     High blood pressure     Hyperlipidemia     Osteoporosis     Scoliosis     Thyroid disease      Visit Number: 47  Precautions: Standard  1st pt visit 11/8/18  Year of care 11/8/2019  6 Month:  May  12 Month:  Time In: 1:30  PM  Time Out: 2:25  PM  Total Treatment Time: 45 Minutes    Subjective:   Cheryl is pain free today. Cheryl is happy that she can do more around the house. Standing for prolonged periods of time doing her make-up used to bother her, but now the pain isn't as severe.     Objective:   Cheryl completed therapeutic stretches (DKTC,EIS LTR,pelvic tilts and supine hamstring stretch that is piriformis stretch, ext in lie for back, and bridges) and the following MedX exercise machines: core lumbar 68 ft lbs, 20 reps, exertion 4)    torso rotation l/r, leg extension, leg curl, upright row, chest press, biceps curl, triceps extension, leg press, hip abd, hip add    See exercise log in patient folder for rate of exertion and repetitions completed.       Fitness Machine Education Key:  E-education on equipment initiated and further follow up and education needed  I-independent with  and exercise      Lumbar/ Ext. i Torso Rotation i Leg Press i   Leg Extension i Seated Leg Curl i Chest Press i   Seated Row i Hip ADD i Hip ABD i   Triceps Extension i Bicep Curl i Other:          Assessment:   Patient tolerated all med-X machines without an increase in pain.    Plan:  Continue with established plan of care towards wellness goals.  Continue to teach patient HEP  For wellness machines       : Yue Michael, PT by physical therapist  10/7/2019

## 2019-10-09 ENCOUNTER — OFFICE VISIT (OUTPATIENT)
Dept: SPORTS MEDICINE | Facility: CLINIC | Age: 74
End: 2019-10-09
Payer: MEDICARE

## 2019-10-09 VITALS
HEART RATE: 85 BPM | BODY MASS INDEX: 25.95 KG/M2 | DIASTOLIC BLOOD PRESSURE: 69 MMHG | SYSTOLIC BLOOD PRESSURE: 133 MMHG | HEIGHT: 64 IN | WEIGHT: 152 LBS

## 2019-10-09 DIAGNOSIS — M17.12 PRIMARY OSTEOARTHRITIS OF LEFT KNEE: Primary | ICD-10-CM

## 2019-10-09 PROCEDURE — 99999 PR PBB SHADOW E&M-EST. PATIENT-LVL IV: CPT | Mod: PBBFAC,,, | Performed by: PHYSICIAN ASSISTANT

## 2019-10-09 PROCEDURE — 20610 PR DRAIN/INJECT LARGE JOINT/BURSA: ICD-10-PCS | Mod: LT,S$GLB,, | Performed by: PHYSICIAN ASSISTANT

## 2019-10-09 PROCEDURE — 99999 PR PBB SHADOW E&M-EST. PATIENT-LVL IV: ICD-10-PCS | Mod: PBBFAC,,, | Performed by: PHYSICIAN ASSISTANT

## 2019-10-09 PROCEDURE — 99499 UNLISTED E&M SERVICE: CPT | Mod: S$GLB,,, | Performed by: PHYSICIAN ASSISTANT

## 2019-10-09 PROCEDURE — 99499 NO LOS: ICD-10-PCS | Mod: S$GLB,,, | Performed by: PHYSICIAN ASSISTANT

## 2019-10-09 PROCEDURE — 20610 DRAIN/INJ JOINT/BURSA W/O US: CPT | Mod: LT,S$GLB,, | Performed by: PHYSICIAN ASSISTANT

## 2019-10-09 NOTE — PROGRESS NOTES
Cheryl Abreu is here for follow up of left knee arthritis. Pt is requesting Euflexxa series injections #2 of 3.  [facility] reviewed per encounter record. Has failed other conservative modalities including NSAIDS, activity modification, weight loss.    The prior shot was tolerated well.    PHYSICAL EXAMINATION:     General: The patient is alert and oriented x 3. Mood is pleasant.   Observation of ears, eyes and nose reveals no gross abnormalities. No   labored breathing observed.     No signs of infection or adverse reaction to knee.    Injection Procedure  A time out was performed, including verification of patient ID, procedure, site and side, availability of information and equipment, review of safety issues, and agreement with consent, the procedure site was marked.    After time out was performed, the patient was prepped aseptically with chloraprep. A diagnostic and therapeutic injection of 2cc Euflexxa was given under sterile technique using a 22g x 1.5 needle from the Superolateral  aspect of the left Knee Joint in the supine position.      Cheryl Abreu had no adverse reactions to the medication. Pain decreased. She was instructed to apply ice to the joint for 20 minutes and avoid strenuous activities for 24-36 hours following the injection. She was warned of possible blood sugar and/or blood pressure changes during that time. Following that time, she can resume regular activities.    She was reminded to call the clinic immediately for any adverse side effects as explained in clinic today.    RTC to see Antony Loera PA-C for Euflexxa series injections #3 of 3.    All of the patient's questions were answered and the patient will contact us if they have any questions or concerns in the interim.

## 2019-10-09 NOTE — LETTER
October 9, 2019      Cari Bean PA-C  [address]  [facility] [address]           [facility]  [address]  [facility] [address]  Phone: [phone]          Patient: Cheryl Abreu   MR Number: [id number]   YOB: 1945   Date of Visit: 10/9/2019       Dear Cari Bean:    Thank you for referring Cheryl Abreu to me for evaluation. Attached you will find relevant portions of my assessment and plan of care.    If you have questions, please do not hesitate to call me. I look forward to following Cheryl Abreu along with you.    Sincerely,    Ravindra Loera PA-C    Enclosure  CC:  No Recipients    If you would like to receive this communication electronically, please contact [email or web address] or [phone] to request more information on [employer] Link access.    For providers and/or their staff who would like to refer a patient to [facility], please contact us through our one-stop-shop provider referral line, [facility], at [phone].    If you feel you have received this communication in error or would no longer like to receive these types of communications, please e-mail [email or web address]

## 2019-10-14 ENCOUNTER — DOCUMENTATION ONLY (OUTPATIENT)
Dept: REHABILITATION | Facility: HOSPITAL | Age: 74
End: 2019-10-14
Attending: INTERNAL MEDICINE
Payer: MEDICARE

## 2019-10-14 NOTE — PROGRESS NOTES
Health  Wellness Visit Note    Name: Cheryl Abreu  Clinic Number: [id number]  Physician: Brisa Boyle, *  Diagnosis: No diagnosis found.  Past Medical History:   Diagnosis Date    Arthritis     Diabetes mellitus, type 2     Hearing loss     High blood pressure     Hyperlipidemia     Osteoporosis     Scoliosis     Thyroid disease      Visit Number: 48  Precautions: Standard  1st pt visit 11/8/18  Year of care 11/8/2019  6 Month:  May  12 Month:  Time In: 4:00 pm  PM  Time Out: 5:00   PM  Total Treatment Time: 45 Minutes    Subjective:   Cheryl is feeling good.   She is functioning faye and managing household chores well.    Objective:   Cheryl completed therapeutic stretches (DKTC,EIS LTR,pelvic tilts and supine hamstring stretch that is piriformis stretch, ext in lie for back, and bridges) and the following MedX exercise machines: core lumbar 68 ft lbs, 20 reps, exertion 4)    torso rotation l/r, leg extension, leg curl, upright row, chest press, biceps curl, triceps extension, leg press, hip abd, hip add    See exercise log in patient folder for rate of exertion and repetitions completed.       Fitness Machine Education Key:  E-education on equipment initiated and further follow up and education needed  I-independent with  and exercise      Lumbar/ Ext. i Torso Rotation i Leg Press i   Leg Extension i Seated Leg Curl i Chest Press i   Seated Row i Hip ADD i Hip ABD i   Triceps Extension i Bicep Curl i Other:          Assessment:   Patient tolerated all med-X machines without an increase in pain.    Plan:  Continue with established plan of care towards wellness goals.  Continue to teach patient HEP  For weights for home peripheral strengthening.  She is indep with setting up machines in wellness      : Yue Michael, PT by physical therapist  10/14/2019

## 2019-10-16 ENCOUNTER — OFFICE VISIT (OUTPATIENT)
Dept: SPORTS MEDICINE | Facility: CLINIC | Age: 74
End: 2019-10-16
Payer: MEDICARE

## 2019-10-16 VITALS
BODY MASS INDEX: 25.95 KG/M2 | SYSTOLIC BLOOD PRESSURE: 147 MMHG | HEART RATE: 82 BPM | WEIGHT: 152 LBS | DIASTOLIC BLOOD PRESSURE: 75 MMHG | HEIGHT: 64 IN

## 2019-10-16 DIAGNOSIS — M17.12 PRIMARY OSTEOARTHRITIS OF LEFT KNEE: Primary | ICD-10-CM

## 2019-10-16 PROCEDURE — 20610 DRAIN/INJ JOINT/BURSA W/O US: CPT | Mod: LT,S$GLB,, | Performed by: PHYSICIAN ASSISTANT

## 2019-10-16 PROCEDURE — 99999 PR PBB SHADOW E&M-EST. PATIENT-LVL IV: ICD-10-PCS | Mod: PBBFAC,,, | Performed by: PHYSICIAN ASSISTANT

## 2019-10-16 PROCEDURE — 20610 PR DRAIN/INJECT LARGE JOINT/BURSA: ICD-10-PCS | Mod: LT,S$GLB,, | Performed by: PHYSICIAN ASSISTANT

## 2019-10-16 PROCEDURE — 99999 PR PBB SHADOW E&M-EST. PATIENT-LVL IV: CPT | Mod: PBBFAC,,, | Performed by: PHYSICIAN ASSISTANT

## 2019-10-16 PROCEDURE — 99499 NO LOS: ICD-10-PCS | Mod: S$GLB,,, | Performed by: PHYSICIAN ASSISTANT

## 2019-10-16 PROCEDURE — 99499 UNLISTED E&M SERVICE: CPT | Mod: S$GLB,,, | Performed by: PHYSICIAN ASSISTANT

## 2019-10-16 NOTE — LETTER
October 16, 2019      Cari Bean PA-C  [address]  [facility] [address]           [facility]  [address]  [facility] [address]  Phone: [phone]          Patient: Cheryl Abreu   MR Number: [id number]   YOB: 1945   Date of Visit: 10/16/2019       Dear Cari Bean:    Thank you for referring Cheryl Abreu to me for evaluation. Attached you will find relevant portions of my assessment and plan of care.    If you have questions, please do not hesitate to call me. I look forward to following Cheryl Abreu along with you.    Sincerely,    Ravindra Loera PA-C    Enclosure  CC:  No Recipients    If you would like to receive this communication electronically, please contact [email or web address] or [phone] to request more information on "[employer] Link access.    For providers and/or their staff who would like to refer a patient to [facility], please contact us through our one-stop-shop provider referral line, [facility], at [phone].    If you feel you have received this communication in error or would no longer like to receive these types of communications, please e-mail [email or web address]

## 2019-10-16 NOTE — PROGRESS NOTES
Cheryl Abreu is here for follow up of left knee arthritis. Pt is requesting Euflexxa series injections #3 of 3.  [facility] Has failed other conservative modalities including NSAIDS, activity modification, weight loss.    The prior shot was tolerated well.    PHYSICAL EXAMINATION:     General: The patient is alert and oriented x 3. Mood is pleasant.   Observation of ears, eyes and nose reveals no gross abnormalities. No   labored breathing observed.     No signs of infection or adverse reaction to knee.    Injection Procedure  A time out was performed, including verification of patient ID, procedure, site and side, availability of information and equipment, review of safety issues, and agreement with consent, the procedure site was marked.    After time out was performed, the patient was prepped aseptically with chloraprep. A diagnostic and therapeutic injection of 2cc Euflexxa was given under sterile technique using a 22g x 1.5 needle from the Superolateral  aspect of the left Knee Joint in the supine position.      Cheryl Abreu had no adverse reactions to the medication. Pain decreased. She was instructed to apply ice to the joint for 20 minutes and avoid strenuous activities for 24-36 hours following the injection. She was warned of possible blood sugar and/or blood pressure changes during that time. Following that time, she can resume regular activities.    She was reminded to call the clinic immediately for any adverse side effects as explained in clinic today.    RTC to see Antony Loera PA-C in 3 months for follow-up.    All of the patient's questions were answered and the patient will contact us if they have any questions or concerns in the interim.

## 2019-10-21 ENCOUNTER — DOCUMENTATION ONLY (OUTPATIENT)
Dept: REHABILITATION | Facility: HOSPITAL | Age: 74
End: 2019-10-21
Attending: INTERNAL MEDICINE
Payer: MEDICARE

## 2019-10-21 NOTE — PROGRESS NOTES
Subjective:      Patient ID: Cheryl Ramirez is a 74 y.o. female.    Chief Complaint: Low-back Pain    Referred by: No ref. provider found     Ms ramirez is a 75 yo female here for follow up of back pain.  She has had back pain off and on for the past 15 years.  Then 5 years ago she was working out and doing hamstring exercises and had left leg pain after a pop.  She tore her hamstring off her ischial tuberosity.  She feels like the back pain has been worse since then and could not get out of bed for 3 weeks and she had steroid injections which helped.  She was last seen by me on 6/13/2019 and she felt like she was having trouble with endurance and was not doing her HEP as often.  We discussed doing it more often.  She feels like she is doing better.  She does not have endurance.  She has been exercising more, but not daily.  She has been able to increase her exercise.  She feels much better after PT.  She is still going for wellness.  She feels like she lost endurance quickly.  She has arthritis all over.  Her feet hurt, she use to be able to walk on treadmill.  The pain is when she starts walking and the more she is up.  She feels like she gets to a point where hard to keep going.  She has a hard time walking in airports.  She is going to [employer] for [employer].      MRI lumbar 5/2017 outside report  L1-2 moderate broad based disc bulge and left greater than right facet arthropathy  L2-3 DDD with left greater than right moderate facet arthopathy with NF narrowing  L3-4 broad based posterior disc bulge with left paracentral disc protrusion and moderate facet arthropathy with moderate central stenosis and moderate left NF narrowing  L4-5 disc dessication and moderate facet arthropathy with moderate right formainal narrowing  L5-S1 disc dessication and severe facet arthropathy and moderate central stenosis and moderate left NF narrowing    X-ray lumbar 6/22/2018  There is an S shaped scoliosis which is moderate.   The vertebral bodies are normal in height.  There is disc space narrowing throughout and mild to moderate osteophytic spurring.  Overall moderate facet degenerative changes are present in the mid to lower spine.  There is no significant alignment abnormality or change in alignment with flexion and extension.    Incidental note is made of a small vascular stent overlying the spine likely relating to the left renal artery.  Vascular calcification present along the wall of the aorta.    Impression      Scoliosis and moderate degenerative changes      X-ray hips 6/22/2018  There is degenerative change within the lower spine.  The hips demonstrate no significant degenerative change.  There is no evidence of fracture.    Impression      As above      Past Medical History:  No date: Diabetes mellitus, type 2  No date: High blood pressure  No date: Scoliosis    History reviewed. No pertinent surgical history.    History reviewed.  No pertinent family history.      Social History    Marital status:              Spouse name:                       Years of education:                 Number of children:               Social History Main Topics    Smoking status: Former Smoker                                                                Packs/day: 0.00      Years: 0.00        Smokeless tobacco: Never Used                          Current Outpatient Prescriptions:  ACCU-CHEK SMARTVIEW TEST STRIP Strp, , Disp: , Rfl:   alendronate (FOSAMAX) 70 MG tablet, , Disp: , Rfl:   ALPRAZolam (XANAX) 0.5 MG tablet, Take 0.5 mg by mouth 3 (three) times daily., Disp: , Rfl:   amLODIPine (NORVASC) 10 MG tablet, , Disp: , Rfl:   aspirin (ECOTRIN) 81 MG EC tablet, Take 81 mg by mouth once daily., Disp: , Rfl:   buPROPion (WELLBUTRIN SR) 150 MG TBSR 12 hr tablet, , Disp: , Rfl:   BYSTOLIC 5 mg Tab, , Disp: , Rfl:   clopidogrel (PLAVIX) 75 mg tablet, , Disp: , Rfl:   fish oil-omega-3 fatty acids 300-1,000 mg capsule, Take by mouth once  daily., Disp: , Rfl:   insulin lispro (HUMALOG) 100 unit/mL injection, Inject into the skin 3 (three) times daily before meals., Disp: , Rfl:   JUBLIA 10 % Marilyn, APPLY ONE DROP TO ALL SMALLER TOES AND 2 DROPS TO GREATER TOES DAILY, Disp: , Rfl: 10  levothyroxine (SYNTHROID) 50 MCG tablet, Take 50 mcg by mouth once daily., Disp: , Rfl:   losartan (COZAAR) 50 MG tablet, , Disp: , Rfl:   MULTIVIT-IRON-MIN-FOLIC ACID 3,500-18-0.4 UNIT-MG-MG ORAL CHEW, Take by mouth., Disp: , Rfl:   OZEMPIC 1 mg/0.75 mL (2 mg/1.5 mL) PnIj, INJECT 1MG EVERY WEEK, Disp: , Rfl: 5  pantoprazole (PROTONIX) 40 MG tablet, Take 40 mg by mouth once daily., Disp: , Rfl:   risedronate (ACTONEL) 150 MG Tab, , Disp: , Rfl:   rosuvastatin (CRESTOR) 10 MG tablet, , Disp: , Rfl:     No current facility-administered medications for this visit.       Review of patient's allergies indicates:   -- Pcn (penicillins)               Review of Systems   Constitution: Negative for weight gain and weight loss.   Cardiovascular: Negative for chest pain and dyspnea on exertion.   Respiratory: Negative for shortness of breath.    Musculoskeletal: Positive for back pain (right back). Negative for joint pain and joint swelling.   Gastrointestinal: Negative for abdominal pain and bowel incontinence.   Genitourinary: Negative for bladder incontinence.   Neurological: Negative for numbness.           Objective:          General    Vitals reviewed.  Constitutional: She is oriented to person, place, and time. She appears well-developed and well-nourished.   HENT:   Head: Normocephalic and atraumatic.   Pulmonary/Chest: Effort normal.   Neurological: She is alert and oriented to person, place, and time.   Psychiatric: She has a normal mood and affect. Her behavior is normal. Judgment and thought content normal.     General Musculoskeletal Exam   Gait: abnormal (lack of arm swing)     Back (L-Spine & T-Spine) / Neck (C-Spine) Exam     Back (L-Spine & T-Spine) Range of Motion    Extension: 20   Flexion: 90   Lateral bend right: 20   Lateral bend left: 20   Rotation right: 40   Rotation left: 40     Spinal Sensation   Right Side Sensation  C-Spine Level: normal   L-Spine Level: normal  S-Spine Level: normal  Left Side Sensation  C-Spine Level: normal  L-Spine Level: normal  S-Spine Level: normal    Other She has no scoliosis .  Spinal Kyphosis:  Absent    Comments:  Neg RICA        Muscle Strength   Right Upper Extremity   Biceps: 5/5/5   Deltoid:  5/5  Triceps:  5/5  Wrist extension: 5/5/5   Finger Flexors:  5/5  Left Upper Extremity  Biceps: 5/5/5   Deltoid:  5/5  Triceps:  5/5  Wrist extension: 5/5/5   Finger Flexors:  5/5  Right Lower Extremity   Hip Flexion: 5/5   Quadriceps:  5/5   Anterior tibial:  5/5/5  EHL:  5/5  Left Lower Extremity   Hip Flexion: 5/5   Quadriceps:  5/5   Anterior tibial:  5/5/5   EHL:  5/5    Reflexes     Left Side  Biceps:  2+  Triceps:  2+  Brachioradialis:  2+  Quadriceps:  2+  Achilles:  2+  Left Steele's Sign:  Absent  Babinski Sign:  absent    Right Side   Biceps:  2+  Triceps:  2+  Brachioradialis:  2+  Quadriceps:  2+  Achilles:  2+  Right Steele's Sign:  absent  Babinski Sign:  absent    Vascular Exam     Right Pulses        Carotid:                  2+    Left Pulses        Carotid:                  2+        Assessment:       Encounter Diagnoses   Name Primary?    Chronic bilateral low back pain with right-sided sciatica Yes    Spondylosis of lumbar region without myelopathy or radiculopathy     Spinal stenosis of lumbar region without neurogenic claudication          Plan:       Cheryl was seen today for low-back pain.    Diagnoses and all orders for this visit:    Chronic bilateral low back pain with right-sided sciatica    Spondylosis of lumbar region without myelopathy or radiculopathy    Spinal stenosis of lumbar region without neurogenic claudication           1.  Continue PT HEP but needs to do it more than once a week.  She finds it  helpful when she does it, she feels like healthy back at [facility] was really helpful.  She is continuing wellness.    2.  Tizanidine 2-4 TID, taking 1 at night  3. She is going to look at gyms.  We discussed getting in pool and continuing to work on exercise and endurance.  She is almost done with wellness and is looking at gyms to continue strength training  4.  Discuss gait with PCP, she does have lack of arm swing and masked expression and feeling like cannot advance the legs.  She also complains of a stiffness.  I mentioned parkinsons to her.    5.  MRI report of the lumbar spine was reviewed.  She does have facet arthropathy and spinal stenosis.  She does have some back pain that gets better with sitting.  She does not want to try injections  6.  She feels like walking is her issue and stiffness,  She will work on increasing her endurance. She feels like she has increased the distance.  She does not do it daily.   She feels like she has to stop and rest and then she can keep going.  She does not have to sit. We did discuss using a cane for distance   7.  Continue tylenol as needed  8. We discussed CV recommendations for exercise are 150 min a week.  We did discuss she can do it in 10 minute increments    9.  She was given gabapentin for her neuropathy but does not want to take it due to side effects  10.  RTC 4 months, or sooner if needed

## 2019-10-21 NOTE — PROGRESS NOTES
Health  Wellness Visit Note    Name: Cheryl Abreu  Clinic Number: [id number]  Physician: Brisa Boyle, *  Diagnosis: No diagnosis found.  Past Medical History:   Diagnosis Date    Arthritis     Diabetes mellitus, type 2     Hearing loss     High blood pressure     Hyperlipidemia     Osteoporosis     Scoliosis     Thyroid disease      Oct 21, 2019    Visit Number: 49  Precautions: Standard  1st pt visit 11/8/18  Year of care 11/8/2019  6 Month:  May  12 Month:  Time In: 4:00 pm  PM  Time Out: 5:00   PM  Total Treatment Time: 45 Minutes    Subjective:   Cheryl is feeling good.   She is functioning faye and managing household chores well.    Objective:   Cheryl completed therapeutic stretches (DKTC,EIS LTR,pelvic tilts and supine hamstring stretch that is piriformis stretch, ext in lie for back, and bridges) and the following MedX exercise machines: core lumbar 68 ft lbs, 20 reps, exertion 4)    torso rotation l/r, leg extension, leg curl, upright row, chest press, biceps curl, triceps extension, leg press, hip abd, hip add    See exercise log in patient folder for rate of exertion and repetitions completed.       Fitness Machine Education Key:  E-education on equipment initiated and further follow up and education needed  I-independent with  and exercise      Lumbar/ Ext. i Torso Rotation i Leg Press i   Leg Extension i Seated Leg Curl i Chest Press i   Seated Row i Hip ADD i Hip ABD i   Triceps Extension i Bicep Curl i Other:          Assessment:   Patient tolerated all med-X machines without an increase in pain.    Plan:  Continue with established plan of care towards wellness goals.  Continue to teach patient HEP  For weights for home peripheral strengthening.  She is indep with setting up machines in wellness      : Yue Michael, PT by physical therapist  10/21/2019

## 2019-10-22 ENCOUNTER — OFFICE VISIT (OUTPATIENT)
Dept: SPINE | Facility: CLINIC | Age: 74
End: 2019-10-22
Attending: PHYSICAL MEDICINE & REHABILITATION
Payer: MEDICARE

## 2019-10-22 VITALS
HEIGHT: 64 IN | TEMPERATURE: 98 F | WEIGHT: 153.25 LBS | BODY MASS INDEX: 26.16 KG/M2 | SYSTOLIC BLOOD PRESSURE: 106 MMHG | DIASTOLIC BLOOD PRESSURE: 62 MMHG | HEART RATE: 79 BPM

## 2019-10-22 DIAGNOSIS — G89.29 CHRONIC BILATERAL LOW BACK PAIN WITH RIGHT-SIDED SCIATICA: Primary | ICD-10-CM

## 2019-10-22 DIAGNOSIS — M47.816 SPONDYLOSIS OF LUMBAR REGION WITHOUT MYELOPATHY OR RADICULOPATHY: ICD-10-CM

## 2019-10-22 DIAGNOSIS — M54.41 CHRONIC BILATERAL LOW BACK PAIN WITH RIGHT-SIDED SCIATICA: Primary | ICD-10-CM

## 2019-10-22 DIAGNOSIS — M48.061 SPINAL STENOSIS OF LUMBAR REGION WITHOUT NEUROGENIC CLAUDICATION: ICD-10-CM

## 2019-10-22 PROCEDURE — 99999 PR PBB SHADOW E&M-EST. PATIENT-LVL III: CPT | Mod: PBBFAC,,, | Performed by: PHYSICAL MEDICINE & REHABILITATION

## 2019-10-22 PROCEDURE — 99999 PR PBB SHADOW E&M-EST. PATIENT-LVL III: ICD-10-PCS | Mod: PBBFAC,,, | Performed by: PHYSICAL MEDICINE & REHABILITATION

## 2019-10-22 PROCEDURE — 1101F PT FALLS ASSESS-DOCD LE1/YR: CPT | Mod: CPTII,S$GLB,, | Performed by: PHYSICAL MEDICINE & REHABILITATION

## 2019-10-22 PROCEDURE — 99214 OFFICE O/P EST MOD 30 MIN: CPT | Mod: S$GLB,,, | Performed by: PHYSICAL MEDICINE & REHABILITATION

## 2019-10-22 PROCEDURE — 1101F PR PT FALLS ASSESS DOC 0-1 FALLS W/OUT INJ PAST YR: ICD-10-PCS | Mod: CPTII,S$GLB,, | Performed by: PHYSICAL MEDICINE & REHABILITATION

## 2019-10-22 PROCEDURE — 99214 PR OFFICE/OUTPT VISIT, EST, LEVL IV, 30-39 MIN: ICD-10-PCS | Mod: S$GLB,,, | Performed by: PHYSICAL MEDICINE & REHABILITATION

## 2019-10-28 ENCOUNTER — DOCUMENTATION ONLY (OUTPATIENT)
Dept: REHABILITATION | Facility: HOSPITAL | Age: 74
End: 2019-10-28
Attending: INTERNAL MEDICINE
Payer: MEDICARE

## 2019-10-28 NOTE — PROGRESS NOTES
Health  Wellness Visit Note    Name: Cheryl Abreu  Clinic Number: [id number]  Physician: Brisa Boyle, *  Diagnosis: No diagnosis found.  Past Medical History:   Diagnosis Date    Arthritis     Diabetes mellitus, type 2     Hearing loss     High blood pressure     Hyperlipidemia     Osteoporosis     Scoliosis     Thyroid disease      Oct 28, 2019    Visit Number: 50  Precautions: Standard  1st pt visit 11/8/18  Year of care 11/8/2019  6 Month:  May  12 Month:  Time In: 2:30  PM  Time Out: 3:45 PM  Total Treatment Time: 45 Minutes    Subjective:   Cheryl is feeling good.   She is functioning faye and managing household chores well.    Objective:   Cheryl completed therapeutic stretches (DKTC,EIS LTR,pelvic tilts and supine hamstring stretch that is piriformis stretch, ext in lie for back, and bridges) and the following MedX exercise machines: core lumbar 68 ft lbs, 20 reps, exertion 4)    torso rotation l/r, leg extension, leg curl, upright row, chest press, biceps curl, triceps extension, leg press, hip abd, hip add    See exercise log in patient folder for rate of exertion and repetitions completed.       Fitness Machine Education Key:  E-education on equipment initiated and further follow up and education needed  I-independent with  and exercise      Lumbar/ Ext. i Torso Rotation i Leg Press i   Leg Extension i Seated Leg Curl i Chest Press i   Seated Row i Hip ADD i Hip ABD i   Triceps Extension i Bicep Curl i Other:          Assessment:   Patient tolerated all med-X machines without an increase in pain.    Plan:  Continue with established plan of care towards wellness goals.  Continue to teach patient HEP  For weights for home peripheral strengthening.  She is indep with setting up machines in wellness      : Yue Michael, PT by physical therapist  10/28/2019

## 2019-11-11 ENCOUNTER — DOCUMENTATION ONLY (OUTPATIENT)
Dept: REHABILITATION | Facility: HOSPITAL | Age: 74
End: 2019-11-11
Attending: INTERNAL MEDICINE
Payer: MEDICARE

## 2019-11-11 NOTE — PROGRESS NOTES
Health  Wellness Visit Note    Name: Cheryl Abreu  Clinic Number: [id number]  Physician: Brisa Boyle, *  Diagnosis: No diagnosis found.  Past Medical History:   Diagnosis Date    Arthritis     Diabetes mellitus, type 2     Hearing loss     High blood pressure     Hyperlipidemia     Osteoporosis     Scoliosis     Thyroid disease      Oct 28, 2019    Visit Number: 51  Precautions: Standard  1st pt visit 11/8/18  Year of care 11/8/2019  6 Month:  May 2019  12 Month: November 2019  Time In: 2:54  PM  Time Out: 4:00 PM  Total Treatment Time: 66 Minutes    Subjective:   Cheryl is reports that her lower back pain is at 1/10 today; she feels stiffness.  Pt is stretching a few times per week but not icing at all.  She has been walking around the block with her  for 15-20 minutes, every other day.  She did great setting up machines and reading her weight card today.  Pt will be joining a gym in the near future.  She has a couple of more visits left.      Objective:   Cheryl completed therapeutic stretches (DKTC,EIS LTR,pelvic tilts and supine hamstring stretch that is piriformis stretch, ext in lie for back, and bridges) and the following MedX exercise machines: core lumbar 68 ft lbs, 20 reps, exertion 4)    torso rotation l/r, leg extension, leg curl, upright row, chest press, biceps curl, triceps extension, leg press, hip abd, hip add    See exercise log in patient folder for rate of exertion and repetitions completed.       Fitness Machine Education Key:  E-education on equipment initiated and further follow up and education needed  I-independent with  and exercise      Lumbar/ Ext. i Torso Rotation i Leg Press i   Leg Extension i Seated Leg Curl i Chest Press i   Seated Row i Hip ADD i Hip ABD i   Triceps Extension i Bicep Curl i Other:          Assessment:   Patient tolerated all med-X machines without an increase in pain.    Plan:  Continue with established plan of care  towards wellness goals.  Continue to teach patient HEP  For weights for home peripheral strengthening.  She is indep with setting up machines in wellness      : Gilma Nice by physical therapist  11/11/2019

## 2019-11-21 ENCOUNTER — DOCUMENTATION ONLY (OUTPATIENT)
Dept: REHABILITATION | Facility: HOSPITAL | Age: 74
End: 2019-11-21
Attending: INTERNAL MEDICINE
Payer: MEDICARE

## 2019-11-21 NOTE — PROGRESS NOTES
Health  Wellness Visit Note -discharge note    Name: Cheryl Abreu  Clinic Number: [id number]  Physician: Brisa Boyle, *  Diagnosis: No diagnosis found.  Past Medical History:   Diagnosis Date    Arthritis     Diabetes mellitus, type 2     Hearing loss     High blood pressure     Hyperlipidemia     Osteoporosis     Scoliosis     Thyroid disease      Nov 21, 2019      Visit Number: 52  Precautions: Standard  1st pt visit 11/8/18  Year of care 11/8/2019  6 Month:  May 2019  12 Month: November 2019  Time In: 10:00 am  Time Out: 11:20 am  Total Treatment Time: 66 Minutes    Subjective:   Cheryl is reports that her lower back pain is at 1/10 today; she feels stiffness.  Over all her stiffness is much better since attending here.   She was reminded to stretch 2/day, like brushing teeth, to prevent or control stiffness better.    Pt is stretching a few times per week but not icing at all.  She has been walking around the block with her  for 15-20 minutes, every other day.  She did great setting up machines and reading her weight card today.     She is still looking at joining a gym.  She was given her weight card.  DIscussed importance of joining gym and making schedule.      Objective:   Cheryl completed therapeutic stretches (DKTC,EIS LTR,pelvic tilts and supine hamstring stretch that is piriformis stretch, ext in lie for back, and bridges) and the following MedX exercise machines: core lumbar 68 ft lbs, 20 reps, exertion 4)    torso rotation l/r, leg extension, leg curl, upright row, chest press, biceps curl, triceps extension, leg press, hip abd, hip add    See exercise log in patient folder for rate of exertion and repetitions completed.       Fitness Machine Education Key:  E-education on equipment initiated and further follow up and education needed  I-independent with  and exercise      Lumbar/ Ext. i Torso Rotation i Leg Press i   Leg Extension i Seated Leg Curl i  Chest Press i   Seated Row i Hip ADD i Hip ABD i   Triceps Extension i Bicep Curl i Other:          Assessment:   Patient tolerated all med-X machines without an increase in pain.    Plan:  Patient has been taught stretches and encouraged to do 2/day.  She has been taught strengthening both at home and has HEP and pictures of weights, and she has been taught how to set up the machines, and has her weight card.  She has discharge instructions and is ready to continue wellness indep.    Discharge from wellness  Yue Michael, PT, 11/21/19, health care note

## 2019-12-18 DIAGNOSIS — M62.838 MUSCLE SPASM: ICD-10-CM

## 2019-12-18 RX ORDER — TIZANIDINE 2 MG/1
2-4 TABLET ORAL EVERY 8 HOURS PRN
Qty: 180 TABLET | Refills: 2 | Status: SHIPPED | OUTPATIENT
Start: 2019-12-18 | End: 2020-04-06

## 2020-01-15 ENCOUNTER — OFFICE VISIT (OUTPATIENT)
Dept: SPORTS MEDICINE | Facility: CLINIC | Age: 75
End: 2020-01-15
Payer: MEDICARE

## 2020-01-15 VITALS
HEIGHT: 64 IN | SYSTOLIC BLOOD PRESSURE: 136 MMHG | WEIGHT: 153 LBS | HEART RATE: 87 BPM | BODY MASS INDEX: 26.12 KG/M2 | DIASTOLIC BLOOD PRESSURE: 69 MMHG

## 2020-01-15 DIAGNOSIS — M17.12 PRIMARY OSTEOARTHRITIS OF LEFT KNEE: ICD-10-CM

## 2020-01-15 DIAGNOSIS — M62.81 MUSCLE WEAKNESS: ICD-10-CM

## 2020-01-15 DIAGNOSIS — M25.562 LEFT KNEE PAIN, UNSPECIFIED CHRONICITY: ICD-10-CM

## 2020-01-15 DIAGNOSIS — M17.0 PRIMARY OSTEOARTHRITIS OF BOTH KNEES: Primary | ICD-10-CM

## 2020-01-15 PROCEDURE — 1101F PT FALLS ASSESS-DOCD LE1/YR: CPT | Mod: CPTII,S$GLB,, | Performed by: PHYSICIAN ASSISTANT

## 2020-01-15 PROCEDURE — 99999 PR PBB SHADOW E&M-EST. PATIENT-LVL IV: ICD-10-PCS | Mod: PBBFAC,,, | Performed by: PHYSICIAN ASSISTANT

## 2020-01-15 PROCEDURE — 1126F PR PAIN SEVERITY QUANTIFIED, NO PAIN PRESENT: ICD-10-PCS | Mod: S$GLB,,, | Performed by: PHYSICIAN ASSISTANT

## 2020-01-15 PROCEDURE — 99213 PR OFFICE/OUTPT VISIT, EST, LEVL III, 20-29 MIN: ICD-10-PCS | Mod: S$GLB,,, | Performed by: PHYSICIAN ASSISTANT

## 2020-01-15 PROCEDURE — 99213 OFFICE O/P EST LOW 20 MIN: CPT | Mod: S$GLB,,, | Performed by: PHYSICIAN ASSISTANT

## 2020-01-15 PROCEDURE — 1159F PR MEDICATION LIST DOCUMENTED IN MEDICAL RECORD: ICD-10-PCS | Mod: S$GLB,,, | Performed by: PHYSICIAN ASSISTANT

## 2020-01-15 PROCEDURE — 1101F PR PT FALLS ASSESS DOC 0-1 FALLS W/OUT INJ PAST YR: ICD-10-PCS | Mod: CPTII,S$GLB,, | Performed by: PHYSICIAN ASSISTANT

## 2020-01-15 PROCEDURE — 1126F AMNT PAIN NOTED NONE PRSNT: CPT | Mod: S$GLB,,, | Performed by: PHYSICIAN ASSISTANT

## 2020-01-15 PROCEDURE — 1159F MED LIST DOCD IN RCRD: CPT | Mod: S$GLB,,, | Performed by: PHYSICIAN ASSISTANT

## 2020-01-15 PROCEDURE — 99999 PR PBB SHADOW E&M-EST. PATIENT-LVL IV: CPT | Mod: PBBFAC,,, | Performed by: PHYSICIAN ASSISTANT

## 2020-01-15 NOTE — PROGRESS NOTES
CC:  Bilateral knee pain (L>R)    Interval Hx:  Patient presents 3 months s/p left knee Euflexxa series injections. Significant relief with injections, and would like to continue with current treatment plan. She has also noticed pain in the right knee as well with similar symptoms and also behind the patella. Denies any recent trauma to the area.    Previous HPI: 74 y.o. Female who returns today for follow up. She has had previous issues with the left knee and had arthroscopic surgery in 2011. Diagnosis L knee osteoarthritis. Has not received her medial  yet. Complaint today is continued L knee pain, worse with prolonged weight-bearing and walking - located behind the patella as well as on the medial aspect of the knee. Occasional swelling episodes. She cannot take NSAIDs due to heart issues. She denies mechanical symptoms or feelings of instability. Here today for the previously discussed steroid injection. She is leaving town for a trip soon.    PMHx notable for HTN, T2DM, osteoporosis, degenerative lumbar scoliosis.   Negative for tobacco.   Positive for diabetes. Last A1C not in Epic and pt unsure.    Pain Score: 0-No pain    REVIEW OF SYSTEMS:   Constitution: Negative. Negative for chills, fever and night sweats.    Hematologic/Lymphatic: Negative for bleeding problem. Does not bruise/bleed easily.   Skin: Negative for dry skin, itching and rash.   Musculoskeletal: Negative for falls. Positive for bilateral knee pain and  muscle weakness.     All other review of symptoms were reviewed and found to be noncontributory.     PAST MEDICAL HISTORY:   Past Medical History:   Diagnosis Date    Arthritis     Diabetes mellitus, type 2     Hearing loss     High blood pressure     Hyperlipidemia     Osteoporosis     Scoliosis     Thyroid disease        PAST SURGICAL HISTORY:   Past Surgical History:   Procedure Laterality Date    arthroscopic left knee Left 2012    EYE SURGERY Bilateral     cataract     OOPHORECTOMY Right     RENAL ARTERY STENT Left     WISDOM TOOTH EXTRACTION         FAMILY HISTORY:   Family History   Problem Relation Age of Onset    Inflammatory bowel disease Neg Hx     Kidney disease Neg Hx     Osteoarthritis Neg Hx     Rheum arthritis Neg Hx     Psoriasis Neg Hx     Thyroid disease Neg Hx        SOCIAL HISTORY:   Social History     Socioeconomic History    Marital status:      Spouse name: Not on file    Number of children: Not on file    Years of education: Not on file    Highest education level: Not on file   Occupational History    Not on file   Social Needs    Financial resource strain: Not on file    Food insecurity:     Worry: Not on file     Inability: Not on file    Transportation needs:     Medical: Not on file     Non-medical: Not on file   Tobacco Use    Smoking status: Former Smoker     Packs/day: 2.00     Years: 25.00     Pack years: 50.00     Last attempt to quit: 3/7/2001     Years since quittin.8    Smokeless tobacco: Never Used    Tobacco comment:  3 children   Substance and Sexual Activity    Alcohol use: Yes     Comment: 1-2 drinks a week    Drug use: No    Sexual activity: Not on file   Lifestyle    Physical activity:     Days per week: Not on file     Minutes per session: Not on file    Stress: Not on file   Relationships    Social connections:     Talks on phone: Not on file     Gets together: Not on file     Attends Sikh service: Not on file     Active member of club or organization: Not on file     Attends meetings of clubs or organizations: Not on file     Relationship status: Not on file   Other Topics Concern    Not on file   Social History Narrative    Not on file       MEDICATIONS:     Current Outpatient Medications:     ACCU-CHEK SMARTVIEW TEST STRIP Strp, , Disp: , Rfl:     alendronate (FOSAMAX) 70 MG tablet, Take 70 mg by mouth every 7 days. , Disp: , Rfl:     ALPRAZolam (XANAX) 0.5 MG tablet, Take 0.5 mg  "by mouth 3 (three) times daily., Disp: , Rfl:     amLODIPine (NORVASC) 10 MG tablet, Take 10 mg by mouth once daily. , Disp: , Rfl:     aspirin (ECOTRIN) 81 MG EC tablet, Take 81 mg by mouth once daily., Disp: , Rfl:     biotin 2,500 mcg Cap, Take 1 capsule by mouth once daily. , Disp: , Rfl:     buPROPion (WELLBUTRIN SR) 150 MG TBSR 12 hr tablet, Take 150 mg by mouth 2 (two) times daily. , Disp: , Rfl:     BYSTOLIC 5 mg Tab, Take 5 mg by mouth once daily. , Disp: , Rfl:     clopidogrel (PLAVIX) 75 mg tablet, Take 75 mg by mouth once daily. , Disp: , Rfl:     fish oil-omega-3 fatty acids 300-1,000 mg capsule, Take by mouth once daily., Disp: , Rfl:     fluticasone (FLONASE) 50 mcg/actuation nasal spray, 1 spray by Each Nare route once daily., Disp: , Rfl:     insulin lispro (HUMALOG) 100 unit/mL injection, Inject into the skin 3 (three) times daily before meals., Disp: , Rfl:     levothyroxine (SYNTHROID) 50 MCG tablet, Take 50 mcg by mouth once daily., Disp: , Rfl:     liraglutide 0.6 mg/0.1 mL, 18 mg/3 mL, subq PNIJ (VICTOZA 2-VITO) 0.6 mg/0.1 mL (18 mg/3 mL) PnIj, Inject 1.8 mg into the skin once daily. , Disp: , Rfl:     losartan (COZAAR) 50 MG tablet, Take 50 mg by mouth 2 (two) times daily. , Disp: , Rfl:     MULTIVIT-IRON-MIN-FOLIC ACID 3,500-18-0.4 UNIT-MG-MG ORAL CHEW, Take by mouth., Disp: , Rfl:     rosuvastatin (CRESTOR) 10 MG tablet, Take 10 mg by mouth every evening. , Disp: , Rfl:     sodium chloride (OCEAN) 0.65 % nasal spray, 1 spray by Nasal route as needed for Congestion., Disp: , Rfl:     tiZANidine (ZANAFLEX) 2 MG tablet, TAKE 1-2 TABLETS (2-4 MG TOTAL) BY MOUTH EVERY 8 (EIGHT) HOURS AS NEEDED., Disp: 180 tablet, Rfl: 2    ALLERGIES:   Review of patient's allergies indicates:   Allergen Reactions    Nickel Itching and Other (See Comments)    Pcn [penicillins] Rash        PHYSICAL EXAMINATION:  /69   Pulse 87   Ht 5' 4" (1.626 m)   Wt 69.4 kg (153 lb)   BMI 26.26 kg/m² "   General: Well-developed well-nourished 74 y.o. femalein no acute distress   Cardiovascular: Regular rhythm by palpation of distal pulse, normal color and temperature, no concerning varicosities on symptomatic side   Lungs: No labored breathing or wheezing appreciated   Neuro: Alert and oriented ×3   Psychiatric: well oriented to person, place and time, demonstrates normal mood and affect   Skin: No rashes, lesions or ulcers, normal temperature, turgor, and texture on involved extremity    Ortho/SPM Exam  Examination of the left knee again demonstrates slight varus alignment. Mild effusion. +TTP medial joint line and inferior pole of patella. Active range of motion 0-130 with increased pain at terminal flexion. Mild crepitance through range of motion.  Positive patellar grind.  Stable to varus and valgus stress. Negative Lachman.  Weak quadriceps.    Examination of the right knee again demonstrates normal alignment. No effusion. +TTP medial joint line and inferior pole of patella. Active range of motion 0-130 with increased pain at terminal flexion. Mild crepitance through range of motion.  Positive patellar grind.  Stable to varus and valgus stress. Negative Lachman.  Weak quadriceps.    Radiographic Findings 01/15/2020:    Right: No fracture dislocation bone destruction or OCD seen.  There is mild DJD.    Left: There is moderate DJD and a varus deformity.  No fracture dislocation bone destruction or OCD seen.    Xrays of the knees were ordered and reviewed by me today. These findings were discussed and reviewed with the patient.      ASSESSMENT:      ICD-10-CM ICD-9-CM   1. Primary osteoarthritis of both knees M17.0 715.16   2. Primary osteoarthritis of left knee M17.12 715.16   3. Left knee pain, unspecified chronicity M25.562 719.46   4. Muscle weakness M62.81 728.87       PLAN:     We have discussed a variety of treatment options including medications, injections, physical therapy and other alternative  treatments. I also explained the indications, risks and benefits of surgery. Pt would like to proceed with visco-supplementation at this time.    I made the decision to obtain old records of the patient including previous notes and imaging. I independently reviewed and interpreted lab results today as well as prior imaging.      1. Pre-authorization placed for bilateral knee Euflexxa series injections.  2. Ice compress to the affected area 2-3x a day for 15-20 minutes as needed for pain management.  3. OTC Tylenol as needed.  4. Ambulatory referral to physical therapy for patellofemoral strength and conditioning protocol.  5. RTC to see Antony Loera PA-C in 3 months for bilateral knee Euflexxa series injections #1 of 3.    All of the patient's questions were answered and the patient will contact us if they have any questions or concerns in the interim.          Procedures

## 2020-01-16 ENCOUNTER — CLINICAL SUPPORT (OUTPATIENT)
Dept: REHABILITATION | Facility: HOSPITAL | Age: 75
End: 2020-01-16
Attending: INTERNAL MEDICINE
Payer: MEDICARE

## 2020-01-16 DIAGNOSIS — R26.9 GAIT ABNORMALITY: ICD-10-CM

## 2020-01-16 DIAGNOSIS — M25.562 CHRONIC PAIN OF LEFT KNEE: ICD-10-CM

## 2020-01-16 DIAGNOSIS — G89.29 CHRONIC PAIN OF LEFT KNEE: ICD-10-CM

## 2020-01-16 DIAGNOSIS — R26.89 DECREASED FUNCTIONAL MOBILITY: ICD-10-CM

## 2020-01-16 DIAGNOSIS — R29.898 DECREASED STRENGTH OF LOWER EXTREMITY: ICD-10-CM

## 2020-01-16 PROCEDURE — 97161 PT EVAL LOW COMPLEX 20 MIN: CPT | Mod: PN

## 2020-01-16 PROCEDURE — 97110 THERAPEUTIC EXERCISES: CPT | Mod: PN

## 2020-01-28 PROBLEM — G89.29 CHRONIC PAIN OF LEFT KNEE: Status: ACTIVE | Noted: 2020-01-28

## 2020-01-28 PROBLEM — R29.898 DECREASED STRENGTH OF LOWER EXTREMITY: Status: ACTIVE | Noted: 2020-01-28

## 2020-01-28 PROBLEM — R26.89 DECREASED FUNCTIONAL MOBILITY: Status: ACTIVE | Noted: 2020-01-28

## 2020-01-28 PROBLEM — M25.562 CHRONIC PAIN OF LEFT KNEE: Status: ACTIVE | Noted: 2020-01-28

## 2020-01-28 PROBLEM — R26.9 GAIT ABNORMALITY: Status: ACTIVE | Noted: 2020-01-28

## 2020-01-28 NOTE — PLAN OF CARE
"  [facility]  Physical Therapy Initial Evaluation    Name: Cheryl Abreu  Clinic Number: [id number]    Therapy Diagnosis:   Encounter Diagnoses   Name Primary?    Decreased strength of lower extremity     Gait abnormality     Decreased functional mobility     Chronic pain of left knee      Physician: Ravindra Loera, *    Physician Orders: PT Eval and Treat     Medical Diagnosis from Referral:   M17.12 (ICD-10-CM) - Primary osteoarthritis of left knee   M25.562 (ICD-10-CM) - Left knee pain, unspecified chronicity   M17.0 (ICD-10-CM) - Primary osteoarthritis of both knees   M62.81 (ICD-10-CM) - Muscle weakness     Evaluation Date: 1/16/2020  Authorization Period Expiration: 1/14/2021  Plan of Care Expiration: 4/16/2020  Visit # / Visits authorized: 1/ 5    Time In: 1110  Time Out: 1155  Total Billable Time: 45 minutes    Precautions: Standard and Diabetes    Subjective   Date of onset: chronic  History of current condition - Cheryl reports: history of chronic knee problems. History of surgery on L knee in 2011 with no resolution of symptoms, no specification of sx details. Pt describes sharp pain in her L knee that increases with going up/down stairs, bending. Pt states she makes all movements  deliberate and slow to avoid pain. Pt reports she got injections in knee and gel shots with a little-moderate relief with gel shots. States that her pain over the past few weeks has been decreasing overall. "I have been still doing things to avoid pain, but I seldom feel it anymore".      Medical History:   Past Medical History:   Diagnosis Date    Arthritis     Diabetes mellitus, type 2     Hearing loss     High blood pressure     Hyperlipidemia     Osteoporosis     Scoliosis     Thyroid disease        Surgical History:   Cheryl Abreu  has a past surgical history that includes Renal artery stent (Left, 2001); arthroscopic left knee (Left, 2012); Oophorectomy " "(Right); Florien tooth extraction; and Eye surgery (Bilateral).    Medications:   Cheryl has a current medication list which includes the following prescription(s): accu-chek smartview test strip, alendronate, alprazolam, amlodipine, aspirin, biotin, bupropion, bystolic, clopidogrel, fish oil-omega-3 fatty acids, fluticasone propionate, insulin lispro, levothyroxine, liraglutide 0.6 mg/0.1 ml (18 mg/3 ml) subq pnij, losartan, multivit-iron-min-folic acid, rosuvastatin, sodium chloride, and tizanidine.    Allergies:   Review of patient's allergies indicates:   Allergen Reactions    Nickel Itching and Other (See Comments)    Pcn [penicillins] Rash        Imaging, X-Ray L knee: 5/2/2019      Prior Therapy: healthy back program and wellness program   Social History: lives with their spouse  Occupation: retired  Prior Level of Function: chronic pain in L knee, increasing over past year  Current Level of Function: limited stamina to complete all activities    Pain:  Current 3/10, worst 10/10, best 3/10   Location: left knee   Description: Aching, Throbbing and Sharp  Aggravating Factors: stairs, bending, twisting  Easing Factors: none reported, some relief with gel shots    Pts goals: "I would like to get some strength back in my legs"    Objective     Posture Alignment: no significant postural deviations noted    Sensation: intact to light touch    GAIT DEVIATIONS: Cheryl displays decreased weightbearing on L LE throughout gait    Range of Motion:   Knee Left active Left Passive   Flexion WFL WFL   Extension WFL WFL     Knee Right active Right Passive   Flexion WFL WFL   Extension WFL WFL       Lower Extremity Strength   Right LE  Left LE    Knee extension: 4+/5 Knee extension: 4+/5   Knee flexion: 3+/5 Knee flexion: 3+/5   Hip flexion: 5/5 Hip flexion: 5/5   Hip extension:  3/5 Hip extension: 3/5   Hip abduction: 3+/5 Hip abduction: 3+/5   Hip adduction: NT Hip adduction NT   Ankle dorsiflexion: 5/5 Ankle dorsiflexion: " 5/5   Ankle plantarflexion: NT Ankle plantarflexion: NT       Special Tests:   Right Left   Valgus Stress Test Negative Negative   Varus Stress test Negative Negative   Lachman's test Negative Negative   Anterior Drawer Negative Negative   Sandro's Test Negative Negative   Fatima's compression test Negative Positive   Patellar Grind Test Negative Negative     Step down test: Negative  Squat: Positive, decreased weightbearing on L LE throughout range of squat    Joint Mobility: decreased patellar mobility noted B to all planes      Palpation: Some increased TTP to distal quadriceps and lateral ITB   Crepitus: noted throughout L knee extension      Flexibility: Decreased flexibility to B hip abductors/ITB, quadriceps        TREATMENT   Treatment Time In: 1145  Treatment Time Out: 1155  Total Treatment time separate from Evaluation: 10 minutes    Cheryl received therapeutic exercises to develop strength and endurance for 10 minutes including:    +SLR 2x10  +Hip Abduction 2x10  +Prone Hip Extension 2x10    Home Exercises and Patient Education Provided    Education provided:   - role of PT, plan of care, goals    Written Home Exercises Provided: yes.  Exercises were reviewed and Cheryl was able to demonstrate them prior to the end of the session.  Cheryl demonstrated good  understanding of the education provided.     See EMR under Patient Instructions for exercises provided 1/16/2020.    Assessment   Cheryl is a 74 y.o. female referred to outpatient Physical Therapy with a medical diagnosis of primary OA and L knee pain. Pt presents with signs and symptoms consistent with medical diagnoses. Pt with significant limitations in strength present in B LE, specifically hip girdle and posterior chain. Pt also with limitations in functional strength as demonstrated by poor form and decreased L LE weightbearing during squat. Pt reporting most difficulty in daily activities with endurance to walking and stair climbing. Pt with  limitations consisting of muscular imbalance of LE, decreased functional strength, increased L knee pain, lack of HEP for LE strengthening, poor tolerance to activities. Pt would benefit from skilled PT services to address listed deficits and decrease pain in L knee during daily activities. Pt is motivated to participate in skilled PT services.    Pt prognosis is Good.   Pt will benefit from skilled outpatient Physical Therapy to address the deficits stated above and in the chart below, provide pt/family education, and to maximize pt's level of independence.     Plan of care discussed with patient: Yes  Pt's spiritual, cultural and educational needs considered and patient is agreeable to the plan of care and goals as stated below:     Anticipated Barriers for therapy: chronicity of knee pain    Medical Necessity is demonstrated by the following  History  Co-morbidities and personal factors that may impact the plan of care Co-morbidities:   diabetes and Osteoporosis    Personal Factors:   no deficits     low   Examination  Body Structures and Functions, activity limitations and participation restrictions that may impact the plan of care Body Regions:   lower extremities    Body Systems:    strength  balance  gait  transfers  transitions  motor control    Participation Restrictions:   Ability to climb stairs without pain    Activity limitations:   Learning and applying knowledge  no deficits    General Tasks and Commands  no deficits    Communication  no deficits    Mobility  lifting and carrying objects  walking    Self care  no deficits    Domestic Life  shopping  cooking  doing house work (cleaning house, washing dishes, laundry)    Interactions/Relationships  no deficits    Life Areas  no deficits    Community and Social Life  no deficits         Complexity: low   Clinical Presentation stable and uncomplicated low   Decision Making/ Complexity Score: low       GOALS: Short Term Goals:  4 weeks  1.Report decreased  in pain at worse less than  <   / =  7/10  to increase tolerance for functional mobility.  3. Increased B LE MMT 1/2 grade to increase tolerance for ADL and work activities.  4. Pt to report ability to climb 1 flight of stairs with pain reported < / = 5/10 in order to improve tolerance to daily activities.  5. Pt to tolerate HEP to improve ROM and independence with ADL's.    Long Term Goals: 8 weeks  1.Report decreased in pain at worse less than  <   / =  2 /10  to increase tolerance for functional mobility.  2.Increased B LE MMT 1 grade to increase tolerance for ADL and work activities.  3. Pt will score < / = 47% disability on LEFS  to demonstrate increase in LE function with every day tasks.   4. Pt to report ability to climb 1 flight of stairs with pain < / = 2/10 in order to improve tolerance to daily activities.   5. Pt to demonstrate ability to complete x 10 squats with proper technique and pain < / = 2/10 in order to improve tolerance to workout activities.   6. Pt to be Independent with HEP to improve ROM and independence with ADL's    Plan   Plan of care Certification: 1/16/2020 to 4/16/2020.    Outpatient Physical Therapy 2 times weekly for 6 weeks to include the following interventions: Gait Training, Manual Therapy, Moist Heat/ Ice, Neuromuscular Re-ed, Patient Education, Self Care, Therapeutic Activites and Therapeutic Exercise.       Dayanna Marquez, PT, DPT   1/16/2020

## 2020-01-28 NOTE — PROGRESS NOTES
"  Physical Therapy Daily Treatment Note     Name: Cheryl Abreu  Clinic Number: [id number]    Therapy Diagnosis:   Encounter Diagnoses   Name Primary?    Decreased strength of lower extremity     Gait abnormality     Decreased functional mobility     Chronic pain of left knee      Physician: Ravindra Loera, *    Visit Date: 1/29/2020    Medical Diagnosis from Referral:   M17.12 (ICD-10-CM) - Primary osteoarthritis of left knee   M25.562 (ICD-10-CM) - Left knee pain, unspecified chronicity   M17.0 (ICD-10-CM) - Primary osteoarthritis of both knees   M62.81 (ICD-10-CM) - Muscle weakness   Evaluation Date: 1/16/2020  Authorization Period Expiration: 1/14/2021  Plan of Care Expiration: 4/16/2020  Visit # / Visits authorized:  2/ 5      Time In: 11:00 am   Time Out: 11:55  Total Billable Time: 45 minutes    Precautions: Standard and Diabetes      Subjective     Pt reports: that she is feeling "ok". She states that the pain in her knee "comes and goes" but that today it is feeling ok. She states that she really would just like to get her knee stronger while she has the gel shot helping with her pain.   She was compliant with home exercise program.  Response to previous treatment: mild soreness  Functional change: none reported at this time     Pain: 6/10  Location: left knee      Objective     Cheryl received therapeutic exercises performed on B LEs (bolded items performed today) to develop strength, endurance, ROM and flexibility for 45 minutes including:    Nustep level 1 for 5 minutes   SLR 2x10  Bridges 2 x 10   SAQ 2 x 10 each leg alternating  Hip adduction squeezes 2 x 10   Clamshells 2 x 10 YTB   Supine HS stretch 3 x 30"   Hip Abduction 2x10  Prone Hip Extension 2x10          Home Exercises Provided and Patient Education Provided     Education provided:   - educated pt to continue with HEP  - educated pt on the importance of therapy to help take pressure off of her knee and improve her pain "     Written Home Exercises Provided: Patient instructed to cont prior HEP.  Exercises were reviewed and Cheryl was able to demonstrate them prior to the end of the session.  Cheryl demonstrated fair  understanding of the education provided.     See EMR under Patient Instructions for exercises provided prior visit.    Assessment     Pt tolerated session well with no complaints. She presents with significant VMO weakness on B knees, but L is weaker than R. Pt tolerated performance of exercise well and had good levels of fatigue at conclusion of the session. Pt will continue to benefit from skilled PT intervention in order to further progress VMO recruitment and B knee stability needed for improved levels of functional mobility and improve QOL.   Cheryl is progressing well towards her goals.   Pt prognosis is Good.     Pt will continue to benefit from skilled outpatient physical therapy to address the deficits listed in the problem list box on initial evaluation, provide pt/family education and to maximize pt's level of independence in the home and community environment.     Pt's spiritual, cultural and educational needs considered and pt agreeable to plan of care and goals.     Anticipated barriers to physical therapy: none     GOALS: Short Term Goals:  4 weeks  1.Report decreased in pain at worse less than  <   / =  7/10  to increase tolerance for functional mobility. -progressing, not met   3. Increased B LE MMT 1/2 grade to increase tolerance for ADL and work activities.   -progressing, not met   4. Pt to report ability to climb 1 flight of stairs with pain reported < / = 5/10 in order to improve tolerance to daily activities.  -progressing, not met   5. Pt to tolerate HEP to improve ROM and independence with ADL's.  -progressing, not met      Long Term Goals: 8 weeks  1.Report decreased in pain at worse less than  <   / =  2 /10  to increase tolerance for functional mobility.  -progressing, not met   2.Increased B LE  MMT 1 grade to increase tolerance for ADL and work activities.  -progressing, not met   3. Pt will score < / = 47% disability on LEFS  to demonstrate increase in LE function with every day tasks.  -progressing, not met   4. Pt to report ability to climb 1 flight of stairs with pain < / = 2/10 in order to improve tolerance to daily activities.   5. Pt to demonstrate ability to complete x 10 squats with proper technique and pain < / = 2/10 in order to improve tolerance to workout activities.   6. Pt to be Independent with HEP to improve ROM and independence with ADL's      Plan   Plan of care Certification: 1/16/2020 to 4/16/2020.     Continue per PT POC, progress exercises as tolerated, progress to standing strengthening as tolerated by patient.     Dee Arellano, PT , DPT  1/29/2020

## 2020-01-29 ENCOUNTER — CLINICAL SUPPORT (OUTPATIENT)
Dept: REHABILITATION | Facility: HOSPITAL | Age: 75
End: 2020-01-29
Attending: INTERNAL MEDICINE
Payer: MEDICARE

## 2020-01-29 DIAGNOSIS — R29.898 DECREASED STRENGTH OF LOWER EXTREMITY: ICD-10-CM

## 2020-01-29 DIAGNOSIS — M25.562 CHRONIC PAIN OF LEFT KNEE: ICD-10-CM

## 2020-01-29 DIAGNOSIS — R26.9 GAIT ABNORMALITY: ICD-10-CM

## 2020-01-29 DIAGNOSIS — R26.89 DECREASED FUNCTIONAL MOBILITY: ICD-10-CM

## 2020-01-29 DIAGNOSIS — G89.29 CHRONIC PAIN OF LEFT KNEE: ICD-10-CM

## 2020-01-29 PROCEDURE — 97110 THERAPEUTIC EXERCISES: CPT | Mod: PN

## 2020-01-30 NOTE — PROGRESS NOTES
"  Physical Therapy Daily Treatment Note     Name: Cheryl Abreu  Clinic Number: [id number]    Therapy Diagnosis:   No diagnosis found.  Physician: Ravindra Loera, *    Visit Date: 1/31/2020    Medical Diagnosis from Referral:   M17.12 (ICD-10-CM) - Primary osteoarthritis of left knee   M25.562 (ICD-10-CM) - Left knee pain, unspecified chronicity   M17.0 (ICD-10-CM) - Primary osteoarthritis of both knees   M62.81 (ICD-10-CM) - Muscle weakness   Evaluation Date: 1/16/2020  Authorization Period Expiration: 1/14/2021  Plan of Care Expiration: 4/16/2020  Visit # / Visits authorized:   3/ 5      Time In: 10:00 am    Time Out: 10:50 am   Total Billable Time: 42 minutes    Precautions: Standard and Diabetes      Subjective     Pt reports: no new complaints. She states that her knees are feeling ok, but that she does have episodes of pain every now and then. Pt reports that she was not sore after her last session, and felt good when she left.   She was compliant with home exercise program.  Response to previous treatment: no adverse reaction; reports that she felt well   Functional change: none reported at this time     Pain: 4/10  Location: left knee      Objective     Cheryl received therapeutic exercises performed on B LEs (bolded items performed today) to develop strength, endurance, ROM and flexibility for 42 minutes including:    Nustep level 1 for 5 minutes   SLR 2x10 #1   Bridges 2 x 10   SAQ with SLR  2 x 10 each #2  Hip adduction squeezes 2 x 10   +supine piriformis stretch 2 x 30" each (modified figure 4 with towel)  +SL SLR 2 x 10 each leg   Clamshells 2 x 10 RTB   Prone Hip Extension 2x10  +shuttle 2 x 10 ; 1 cord   +standing TKE red cord 2 x 10   ASU on 4 inch step 1 x 10 each   Marching on foam 2 x 1'   Calf stretch on slant board 2 x 30"      Cheryl received ice pack to her L plantar fascia and knee for 10 minutes           Home Exercises Provided and Patient Education Provided     Education " provided:   - educated pt to continue with HEP  - educated pt on the importance of therapy to help take pressure off of her knee and improve her pain     Written Home Exercises Provided: Patient instructed to cont prior HEP.  Exercises were reviewed and Cheryl was able to demonstrate them prior to the end of the session.  Cheryl demonstrated fair  understanding of the education provided.     See EMR under Patient Instructions for exercises provided prior visit.    Assessment     Pt tolerated session well with no complaints. She continues to tolerate progression of exercise today with good fatigue noted at conclusion of session. Pt with strength deficits most noted with standing tasks that require quad control like step ups and TKE into band. She will continue to benefit from skilled PT intervention in order to further progress B knee stability and postural control.   Cheryl is progressing well towards her goals.   Pt prognosis is Good.     Pt will continue to benefit from skilled outpatient physical therapy to address the deficits listed in the problem list box on initial evaluation, provide pt/family education and to maximize pt's level of independence in the home and community environment.     Pt's spiritual, cultural and educational needs considered and pt agreeable to plan of care and goals.     Anticipated barriers to physical therapy: none     GOALS: Short Term Goals:  4 weeks  1.Report decreased in pain at worse less than  <   / =  7/10  to increase tolerance for functional mobility. -progressing, not met   3. Increased B LE MMT 1/2 grade to increase tolerance for ADL and work activities.   -progressing, not met   4. Pt to report ability to climb 1 flight of stairs with pain reported < / = 5/10 in order to improve tolerance to daily activities.  -progressing, not met   5. Pt to tolerate HEP to improve ROM and independence with ADL's.  -progressing, not met      Long Term Goals: 8 weeks  1.Report decreased in  pain at worse less than  <   / =  2 /10  to increase tolerance for functional mobility.  -progressing, not met   2.Increased B LE MMT 1 grade to increase tolerance for ADL and work activities.  -progressing, not met   3. Pt will score < / = 47% disability on LEFS  to demonstrate increase in LE function with every day tasks.  -progressing, not met   4. Pt to report ability to climb 1 flight of stairs with pain < / = 2/10 in order to improve tolerance to daily activities.   5. Pt to demonstrate ability to complete x 10 squats with proper technique and pain < / = 2/10 in order to improve tolerance to workout activities.   6. Pt to be Independent with HEP to improve ROM and independence with ADL's      Plan   Plan of care Certification: 1/16/2020 to 4/16/2020.     Continue per PT POC, progress exercises as tolerated, progress to standing strengthening as tolerated by patient.     Dee Arellano, PT , DPT  1/30/2020

## 2020-01-31 ENCOUNTER — CLINICAL SUPPORT (OUTPATIENT)
Dept: REHABILITATION | Facility: HOSPITAL | Age: 75
End: 2020-01-31
Attending: INTERNAL MEDICINE
Payer: MEDICARE

## 2020-01-31 DIAGNOSIS — R26.9 GAIT ABNORMALITY: ICD-10-CM

## 2020-01-31 DIAGNOSIS — R29.898 DECREASED STRENGTH OF LOWER EXTREMITY: ICD-10-CM

## 2020-01-31 DIAGNOSIS — R26.89 DECREASED FUNCTIONAL MOBILITY: ICD-10-CM

## 2020-01-31 DIAGNOSIS — M25.562 CHRONIC PAIN OF LEFT KNEE: ICD-10-CM

## 2020-01-31 DIAGNOSIS — G89.29 CHRONIC PAIN OF LEFT KNEE: ICD-10-CM

## 2020-01-31 PROCEDURE — 97110 THERAPEUTIC EXERCISES: CPT | Mod: PN

## 2020-02-06 NOTE — PROGRESS NOTES
"  Physical Therapy Daily Treatment Note     Name: Cheryl Abreu  Clinic Number: [id number]    Therapy Diagnosis:   No diagnosis found.  Physician: Ravindra Loera, *    Visit Date: 2/7/2020    Medical Diagnosis from Referral:   M17.12 (ICD-10-CM) - Primary osteoarthritis of left knee   M25.562 (ICD-10-CM) - Left knee pain, unspecified chronicity   M17.0 (ICD-10-CM) - Primary osteoarthritis of both knees   M62.81 (ICD-10-CM) - Muscle weakness   Evaluation Date: 1/16/2020   PN Due: 2/16/2020  Authorization Period Expiration: 1/14/2021  Plan of Care Expiration: 4/16/2020  Visit # / Visits authorized:  4/ 12      Time In: 10:00 am    Time Out: 10:57 am   Total treatment time: 57 minutes    Total Billable Time: 57 minutes    Precautions: Standard and Diabetes      Subjective     Pt reports:that she is feeling a lot better overall. She states that she still has occasional episodes of pain in her knees and low back, but states that she feels that her strength has greatly improved since beginning PT.   She was compliant with home exercise program.  Response to previous treatment: no adverse reaction; reports that she felt well   Functional change: none reported at this time     Pain: 2/10  Location: left knee      Objective     Cheryl received therapeutic exercises performed on B LEs (bolded items performed today) to develop strength, endurance, ROM and flexibility for 47 minutes including:    Nustep level 1 for 5 minutes   SLR 2x10 #1   Bridges  x 15   SAQ with SLR  2 x 10 each #2  Hip adduction squeezes 2 x 10   supine piriformis stretch 2 x 30" each (modified figure 4 with towel)  SL SLR 2 x 10 each leg   Clamshells 2 x 10 RTB   Prone Hip Extension 2x10  shuttle 2 x 10 ; 1 cord   standing TKE red cord 2 x 10   ASU on 4 inch step 1 x 10 each   +LSU on 4 inch step x 10 each   +BKFO GTB 2 minutes   +standing alternating hip abduction  Marching on foam 2 x 1'   Calf stretch on slant board 3 x 30"      Cheryl " received ice pack to her L plantar fascia and knee for 10 minutes           Home Exercises Provided and Patient Education Provided     Education provided:   - educated pt to continue with HEP  - educated pt on the importance of therapy to help take pressure off of her knee and improve her pain     Written Home Exercises Provided: Patient instructed to cont prior HEP.  Exercises were reviewed and Cheryl was able to demonstrate them prior to the end of the session.  Cheryl demonstrated fair  understanding of the education provided.     See EMR under Patient Instructions for exercises provided prior visit.    Assessment     Pt tolerated session well with no complaints. She demonstrate improved posturing when ambulating into clinic today with more upright posturing and improved knee extension throughout stance phase of gait. Pt with good tolerance of progressed exercises today with no complaints. Educated pt in continuing with HEP and progressing toward home/gym program. Pt will continue to benefit from skilled PT intervention in order to further progress knee stability and postural control during gait performance.     Cheryl is progressing well towards her goals.   Pt prognosis is Good.     Pt will continue to benefit from skilled outpatient physical therapy to address the deficits listed in the problem list box on initial evaluation, provide pt/family education and to maximize pt's level of independence in the home and community environment.     Pt's spiritual, cultural and educational needs considered and pt agreeable to plan of care and goals.     Anticipated barriers to physical therapy: none     GOALS: Short Term Goals:  4 weeks  1.Report decreased in pain at worse less than  <   / =  7/10  to increase tolerance for functional mobility. -progressing, not met   3. Increased B LE MMT 1/2 grade to increase tolerance for ADL and work activities.   -progressing, not met   4. Pt to report ability to climb 1 flight of  stairs with pain reported < / = 5/10 in order to improve tolerance to daily activities.  -progressing, not met   5. Pt to tolerate HEP to improve ROM and independence with ADL's.  -progressing, not met      Long Term Goals: 8 weeks  1.Report decreased in pain at worse less than  <   / =  2 /10  to increase tolerance for functional mobility.  -progressing, not met   2.Increased B LE MMT 1 grade to increase tolerance for ADL and work activities.  -progressing, not met   3. Pt will score < / = 47% disability on LEFS  to demonstrate increase in LE function with every day tasks.  -progressing, not met   4. Pt to report ability to climb 1 flight of stairs with pain < / = 2/10 in order to improve tolerance to daily activities.   5. Pt to demonstrate ability to complete x 10 squats with proper technique and pain < / = 2/10 in order to improve tolerance to workout activities.   6. Pt to be Independent with HEP to improve ROM and independence with ADL's      Plan   Plan of care Certification: 1/16/2020 to 4/16/2020.     Continue per PT POC, progress exercises as tolerated, progress to standing strengthening as tolerated by patient.     Dee Arellano, PT , DPT  2/6/2020

## 2020-02-07 ENCOUNTER — CLINICAL SUPPORT (OUTPATIENT)
Dept: REHABILITATION | Facility: HOSPITAL | Age: 75
End: 2020-02-07
Attending: INTERNAL MEDICINE
Payer: MEDICARE

## 2020-02-07 DIAGNOSIS — M25.562 CHRONIC PAIN OF LEFT KNEE: ICD-10-CM

## 2020-02-07 DIAGNOSIS — R26.89 DECREASED FUNCTIONAL MOBILITY: ICD-10-CM

## 2020-02-07 DIAGNOSIS — G89.29 CHRONIC PAIN OF LEFT KNEE: ICD-10-CM

## 2020-02-07 DIAGNOSIS — R29.898 DECREASED STRENGTH OF LOWER EXTREMITY: ICD-10-CM

## 2020-02-07 DIAGNOSIS — R26.9 GAIT ABNORMALITY: ICD-10-CM

## 2020-02-07 PROCEDURE — 97110 THERAPEUTIC EXERCISES: CPT | Mod: PN

## 2020-02-10 ENCOUNTER — CLINICAL SUPPORT (OUTPATIENT)
Dept: REHABILITATION | Facility: HOSPITAL | Age: 75
End: 2020-02-10
Attending: INTERNAL MEDICINE
Payer: MEDICARE

## 2020-02-10 DIAGNOSIS — R29.898 DECREASED STRENGTH OF LOWER EXTREMITY: ICD-10-CM

## 2020-02-10 DIAGNOSIS — M25.562 CHRONIC PAIN OF LEFT KNEE: ICD-10-CM

## 2020-02-10 DIAGNOSIS — R26.9 GAIT ABNORMALITY: ICD-10-CM

## 2020-02-10 DIAGNOSIS — G89.29 CHRONIC PAIN OF LEFT KNEE: ICD-10-CM

## 2020-02-10 DIAGNOSIS — R26.89 DECREASED FUNCTIONAL MOBILITY: ICD-10-CM

## 2020-02-10 PROCEDURE — 97110 THERAPEUTIC EXERCISES: CPT | Mod: PN

## 2020-02-10 NOTE — PROGRESS NOTES
"  Physical Therapy Daily Treatment Note     Name: Cheryl Abreu  Clinic Number: [id number]    Therapy Diagnosis:   Encounter Diagnoses   Name Primary?    Decreased strength of lower extremity     Gait abnormality     Decreased functional mobility     Chronic pain of left knee      Physician: Ravindra Loera, *    Visit Date: 2/10/2020    Medical Diagnosis from Referral:   M17.12 (ICD-10-CM) - Primary osteoarthritis of left knee   M25.562 (ICD-10-CM) - Left knee pain, unspecified chronicity   M17.0 (ICD-10-CM) - Primary osteoarthritis of both knees   M62.81 (ICD-10-CM) - Muscle weakness   Evaluation Date: 2020   PN Due: 2020  Authorization Period Expiration: 2021  Plan of Care Expiration: 2020  Visit # / Visits authorized:        Time In: 13:00 am    Time Out: 13:55 am   Total treatment time: 55 minutes    Total Billable Time: 55 minutes    Precautions: Standard and Diabetes      Subjective     Pt reports: That she feels stronger overall. States she feels better when participating in PT, however, still having pain in L knee especially with prolonged standing/walking. States she was at a  this weekend and stood for 1 hour without pain.     She was compliant with home exercise program.  Response to previous treatment: no adverse reaction; reports that she felt well   Functional change: none reported at this time     Pain: 210  Location: left knee      Objective     Cheryl received therapeutic exercises performed on B LEs (bolded items performed today) to develop strength, endurance, ROM and flexibility for 55 minutes including:    Nustep level 1 for 5 minutes   SLR 2x10 #1   Bridges  x 15   SAQ with SLR  2 x 10 each #2   Hip adduction squeezes 2 x 10   supine piriformis stretch 2 x 30" each (modified figure 4 with towel)  SL SLR 2 x 10 each leg   Clamshells 2 x 10 RTB   Prone Hip Extension 2x10  shuttle 2 x 10 ; 1 cord   standing TKE red cord 2 x 10   ASU on 4 inch step 1 " "x 10 each   LSU on 4 inch step x 10 each   NP - BKFO GTB 2 minutes   standing alternating hip abduction  Marching on foam 2 x 1'   Calf stretch on slant board 3 x 30"    Cheryl received ice pack to her L plantar fascia and knee for 00 minutes     Home Exercises Provided and Patient Education Provided     Education provided:   - educated pt to continue with HEP  - educated pt on the importance of therapy to help take pressure off of her knee and improve her pain     Written Home Exercises Provided: Patient instructed to cont prior HEP.  Exercises were reviewed and Cheryl was able to demonstrate them prior to the end of the session.  Chreyl demonstrated fair  understanding of the education provided.     See EMR under Patient Instructions for exercises provided prior visit.    Assessment     Pt tolerated session well with no complaints. Pt with appropriate level of muscular fatigue throughout ther-ex this session. Plan to progress CKC strengthening as tolerated. Pt remains appropriate for therapy at this time.     Cheryl is progressing well towards her goals.   Pt prognosis is Good.     Pt will continue to benefit from skilled outpatient physical therapy to address the deficits listed in the problem list box on initial evaluation, provide pt/family education and to maximize pt's level of independence in the home and community environment.     Pt's spiritual, cultural and educational needs considered and pt agreeable to plan of care and goals.     Anticipated barriers to physical therapy: none     GOALS: Short Term Goals:  4 weeks  1.Report decreased in pain at worse less than  <   / =  7/10  to increase tolerance for functional mobility. -progressing, not met   3. Increased B LE MMT 1/2 grade to increase tolerance for ADL and work activities.   -progressing, not met   4. Pt to report ability to climb 1 flight of stairs with pain reported < / = 5/10 in order to improve tolerance to daily activities.  -progressing, not met "   5. Pt to tolerate HEP to improve ROM and independence with ADL's.  -progressing, not met      Long Term Goals: 8 weeks  1.Report decreased in pain at worse less than  <   / =  2 /10  to increase tolerance for functional mobility.  -progressing, not met   2.Increased B LE MMT 1 grade to increase tolerance for ADL and work activities.  -progressing, not met   3. Pt will score < / = 47% disability on LEFS  to demonstrate increase in LE function with every day tasks.  -progressing, not met   4. Pt to report ability to climb 1 flight of stairs with pain < / = 2/10 in order to improve tolerance to daily activities.   5. Pt to demonstrate ability to complete x 10 squats with proper technique and pain < / = 2/10 in order to improve tolerance to workout activities.   6. Pt to be Independent with HEP to improve ROM and independence with ADL's      Plan   Plan of care Certification: 1/16/2020 to 4/16/2020.     Continue per PT POC, progress exercises as tolerated, progress to standing strengthening as tolerated by patient.     Dayanna Marquez, PT , DPT  2/10/2020

## 2020-02-12 ENCOUNTER — CLINICAL SUPPORT (OUTPATIENT)
Dept: REHABILITATION | Facility: HOSPITAL | Age: 75
End: 2020-02-12
Attending: INTERNAL MEDICINE
Payer: MEDICARE

## 2020-02-12 DIAGNOSIS — R26.89 DECREASED FUNCTIONAL MOBILITY: ICD-10-CM

## 2020-02-12 DIAGNOSIS — M25.562 CHRONIC PAIN OF LEFT KNEE: ICD-10-CM

## 2020-02-12 DIAGNOSIS — G89.29 CHRONIC PAIN OF LEFT KNEE: ICD-10-CM

## 2020-02-12 DIAGNOSIS — R26.9 GAIT ABNORMALITY: ICD-10-CM

## 2020-02-12 DIAGNOSIS — R29.898 DECREASED STRENGTH OF LOWER EXTREMITY: ICD-10-CM

## 2020-02-12 PROCEDURE — 97110 THERAPEUTIC EXERCISES: CPT | Mod: PN

## 2020-02-12 NOTE — PROGRESS NOTES
"  Physical Therapy Daily Treatment Note     Name: Cheryl Abreu  Clinic Number: [id number]    Therapy Diagnosis:   Encounter Diagnoses   Name Primary?    Decreased strength of lower extremity     Gait abnormality     Decreased functional mobility     Chronic pain of left knee      Physician: Ravindra Loera, *    Visit Date: 2/12/2020    Medical Diagnosis from Referral:   M17.12 (ICD-10-CM) - Primary osteoarthritis of left knee   M25.562 (ICD-10-CM) - Left knee pain, unspecified chronicity   M17.0 (ICD-10-CM) - Primary osteoarthritis of both knees   M62.81 (ICD-10-CM) - Muscle weakness   Evaluation Date: 1/16/2020   PN Due: 2/16/2020  Authorization Period Expiration: 1/14/2021  Plan of Care Expiration: 4/16/2020  Visit # / Visits authorized:  6/ 12      Time In: 11:00 am    Time Out: 12:00 pm    Total treatment time: 60 minutes    Total Billable Time: 60 minutes    Precautions: Standard and Diabetes      Subjective     Pt reports: no new complaints. She states that she noticed yesterday while she was at the store that she is able to walk better and that she is able to walk for a longer period of time before she feels tired.     She was compliant with home exercise program.  Response to previous treatment: no adverse reaction; reports that she felt well   Functional change: reports that she is able to walk further without pain and longer in general.     Pain: 2/10  Location: left knee      Objective     Cheryl received therapeutic exercises performed on B LEs (bolded items performed today) to develop strength, endurance, ROM and flexibility for 60 minutes including:    Nustep level 1.7 for 5 minutes   SLR 2x10 #2  Bridges  2x 10    SAQ with SLR  2 x 10 each #2   Hip adduction squeezes 2 x 10   supine piriformis stretch 3 x 30" each (modified figure 4 with towel)  SL SLR #1 ankle weight  2 x 10 each leg   Clamshells 2 x 10 RTB   Prone Hip Extension 2x10  shuttle 2 x 10 ; 1 cord   standing TKE red cord 2 " "x 10   +matrix knee extension #15 2 x 10 reps   +matrix knee flexion #15 2 x 15   ASU on 4 inch step 1 x 10 each   LSU on 4 inch step x 10 each   standing alternating hip abduction   Marching on foam 2 x 1'   Calf stretch on slant board 3 x 30"    Cheryl received ice pack to her L plantar fascia and knee for 00 minutes     Home Exercises Provided and Patient Education Provided     Education provided:   - educated pt to continue with HEP  - educated pt on the importance of therapy to help take pressure off of her knee and improve her pain     Written Home Exercises Provided: Patient instructed to cont prior HEP.  Exercises were reviewed and Cheryl was able to demonstrate them prior to the end of the session.  Cheryl demonstrated fair  understanding of the education provided.     See EMR under Patient Instructions for exercises provided prior visit.    Assessment     Pt tolerated session well with no complaints She demonstrates improved knee control in standing and with standing tasks, including standing TKEs. Pt with good tolerance of progression of exercises and good levels of fatigue at conclusion of session. Pt will continue to benefit from skilled PT intervention in order to further progress knee control, standing tolerance, and overall functional mobility.     Cheryl is progressing well towards her goals.   Pt prognosis is Good.     Pt will continue to benefit from skilled outpatient physical therapy to address the deficits listed in the problem list box on initial evaluation, provide pt/family education and to maximize pt's level of independence in the home and community environment.     Pt's spiritual, cultural and educational needs considered and pt agreeable to plan of care and goals.     Anticipated barriers to physical therapy: none     GOALS: Short Term Goals:  4 weeks  1.Report decreased in pain at worse less than  <   / =  7/10  to increase tolerance for functional mobility. -progressing, not met   3. " Increased B LE MMT 1/2 grade to increase tolerance for ADL and work activities.   -progressing, not met   4. Pt to report ability to climb 1 flight of stairs with pain reported < / = 5/10 in order to improve tolerance to daily activities.  -progressing, not met   5. Pt to tolerate HEP to improve ROM and independence with ADL's.  -progressing, not met      Long Term Goals: 8 weeks  1.Report decreased in pain at worse less than  <   / =  2 /10  to increase tolerance for functional mobility.  -progressing, not met   2.Increased B LE MMT 1 grade to increase tolerance for ADL and work activities.  -progressing, not met   3. Pt will score < / = 47% disability on LEFS  to demonstrate increase in LE function with every day tasks.  -progressing, not met   4. Pt to report ability to climb 1 flight of stairs with pain < / = 2/10 in order to improve tolerance to daily activities.   5. Pt to demonstrate ability to complete x 10 squats with proper technique and pain < / = 2/10 in order to improve tolerance to workout activities.   6. Pt to be Independent with HEP to improve ROM and independence with ADL's      Plan   Plan of care Certification: 1/16/2020 to 4/16/2020.     Continue per PT POC, progress exercises as tolerated, progress to standing strengthening as tolerated by patient.     PN at next visit , likely increase resistance on matrix knee flexion and extension activities     Dee Arellano, PT , DPT  2/12/2020

## 2020-02-20 NOTE — PROGRESS NOTES
Physical Therapy Daily Treatment Note     Name: Cheryl Abreu  Clinic Number: [id number]    Therapy Diagnosis:   Encounter Diagnoses   Name Primary?    Decreased strength of lower extremity     Gait abnormality     Decreased functional mobility     Chronic pain of left knee      Physician: Ravindra Loera, *    Visit Date: 2/21/2020    Medical Diagnosis from Referral:   M17.12 (ICD-10-CM) - Primary osteoarthritis of left knee   M25.562 (ICD-10-CM) - Left knee pain, unspecified chronicity   M17.0 (ICD-10-CM) - Primary osteoarthritis of both knees   M62.81 (ICD-10-CM) - Muscle weakness   Evaluation Date: 1/16/2020   PN Due: 3/21/2020  Authorization Period Expiration: 1/14/2021  Plan of Care Expiration: 4/16/2020  Visit # / Visits authorized:   7/ 12      Time In: 1100 am     Time Out: 1205 pm     Total treatment time: 65 minutes    Total Billable Time: 65 minutes    Precautions: Standard and Diabetes      Subjective     Pt reports: that overall she feels that her knees have been feeling better. She states that she feels that she is standing taller and feels that she is walking better. She states that she feels that PT has been very helpful.     She was compliant with home exercise program.  Response to previous treatment: no adverse reaction; reports that she felt well   Functional change: reports that she is able to walk further without pain and longer in general.     Pain: 2/10  Location: left knee      Objective     Lower Extremity Strength   Right LE   Left LE     Knee extension: 4+/5 Knee extension: 4+/5   Knee flexion: 4-/5 Knee flexion: 4-/5   Hip flexion: 5/5 Hip flexion: 5/5   Hip extension:  3+/5 Hip extension: 3+/5   Hip abduction: 4-/5 Hip abduction: 4-/5   Hip adduction: NT Hip adduction NT   Ankle dorsiflexion: 5/5 Ankle dorsiflexion: 5/5   Ankle plantarflexion: NT Ankle plantarflexion: NT     FOTO: 51 % limitation     Cheryl received therapeutic exercises performed on B LEs (bolded  "items performed today) to develop strength, endurance, ROM and flexibility for 60 minutes including:    Nustep level 1.7 for 5 minutes   SLR 2x10 #2  Bridges  2x 10    SAQ with SLR  2 x 10 each #2   Hip adduction squeezes 2 x 10   supine piriformis stretch 3 x 30" each (modified figure 4 with towel)  SL SLR #1 ankle weight  2 x 10 each leg   Clamshells 2 x 10 RTB   Prone Hip Extension 2x10  shuttle 2 x 10 ; 1 cord   standing TKE red cord 2 x 10   matrix knee extension #15 2 x 10 reps   matrix knee flexion #15 2 x 15   ASU on 4 inch step 1 x 10 each   LSU on 4 inch step x 10 each   standing alternating hip abduction 2 x 10   Marching on foam 2 x 1'   Calf stretch on slant board 3 x 30"    Cheryl received ice pack to her L plantar fascia and knee for 00 minutes     Home Exercises Provided and Patient Education Provided     Education provided:   - educated pt to continue with HEP  - educated pt on the importance of therapy to help take pressure off of her knee and improve her pain     Written Home Exercises Provided: Patient instructed to cont prior HEP.  Exercises were reviewed and Cheryl was able to demonstrate them prior to the end of the session.  Cheryl demonstrated fair  understanding of the education provided.     See EMR under Patient Instructions for exercises provided prior visit.    Assessment     Pt tolerated session well with no complaints. She tolerated progression of exercise today with no difficulty, but appropriate levels of fatigue. PN performed today and pt presents with increased LE strength as well as improved overall functional mobility. Per FOTO, pt reports improved functionality and decreased limitations. Pt will continue to benefit from skilled PT intervention in order to further progress B LE strength and progress knee stability and postural control needed for return to PLOF and improve overall QOL.     Cheryl is progressing well towards her goals.   Pt prognosis is Good.     Pt will continue to " benefit from skilled outpatient physical therapy to address the deficits listed in the problem list box on initial evaluation, provide pt/family education and to maximize pt's level of independence in the home and community environment.     Pt's spiritual, cultural and educational needs considered and pt agreeable to plan of care and goals.     Anticipated barriers to physical therapy: none     GOALS: Short Term Goals:  4 weeks  1.Report decreased in pain at worse less than  <   / =  7/10  to increase tolerance for functional mobility. -goal met, 2/21/2020  3. Increased B LE MMT 1/2 grade to increase tolerance for ADL and work activities.   -goal met, 2/21/2020  4. Pt to report ability to climb 1 flight of stairs with pain reported < / = 5/10 in order to improve tolerance to daily activities.  -goal met, 2/21/2020    5. Pt to tolerate HEP to improve ROM and independence with ADL's.  -goal met, 2/21/2020     Long Term Goals: 8 weeks  1.Report decreased in pain at worse less than  <   / =  2 /10  to increase tolerance for functional mobility.  -progressing, not met   2.Increased B LE MMT 1 grade to increase tolerance for ADL and work activities.  -progressing, not met   3. Pt will score < / = 47% disability on LEFS  to demonstrate increase in LE function with every day tasks.  -progressing, not met (54% disability)   4. Pt to report ability to climb 1 flight of stairs with pain < / = 2/10 in order to improve tolerance to daily activities. . -progressing, not met   5. Pt to demonstrate ability to complete x 10 squats with proper technique and pain < / = 2/10 in order to improve tolerance to workout activities.  -progressing, not met   6. Pt to be Independent with HEP to improve ROM and independence with ADL's -goal met, 2/21/2020      Plan   Plan of care Certification: 1/16/2020 to 4/16/2020.     Continue per PT POC 2x/wk for additional 3 weeks for total of 12 PT visits, progress exercises as tolerated, progress to  standing strengthening as tolerated by patient.       Dee Arellano, PT , DPT  2/21/2020

## 2020-02-21 ENCOUNTER — CLINICAL SUPPORT (OUTPATIENT)
Dept: REHABILITATION | Facility: HOSPITAL | Age: 75
End: 2020-02-21
Attending: INTERNAL MEDICINE
Payer: MEDICARE

## 2020-02-21 DIAGNOSIS — R26.9 GAIT ABNORMALITY: ICD-10-CM

## 2020-02-21 DIAGNOSIS — R29.898 DECREASED STRENGTH OF LOWER EXTREMITY: ICD-10-CM

## 2020-02-21 DIAGNOSIS — R26.89 DECREASED FUNCTIONAL MOBILITY: ICD-10-CM

## 2020-02-21 DIAGNOSIS — G89.29 CHRONIC PAIN OF LEFT KNEE: ICD-10-CM

## 2020-02-21 DIAGNOSIS — M25.562 CHRONIC PAIN OF LEFT KNEE: ICD-10-CM

## 2020-02-21 PROCEDURE — 97110 THERAPEUTIC EXERCISES: CPT | Mod: PN

## 2020-03-05 NOTE — PROGRESS NOTES
"  Physical Therapy Daily Treatment Note     Name: Cheryl Abreu  Clinic Number: [id number]    Therapy Diagnosis:   Encounter Diagnoses   Name Primary?    Decreased strength of lower extremity     Gait abnormality     Decreased functional mobility     Chronic pain of left knee      Physician: Ravindra Loera, *    Visit Date: 3/6/2020    Medical Diagnosis from Referral:   M17.12 (ICD-10-CM) - Primary osteoarthritis of left knee   M25.562 (ICD-10-CM) - Left knee pain, unspecified chronicity   M17.0 (ICD-10-CM) - Primary osteoarthritis of both knees   M62.81 (ICD-10-CM) - Muscle weakness   Evaluation Date: 1/16/2020   PN Due: 3/21/2020  Authorization Period Expiration: 1/14/2021  Plan of Care Expiration: 4/16/2020  Visit # / Visits authorized:   8/ 12      Time In: 10:00 am      Time Out: 10:55 am      Total treatment time: 55 minutes    Total Billable Time: 55 minutes    Precautions: Standard and Diabetes      Subjective     Pt reports: no new complaints. She states that her knees have been feeling better overall and that she feels that she is walking around much better and "not all bent over"     She was compliant with home exercise program.  Response to previous treatment: no adverse reaction; reports that she felt well   Functional change: reports that she is able to walk further without pain and longer in general.     Pain: 2/10  Location: left knee      Objective       Cheryl received therapeutic exercises performed on B LEs (bolded items performed today) to develop strength, endurance, ROM and flexibility for 55 minutes including:    Nustep level 1.7 for 5 minutes   SLR 2x10 #2  Bridges  2x 10    SAQ with SLR  2 x 10 each #2   Hip adduction squeezes 2 x 10   supine piriformis stretch 3 x 30" each (modified figure 4 with towel)  SL SLR #2 ankle weight  2 x 10 each leg   Clamshells 2 x 10 RTB   Prone Hip Extension 2x10  shuttle 2 x 10 ; 1 cord   standing TKE red cord 2 x 10   matrix knee extension " "#15 2 x 10 reps   matrix knee flexion #15 2 x 15   ASU on 4 inch step 1 x 10 each   LSU on 4 inch step x 10 each   standing alternating hip abduction 2 x 10   Marching on foam 2 x 1'   Calf stretch on slant board 3 x 30"    Cheryl received ice pack to her L plantar fascia and knee for 00 minutes     Home Exercises Provided and Patient Education Provided     Education provided:   - educated pt to continue with HEP  - educated pt on the importance of therapy to help take pressure off of her knee and improve her pain     Written Home Exercises Provided: Patient instructed to cont prior HEP.  Exercises were reviewed and Cheryl was able to demonstrate them prior to the end of the session.  Cheryl demonstrated fair  understanding of the education provided.     See EMR under Patient Instructions for exercises provided prior visit.    Assessment     Pt tolerated session well with no complaints. She tolerated progression of exercise today with increased reps and increased resistance. Pt's standing posture also demonstrates good improvements and she performs gait and standing exercises with improved knee posturing.     Cheryl is progressing well towards her goals.   Pt prognosis is Good.     Pt will continue to benefit from skilled outpatient physical therapy to address the deficits listed in the problem list box on initial evaluation, provide pt/family education and to maximize pt's level of independence in the home and community environment.     Pt's spiritual, cultural and educational needs considered and pt agreeable to plan of care and goals.     Anticipated barriers to physical therapy: none     GOALS: Short Term Goals:  4 weeks  1.Report decreased in pain at worse less than  <   / =  7/10  to increase tolerance for functional mobility. -goal met, 2/21/2020  3. Increased B LE MMT 1/2 grade to increase tolerance for ADL and work activities.   -goal met, 2/21/2020  4. Pt to report ability to climb 1 flight of stairs with pain " reported < / = 5/10 in order to improve tolerance to daily activities.  -goal met, 2/21/2020    5. Pt to tolerate HEP to improve ROM and independence with ADL's.  -goal met, 2/21/2020     Long Term Goals: 8 weeks  1.Report decreased in pain at worse less than  <   / =  2 /10  to increase tolerance for functional mobility.  -progressing, not met   2.Increased B LE MMT 1 grade to increase tolerance for ADL and work activities.  -progressing, not met   3. Pt will score < / = 47% disability on LEFS  to demonstrate increase in LE function with every day tasks.  -progressing, not met (54% disability)   4. Pt to report ability to climb 1 flight of stairs with pain < / = 2/10 in order to improve tolerance to daily activities. . -progressing, not met   5. Pt to demonstrate ability to complete x 10 squats with proper technique and pain < / = 2/10 in order to improve tolerance to workout activities.  -progressing, not met   6. Pt to be Independent with HEP to improve ROM and independence with ADL's -goal met, 2/21/2020      Plan   Plan of care Certification: 1/16/2020 to 4/16/2020.     Continue per PT POC 2x/wk for additional 3 weeks for total of 12 PT visits, progress exercises as tolerated, progress to standing strengthening as tolerated by patient.       Dee Arellano, PT , DPT  3/6/2020

## 2020-03-06 ENCOUNTER — CLINICAL SUPPORT (OUTPATIENT)
Dept: REHABILITATION | Facility: HOSPITAL | Age: 75
End: 2020-03-06
Attending: INTERNAL MEDICINE
Payer: MEDICARE

## 2020-03-06 DIAGNOSIS — R26.9 GAIT ABNORMALITY: ICD-10-CM

## 2020-03-06 DIAGNOSIS — R26.89 DECREASED FUNCTIONAL MOBILITY: ICD-10-CM

## 2020-03-06 DIAGNOSIS — R29.898 DECREASED STRENGTH OF LOWER EXTREMITY: ICD-10-CM

## 2020-03-06 DIAGNOSIS — M25.562 CHRONIC PAIN OF LEFT KNEE: ICD-10-CM

## 2020-03-06 DIAGNOSIS — G89.29 CHRONIC PAIN OF LEFT KNEE: ICD-10-CM

## 2020-03-06 PROCEDURE — 97110 THERAPEUTIC EXERCISES: CPT | Mod: PN

## 2020-03-09 NOTE — PROGRESS NOTES
"  Physical Therapy Daily Treatment Note     Name: Cheryl Abreu  Clinic Number: [id number]    Therapy Diagnosis:   Encounter Diagnoses   Name Primary?    Decreased strength of lower extremity     Gait abnormality     Decreased functional mobility     Chronic pain of left knee      Physician: Ravindra Loera, *    Visit Date: 3/10/2020    Medical Diagnosis from Referral:   M17.12 (ICD-10-CM) - Primary osteoarthritis of left knee   M25.562 (ICD-10-CM) - Left knee pain, unspecified chronicity   M17.0 (ICD-10-CM) - Primary osteoarthritis of both knees   M62.81 (ICD-10-CM) - Muscle weakness   Evaluation Date: 1/16/2020   PN Due: 3/21/2020  Authorization Period Expiration: 1/14/2021  Plan of Care Expiration: 4/16/2020  Visit # / Visits authorized:   9/ 12      Time In: 11:00 am       Time Out: 12:00 pm       Total treatment time: 60 minutes    Total Billable Time: 30 minutes    Precautions: Standard and Diabetes      Subjective     Pt reports:that she is feeling a little under the weather today. She states that she is having sinus trouble. Denies fever. Pt states that her knees are feeling ok, but that she feels that she just generally feels weak today d/t not sleeping well last night.     She was compliant with home exercise program.  Response to previous treatment: no adverse reaction; reports that she felt well   Functional change: reports that she is able to walk further without pain and longer in general.     Pain: 2/10  Location: left knee      Objective       Cheryl received therapeutic exercises performed on B LEs (bolded items performed today) to develop strength, endurance, ROM and flexibility for 60 minutes including:    Nustep level 1.7 for 8 minutes   SLR 2x10 #2  Bridges  2x 10    SAQ with SLR  2 x 10 each #2   Hip adduction squeezes 2 x 10   supine piriformis stretch 3 x 30" each (modified figure 4 with towel)  SL SLR #2 ankle weight  2 x 10 each leg   Clamshells 2 x 10 RTB   Prone Hip " "Extension 2x10  shuttle 2 x 10 ; 1.5 cords   standing TKE red cord 2 x 10   matrix knee extension #15 2 x 10 reps   matrix knee flexion #20 2 x 15   ASU on 4 inch step 1 x 10 each   LSU on 4 inch step x 10 each   standing alternating hip abduction 2 x 10   Marching on foam 2 x 1'   Calf stretch on slant board 3 x 30"    Cheryl received ice pack to her L plantar fascia and knee for 00 minutes     Home Exercises Provided and Patient Education Provided     Education provided:   - educated pt to continue with HEP  - educated pt on the importance of therapy to help take pressure off of her knee and improve her pain     Written Home Exercises Provided: Patient instructed to cont prior HEP.  Exercises were reviewed and Cheryl was able to demonstrate them prior to the end of the session.  Cheryl demonstrated fair  understanding of the education provided.     See EMR under Patient Instructions for exercises provided prior visit.    Assessment     Pt tolerated session fairly with no complaints. She demonstrates good levels of fatigue at the conclusion of the session with no complaints of increased pain. Pt with good tolerance of progressed resistance exercise today as well as improved tolerance of standing activities today.     Cheryl is progressing well towards her goals.   Pt prognosis is Good.     Pt will continue to benefit from skilled outpatient physical therapy to address the deficits listed in the problem list box on initial evaluation, provide pt/family education and to maximize pt's level of independence in the home and community environment.     Pt's spiritual, cultural and educational needs considered and pt agreeable to plan of care and goals.     Anticipated barriers to physical therapy: none     GOALS: Short Term Goals:  4 weeks  1.Report decreased in pain at worse less than  <   / =  7/10  to increase tolerance for functional mobility. -goal met, 2/21/2020  3. Increased B LE MMT 1/2 grade to increase tolerance for " ADL and work activities.   -goal met, 2/21/2020  4. Pt to report ability to climb 1 flight of stairs with pain reported < / = 5/10 in order to improve tolerance to daily activities.  -goal met, 2/21/2020    5. Pt to tolerate HEP to improve ROM and independence with ADL's.  -goal met, 2/21/2020     Long Term Goals: 8 weeks  1.Report decreased in pain at worse less than  <   / =  2 /10  to increase tolerance for functional mobility.  -progressing, not met   2.Increased B LE MMT 1 grade to increase tolerance for ADL and work activities.  -progressing, not met   3. Pt will score < / = 47% disability on LEFS  to demonstrate increase in LE function with every day tasks.  -progressing, not met (54% disability)   4. Pt to report ability to climb 1 flight of stairs with pain < / = 2/10 in order to improve tolerance to daily activities. . -progressing, not met   5. Pt to demonstrate ability to complete x 10 squats with proper technique and pain < / = 2/10 in order to improve tolerance to workout activities.  -progressing, not met   6. Pt to be Independent with HEP to improve ROM and independence with ADL's -goal met, 2/21/2020      Plan   Plan of care Certification: 1/16/2020 to 4/16/2020.     Continue per PT POC 2x/wk for additional 3 weeks for total of 12 PT visits, progress exercises as tolerated, progress to standing strengthening as tolerated by patient.       Dee Arellano, PT , DPT  3/10/2020

## 2020-03-10 ENCOUNTER — CLINICAL SUPPORT (OUTPATIENT)
Dept: REHABILITATION | Facility: HOSPITAL | Age: 75
End: 2020-03-10
Attending: INTERNAL MEDICINE
Payer: MEDICARE

## 2020-03-10 DIAGNOSIS — R29.898 DECREASED STRENGTH OF LOWER EXTREMITY: ICD-10-CM

## 2020-03-10 DIAGNOSIS — R26.9 GAIT ABNORMALITY: ICD-10-CM

## 2020-03-10 DIAGNOSIS — R26.89 DECREASED FUNCTIONAL MOBILITY: ICD-10-CM

## 2020-03-10 DIAGNOSIS — G89.29 CHRONIC PAIN OF LEFT KNEE: ICD-10-CM

## 2020-03-10 DIAGNOSIS — M25.562 CHRONIC PAIN OF LEFT KNEE: ICD-10-CM

## 2020-03-10 PROCEDURE — 97110 THERAPEUTIC EXERCISES: CPT | Mod: PN

## 2020-03-18 ENCOUNTER — DOCUMENTATION ONLY (OUTPATIENT)
Dept: REHABILITATION | Facility: HOSPITAL | Age: 75
End: 2020-03-18

## 2020-03-18 NOTE — PROGRESS NOTES
Patient: Cheryl Abreu  Date: 3/18/2020  Diagnosis: No diagnosis found.  MRN: [id number]    Spoke with patient due to therapy following updates regarding COVID-19 closely and taking every precaution to ensure the safety of our patients, staff and community.  In an abundance of caution and in an effort to help reduce risk and limit community spread, we have decided to temporarily postpone appointments for patients who may be at increased risk to attend in-person therapy or where therapy is not critically needed at this time. Plan of care and home exercise program were reviewed and patient has what they need to continue therapy at home. All patient questions were answered. Also stated to patient that we are exploring virtual methods of providing care and will be in touch over the next few weeks. Patient verbalized understanding to all.    Dee Arellano, PT, DPT   3/18/2020

## 2020-04-05 DIAGNOSIS — M62.838 MUSCLE SPASM: ICD-10-CM

## 2020-04-06 RX ORDER — TIZANIDINE 2 MG/1
2-4 TABLET ORAL EVERY 8 HOURS PRN
Qty: 180 TABLET | Refills: 2 | Status: SHIPPED | OUTPATIENT
Start: 2020-04-06 | End: 2020-07-20

## 2020-04-08 ENCOUNTER — TELEPHONE (OUTPATIENT)
Dept: SPORTS MEDICINE | Facility: CLINIC | Age: 75
End: 2020-04-08

## 2020-04-08 NOTE — TELEPHONE ENCOUNTER
Spoke with pt and rescheduled her injection appointment due to COVID-19 concerns. Her injections is scheduled for 5/25/20.

## 2020-05-25 ENCOUNTER — OFFICE VISIT (OUTPATIENT)
Dept: SPORTS MEDICINE | Facility: CLINIC | Age: 75
End: 2020-05-25
Payer: MEDICARE

## 2020-05-25 VITALS
SYSTOLIC BLOOD PRESSURE: 133 MMHG | HEIGHT: 64 IN | HEART RATE: 71 BPM | DIASTOLIC BLOOD PRESSURE: 72 MMHG | WEIGHT: 153 LBS | BODY MASS INDEX: 26.12 KG/M2

## 2020-05-25 DIAGNOSIS — M17.0 PRIMARY OSTEOARTHRITIS OF BOTH KNEES: Primary | ICD-10-CM

## 2020-05-25 DIAGNOSIS — M17.12 PRIMARY OSTEOARTHRITIS OF LEFT KNEE: ICD-10-CM

## 2020-05-25 PROCEDURE — 20610 PR DRAIN/INJECT LARGE JOINT/BURSA: ICD-10-PCS | Mod: 50,S$GLB,, | Performed by: PHYSICIAN ASSISTANT

## 2020-05-25 PROCEDURE — 99499 UNLISTED E&M SERVICE: CPT | Mod: S$GLB,,, | Performed by: PHYSICIAN ASSISTANT

## 2020-05-25 PROCEDURE — 99999 PR PBB SHADOW E&M-EST. PATIENT-LVL IV: ICD-10-PCS | Mod: PBBFAC,,, | Performed by: PHYSICIAN ASSISTANT

## 2020-05-25 PROCEDURE — 20610 DRAIN/INJ JOINT/BURSA W/O US: CPT | Mod: 50,S$GLB,, | Performed by: PHYSICIAN ASSISTANT

## 2020-05-25 PROCEDURE — 99499 NO LOS: ICD-10-PCS | Mod: S$GLB,,, | Performed by: PHYSICIAN ASSISTANT

## 2020-05-25 PROCEDURE — 99999 PR PBB SHADOW E&M-EST. PATIENT-LVL IV: CPT | Mod: PBBFAC,,, | Performed by: PHYSICIAN ASSISTANT

## 2020-05-25 NOTE — PROGRESS NOTES
Cheryl Abreu is here for follow up of bilateral knee arthritis. Pt is requesting Euflexxa series injections #1 of 3.  [facility] reviewed per encounter record. Has failed other conservative modalities including NSAIDS, activity modification, weight loss.    The prior shot was tolerated well.    PHYSICAL EXAMINATION:     General: The patient is alert and oriented x 3. Mood is pleasant.   Observation of ears, eyes and nose reveals no gross abnormalities. No   labored breathing observed.     No signs of infection or adverse reaction to knee.    Bilateral Injection Procedure  A time out was performed, including verification of patient ID, procedure, site and side, availability of information and equipment, review of safety issues, and agreement with consent, the procedure site was marked.    After time out was performed, the patient was prepped aseptically with chloraprep. A diagnostic and therapeutic injection of 2cc Euflexxa was given under sterile technique using a 22g x 1.5 needle from the Superolateral  aspect of the Bilateral Knee Joint in the supine position.      Cheryl Abreu had no adverse reactions to the medication. Pain decreased. She was instructed to apply ice to the joint for 20 minutes and avoid strenuous activities for 24-36 hours following the injection. She was warned of possible blood sugar and/or blood pressure changes during that time. Following that time, she can resume regular activities.    She was reminded to call the clinic immediately for any adverse side effects as explained in clinic today.    RTC to see Antony Loera PA-C for Euflexxa series injections #2 of 3.    All of the patient's questions were answered and the patient will contact us if they have any questions or concerns in the interim.

## 2020-05-28 ENCOUNTER — PATIENT MESSAGE (OUTPATIENT)
Dept: SPINE | Facility: CLINIC | Age: 75
End: 2020-05-28

## 2020-06-01 ENCOUNTER — OFFICE VISIT (OUTPATIENT)
Dept: SPORTS MEDICINE | Facility: CLINIC | Age: 75
End: 2020-06-01
Payer: MEDICARE

## 2020-06-01 VITALS
BODY MASS INDEX: 26.12 KG/M2 | WEIGHT: 153 LBS | DIASTOLIC BLOOD PRESSURE: 62 MMHG | HEIGHT: 64 IN | HEART RATE: 76 BPM | SYSTOLIC BLOOD PRESSURE: 119 MMHG

## 2020-06-01 DIAGNOSIS — M17.0 PRIMARY OSTEOARTHRITIS OF BOTH KNEES: Primary | ICD-10-CM

## 2020-06-01 PROCEDURE — 99499 UNLISTED E&M SERVICE: CPT | Mod: S$GLB,,, | Performed by: PHYSICIAN ASSISTANT

## 2020-06-01 PROCEDURE — 99999 PR PBB SHADOW E&M-EST. PATIENT-LVL IV: ICD-10-PCS | Mod: PBBFAC,,, | Performed by: PHYSICIAN ASSISTANT

## 2020-06-01 PROCEDURE — 20610 DRAIN/INJ JOINT/BURSA W/O US: CPT | Mod: 50,S$GLB,, | Performed by: PHYSICIAN ASSISTANT

## 2020-06-01 PROCEDURE — 20610 PR DRAIN/INJECT LARGE JOINT/BURSA: ICD-10-PCS | Mod: 50,S$GLB,, | Performed by: PHYSICIAN ASSISTANT

## 2020-06-01 PROCEDURE — 99499 NO LOS: ICD-10-PCS | Mod: S$GLB,,, | Performed by: PHYSICIAN ASSISTANT

## 2020-06-01 PROCEDURE — 99999 PR PBB SHADOW E&M-EST. PATIENT-LVL IV: CPT | Mod: PBBFAC,,, | Performed by: PHYSICIAN ASSISTANT

## 2020-06-01 NOTE — PROGRESS NOTES
Cheryl Abreu is here for follow up of bilateral knee arthritis. Pt is requesting Euflexxa series injections #2 of 3.  [facility] reviewed per encounter record. Has failed other conservative modalities including NSAIDS, activity modification, weight loss.    The prior shot was tolerated well.    PHYSICAL EXAMINATION:     General: The patient is alert and oriented x 3. Mood is pleasant.   Observation of ears, eyes and nose reveals no gross abnormalities. No   labored breathing observed.     No signs of infection or adverse reaction to knee.    Bilateral Injection Procedure  A time out was performed, including verification of patient ID, procedure, site and side, availability of information and equipment, review of safety issues, and agreement with consent, the procedure site was marked.    After time out was performed, the patient was prepped aseptically with chloraprep. A diagnostic and therapeutic injection of 2cc Euflexxa was given under sterile technique using a 22g x 1.5 needle from the Superolateral  aspect of the Bilateral Knee Joint in the supine position.      Cheryl Abreu had no adverse reactions to the medication. Pain decreased. She was instructed to apply ice to the joint for 20 minutes and avoid strenuous activities for 24-36 hours following the injection. She was warned of possible blood sugar and/or blood pressure changes during that time. Following that time, she can resume regular activities.    She was reminded to call the clinic immediately for any adverse side effects as explained in clinic today.    RTC to see Cari Bean PA-C for Euflexxa series injections #3 of 3.    All of the patient's questions were answered and the patient will contact us if they have any questions or concerns in the interim.

## 2020-06-04 ENCOUNTER — TELEPHONE (OUTPATIENT)
Dept: SPORTS MEDICINE | Facility: CLINIC | Age: 75
End: 2020-06-04

## 2020-06-04 NOTE — TELEPHONE ENCOUNTER
Spoke with pt regarding portal msg.     She has been experiencing right arm pain localized between the shoulder and elbow. Has trouble doing overhead activities with that arm.     Appt for Right arm: 6/10 neelam/ cecy    Pt already scheduled for 6/8 for gel injection in knee.

## 2020-06-08 ENCOUNTER — OFFICE VISIT (OUTPATIENT)
Dept: SPORTS MEDICINE | Facility: CLINIC | Age: 75
End: 2020-06-08
Payer: MEDICARE

## 2020-06-08 VITALS
DIASTOLIC BLOOD PRESSURE: 71 MMHG | SYSTOLIC BLOOD PRESSURE: 124 MMHG | BODY MASS INDEX: 26.12 KG/M2 | HEART RATE: 78 BPM | HEIGHT: 64 IN | WEIGHT: 153 LBS

## 2020-06-08 DIAGNOSIS — M17.11 PRIMARY OSTEOARTHRITIS OF RIGHT KNEE: Primary | ICD-10-CM

## 2020-06-08 DIAGNOSIS — M17.12 PRIMARY OSTEOARTHRITIS OF LEFT KNEE: ICD-10-CM

## 2020-06-08 PROCEDURE — 99999 PR PBB SHADOW E&M-EST. PATIENT-LVL IV: ICD-10-PCS | Mod: PBBFAC,,, | Performed by: PHYSICIAN ASSISTANT

## 2020-06-08 PROCEDURE — 99999 PR PBB SHADOW E&M-EST. PATIENT-LVL IV: CPT | Mod: PBBFAC,,, | Performed by: PHYSICIAN ASSISTANT

## 2020-06-08 PROCEDURE — 99499 UNLISTED E&M SERVICE: CPT | Mod: S$GLB,,, | Performed by: PHYSICIAN ASSISTANT

## 2020-06-08 PROCEDURE — 20610 PR DRAIN/INJECT LARGE JOINT/BURSA: ICD-10-PCS | Mod: 50,S$GLB,, | Performed by: PHYSICIAN ASSISTANT

## 2020-06-08 PROCEDURE — 20610 DRAIN/INJ JOINT/BURSA W/O US: CPT | Mod: 50,S$GLB,, | Performed by: PHYSICIAN ASSISTANT

## 2020-06-08 PROCEDURE — 99499 NO LOS: ICD-10-PCS | Mod: S$GLB,,, | Performed by: PHYSICIAN ASSISTANT

## 2020-06-08 NOTE — LETTER
June 8, 2020      Ravindra Loera PA-C  [address]  [facility] [address]           [facility]  [address]  [facility] [address]  Phone: [phone]          Patient: Cheryl Abreu   MR Number: [id number]   YOB: 1945   Date of Visit: 6/8/2020       Dear Ravindra Loera:    Thank you for referring Cheryl Abreu to me for evaluation. Attached you will find relevant portions of my assessment and plan of care.    If you have questions, please do not hesitate to call me. I look forward to following Cheryl Abreu along with you.    Sincerely,    Cari Bean PA-C    Enclosure  CC:  No Recipients    If you would like to receive this communication electronically, please contact [email or web address] or [phone] to request more information on [facility] access.    For providers and/or their staff who would like to refer a patient to [facility], please contact us through our one-stop-shop provider referral line, [facility] , at [phone].    If you feel you have received this communication in error or would no longer like to receive these types of communications, please e-mail [email or web address]

## 2020-06-08 NOTE — PROGRESS NOTES
Cheryl Abreu is here for follow up of bilateral knee arthritis. Pt is requesting Euflexxa series injections #3 of 3.  [facility] reviewed per encounter record. Has failed other conservative modalities including NSAIDS, activity modification, weight loss.    The prior shot was tolerated well.    PHYSICAL EXAMINATION:     General: The patient is alert and oriented x 3. Mood is pleasant.   Observation of ears, eyes and nose reveals no gross abnormalities. No   labored breathing observed.     No signs of infection or adverse reaction to knee.    Bilateral Injection Procedure  A time out was performed, including verification of patient ID, procedure, site and side, availability of information and equipment, review of safety issues, and agreement with consent, the procedure site was marked.    After time out was performed, the patient was prepped aseptically with chloraprep. A diagnostic and therapeutic injection of 2cc Euflexxa was given under sterile technique using a 22g x 1.5 needle from the Superolateral  aspect of the Bilateral Knee Joint in the supine position.      Cheryl Abreu had no adverse reactions to the medication. Pain decreased. She was instructed to apply ice to the joint for 20 minutes and avoid strenuous activities for 24-36 hours following the injection. She was warned of possible blood sugar and/or blood pressure changes during that time. Following that time, she can resume regular activities.    She was reminded to call the clinic immediately for any adverse side effects as explained in clinic today.    RTC to see Cari Bean PA-C for follow up as needed.    All of the patient's questions were answered and the patient will contact us if they have any questions or concerns in the interim.

## 2020-06-10 ENCOUNTER — HOSPITAL ENCOUNTER (OUTPATIENT)
Dept: RADIOLOGY | Facility: HOSPITAL | Age: 75
Discharge: HOME | End: 2020-06-10
Attending: PHYSICIAN ASSISTANT
Payer: MEDICARE

## 2020-06-10 ENCOUNTER — OFFICE VISIT (OUTPATIENT)
Dept: SPORTS MEDICINE | Facility: CLINIC | Age: 75
End: 2020-06-10
Payer: MEDICARE

## 2020-06-10 VITALS
SYSTOLIC BLOOD PRESSURE: 115 MMHG | HEART RATE: 80 BPM | HEIGHT: 64 IN | BODY MASS INDEX: 26.12 KG/M2 | DIASTOLIC BLOOD PRESSURE: 60 MMHG | WEIGHT: 153 LBS

## 2020-06-10 DIAGNOSIS — M25.511 RIGHT SHOULDER PAIN, UNSPECIFIED CHRONICITY: ICD-10-CM

## 2020-06-10 DIAGNOSIS — M25.811 SHOULDER IMPINGEMENT, RIGHT: Primary | ICD-10-CM

## 2020-06-10 DIAGNOSIS — M19.011 ARTHRITIS OF RIGHT ACROMIOCLAVICULAR JOINT: ICD-10-CM

## 2020-06-10 PROCEDURE — 99214 PR OFFICE/OUTPT VISIT, EST, LEVL IV, 30-39 MIN: ICD-10-PCS | Mod: 25,S$GLB,, | Performed by: PHYSICIAN ASSISTANT

## 2020-06-10 PROCEDURE — 73030 XR SHOULDER COMPLETE 2 OR MORE VIEWS RIGHT: ICD-10-PCS | Mod: 26,RT,, | Performed by: RADIOLOGY

## 2020-06-10 PROCEDURE — 73030 X-RAY EXAM OF SHOULDER: CPT | Mod: 26,RT,, | Performed by: RADIOLOGY

## 2020-06-10 PROCEDURE — 1159F PR MEDICATION LIST DOCUMENTED IN MEDICAL RECORD: ICD-10-PCS | Mod: S$GLB,,, | Performed by: PHYSICIAN ASSISTANT

## 2020-06-10 PROCEDURE — 1125F AMNT PAIN NOTED PAIN PRSNT: CPT | Mod: S$GLB,,, | Performed by: PHYSICIAN ASSISTANT

## 2020-06-10 PROCEDURE — 99214 OFFICE O/P EST MOD 30 MIN: CPT | Mod: 25,S$GLB,, | Performed by: PHYSICIAN ASSISTANT

## 2020-06-10 PROCEDURE — 1101F PT FALLS ASSESS-DOCD LE1/YR: CPT | Mod: CPTII,S$GLB,, | Performed by: PHYSICIAN ASSISTANT

## 2020-06-10 PROCEDURE — 99999 PR PBB SHADOW E&M-EST. PATIENT-LVL III: CPT | Mod: PBBFAC,,, | Performed by: PHYSICIAN ASSISTANT

## 2020-06-10 PROCEDURE — 73030 X-RAY EXAM OF SHOULDER: CPT | Mod: TC,RT

## 2020-06-10 PROCEDURE — 97110 PR THERAPEUTIC EXERCISES: ICD-10-PCS | Mod: GP,S$GLB,, | Performed by: PHYSICIAN ASSISTANT

## 2020-06-10 PROCEDURE — 1125F PR PAIN SEVERITY QUANTIFIED, PAIN PRESENT: ICD-10-PCS | Mod: S$GLB,,, | Performed by: PHYSICIAN ASSISTANT

## 2020-06-10 PROCEDURE — 1159F MED LIST DOCD IN RCRD: CPT | Mod: S$GLB,,, | Performed by: PHYSICIAN ASSISTANT

## 2020-06-10 PROCEDURE — 1101F PR PT FALLS ASSESS DOC 0-1 FALLS W/OUT INJ PAST YR: ICD-10-PCS | Mod: CPTII,S$GLB,, | Performed by: PHYSICIAN ASSISTANT

## 2020-06-10 PROCEDURE — 97110 THERAPEUTIC EXERCISES: CPT | Mod: GP,S$GLB,, | Performed by: PHYSICIAN ASSISTANT

## 2020-06-10 PROCEDURE — 99999 PR PBB SHADOW E&M-EST. PATIENT-LVL III: ICD-10-PCS | Mod: PBBFAC,,, | Performed by: PHYSICIAN ASSISTANT

## 2020-06-10 NOTE — PROGRESS NOTES
Subjective:          Chief Complaint: Cheryl Abreu is a 75 y.o. female who had concerns including Pain of the Right Shoulder.    HPI   Patient presents to clinic with right shoulder pain x 1 year, worsening over the past 3 weeks. Denies any specific NATALIA. Pain at rest is 0/10 and at its worst is 3-4/10. She has not been taking any medication for pain. She states that pain increases with reaching overhead and lying on her right shoulder. Pain has progressively decreased since its recent onset 3 weeks ago. Denies any neck pain. Reports radiation of pain from right shoulder to right elbow.       Review of Systems   Constitution: Negative. Negative for chills, fever, weight gain and weight loss.   HENT: Negative for congestion and sore throat.    Eyes: Negative for blurred vision and double vision.   Cardiovascular: Negative for chest pain, leg swelling and palpitations.   Respiratory: Negative for cough and shortness of breath.    Hematologic/Lymphatic: Does not bruise/bleed easily.   Skin: Negative for itching, poor wound healing and rash.   Musculoskeletal: Positive for joint pain. Negative for back pain, joint swelling, muscle weakness, myalgias and stiffness.   Gastrointestinal: Negative for abdominal pain, constipation, diarrhea, nausea and vomiting.   Genitourinary: Negative.  Negative for frequency and hematuria.   Neurological: Negative for dizziness, headaches, numbness, paresthesias and sensory change.   Psychiatric/Behavioral: Negative for altered mental status and depression. The patient is not nervous/anxious.    Allergic/Immunologic: Negative for hives.       Pain Related Questions  Over the past 3 days, what was your average pain during activity? (I.e. running, jogging, walking, climbing stairs, getting dressed, ect.): 7  Over the past 3 days, what was your highest pain level?: 8  Over the past 3 days, what was your lowest pain level? : 0    Other  How many nights a week are you awakened by your  affected body part?: 0  Was the patient's HEIGHT measured or patient reported?: Patient Reported  Was the patient's WEIGHT measured or patient reported?: Measured      Objective:        General: Cheryl is well-developed, well-nourished, appears stated age, in no acute distress, alert and oriented to time, place and person.     General    Vitals reviewed.  Constitutional: She is oriented to person, place, and time. She appears well-developed and well-nourished. No distress.   HENT:   Head: Normocephalic.   Eyes: EOM are normal.   Neck: Normal range of motion.   Cardiovascular: Normal rate and regular rhythm.    Pulmonary/Chest: Effort normal. No respiratory distress.   Neurological: She is alert and oriented to person, place, and time. She has normal reflexes. No cranial nerve deficit. Coordination normal.   Psychiatric: She has a normal mood and affect. Her behavior is normal. Judgment and thought content normal.         Right Shoulder Exam     Inspection/Observation   Swelling: absent  Bruising: absent  Scars: absent  Deformity: absent  Scapular Winging: absent  Scapular Dyskinesia: negative  Atrophy: absent    Tenderness   The patient is tender to palpation of the biceps tendon.    Range of Motion   Active abduction:  120 abnormal   Passive abduction:  120 abnormal   Extension:  30 normal   Forward Flexion:  150 abnormal   Adduction: 50 abnormal  External Rotation 0 degrees:  70 abnormal   Internal rotation 0 degrees:  T12 abnormal     Muscle Strength   The patient has normal right shoulder strength.    Tests & Signs   Apprehension: positive  Cross arm: positive  Drop arm: negative  Argueta test: positive  Impingement: positive  Sulcus: absent  Rotator Cuff Painful Arc/Range: mild  Lift Off Sign: negative  Active Compression Test (Boyd's Sign): negative  Yergason's Test: negative  Speed's Test: negative  Anterior Drawer Test: 1+   Posterior Drawer Test: 1+    Other   Sensation: normal    Left Shoulder Exam      Inspection/Observation   Swelling: absent  Bruising: absent  Scars: absent  Deformity: absent  Scapular Winging: absent  Scapular Dyskinesia: negative  Atrophy: absent    Tenderness   The patient is experiencing no tenderness.     Range of Motion   Active abduction:  160 normal   Passive abduction:  160 normal   Extension:  30 normal   Forward Flexion:  180 normal   Adduction: 70 normal  External Rotation 0 degrees:  90 normal   Internal rotation 0 degrees:  T4 normal     Muscle Strength   The patient has normal left shoulder strength.    Tests & Signs   Apprehension: negative  Cross arm: negative  Drop arm: negative  Argueta test: negative  Impingement: negative  Sulcus: absent  Lift Off Sign: negative  Active Compression test ([place]'s Sign): negative  Yergasons's Test: negative  Speed's Test: negative  Anterior Drawer Test: 1+  Posterior Drawer Test: 1+    Other   Sensation: normal       Muscle Strength   Right Upper Extremity   Shoulder Abduction: 5/5 (pain)   Shoulder Internal Rotation: 5/5   Shoulder External Rotation: 5/5   Supraspinatus: 5/5/5   Subscapularis: 5/5/5   Biceps: 5/5/5   Left Upper Extremity  Shoulder Abduction: 5/5   Shoulder Internal Rotation: 5/5   Shoulder External Rotation: 5/5   Supraspinatus: 5/5/5   Subscapularis: 5/5/5   Biceps: 5/5/5     Reflexes     Left Side  Biceps:  2+  Triceps:  2+  Brachioradialis:  2+    Right Side   Biceps:  2+  Triceps:  2+  Brachioradialis:  2+    RADIOGRAPHS:  Right shoulder:  FINDINGS:  There is no acute fractures or dislocations.  There is DJD of the acromioclavicular joint.  No osteoblastic or lytic lesions.  Visualized right ribs demonstrate no fractures.  Soft tissues are unremarkable.        Assessment:       Encounter Diagnoses   Name Primary?    Right shoulder pain, unspecified chronicity     Shoulder impingement, right Yes    Arthritis of right acromioclavicular joint           Plan:       1. I made the decision to obtain old records of the  patient including previous notes and imaging. New imaging was ordered today of the extremity or extremities evaluated. I independently reviewed and interpreted the radiographs  today as well as prior imaging. Reviewed radiographs with patient in detail.     2. NSAIDS and Tylenol prn pain    3. HEP 18917 - I, instructed and demonstrated a RC and periscapular HEP. The patient then demonstrated understanding of exercises and proper technique. This program was performed for 15 minutes.     4. Patient declined steroid injection today because she was unsure of her glucose level today. She will call back if she would like to receive a steroid injection.   Possible referral to formal PT in the future if continued pain.     5. Patient will message me via [facility] as needed.                      Patient questionnaires may have been collected.

## 2020-08-17 NOTE — H&P (VIEW-ONLY)
Subjective:      Patient ID: Cheryl Ramirez is a 75 y.o. female.    Chief Complaint: Low-back Pain    Referred by: No ref. provider found     Ms ramirez is a 76 yo female here for follow up of back pain.  She has had back pain off and on for the past 15 years.  Then 5 years ago she was working out and doing hamstring exercises and had left leg pain after a pop.  She tore her hamstring off her ischial tuberosity.  She feels like the back pain has been worse since then and could not get out of bed for 3 weeks and she had steroid injections which helped.  She was last seen by me on 10/22/2019 and she felt like she was doing better but still having trouble with endurance.  We discussed back injections but not interested.  We also discussed gait.  She feels like having more trouble with her back.  She feels like she looses her balance.  She has had 2 bad falls.  She feels like loss of balance.  She is not tripping.  Once it was in the middle of the night, one she was bending forward and kept falling forward.  She feels like she gets bruised.  She feels like she has to pivot when she turns.  She feels like she shuffles.  She does feel like her walk is changing even more.  She has not talked to her primary care.  She does not feel like pain is increasing.  She feels like she gets tired and has to sit down because her back hurts.She gets relief with sitting.  Pain is 0/10 now, worst 8/10 standing and walking, best 0/10 sitting.  She has done some of her exercises, but not all of them.  She knows she should have done them before. She does have some neck pain and trouble lifting head up    MRI lumbar 5/2017 outside report  L1-2 moderate broad based disc bulge and left greater than right facet arthropathy  L2-3 DDD with left greater than right moderate facet arthopathy with NF narrowing  L3-4 broad based posterior disc bulge with left paracentral disc protrusion and moderate facet arthropathy with moderate central  stenosis and moderate left NF narrowing  L4-5 disc dessication and moderate facet arthropathy with moderate right formainal narrowing  L5-S1 disc dessication and severe facet arthropathy and moderate central stenosis and moderate left NF narrowing    X-ray lumbar 6/22/2018  There is an S shaped scoliosis which is moderate.  The vertebral bodies are normal in height.  There is disc space narrowing throughout and mild to moderate osteophytic spurring.  Overall moderate facet degenerative changes are present in the mid to lower spine.  There is no significant alignment abnormality or change in alignment with flexion and extension.    Incidental note is made of a small vascular stent overlying the spine likely relating to the left renal artery.  Vascular calcification present along the wall of the aorta.    Impression      Scoliosis and moderate degenerative changes      X-ray hips 6/22/2018  There is degenerative change within the lower spine.  The hips demonstrate no significant degenerative change.  There is no evidence of fracture.    Impression      As above      Past Medical History:  No date: Diabetes mellitus, type 2  No date: High blood pressure  No date: Scoliosis    History reviewed. No pertinent surgical history.    History reviewed.  No pertinent family history.      Social History    Marital status:              Spouse name:                       Years of education:                 Number of children:               Social History Main Topics    Smoking status: Former Smoker                                                                Packs/day: 0.00      Years: 0.00        Smokeless tobacco: Never Used                          Current Outpatient Prescriptions:  ACCU-CHEK SMARTVIEW TEST STRIP Strp, , Disp: , Rfl:   alendronate (FOSAMAX) 70 MG tablet, , Disp: , Rfl:   ALPRAZolam (XANAX) 0.5 MG tablet, Take 0.5 mg by mouth 3 (three) times daily., Disp: , Rfl:   amLODIPine (NORVASC) 10 MG tablet, ,  Disp: , Rfl:   aspirin (ECOTRIN) 81 MG EC tablet, Take 81 mg by mouth once daily., Disp: , Rfl:   buPROPion (WELLBUTRIN SR) 150 MG TBSR 12 hr tablet, , Disp: , Rfl:   BYSTOLIC 5 mg Tab, , Disp: , Rfl:   clopidogrel (PLAVIX) 75 mg tablet, , Disp: , Rfl:   fish oil-omega-3 fatty acids 300-1,000 mg capsule, Take by mouth once daily., Disp: , Rfl:   insulin lispro (HUMALOG) 100 unit/mL injection, Inject into the skin 3 (three) times daily before meals., Disp: , Rfl:   JUBLIA 10 % Marilyn, APPLY ONE DROP TO ALL SMALLER TOES AND 2 DROPS TO GREATER TOES DAILY, Disp: , Rfl: 10  levothyroxine (SYNTHROID) 50 MCG tablet, Take 50 mcg by mouth once daily., Disp: , Rfl:   losartan (COZAAR) 50 MG tablet, , Disp: , Rfl:   MULTIVIT-IRON-MIN-FOLIC ACID 3,500-18-0.4 UNIT-MG-MG ORAL CHEW, Take by mouth., Disp: , Rfl:   OZEMPIC 1 mg/0.75 mL (2 mg/1.5 mL) PnIj, INJECT 1MG EVERY WEEK, Disp: , Rfl: 5  pantoprazole (PROTONIX) 40 MG tablet, Take 40 mg by mouth once daily., Disp: , Rfl:   risedronate (ACTONEL) 150 MG Tab, , Disp: , Rfl:   rosuvastatin (CRESTOR) 10 MG tablet, , Disp: , Rfl:     No current facility-administered medications for this visit.       Review of patient's allergies indicates:   -- Pcn (penicillins)           Review of Systems   Constitution: Negative for weight gain and weight loss.   Cardiovascular: Positive for dyspnea on exertion. Negative for chest pain.   Respiratory: Negative for shortness of breath.    Musculoskeletal: Positive for back pain (right back). Negative for joint pain and joint swelling.   Gastrointestinal: Negative for abdominal pain, bowel incontinence, nausea and vomiting.   Genitourinary: Negative for bladder incontinence.   Neurological: Positive for loss of balance. Negative for numbness.           Objective:          General    Vitals reviewed.  Constitutional: She is oriented to person, place, and time. She appears well-developed and well-nourished.   HENT:   Head: Normocephalic and atraumatic.    Pulmonary/Chest: Effort normal.   Neurological: She is alert and oriented to person, place, and time.   Psychiatric: She has a normal mood and affect. Her behavior is normal. Judgment and thought content normal.     General Musculoskeletal Exam   Gait: abnormal (lack of arm swing and quick steps)     Back (L-Spine & T-Spine) / Neck (C-Spine) Exam     Back (L-Spine & T-Spine) Range of Motion   Extension: 20   Flexion: 90   Lateral bend right: 20   Lateral bend left: 20   Rotation right: 40   Rotation left: 40     Spinal Sensation   Right Side Sensation  C-Spine Level: normal   L-Spine Level: normal  S-Spine Level: normal  Left Side Sensation  C-Spine Level: normal  L-Spine Level: normal  S-Spine Level: normal    Other She has no scoliosis .  Spinal Kyphosis:  present    Comments:  Neg RICA        Muscle Strength   Right Upper Extremity   Biceps: 5/5/5   Deltoid:  5/5  Triceps:  5/5  Wrist extension: 5/5/5   Finger Flexors:  5/5  Left Upper Extremity  Biceps: 5/5/5   Deltoid:  5/5  Triceps:  5/5  Wrist extension: 5/5/5   Finger Flexors:  5/5  Right Lower Extremity   Hip Flexion: 5/5   Quadriceps:  5/5   Anterior tibial:  5/5/5  EHL:  5/5  Left Lower Extremity   Hip Flexion: 5/5   Quadriceps:  5/5   Anterior tibial:  5/5/5   EHL:  5/5    Reflexes     Left Side  Biceps:  2+  Triceps:  2+  Brachioradialis:  2+  Achilles:  2+  Left Steele's Sign:  Absent  Babinski Sign:  absent  Quadriceps:  2+    Right Side   Biceps:  2+  Triceps:  2+  Brachioradialis:  2+  Achilles:  2+  Right Steele's Sign:  absent  Babinski Sign:  absent  Quadriceps:  2+    Vascular Exam     Right Pulses        Carotid:                  2+    Left Pulses        Carotid:                  2+        Assessment:       Encounter Diagnoses   Name Primary?    Spinal stenosis of lumbar region without neurogenic claudication Yes    Spondylosis of lumbar region without myelopathy or radiculopathy     Chronic bilateral low back pain with right-sided  sciatica     Gait instability          Plan:       Cheryl was seen today for low-back pain.    Diagnoses and all orders for this visit:    Spinal stenosis of lumbar region without neurogenic claudication  -     Ambulatory referral/consult to [facility]; Future  -     Procedure Order to Pain Management; Future    Spondylosis of lumbar region without myelopathy or radiculopathy    Chronic bilateral low back pain with right-sided sciatica    Gait instability  -     Ambulatory referral/consult to Neurology; Future           1.  She did not continue PT HEP she felt it very helpful.  She knows she should have done it more.  She felt much better.  We will return to healthy back pattern 2  2.  Tizanidine 2-4 TID, taking 1 at night  3. We discussed HEP, she knows it is important to continue.  She has not continued knee exercises either  4.  Discussed gait,  she does have lack of arm swing and masked expression and feeling like cannot advance the legs and fenestrated.  She has not discussed with PCP.  I mention parkinsons.  A neurology consult placed    5.  MRI report of the lumbar spine was reviewed.  She does have facet arthropathy and spinal stenosis.  She does have some back pain that gets better with sitting.  She will try the injections.  We discussed repeating MRI, she is not interested  6.  We discussed balance and could be from neck, or could be PN, or could be neurologic.  We discussed MRI of neck . She would like to wait.    7.  Continue tylenol as needed  8.  we discussed small walks or recumbent bike  9.  Bilateral L5-S1 TF FAM with pain management to help neurogenic claudication symptoms  10  rtc 3 months

## 2020-08-18 ENCOUNTER — OFFICE VISIT (OUTPATIENT)
Dept: SPINE | Facility: CLINIC | Age: 75
End: 2020-08-18
Attending: PHYSICAL MEDICINE & REHABILITATION
Payer: MEDICARE

## 2020-08-18 VITALS
DIASTOLIC BLOOD PRESSURE: 64 MMHG | HEART RATE: 81 BPM | SYSTOLIC BLOOD PRESSURE: 130 MMHG | HEIGHT: 64 IN | BODY MASS INDEX: 26.12 KG/M2 | WEIGHT: 153 LBS

## 2020-08-18 DIAGNOSIS — M54.41 CHRONIC BILATERAL LOW BACK PAIN WITH RIGHT-SIDED SCIATICA: ICD-10-CM

## 2020-08-18 DIAGNOSIS — M47.816 SPONDYLOSIS OF LUMBAR REGION WITHOUT MYELOPATHY OR RADICULOPATHY: ICD-10-CM

## 2020-08-18 DIAGNOSIS — R26.81 GAIT INSTABILITY: ICD-10-CM

## 2020-08-18 DIAGNOSIS — G89.29 CHRONIC BILATERAL LOW BACK PAIN WITH RIGHT-SIDED SCIATICA: ICD-10-CM

## 2020-08-18 DIAGNOSIS — M48.061 SPINAL STENOSIS OF LUMBAR REGION WITHOUT NEUROGENIC CLAUDICATION: Primary | ICD-10-CM

## 2020-08-18 PROCEDURE — 3008F PR BODY MASS INDEX (BMI) DOCUMENTED: ICD-10-PCS | Mod: CPTII,S$GLB,, | Performed by: PHYSICAL MEDICINE & REHABILITATION

## 2020-08-18 PROCEDURE — 99214 OFFICE O/P EST MOD 30 MIN: CPT | Mod: S$GLB,,, | Performed by: PHYSICAL MEDICINE & REHABILITATION

## 2020-08-18 PROCEDURE — 1126F PR PAIN SEVERITY QUANTIFIED, NO PAIN PRESENT: ICD-10-PCS | Mod: S$GLB,,, | Performed by: PHYSICAL MEDICINE & REHABILITATION

## 2020-08-18 PROCEDURE — 99999 PR PBB SHADOW E&M-EST. PATIENT-LVL V: ICD-10-PCS | Mod: PBBFAC,,, | Performed by: PHYSICAL MEDICINE & REHABILITATION

## 2020-08-18 PROCEDURE — 1126F AMNT PAIN NOTED NONE PRSNT: CPT | Mod: S$GLB,,, | Performed by: PHYSICAL MEDICINE & REHABILITATION

## 2020-08-18 PROCEDURE — 1159F PR MEDICATION LIST DOCUMENTED IN MEDICAL RECORD: ICD-10-PCS | Mod: S$GLB,,, | Performed by: PHYSICAL MEDICINE & REHABILITATION

## 2020-08-18 PROCEDURE — 3008F BODY MASS INDEX DOCD: CPT | Mod: CPTII,S$GLB,, | Performed by: PHYSICAL MEDICINE & REHABILITATION

## 2020-08-18 PROCEDURE — 99999 PR PBB SHADOW E&M-EST. PATIENT-LVL V: CPT | Mod: PBBFAC,,, | Performed by: PHYSICAL MEDICINE & REHABILITATION

## 2020-08-18 PROCEDURE — 99214 PR OFFICE/OUTPT VISIT, EST, LEVL IV, 30-39 MIN: ICD-10-PCS | Mod: S$GLB,,, | Performed by: PHYSICAL MEDICINE & REHABILITATION

## 2020-08-18 PROCEDURE — 1159F MED LIST DOCD IN RCRD: CPT | Mod: S$GLB,,, | Performed by: PHYSICAL MEDICINE & REHABILITATION

## 2020-08-18 PROCEDURE — 1101F PR PT FALLS ASSESS DOC 0-1 FALLS W/OUT INJ PAST YR: ICD-10-PCS | Mod: CPTII,S$GLB,, | Performed by: PHYSICAL MEDICINE & REHABILITATION

## 2020-08-18 PROCEDURE — 1101F PT FALLS ASSESS-DOCD LE1/YR: CPT | Mod: CPTII,S$GLB,, | Performed by: PHYSICAL MEDICINE & REHABILITATION

## 2020-08-18 RX ORDER — SEMAGLUTIDE 1.34 MG/ML
INJECTION, SOLUTION SUBCUTANEOUS WEEKLY
COMMUNITY

## 2020-08-19 ENCOUNTER — TELEPHONE (OUTPATIENT)
Dept: PAIN MEDICINE | Facility: CLINIC | Age: 75
End: 2020-08-19

## 2020-08-19 DIAGNOSIS — M48.061 SPINAL STENOSIS OF LUMBAR REGION WITHOUT NEUROGENIC CLAUDICATION: Primary | ICD-10-CM

## 2020-08-19 NOTE — TELEPHONE ENCOUNTER
Spoke to patient to schedule procedure. Date, time, and instructions given. Patient verbalized understanding.

## 2020-08-28 ENCOUNTER — CLINICAL SUPPORT (OUTPATIENT)
Dept: REHABILITATION | Facility: HOSPITAL | Age: 75
End: 2020-08-28
Attending: PHYSICAL MEDICINE & REHABILITATION
Payer: MEDICARE

## 2020-08-28 DIAGNOSIS — M48.061 SPINAL STENOSIS OF LUMBAR REGION WITHOUT NEUROGENIC CLAUDICATION: ICD-10-CM

## 2020-08-28 DIAGNOSIS — Z74.09 IMPAIRED FUNCTIONAL MOBILITY, BALANCE, GAIT, AND ENDURANCE: ICD-10-CM

## 2020-08-28 DIAGNOSIS — M54.16 LUMBAR BACK PAIN WITH RADICULOPATHY AFFECTING RIGHT LOWER EXTREMITY: Primary | ICD-10-CM

## 2020-08-28 PROCEDURE — 97161 PT EVAL LOW COMPLEX 20 MIN: CPT | Mod: PN

## 2020-08-28 PROCEDURE — 97110 THERAPEUTIC EXERCISES: CPT | Mod: PN

## 2020-08-28 NOTE — PLAN OF CARE
[facility] - PHYSICAL THERAPY EVALUATION     Name: Cheryl Abreu  Clinic Number: [id number]    Therapy Diagnosis:   Encounter Diagnoses   Name Primary?    Spinal stenosis of lumbar region without neurogenic claudication     Lumbar back pain with radiculopathy affecting right lower extremity Yes    Impaired functional mobility, balance, gait, and endurance        Physician: Brisa Boyle, *    Physician Orders: PT Eval and Treat   Medical Diagnosis from Referral: M48.061 (ICD-10-CM) - Spinal stenosis of lumbar region without neurogenic claudication  Evaluation Date: 8/28/2020  Authorization Period Expiration: 9/21/20  Plan of Care Expiration: 11/28/20  Reassessment Due: 9/28/20  Visit # / Visits authorized: 1/ 1    Time In: 2:30 pm  Time Out: 4:00 pm  Total Billable Time: 90 minutes    Precautions: Standard, Fall, scoliosis, osteoporosis    Pattern of pain determined: Pattern 2      Subjective   Date of onset: 3-4 weeks ago, hx of back pain  History of current condition - Cheryl reports: Has had back pain for several years. Has attended healthy back and general physical therapy in the past with good results. States she stopped doing exercise and has noticed her pain returning. History of scoliosis, shifts to R in sitting and standing. Difficulty rolling into sidelying in bed, difficulty getting up from a chair, and difficulty stepping into tub. Has had two significant falls in last several months. Ambulates with shuffle gait. States she was referred to neurologist for future testing.      Medical History:   Past Medical History:   Diagnosis Date    Arthritis     Diabetes mellitus, type 2     Hearing loss     High blood pressure     Hyperlipidemia     Osteoporosis     Scoliosis     Thyroid disease        Surgical History:   Cheryl Abreu  has a past surgical history that includes Renal artery stent (Left, 2001); arthroscopic left knee (Left, 2012); Oophorectomy (Right);  Wallace tooth extraction; and Eye surgery (Bilateral).    Medications:   Cheryl has a current medication list which includes the following prescription(s): accu-chek smartview test strip, alendronate, amlodipine, aspirin, biotin, bupropion, bystolic, clopidogrel, fluticasone propionate, insulin lispro, levothyroxine, losartan, multivit-iron-min-folic acid, rosuvastatin, ozempic, sodium chloride, and tizanidine.    Allergies:   Review of patient's allergies indicates:   Allergen Reactions    Nickel Itching and Other (See Comments)    Pcn [penicillins] Rash        Imaging, bone scan films: 6/10/20  CLINICAL HISTORY:  Pain in right shoulder     TECHNIQUE:  Two or three views of the right shoulder were performed.     COMPARISON:  None     FINDINGS:  There is no acute fractures or dislocations.  There is DJD of the acromioclavicular joint.  No osteoblastic or lytic lesions.  Visualized right ribs demonstrate no fractures.  Soft tissues are unremarkable.     Impression:     As above    Prior Therapy: has attended HB program and therapy for Knees  Prior Treatment: PT for HB and knee  Social History:  lives with their spouse, single story home, [address]  Occupation: Retired   Leisure: read, watch tv      Prior Level of Function: independent   Current Level of Function: difficulty with transitions, increased low back pain with radicular pain into RLE, poor balance, ambulates occasionally with use of cane  DME owned/used: [employer], single point cane,       Pain:   Current 1/10, worst 7/10, best 0/10   Location: bilateral back, radiating pain into LLE  Description: Sharp and Shooting  Aggravating Factors: Bending, standing, rolling over in bed   Easing Factors: lying down  Disturbed Sleep: no         Pattern of pain questions:  1.  Where is your pain the worst? back  2.  Is your pain constant or intermittent? Constant   3.  Does bending forward make your typical pain worse? Yes   4.  Since the start of your back pain, has there been a  change in your bowel or bladder? No   5.  What can't you do now that you use to be able to do? Standing/sit without pain, ADLs, putting on socks and shoes       Pts goals: decrease low back pain      Red Flag Screening:   Cough  Sneeze  Strain: (--)  Bladder/ bowel: (--)  Falls: (+)  Night pain: (--)  Unexplained weight loss: (--)  General health: fair  OBJECTIVE     Postural examination/scapula alignment: Rounded shoulder and lateral shift to R  Joint integrity: Firm end feeling  Edema: Mild edema in R ankle  Sitting: hip shifted to R  Standing: kyphosis, hips shifted to R  Correction of posture: better with lumbar roll    MOVEMENT LOSS    ROM Loss   Flexion minimal loss   Extension major loss*   Side bending Right major loss* on R   Side bending Left major loss   Rotation Right major loss   Rotation Left major loss     Lower Extremity Strength  Right LE  Left LE    Hip flexion: 3+/5 Hip flexion: 3-/5* on R QL   Hip extension:  3-/5 Hip extension: 3/5   Hip abduction: 3/5 Hip abduction: 3/5   Hip adduction:  3-/5 Hip adduction:  3-/5   Hip Internal rotation    3/5 Hip Internal rotation 3+/5   Knee Flexion 4-/5 Knee Flexion 4-/5   Knee Extension 4/5 Knee Extension 4/5   Ankle dorsiflexion: 4-/5 Ankle dorsiflexion: 4-/5   Ankle plantarflexion: 4-/5 Ankle plantarflexion: 4-/5       GAIT:  Assistive Device used: none today but occasionally will use cane in community outings   Level of Assistance: supervision  Patient displays the following gait deviations:  unsteady gait, decreased step length, increased base of support, decreased weight shift and antalgic gait, shuffle gait.    Special Tests:   Test Name  Test Result   Prone Instability Test (--)   SI Joint Provocation Test (+) on R   Straight Leg Raise (--)   Neural Tension Test (--)   Crossed Straight Leg Raise (--)   Walking on toes (--)   Walking on heels  (--)       NEUROLOGICAL SCREENING     Sensory deficit: none    Reflexes:    Left Right   Patella Tendon 1+  1+   Achilles Tendon 1+ 1+   Babinski  (--) (--)   Clonus (--) (--)     REPEATED TEST MOVEMENTS:  Repeated Flexion in Standing no worse   Repeated Extension in Standing pain during motion  better   Repeated Flexion in lying better   Repeated Extension in lying  Unable to attempt       STATIC TESTS   Sitting slouched  no worse   Sitting erect no worse   Standing slouched better   Standing erect  worse   Lying prone in extension  worse   Long sitting   NT       Baseline Isometric Testing on Med X equipment: Testing administered by PT  Date of testin20  ROM 9-48 deg   Max Peak Torque 80    Min Peak Torque 8    Flex/Ext Ratio 10/1   % below normative data 62         Limitation/Restriction for FOTO Lumbar Survey    Therapist reviewed FOTO scores for Cheryl Abreu on 2020.   FOTO documents entered into [employer] - see Media section.    Limitation Score: 64%             Treatment   Treatment Time In: 3:30 pm  Treatment Time Out: 4:00 pm  Total Treatment time separate from Evaluation: 30 minutes      Cheryl received therapeutic exercises to develop/improve posture, lumbar/cervical ROM, strength and muscular endurance for 30 minutes including the following exercises:     Med x dynamic exercise and baseline IM test  DKTC with PB  LTR with PB  Figure 4 stretch  Pelvic tilt  SLR      Written Home Exercises Provided: yes.  Exercises were reviewed and Cheryl was able to demonstrate them prior to the end of the session.  Cheryl demonstrated good  understanding of the education provided.     See EMR under Patient Instructions for exercises provided 2020.      Education provided:   - Patient received education regarding proper posture and body mechanics.  Patient was given top 10 tips handout which discusses posture seated, standing, lifting correctly, components of exercise, importance of nutrition and hydration, and importance of sleep.  - [place] cao tried, recommended, and purchase information was  provided.    - Patient received a handout regarding anticipated muscular soreness following the isometric test and strategies for management were reviewed with patient including stretching, using ice and scheduled rest.   - Patient received education on the Healthy Back program, purpose of the isometric test, progression of back strengthening as well as wellness approach and systemic strengthening.  Details of the program were discussed.  Reviewed that patient should feel support/pressure from med ex restraints but no pain or discomfort and patient expressed understanding.    Cheryl received cold pack for 10 minutes to lumbar in Z lie position.    Assessment   Cheryl is a 75 y.o. female referred to [facility] with a medical diagnosis of spinal stenosis of lumbar region without neurogenic claudication. Pt presents with lumbar pain with radicular pain into RLE, increased pain reported with lumbar extension, relief with flexion, impaired balance, shuffle and unsteady gait pattern, poor standing tolerance, increased fall risk, increased thoracic kyphosis, L lateral shift, decreased lumbar and thoracic range of motion, and decreased trunk and BLE strength. Pt medx isometric testing is 62% below age norms. Testing indicates significant weakness into extension ranges, with pt lacking 9 degrees. Pt findings are consistent with medical diagnosis. Pt educated on use of AD for gait to decrease fall risk in community. Given HEP and educated pt on continuation of exercises during treatment as well as following discharge from therapy to maintain improvements lumbar pain. Plan to treat pt 2x/week for 10 weeks or 20 visits to address deficits.     Pain Pattern: Pattern 2       Pt prognosis is Good.   Pt will benefit from skilled outpatient Physical Therapy to address the deficits stated above and in the chart below, provide pt/family education, and to maximize pt's level of independence. Based on the above history and  physical examination an active physical therapy program is recommended.  Pt will continue to benefit from skilled outpatient physical therapy to address the deficits listed below in the chart, provide pt/family education and to maximize pt's level of independence in the home and community environment. .       Plan of care discussed with patient: Yes  Pt's spiritual, cultural and educational needs considered and patient is agreeable to the plan of care and goals as stated below:     Anticipated Barriers for therapy: none,     PT Evaluation Completed? Yes    Medical necessity is demonstrated by the following problem list.    Pt presents with the following impairments:     History  Co-morbidities and personal factors that may impact the plan of care Co-morbidities:   advanced age and arthritis, DM, osteoporosis, scoliosis, thyroid disease    Personal Factors:   age  lifestyle     high   Examination  Body Structures and Functions, activity limitations and participation restrictions that may impact the plan of care Body Regions:   back  lower extremities  trunk    Body Systems:    ROM  strength  balance  gait  transfers  motor control  motor learning    Participation Restrictions:   Unable to walk or stand for >/= 5 minutes    Activity limitations:   Learning and applying knowledge  no deficits    General Tasks and Commands  no deficits    Communication  no deficits    Mobility  lifting and carrying objects  walking  stairs    Self care  washing oneself (bathing, drying, washing hands)  toileting  dressing  looking after one's health    Domestic Life  shopping  cooking  doing house work (cleaning house, washing dishes, laundry)    Interactions/Relationships  no deficits    Life Areas  no deficits    Community and Social Life  community life  recreation and leisure         low   Clinical Presentation stable and uncomplicated low   Decision Making/ Complexity Score: low       GOALS: Pt is in agreement with the following  goals.    Short term goals:  6 weeks or 10 visits   1.  Pt will demonstrate increased lumbar ROM by at least 6 degrees from the initial ROM value with improvements noted in functional ROM and ability to perform ADLs.  2.  Pt will demonstrate increased MedX average isometric strength value  by 30% from initial test resulting in improved ability to perform bending, lifting, and carrying activities safely, confidently.    3.  Patient report a reduction in worst pain score by 1-2 points for improved tolerance for standing.  4.  Pt able to perform HEP correctly with minimal cueing or supervision from therapist to encourage independent management of symptoms.       Long term goals: 10 weeks or 20 visits   1. Pt will demonstrate increased lumbar ROM by at least 12 degrees from initial ROM value, resulting in improved ability to perform functional fwd bending while standing and sitting.   2. Pt will demonstrate increased MedX average isometric strength value  by 60% from initial test resulting in improved ability to perform bending, lifting, and carrying activities safely, confidently.  3. Pt to demonstrate ability to independently control and reduce their pain through posture positioning and mechanical movements throughout a typical day.  4.  Pt will demonstrate reduced pain and improved functional outcomes as reported on the FOTO by reaching a limitation score of < or = 50% or less in order to demonstrate subjective improvement in pt's condition.    5. Pt will demonstrate independence with the HEP at discharge  6.  Pt will be able to tolerate walking for >/= 15 minutes without significant increase in low back pain.      Plan   Outpatient physical therapy 2x week for 10 weeks or 20 visits to include the following:   - Patient education  - Therapeutic exercise  - Manual therapy  - Performance testing   - Neuromuscular Re-education  - Therapeutic activity   - Modalities    Pt may be seen by PTA as part of the rehabilitation  "team.     Therapist: Janel Saldivar, PT  8/28/2020    "I certify the need for these services furnished under this plan of treatment and while under my care."    ____________________________________  Physician/Referring Practitioner    _______________  Date of Signature            "

## 2020-08-28 NOTE — PATIENT INSTRUCTIONS
Goal:  - reduce back /thigh/buttock pain with mechanical movements or positions that relieve pressure on structures in low back  - exercises done to reduce symptoms when you experience them  - exercises done to prevent symptoms, when you are feeling good      1. Exercises  A. Supine Knee-to-Chest, Bilateral    Lie on back, hands clasped behind both knees. Pull knees in toward chest until a comfortable stretch is felt in lower back and buttocks. Hold 3 seconds.  Repeat 10 times per session. Do 2 sessions per day.        B. Flexion in Sitting    Sit in chair with knees spread apart. Bend forward toward floor. Comfortable stretch should be felt in lower back.  Hold 2 seconds.  Repeat 10 times per session. Do 2 sessions per day.      Pelvic Tilt    lying supine, flatten back by tightening stomach muscles and rolling pelvis back.   Your back will flatten, your buttock will lift up.   Do not hold your breath.  Hold 3 seconds.  Repeat 10 times per set. Do 10 sets per session. Do 2 sessions per day.    [email or web address]           Positioning - Z LIE  Decompression Exercise: Leg Support.  Can be done on floor or bed with legs on couch, therapy ball, chair, or auto man        Lie on back on firm surface arms turned up, out to sides, backs of hands down. Support under legs: 90/90 position. Time 10 minutes.  Surface: floor      Copyright © [employer]. All rights reserved.

## 2020-09-03 ENCOUNTER — HOSPITAL ENCOUNTER (OUTPATIENT)
Facility: OTHER | Age: 75
Discharge: HOME | End: 2020-09-03
Attending: ANESTHESIOLOGY | Admitting: ANESTHESIOLOGY
Payer: MEDICARE

## 2020-09-03 VITALS
BODY MASS INDEX: 24.16 KG/M2 | DIASTOLIC BLOOD PRESSURE: 74 MMHG | RESPIRATION RATE: 16 BRPM | HEART RATE: 77 BPM | WEIGHT: 145 LBS | SYSTOLIC BLOOD PRESSURE: 142 MMHG | TEMPERATURE: 98 F | OXYGEN SATURATION: 99 % | HEIGHT: 65 IN

## 2020-09-03 DIAGNOSIS — M47.819 SPONDYLOSIS WITHOUT MYELOPATHY: Primary | ICD-10-CM

## 2020-09-03 DIAGNOSIS — M47.9 OSTEOARTHRITIS OF SPINE, UNSPECIFIED SPINAL OSTEOARTHRITIS COMPLICATION STATUS, UNSPECIFIED SPINAL REGION: ICD-10-CM

## 2020-09-03 DIAGNOSIS — M51.37 DDD (DEGENERATIVE DISC DISEASE), LUMBOSACRAL: ICD-10-CM

## 2020-09-03 DIAGNOSIS — M54.17 LUMBOSACRAL RADICULOPATHY: ICD-10-CM

## 2020-09-03 LAB — POCT GLUCOSE: 142 MG/DL (ref 70–110)

## 2020-09-03 PROCEDURE — 64483 NJX AA&/STRD TFRM EPI L/S 1: CPT | Mod: 50,,, | Performed by: ANESTHESIOLOGY

## 2020-09-03 PROCEDURE — 25000003 PHARM REV CODE 250: Performed by: STUDENT IN AN ORGANIZED HEALTH CARE EDUCATION/TRAINING PROGRAM

## 2020-09-03 PROCEDURE — 64483 NJX AA&/STRD TFRM EPI L/S 1: CPT | Mod: 50 | Performed by: ANESTHESIOLOGY

## 2020-09-03 PROCEDURE — A4216 STERILE WATER/SALINE, 10 ML: HCPCS | Performed by: ANESTHESIOLOGY

## 2020-09-03 PROCEDURE — 63600175 PHARM REV CODE 636 W HCPCS: Performed by: ANESTHESIOLOGY

## 2020-09-03 PROCEDURE — 82947 ASSAY GLUCOSE BLOOD QUANT: CPT | Performed by: ANESTHESIOLOGY

## 2020-09-03 PROCEDURE — 25000003 PHARM REV CODE 250: Performed by: ANESTHESIOLOGY

## 2020-09-03 PROCEDURE — 64483 PR EPIDURAL INJ, ANES/STEROID, TRANSFORAMINAL, LUMB/SACR, SNGL LEVL: ICD-10-PCS | Mod: 50,,, | Performed by: ANESTHESIOLOGY

## 2020-09-03 PROCEDURE — 25500020 PHARM REV CODE 255: Performed by: ANESTHESIOLOGY

## 2020-09-03 RX ORDER — SODIUM CHLORIDE 9 MG/ML
INJECTION, SOLUTION INTRAMUSCULAR; INTRAVENOUS; SUBCUTANEOUS
Status: DISCONTINUED | OUTPATIENT
Start: 2020-09-03 | End: 2020-09-03 | Stop reason: HOSPADM

## 2020-09-03 RX ORDER — SODIUM CHLORIDE 9 MG/ML
500 INJECTION, SOLUTION INTRAVENOUS CONTINUOUS
Status: DISCONTINUED | OUTPATIENT
Start: 2020-09-03 | End: 2020-09-03 | Stop reason: HOSPADM

## 2020-09-03 RX ORDER — MIDAZOLAM HYDROCHLORIDE 1 MG/ML
INJECTION INTRAMUSCULAR; INTRAVENOUS
Status: DISCONTINUED | OUTPATIENT
Start: 2020-09-03 | End: 2020-09-03 | Stop reason: HOSPADM

## 2020-09-03 RX ORDER — LIDOCAINE HYDROCHLORIDE 10 MG/ML
INJECTION, SOLUTION EPIDURAL; INFILTRATION; INTRACAUDAL; PERINEURAL
Status: DISCONTINUED | OUTPATIENT
Start: 2020-09-03 | End: 2020-09-03 | Stop reason: HOSPADM

## 2020-09-03 RX ORDER — DEXAMETHASONE SODIUM PHOSPHATE 100 MG/10ML
INJECTION INTRAMUSCULAR; INTRAVENOUS
Status: DISCONTINUED | OUTPATIENT
Start: 2020-09-03 | End: 2020-09-03 | Stop reason: HOSPADM

## 2020-09-03 RX ORDER — LIDOCAINE HYDROCHLORIDE 10 MG/ML
INJECTION INFILTRATION; PERINEURAL
Status: DISCONTINUED | OUTPATIENT
Start: 2020-09-03 | End: 2020-09-03 | Stop reason: HOSPADM

## 2020-09-03 RX ORDER — FENTANYL CITRATE 50 UG/ML
INJECTION, SOLUTION INTRAMUSCULAR; INTRAVENOUS
Status: DISCONTINUED | OUTPATIENT
Start: 2020-09-03 | End: 2020-09-03 | Stop reason: HOSPADM

## 2020-09-03 RX ADMIN — SODIUM CHLORIDE 500 ML: 0.9 INJECTION, SOLUTION INTRAVENOUS at 09:09

## 2020-09-03 NOTE — DISCHARGE INSTRUCTIONS

## 2020-09-03 NOTE — DISCHARGE SUMMARY
Discharge Note  Short Stay      SUMMARY     Admit Date: 9/3/2020    Attending Physician: Lavonne Vaughn      Discharge Physician: Lavonne Vaughn      Discharge Date: 9/3/2020 11:04 AM    Procedure(s) (LRB):  LUMBAR TRANSFORAMINAL BILATERAL L5/S1 DIRECT REFERRAL (Bilateral)    Final Diagnosis: Spinal stenosis of lumbar region without neurogenic claudication [M48.061]    Disposition: Home or self care    Patient Instructions:   Current Discharge Medication List      CONTINUE these medications which have NOT CHANGED    Details   ACCU-CHEK SMARTVIEW TEST STRIP Strp       alendronate (FOSAMAX) 70 MG tablet Take 70 mg by mouth every 7 days.       amLODIPine (NORVASC) 10 MG tablet Take 10 mg by mouth once daily.       aspirin (ECOTRIN) 81 MG EC tablet Take 81 mg by mouth once daily.      biotin 2,500 mcg Cap Take 1 capsule by mouth once daily.       buPROPion (WELLBUTRIN SR) 150 MG TBSR 12 hr tablet Take 150 mg by mouth 2 (two) times daily.       BYSTOLIC 5 mg Tab Take 5 mg by mouth once daily.       clopidogrel (PLAVIX) 75 mg tablet Take 75 mg by mouth once daily.       fluticasone (FLONASE) 50 mcg/actuation nasal spray 1 spray by Each Nare route once daily.      insulin lispro (HUMALOG) 100 unit/mL injection Inject into the skin 3 (three) times daily before meals.      levothyroxine (SYNTHROID) 50 MCG tablet Take 50 mcg by mouth once daily.      losartan (COZAAR) 50 MG tablet Take 50 mg by mouth 2 (two) times daily.       MULTIVIT-IRON-MIN-FOLIC ACID 3,500-18-0.4 UNIT-MG-MG ORAL CHEW Take by mouth.      rosuvastatin (CRESTOR) 10 MG tablet Take 10 mg by mouth every evening.       semaglutide (OZEMPIC) 0.25 mg or 0.5 mg(2 mg/1.5 mL) PnIj Inject into the skin once a week.      sodium chloride (OCEAN) 0.65 % nasal spray 1 spray by Nasal route as needed for Congestion.      tiZANidine (ZANAFLEX) 2 MG tablet TAKE 1-2 TABLETS (2-4 MG TOTAL) BY MOUTH EVERY 8 (EIGHT) HOURS AS NEEDED.  Qty: 180 tablet, Refills: 2    Associated  Diagnoses: Muscle spasm                 Discharge Diagnosis: Spinal stenosis of lumbar region without neurogenic claudication [M48.061]  Condition on Discharge: Stable with no complications to procedure   Diet on Discharge: Same as before.  Activity: as per instruction sheet.  Discharge to: Home with a responsible adult.  Follow up: 2-4 weeks       Please call my office or pager at [phone] if experienced any weakness or loss of sensation, fever > 101.5, pain uncontrolled with oral medications, persistent nausea/vomiting/or diarrhea, redness or drainage from the incisions, or any other worrisome concerns. If physician on call was not reached or could not communicate with our office for any reason please go to the nearest emergency department

## 2020-09-03 NOTE — INTERVAL H&P NOTE
The patient has been examined and the H&P has been reviewed:    I concur with the findings and no changes have occurred since H&P was written.    Anesthesia/Surgery risks, benefits and alternative options discussed and understood by patient/family.          Active Hospital Problems    Diagnosis  POA    Spondylosis without myelopathy [M47.819]  Yes      Resolved Hospital Problems   No resolved problems to display.

## 2020-09-03 NOTE — OP NOTE
Date of Service: 09/03/2020    PCP: Tomás Clark MD    Referring Physician:    Time-out taken to identify patient and procedure side prior to starting the procedure.   I attest that I have reviewed the patient's home medications prior to the procedure and no contraindication have been identified. I  re-evaluated the patient after the patient was positioned for the procedure in the procedure room immediately before the procedural time-out. The vital signs are current and represent the current state of the patient which has not significantly changed since the preprocedure assessment.                                                           PROCEDURE: Bilateral L5/S1 transforaminal epidural steroid injection under fluoroscopy    REASON FOR PROCEDURE: Bilateral Spinal stenosis of lumbar region without neurogenic claudication [M48.061]  1. Spondylosis without myelopathy    2. Osteoarthritis of spine, unspecified spinal osteoarthritis complication status, unspecified spinal region    3. DDD (degenerative disc disease), lumbosacral    4. Lumbosacral radiculopathy      POSTPROCEDURE DIAGNOSIS:   Spinal stenosis of lumbar region without neurogenic claudication [M48.061]    1. Spondylosis without myelopathy    2. Osteoarthritis of spine, unspecified spinal osteoarthritis complication status, unspecified spinal region    3. DDD (degenerative disc disease), lumbosacral    4. Lumbosacral radiculopathy           PHYSICIAN: Lavonne Vaughn MD  ASSISTANTS:Demetrice Jaramillo DO ([facility])          MEDICATIONS INJECTED:  Preservative-free dexamethasone 10mg, Xylocaine 1% MPF 3-5ml. 3ml per level. Preservative free, sterile normal saline is used to get larger volume as needed.  LOCAL ANESTHETIC INJECTED:  Xylocaine 1% 9ml with Sodium Bicarbonate 1ml. 3ml per site.    SEDATION MEDICATIONS: local/IV sedation: Versed 1 mg and fentanyl 25 mcg IV.  Conscious sedation ordered by MD.  Patient reevaluated and sedation administered by  MD and monitored by RN.  Total sedation time was less than 10 min. (See nurse documentation and case log for sedation time)    ESTIMATED BLOOD LOSS:  None.    COMPLICATIONS:  None.    TECHNIQUE:   Laying in a prone position, the patient was prepped and draped in the usual sterile fashion using ChloraPrep and fenestrated drape.  The area to be injected was determined under fluoroscopic guidance.  Local anesthetic was given by raising a wheel and going down to the hub of a 27-gauge 1.25 inch needle.  The 3.5inch 22-gauge spinal needle was introduced towards the transverse process of all levels as stated above.   The needle was walked medially then hinged into the neural foramen.  Omnipaque was injected to confirm appropriate placement and that there was no vascular runoff.  The medication was then injected after applying negative pressure. The patient tolerated the procedure well.    PAIN BEFORE THE PROCEDURE: 4/10.    PAIN AFTER THE PROCEDURE: 0/10.    The patient was monitored after the procedure.  Patient was given post procedure and discharge instructions to follow at home.  We will see the patient back in two weeks or the patient may call to inform of status. The patient was discharged in a stable condition.   Patient informed

## 2020-09-08 ENCOUNTER — CLINICAL SUPPORT (OUTPATIENT)
Dept: REHABILITATION | Facility: HOSPITAL | Age: 75
End: 2020-09-08
Attending: PHYSICAL MEDICINE & REHABILITATION
Payer: MEDICARE

## 2020-09-08 DIAGNOSIS — M54.16 LUMBAR BACK PAIN WITH RADICULOPATHY AFFECTING RIGHT LOWER EXTREMITY: ICD-10-CM

## 2020-09-08 DIAGNOSIS — Z74.09 IMPAIRED FUNCTIONAL MOBILITY, BALANCE, GAIT, AND ENDURANCE: ICD-10-CM

## 2020-09-08 PROCEDURE — 97110 THERAPEUTIC EXERCISES: CPT | Mod: PN

## 2020-09-08 NOTE — PROGRESS NOTES
[facility]      Name: Cheryl Abreu  Clinic Number: [id number]    Therapy Diagnosis:   Encounter Diagnoses   Name Primary?    Lumbar back pain with radiculopathy affecting right lower extremity     Impaired functional mobility, balance, gait, and endurance      Physician: Brisa Boyle, *    Visit Date: 2020    Physician Orders: PT Eval and Treat   Medical Diagnosis from Referral: M48.061 (ICD-10-CM) - Spinal stenosis of lumbar region without neurogenic claudication  Evaluation Date: 2020  Authorization Period Expiration: 20  Plan of Care Expiration: 20  Reassessment Due: 20  Visit # / Visits authorized:      Time In: 10:30 am  Time Out: 11:30 am  Total Billable Time: 60 minutes     Precautions: Standard, Fall, scoliosis, osteoporosis     Pattern of pain determined: Pattern 2    Subjective   Cheryl reports marked improvement in symptoms since evaluation, however she also received an injection last week which has helped notably.      Patient reports tolerating previous visit well, no residual issues or symptoms.  Patient reports their pain to be 2/10 on a 0-10 scale with 0 being no pain and 10 being the worst pain imaginable.  Pain Location: Central low back     Work and leisure: Retired  Pt goals: Reading, watching TV    Objective     Baseline Isometric Testing on Med X equipment: Testing administered by PT  Date of testin20  ROM 9-48 deg   Max Peak Torque 80    Min Peak Torque 8    Flex/Ext Ratio 10/1   % below normative data 62         Outcomes: FOTO  Initial score: 64%  Visit 5 score:   Goal: <60%      Treatment    Pt was instructed in and performed the following:     Cheryl received therapeutic exercises to develop/improved posture, cardiovascular endurance, muscular endurance, lumbar/cervical ROM, strength and muscular endurance for 50 minutes including the following exercises:     [facility] Therapy - Short 2020   Visit  Number 2   VAS Pain Rating 2   Treadmill Time (in min.) -   Speed -   Incline -   Recumbent Bike - Seat Pos. -   Time -   Extension in Standing -   Flexion in Lying 10   Manual Therapy -   Lumbar Extension - Seat Pad -   Femur Restraint -   Top Dead Center -   Counterweight -   Lumbar Flexion -   Lumbar Extension -   Lumbar Peak Torque -   Lumbar Weight 35   Repetitions 18   Rating of Perceived Exertion 3             Peripheral muscle strengthening which included 1 set of 15-20 repetitions at a slow, controlled 10-13 second per rep pace focused on strengthening supporting musculature for improved body mechanics and functional mobility.  Pt and therapist focused on proper form during treatment to ensure optimal strengthening of each targeted muscle group.  Machines were utilized including torso rotation, leg extension, leg curl, chest press, upright row. Tricep extension, bicep curl, leg press, and hip abduction added visit 3    Cheryl received the following manual therapy techniques: none        Home Exercises Provided and Patient Education Provided     Education provided:   - Instruction on technique and pacing for lumbar MedX exercises    Written Home Exercises Provided: Patient instructed to cont prior HEP.  Exercises were reviewed and Cheryl was able to demonstrate them prior to the end of the session.  Cheryl demonstrated good  understanding of the education provided.     See EMR under Patient Instructions for exercises provided prior visit.          Assessment   Cheryl came to second healthy back visit reporting slight continued low back pain but much better after having received an injection last week. Patient was compliant with HEP since evaluation and is continuing to progress with these. Patient was able to initiate lumbar MedX resistance training at 35 ft/lb which is just below 50% maximum due to markedly low minimum test, though this was likely due to pre-injection symptoms and stiffness. Patient also  increased ROM to 6-39 degrees and was able to complete 18 repetitions with a final RPE rated at 3/10. Will likely increase resistance next session pending patient response to treatment.    Patient is making good progress towards established goals.  Pt will continue to benefit from skilled outpatient physical therapy to address the deficits stated in the impairment chart, provide pt/family education and to maximize pt's level of independence in the home and community environment.     Anticipated Barriers for therapy: none  Pt's spiritual, cultural and educational needs considered and pt agreeable to plan of care and goals as stated below:             GOALS: Pt is in agreement with the following goals.     Short term goals:  6 weeks or 10 visits   1.  Pt will demonstrate increased lumbar ROM by at least 6 degrees from the initial ROM value with improvements noted in functional ROM and ability to perform ADLs.  2.  Pt will demonstrate increased MedX average isometric strength value  by 30% from initial test resulting in improved ability to perform bending, lifting, and carrying activities safely, confidently.     3.  Patient report a reduction in worst pain score by 1-2 points for improved tolerance for standing.  4.  Pt able to perform HEP correctly with minimal cueing or supervision from therapist to encourage independent management of symptoms.         Long term goals: 10 weeks or 20 visits   1. Pt will demonstrate increased lumbar ROM by at least 12 degrees from initial ROM value, resulting in improved ability to perform functional fwd bending while standing and sitting.   2. Pt will demonstrate increased MedX average isometric strength value  by 60% from initial test resulting in improved ability to perform bending, lifting, and carrying activities safely, confidently.  3. Pt to demonstrate ability to independently control and reduce their pain through posture positioning and mechanical movements throughout a  typical day.  4.  Pt will demonstrate reduced pain and improved functional outcomes as reported on the FOTO by reaching a limitation score of < or = 50% or less in order to demonstrate subjective improvement in pt's condition.    5. Pt will demonstrate independence with the HEP at discharge  6.  Pt will be able to tolerate walking for >/= 15 minutes without significant increase in low back pain.        Plan   Continue with established Plan of Care towards established PT goals.

## 2020-09-10 ENCOUNTER — CLINICAL SUPPORT (OUTPATIENT)
Dept: REHABILITATION | Facility: HOSPITAL | Age: 75
End: 2020-09-10
Attending: PHYSICAL MEDICINE & REHABILITATION
Payer: MEDICARE

## 2020-09-10 DIAGNOSIS — Z74.09 IMPAIRED FUNCTIONAL MOBILITY, BALANCE, GAIT, AND ENDURANCE: ICD-10-CM

## 2020-09-10 DIAGNOSIS — M54.16 LUMBAR BACK PAIN WITH RADICULOPATHY AFFECTING RIGHT LOWER EXTREMITY: ICD-10-CM

## 2020-09-10 PROCEDURE — 97110 THERAPEUTIC EXERCISES: CPT | Mod: PN

## 2020-09-10 PROCEDURE — 97530 THERAPEUTIC ACTIVITIES: CPT | Mod: PN

## 2020-09-10 NOTE — PROGRESS NOTES
[facility]      Name: Cheryl Abreu  Clinic Number: [id number]    Therapy Diagnosis:   Encounter Diagnoses   Name Primary?    Lumbar back pain with radiculopathy affecting right lower extremity     Impaired functional mobility, balance, gait, and endurance      Physician: Brisa Boyle, *    Visit Date: 9/10/2020    Physician Orders: PT Eval and Treat   Medical Diagnosis from Referral: M48.061 (ICD-10-CM) - Spinal stenosis of lumbar region without neurogenic claudication  Evaluation Date: 2020  Authorization Period Expiration: 20  Plan of Care Expiration: 20  Reassessment Due: 20  Visit # / Visits authorized: 3/6     Time In: 10:15 am  Time Out: 11:15 am  Total Billable Time: 60 minutes     Precautions: Standard, Fall, scoliosis, osteoporosis     Pattern of pain determined: Pattern 2    Subjective   Cheryl reports feeling okay, has been having difficulty walking stating that she doesn't use her canes at home but she also doesn't use a walker because it's old and heavy and has no wheels.      Patient reports tolerating previous visit well, no residual issues or symptoms.  Patient reports their pain to be 1/10 on a 0-10 scale with 0 being no pain and 10 being the worst pain imaginable.  Pain Location: Central low back     Work and leisure: Retired  Pt goals: Reading, watching TV    Objective     Baseline Isometric Testing on Med X equipment: Testing administered by PT  Date of testin20  ROM 9-48 deg   Max Peak Torque 80    Min Peak Torque 8    Flex/Ext Ratio 10/1   % below normative data 62         Outcomes: FOTO  Initial score: 64%  Visit 5 score:   Goal: <60%      Treatment    Pt was instructed in and performed the following:     Cheryl received therapeutic exercises to develop/improved posture, cardiovascular endurance, muscular endurance, lumbar/cervical ROM, strength and muscular endurance for 30 minutes including the following  exercises:     [facility] - Short 9/10/2020   Visit Number 3   VAS Pain Rating 1   Treadmill Time (in min.) -   Speed -   Incline -   Recumbent Bike - Seat Pos. -   Time 8   Extension in Standing -   Flexion in Lying 10   Manual Therapy -   Lumbar Extension - Seat Pad -   Femur Restraint -   Top Dead Center -   Counterweight -   Lumbar Flexion -   Lumbar Extension -   Lumbar Peak Torque -   Lumbar Weight 42   Repetitions 20   Rating of Perceived Exertion 3       Peripheral muscle strengthening which included 1 set of 15-20 repetitions at a slow, controlled 10-13 second per rep pace focused on strengthening supporting musculature for improved body mechanics and functional mobility.  Pt and therapist focused on proper form during treatment to ensure optimal strengthening of each targeted muscle group.  Machines were utilized including torso rotation, leg extension, leg curl, chest press, upright row. Tricep extension, bicep curl, leg press, and hip abduction added visit 3    Cheryl received the following therapeutic activities: education regarding sizing of rollator walking, education and trial on ambulation, navigating and standing/sitting using walker        Home Exercises Provided and Patient Education Provided     Education provided:   - RW training in order to encourage her getting one of her own    Written Home Exercises Provided: Patient instructed to cont prior HEP.  Exercises were reviewed and Cheryl was able to demonstrate them prior to the end of the session.  Cheryl demonstrated good  understanding of the education provided.     See EMR under Patient Instructions for exercises provided prior visit.          Assessment   Cheryl came to third healthy back visit reporting slight continued low back pain but much better after having received an injection last week. Discussed with patient regarding potential benefit of rollator walker for improved balance and mobility in order to decrease overall workload  of daily activities and allow for progression of strength and endurance with daily activities. Patient wa receptive and understanding with both education and trial, and will look into getting one of her own. Patient was able to increase lumbar MedX resistance to 42 ft/lb and was able to complete 20 repetitions with a final RPE rated at 3/10. Will likely increase resistance next session pending patient response to treatment.    Patient is making good progress towards established goals.  Pt will continue to benefit from skilled outpatient physical therapy to address the deficits stated in the impairment chart, provide pt/family education and to maximize pt's level of independence in the home and community environment.     Anticipated Barriers for therapy: none  Pt's spiritual, cultural and educational needs considered and pt agreeable to plan of care and goals as stated below:             GOALS: Pt is in agreement with the following goals.     Short term goals:  6 weeks or 10 visits   1.  Pt will demonstrate increased lumbar ROM by at least 6 degrees from the initial ROM value with improvements noted in functional ROM and ability to perform ADLs.  2.  Pt will demonstrate increased MedX average isometric strength value  by 30% from initial test resulting in improved ability to perform bending, lifting, and carrying activities safely, confidently.     3.  Patient report a reduction in worst pain score by 1-2 points for improved tolerance for standing.  4.  Pt able to perform HEP correctly with minimal cueing or supervision from therapist to encourage independent management of symptoms.         Long term goals: 10 weeks or 20 visits   1. Pt will demonstrate increased lumbar ROM by at least 12 degrees from initial ROM value, resulting in improved ability to perform functional fwd bending while standing and sitting.   2. Pt will demonstrate increased MedX average isometric strength value  by 60% from initial test resulting  in improved ability to perform bending, lifting, and carrying activities safely, confidently.  3. Pt to demonstrate ability to independently control and reduce their pain through posture positioning and mechanical movements throughout a typical day.  4.  Pt will demonstrate reduced pain and improved functional outcomes as reported on the FOTO by reaching a limitation score of < or = 50% or less in order to demonstrate subjective improvement in pt's condition.    5. Pt will demonstrate independence with the HEP at discharge  6.  Pt will be able to tolerate walking for >/= 15 minutes without significant increase in low back pain.        Plan   Continue with established Plan of Care towards established PT goals.     Stephen Lorenzo PT, DPT

## 2020-09-15 ENCOUNTER — CLINICAL SUPPORT (OUTPATIENT)
Dept: REHABILITATION | Facility: HOSPITAL | Age: 75
End: 2020-09-15
Attending: PHYSICAL MEDICINE & REHABILITATION
Payer: MEDICARE

## 2020-09-15 DIAGNOSIS — Z74.09 IMPAIRED FUNCTIONAL MOBILITY, BALANCE, GAIT, AND ENDURANCE: ICD-10-CM

## 2020-09-15 DIAGNOSIS — M54.16 LUMBAR BACK PAIN WITH RADICULOPATHY AFFECTING RIGHT LOWER EXTREMITY: ICD-10-CM

## 2020-09-15 PROCEDURE — 97110 THERAPEUTIC EXERCISES: CPT | Mod: PN

## 2020-09-15 NOTE — PROGRESS NOTES
[facility]      Name: Cheryl Abreu  Clinic Number: [id number]    Therapy Diagnosis:   Encounter Diagnoses   Name Primary?    Lumbar back pain with radiculopathy affecting right lower extremity     Impaired functional mobility, balance, gait, and endurance      Physician: Brisa Boyle, *    Visit Date: 9/15/2020    Physician Orders: PT Eval and Treat   Medical Diagnosis from Referral: M48.061 (ICD-10-CM) - Spinal stenosis of lumbar region without neurogenic claudication  Evaluation Date: 2020  Authorization Period Expiration: 20  Plan of Care Expiration: 20  Reassessment Due: 20  Visit # / Visits authorized:      Time In: 10:30 am  Time Out: 11:30 am  Total Billable Time: 60 minutes     Precautions: Standard, Fall, scoliosis, osteoporosis     Pattern of pain determined: Pattern 2    Subjective   Cheyrl reports feeling okay, comes with her SPC and states that she has her new RW on order and coming on Friday.      Patient reports tolerating previous visit well, no residual issues or symptoms.  Patient reports their pain to be 1/10 on a 0-10 scale with 0 being no pain and 10 being the worst pain imaginable.  Pain Location: Central low back     Work and leisure: Retired  Pt goals: Reading, watching TV    Objective     Baseline Isometric Testing on Med X equipment: Testing administered by PT  Date of testin20  ROM 9-48 deg   Max Peak Torque 80    Min Peak Torque 8    Flex/Ext Ratio 10/1   % below normative data 62         Outcomes: FOTO  Initial score: 64%  Visit 5 score:   Goal: <60%      Treatment    Pt was instructed in and performed the following:     Cheryl received therapeutic exercises to develop/improved posture, cardiovascular endurance, muscular endurance, lumbar/cervical ROM, strength and muscular endurance for 30 minutes including the following exercises:     [facility] Therapy - Short 9/15/2020   Visit Number 4   VAS Pain  Rating 1   Treadmill Time (in min.) -   Speed -   Incline -   Recumbent Bike - Seat Pos. -   Time 10   Extension in Standing -   Flexion in Lying 10   Manual Therapy -   Lumbar Extension - Seat Pad -   Femur Restraint -   Top Dead Center -   Counterweight -   Lumbar Flexion -   Lumbar Extension -   Lumbar Peak Torque -   Lumbar Weight 46   Repetitions 15   Rating of Perceived Exertion 5         Peripheral muscle strengthening which included 1 set of 15-20 repetitions at a slow, controlled 10-13 second per rep pace focused on strengthening supporting musculature for improved body mechanics and functional mobility.  Pt and therapist focused on proper form during treatment to ensure optimal strengthening of each targeted muscle group.  Machines were utilized including torso rotation, leg extension, leg curl, chest press, upright row. Tricep extension, bicep curl, leg press, and hip abduction added visit 3    Cheryl received the following therapeutic activities: gait training including encouragement of arm swing/trunk rotation in order to improve step and stride length as well as improve patient balance confidence; also education regarding adjusting CT dynamically for fall prevention as well as techniques with falling.       Home Exercises Provided and Patient Education Provided     Education provided:   - Gait training in order to adjust/modify gait mechanics for improved balance and balance confidence    Written Home Exercises Provided: Patient instructed to cont prior HEP.  Exercises were reviewed and Cheryl was able to demonstrate them prior to the end of the session.  Cheryl demonstrated good  understanding of the education provided.     See EMR under Patient Instructions for exercises provided prior visit.          Assessment   Cheryl came to healthy back visit with slight continued low back pain, also using SPC though she will have her new RW by Friday. Did continued education on proper gait mechanics, dynamic  balance and use of arm swing for improved balance and momentum. Patient verbalized understanding but will require additional education. Patient was able to increase lumbar MedX resistance to 46 ft/lb and was able to complete 15 repetitions with a final RPE rated at 5/10. Will increase repetitions next session pending patient response to treatment.    Patient is making good progress towards established goals.  Pt will continue to benefit from skilled outpatient physical therapy to address the deficits stated in the impairment chart, provide pt/family education and to maximize pt's level of independence in the home and community environment.     Anticipated Barriers for therapy: none  Pt's spiritual, cultural and educational needs considered and pt agreeable to plan of care and goals as stated below:             GOALS: Pt is in agreement with the following goals.     Short term goals:  6 weeks or 10 visits   1.  Pt will demonstrate increased lumbar ROM by at least 6 degrees from the initial ROM value with improvements noted in functional ROM and ability to perform ADLs.  2.  Pt will demonstrate increased MedX average isometric strength value  by 30% from initial test resulting in improved ability to perform bending, lifting, and carrying activities safely, confidently.     3.  Patient report a reduction in worst pain score by 1-2 points for improved tolerance for standing.  4.  Pt able to perform HEP correctly with minimal cueing or supervision from therapist to encourage independent management of symptoms.         Long term goals: 10 weeks or 20 visits   1. Pt will demonstrate increased lumbar ROM by at least 12 degrees from initial ROM value, resulting in improved ability to perform functional fwd bending while standing and sitting.   2. Pt will demonstrate increased MedX average isometric strength value  by 60% from initial test resulting in improved ability to perform bending, lifting, and carrying activities  safely, confidently.  3. Pt to demonstrate ability to independently control and reduce their pain through posture positioning and mechanical movements throughout a typical day.  4.  Pt will demonstrate reduced pain and improved functional outcomes as reported on the FOTO by reaching a limitation score of < or = 50% or less in order to demonstrate subjective improvement in pt's condition.    5. Pt will demonstrate independence with the HEP at discharge  6.  Pt will be able to tolerate walking for >/= 15 minutes without significant increase in low back pain.        Plan   Continue with established Plan of Care towards established PT goals.     Stephen Lorenzo PT, DPT

## 2020-09-22 ENCOUNTER — TELEPHONE (OUTPATIENT)
Dept: SPINE | Facility: CLINIC | Age: 75
End: 2020-09-22

## 2020-09-22 ENCOUNTER — CLINICAL SUPPORT (OUTPATIENT)
Dept: REHABILITATION | Facility: HOSPITAL | Age: 75
End: 2020-09-22
Attending: PHYSICAL MEDICINE & REHABILITATION
Payer: MEDICARE

## 2020-09-22 DIAGNOSIS — M54.16 LUMBAR BACK PAIN WITH RADICULOPATHY AFFECTING RIGHT LOWER EXTREMITY: ICD-10-CM

## 2020-09-22 DIAGNOSIS — Z74.09 IMPAIRED FUNCTIONAL MOBILITY, BALANCE, GAIT, AND ENDURANCE: ICD-10-CM

## 2020-09-22 DIAGNOSIS — R26.81 GAIT INSTABILITY: Primary | ICD-10-CM

## 2020-09-22 PROCEDURE — 97110 THERAPEUTIC EXERCISES: CPT | Mod: PN

## 2020-09-22 PROCEDURE — 97530 THERAPEUTIC ACTIVITIES: CPT | Mod: PN

## 2020-09-22 NOTE — TELEPHONE ENCOUNTER
----- Message from Stephen Lorenzo PT sent at 9/22/2020  1:47 PM CDT -----  Regarding: RE: Patient Concern  Well darn. Yes, that would be very helpful and I would greatly appreciate it.    ----- Message -----  From: Brisa Boyle MD  Sent: 9/22/2020   1:04 PM CDT  To: Stephen Lorenzo PT  Subject: RE: Patient Concern                              Dr. Clark is not with [facility], so I am not sure he will the message.  She did go to ER and had Ct scans of her head.  Would you like me to write traditional PT orders?  ----- Message -----  From: Stephen Lorenzo PT  Sent: 9/22/2020  11:44 AM CDT  To: Brisa Boyle MD, Tomás Clark MD  Subject: Patient Concern                                  Dr. Clark.    I noticed you saw Cheryl about two weeks ago and didn't know how aware you were of the fall she had last week where she fell backwards and hit her head, winding up in the ER overnight. As of this morning when I saw her she still had some marked bruising in the area of the hematoma on the back of her head and the right side of her neck.I'm planning to see for a few additional visits as needed while waiting for  separate referral, but I plan to eventually hold additional [facility] program visits because I want her to see one of our neuro PT instead to work on gait/balance/coordination more intensely, as that's much more concerning right now - whether she's ultimately diagnosed with Parkinson's or not.Let me know if you have any questions, just wanted to keep you up to date.

## 2020-09-22 NOTE — PROGRESS NOTES
[facility] Back Physical Therapy Treatment      Name: Cheryl Abreu  Clinic Number: [id number]    Therapy Diagnosis:   Encounter Diagnoses   Name Primary?    Lumbar back pain with radiculopathy affecting right lower extremity     Impaired functional mobility, balance, gait, and endurance      Physician: Brisa Boyle, *    Visit Date: 2020    Physician Orders: PT Eval and Treat   Medical Diagnosis from Referral: M48.061 (ICD-10-CM) - Spinal stenosis of lumbar region without neurogenic claudication  Evaluation Date: 2020  Authorization Period Expiration: 20  Plan of Care Expiration: 20  Reassessment Due: 20  Visit # / Visits authorized:      Time In: 10:30 am  Time Out: 11:25 am  Total Billable Time: 55 minutes     Precautions: Standard, Fall, scoliosis, osteoporosis     Pattern of pain determined: Pattern 2    Subjective   Cheryl reports not doing great. She was out running errands last Wednesday and lost her balance, states that as she lost her balance her steps got increasingly choppy, she eventually fell and hit the back of her head. She revealed a healing bruise/hematoma on the crown of her skull and another on the right side of her neck. States she was kept overnight in the ER and has since seen her doctors about this.      Patient reports tolerating previous visit well, no residual issues or symptoms.  Patient reports their pain to be 1/10 on a 0-10 scale with 0 being no pain and 10 being the worst pain imaginable.  Pain Location: Central low back     Work and leisure: Retired  Pt goals: Reading, watching TV    Objective     Baseline Isometric Testing on Med X equipment: Testing administered by PT  Date of testin20  ROM 9-48 deg   Max Peak Torque 80    Min Peak Torque 8    Flex/Ext Ratio 10/1   % below normative data 62         Outcomes: FOTO  Initial score: 64%  Visit 5 score:   Goal: <60%      Treatment    Pt was instructed in and performed the  following:     Cheryl received therapeutic exercises to develop/improved posture, cardiovascular endurance, muscular endurance, lumbar/cervical ROM, strength and muscular endurance for 30 minutes including the following exercises:     [facility] Therapy - Short 9/22/2020   Visit Number 5   VAS Pain Rating 1   Treadmill Time (in min.) -   Speed -   Incline -   Recumbent Bike - Seat Pos. -   Time 10   Extension in Standing -   Flexion in Lying -   Manual Therapy -   Lumbar Extension - Seat Pad -   Femur Restraint -   Top Dead Center -   Counterweight -   Lumbar Flexion -   Lumbar Extension -   Lumbar Peak Torque -   Lumbar Weight 46   Repetitions 15   Rating of Perceived Exertion 5           Peripheral muscle strengthening which included 1 set of 15-20 repetitions at a slow, controlled 10-13 second per rep pace focused on strengthening supporting musculature for improved body mechanics and functional mobility.  Pt and therapist focused on proper form during treatment to ensure optimal strengthening of each targeted muscle group.  Machines were utilized including torso rotation, leg extension, leg curl, chest press, upright row. Tricep extension, bicep curl, leg press, and hip abduction added visit 3    Cheryl received the following therapeutic activities: gait training including encouragement of arm swing/trunk rotation in order to improve step and stride length as well as improve patient balance confidence; also education regarding adjusting CT dynamically for fall prevention as well as techniques with falling.       Home Exercises Provided and Patient Education Provided     Education provided:   - Gait training in order to adjust/modify gait mechanics for improved balance and balance confidence    Written Home Exercises Provided: Patient instructed to cont prior HEP.  Exercises were reviewed and Cheryl was able to demonstrate them prior to the end of the session.  Cheryl demonstrated good  understanding of the  education provided.     See EMR under Patient Instructions for exercises provided prior visit.          Assessment   Cheryl came to healthy back visit after having missed her previous visit due to the fall and injuries as reported above. Injuries appear to be healing with no fractures or anything else beyond some bruising and soreness, but patient is having further discussions with her doctors as well as now a neurologist about possible Parkinsons. Patient will continue to be seen by [facility] clinicians in order to ensure there are no gaps in care however patient is appropriate to transition to neuro PT in order to focus more intensely on gait, balance and coordination training in the short term, with the potential for transition back to healthy back program in the future.    Again today did continued education on proper gait mechanics, dynamic balance and use of arm swing for improved balance and momentum. Patient verbalized understanding but will require additional education. Patient was able to sustain lumbar MedX resistance at 46 ft/lb and was able to complete 15 repetitions with a final RPE rated at 5/10. Will increase repetitions next session pending patient response to treatment.    Patient is making fair progress towards established goals.  Pt will continue to benefit from skilled outpatient physical therapy to address the deficits stated in the impairment chart, provide pt/family education and to maximize pt's level of independence in the home and community environment.     Anticipated Barriers for therapy: none  Pt's spiritual, cultural and educational needs considered and pt agreeable to plan of care and goals as stated below:             GOALS: Pt is in agreement with the following goals.     Short term goals:  6 weeks or 10 visits   1.  Pt will demonstrate increased lumbar ROM by at least 6 degrees from the initial ROM value with improvements noted in functional ROM and ability to perform ADLs.  2.   Pt will demonstrate increased MedX average isometric strength value  by 30% from initial test resulting in improved ability to perform bending, lifting, and carrying activities safely, confidently.     3.  Patient report a reduction in worst pain score by 1-2 points for improved tolerance for standing.  4.  Pt able to perform HEP correctly with minimal cueing or supervision from therapist to encourage independent management of symptoms.         Long term goals: 10 weeks or 20 visits   1. Pt will demonstrate increased lumbar ROM by at least 12 degrees from initial ROM value, resulting in improved ability to perform functional fwd bending while standing and sitting.   2. Pt will demonstrate increased MedX average isometric strength value  by 60% from initial test resulting in improved ability to perform bending, lifting, and carrying activities safely, confidently.  3. Pt to demonstrate ability to independently control and reduce their pain through posture positioning and mechanical movements throughout a typical day.  4.  Pt will demonstrate reduced pain and improved functional outcomes as reported on the FOTO by reaching a limitation score of < or = 50% or less in order to demonstrate subjective improvement in pt's condition.    5. Pt will demonstrate independence with the HEP at discharge  6.  Pt will be able to tolerate walking for >/= 15 minutes without significant increase in low back pain.        Plan   Continue with established Plan of Care towards established PT goals.     Stephen Lorenzo PT, DPT

## 2020-09-24 ENCOUNTER — CLINICAL SUPPORT (OUTPATIENT)
Dept: REHABILITATION | Facility: HOSPITAL | Age: 75
End: 2020-09-24
Attending: PHYSICAL MEDICINE & REHABILITATION
Payer: MEDICARE

## 2020-09-24 DIAGNOSIS — Z74.09 IMPAIRED FUNCTIONAL MOBILITY, BALANCE, GAIT, AND ENDURANCE: ICD-10-CM

## 2020-09-24 DIAGNOSIS — M54.16 LUMBAR BACK PAIN WITH RADICULOPATHY AFFECTING RIGHT LOWER EXTREMITY: ICD-10-CM

## 2020-09-24 PROCEDURE — 97110 THERAPEUTIC EXERCISES: CPT | Mod: PN

## 2020-09-24 NOTE — PROGRESS NOTES
[facility]      Name: Cheryl Abreu  Clinic Number: [id number]    Therapy Diagnosis:   Encounter Diagnoses   Name Primary?    Lumbar back pain with radiculopathy affecting right lower extremity     Impaired functional mobility, balance, gait, and endurance      Physician: Brisa Boyle, *    Visit Date: 2020    Physician Orders: PT Eval and Treat   Medical Diagnosis from Referral: M48.061 (ICD-10-CM) - Spinal stenosis of lumbar region without neurogenic claudication  Evaluation Date: 2020  Authorization Period Expiration: 20  Plan of Care Expiration: 20  Reassessment Due: 20  Visit # / Visits authorized:      Time In: 1100 am  Time Out: 1200 pm  Total Billable Time: 60 minutes     Precautions: Standard, Fall, scoliosis, osteoporosis     Pattern of pain determined: Pattern 2    Subjective   Cheryl reports feeling better than last visit but walking is still a challenge.      Patient reports tolerating previous visit well, no residual issues or symptoms.  Patient reports their pain to be 1/10 on a 0-10 scale with 0 being no pain and 10 being the worst pain imaginable.  Pain Location: Central low back     Work and leisure: Retired  Pt goals: Reading, watching TV    Objective     Baseline Isometric Testing on Med X equipment: Testing administered by PT  Date of testin20  ROM 9-48 deg   Max Peak Torque 80    Min Peak Torque 8    Flex/Ext Ratio 10/1   % below normative data 62         Outcomes: FOTO  Initial score: 64%  Visit 5 score:   Goal: <60%      Treatment    Pt was instructed in and performed the following:     Cheryl received therapeutic exercises to develop/improved posture, cardiovascular endurance, muscular endurance, lumbar/cervical ROM, strength and muscular endurance for 30 minutes including the following exercises:     [facility] Therapy - Short 2020   Visit Number 6   VAS Pain Rating 1   Treadmill Time (in min.) -    Speed -   Incline -   Recumbent Bike - Seat Pos. -   Time 10   Extension in Standing -   Flexion in Lying -   Manual Therapy -   Lumbar Extension - Seat Pad -   Femur Restraint -   Top Dead Center -   Counterweight -   Lumbar Flexion -   Lumbar Extension -   Lumbar Peak Torque -   Lumbar Weight 48   Repetitions 18   Rating of Perceived Exertion 4             Peripheral muscle strengthening which included 1 set of 15-20 repetitions at a slow, controlled 10-13 second per rep pace focused on strengthening supporting musculature for improved body mechanics and functional mobility.  Pt and therapist focused on proper form during treatment to ensure optimal strengthening of each targeted muscle group.  Machines were utilized including torso rotation, leg extension, leg curl, chest press, upright row. Tricep extension, bicep curl, leg press, and hip abduction added visit 3    Cheryl received the following therapeutic activities: gait training including encouragement of arm swing/trunk rotation in order to improve step and stride length as well as improve patient balance confidence; also education regarding adjusting CT dynamically for fall prevention as well as techniques with falling.       Home Exercises Provided and Patient Education Provided     Education provided:   - Gait training in order to adjust/modify gait mechanics for improved balance and balance confidence    Written Home Exercises Provided: Patient instructed to cont prior HEP.  Exercises were reviewed and Cheryl was able to demonstrate them prior to the end of the session.  Cheryl demonstrated good  understanding of the education provided.     See EMR under Patient Instructions for exercises provided prior visit.          Assessment   Cheryl participated in healthy back visit with little issue other than again focusing on gait and balance training during session. Patient was able to perform 46 ft/lb of resistance on lumbar MedX machine for 18 repetitions  with a final RPE reported at 4/10. Also performed peripheral strength machines without issue other than difficulties on and off some machines and maneuvering between due to balance issues. Patient will now hold healthy back therapy and be transitioned to neuro PT and patient will work on gait and balance concerns now that she may be experiencing increased issues with tremor and incoordination. Patient will resume healthy back therapy once these issues are addressed and program is more appropriate.    Again today did continued education on proper gait mechanics, dynamic balance and use of arm swing for improved balance and momentum.    Patient is making fair progress towards established goals.  Pt will continue to benefit from skilled outpatient physical therapy to address the deficits stated in the impairment chart, provide pt/family education and to maximize pt's level of independence in the home and community environment.     Anticipated Barriers for therapy: none  Pt's spiritual, cultural and educational needs considered and pt agreeable to plan of care and goals as stated below:             GOALS: Pt is in agreement with the following goals.     Short term goals:  6 weeks or 10 visits   1.  Pt will demonstrate increased lumbar ROM by at least 6 degrees from the initial ROM value with improvements noted in functional ROM and ability to perform ADLs.  2.  Pt will demonstrate increased MedX average isometric strength value  by 30% from initial test resulting in improved ability to perform bending, lifting, and carrying activities safely, confidently.     3.  Patient report a reduction in worst pain score by 1-2 points for improved tolerance for standing.  4.  Pt able to perform HEP correctly with minimal cueing or supervision from therapist to encourage independent management of symptoms.         Long term goals: 10 weeks or 20 visits   1. Pt will demonstrate increased lumbar ROM by at least 12 degrees from initial  ROM value, resulting in improved ability to perform functional fwd bending while standing and sitting.   2. Pt will demonstrate increased MedX average isometric strength value  by 60% from initial test resulting in improved ability to perform bending, lifting, and carrying activities safely, confidently.  3. Pt to demonstrate ability to independently control and reduce their pain through posture positioning and mechanical movements throughout a typical day.  4.  Pt will demonstrate reduced pain and improved functional outcomes as reported on the FOTO by reaching a limitation score of < or = 50% or less in order to demonstrate subjective improvement in pt's condition.    5. Pt will demonstrate independence with the HEP at discharge  6.  Pt will be able to tolerate walking for >/= 15 minutes without significant increase in low back pain.        Plan   Patient is discharging from healthy back program and transitioning to neuro PT at this time.     Stephen Lorenzo PT, DPT

## 2020-09-25 ENCOUNTER — CLINICAL SUPPORT (OUTPATIENT)
Dept: REHABILITATION | Facility: HOSPITAL | Age: 75
End: 2020-09-25
Attending: PHYSICAL MEDICINE & REHABILITATION
Payer: MEDICARE

## 2020-09-25 DIAGNOSIS — Z91.81 AT RISK FOR FALLING: ICD-10-CM

## 2020-09-25 DIAGNOSIS — R53.1 WEAKNESS: ICD-10-CM

## 2020-09-25 DIAGNOSIS — Z74.09 IMPAIRED FUNCTIONAL MOBILITY, BALANCE, AND ENDURANCE: ICD-10-CM

## 2020-09-25 PROCEDURE — 97162 PT EVAL MOD COMPLEX 30 MIN: CPT | Mod: PN

## 2020-09-28 PROBLEM — Z74.09 IMPAIRED FUNCTIONAL MOBILITY, BALANCE, AND ENDURANCE: Status: ACTIVE | Noted: 2020-09-28

## 2020-09-28 PROBLEM — R53.1 WEAKNESS: Status: ACTIVE | Noted: 2020-09-28

## 2020-09-28 PROBLEM — Z91.81 AT RISK FOR FALLING: Status: ACTIVE | Noted: 2020-09-28

## 2020-09-28 NOTE — PLAN OF CARE
[facility]  Physical Therapy Neurological Rehabilitation Initial Evaluation    Name: Cheryl Abreu  Clinic Number: [id number]    Therapy Diagnosis:   Encounter Diagnoses   Name Primary?    At risk for falling     Weakness     Impaired functional mobility, balance, and endurance        Physician: Brisa Boyle, *    Physician Orders: PT Eval and Treat   Medical Diagnosis from Referral: R26.81 (ICD-10-CM) - Gait instability  Evaluation Date: 9/25/2020  Authorization Period Expiration: 10/2/2020  Plan of Care Expiration: 12/25/20  Visit # / Visits authorized: 1/ 1    Time In: 9:20 am  Time Out: 10:00 am  Total Billable Time: 40 minutes    Precautions: Standard, Fall and [place], osteoporosis, scoliosis    Subjective   Date of onset: 1 month ago, hx of back pain and impaired balance  History of current condition - Cheryl reports: She was attending healthy back program and has had 4 major falls in the last month. More difficulty walking, balancing, and has noticed her handwriting has gotten smaller. After a significant fall in the last month pt went to hospital, spoke with a neurologist due to gait disturbances. Physician believes she shows signs of parkinson's disease(not yet confirmed).     Medical History:   Past Medical History:   Diagnosis Date    Arthritis     Diabetes mellitus, type 2     Hearing loss     High blood pressure     Hyperlipidemia     Osteoporosis     Scoliosis     Thyroid disease        Surgical History:   Cheryl Abreu  has a past surgical history that includes Renal artery stent (Left, 2001); arthroscopic left knee (Left, 2012); Oophorectomy (Right); [place] tooth extraction; Eye surgery (Bilateral); and Transforaminal epidural injection of steroid (Bilateral, 9/3/2020).    Medications:   Cheryl has a current medication list which includes the following prescription(s): accu-chek smartview test strip, alendronate, amlodipine, aspirin, biotin,  "bupropion, bystolic, clopidogrel, fluticasone propionate, insulin lispro, levothyroxine, losartan, multivit-iron-min-folic acid, rosuvastatin, ozempic, sodium chloride, and tizanidine.    Allergies:   Review of patient's allergies indicates:   Allergen Reactions    Nickel Itching and Other (See Comments)    Pcn [penicillins] Rash        Imaging: bone scan films: 6/10/20  CLINICAL HISTORY:  Pain in right shoulder     TECHNIQUE:  Two or three views of the right shoulder were performed.     COMPARISON:  None     FINDINGS:  There is no acute fractures or dislocations.  There is DJD of the acromioclavicular joint.  No osteoblastic or lytic lesions.  Visualized right ribs demonstrate no fractures.  Soft tissues are unremarkable.     Impression:  As above    Prior Therapy: HB, no prior therapy for balance  Social History:  lives with their spouse  Falls: 4 big falls in the last month. Has fallen in bathroom, backyard,   DME: Walker and Straight cane    Home Environment: single story home, 1 [address]- no handrail   Exercise Routine / History: was working on healthy back program   Family Present at time of Eval: none  Occupation: retired   Prior Level of Function: independent  Current Level of Function: ambulates with SPC, multiple falls, back pain, modified independent with ADLs    Pain:  Current 0/10, worst 7/10, best 0/10   Location: bilateral back   Description: Aching and Dull  Aggravating Factors: Standing and Bending  Easing Factors: lying down, healthy back stretches/exercises    Pts goals: Be able to walk without "looking drunk" and independently     Objective     - Follows commands: intact  - Speech: no deficits     Mental status: alert, oriented to person, place, and time  Appearance: Casually dressed  Behavior:  calm and cooperative  Attention Span and Concentration: slightly impaired. Unable to recall details of falls from the last month,     Dominant hand:  right     Posture Alignment :slouched posture, forward " head, thoracic lean to R    Skin integrity:  Bruising on L forearm     Sensation:  Light Touch: Intact           Proprioception:   Impaired:     Tone: 0 - No increase in muscle tone  Limbs/muscles affected: NA    Visual/Auditory: denies changes  Tracking:Intact  Saccades: Intact  Acuity:NT  R/L discrimination: Intact    Coordination:   - fine motor: slowed, difficulty coordinating movement  - UE coordination: finger to nose: intact, slowed                       Pronation/ supination: intact  - LE coordination:  alternating toe taps: intact                                Heel to shin: slowed but able to perform    ROM:   UPPER EXTREMITY--AROM/PROM  (R) UE: WNLs  (L) UE: WNLs           RANGE OF MOTION--LOWER EXTREMITIES  (R) LE Hip: full   Knee: full   Ankle: normal    (L) LE: Hip: full   Knee: full   Ankle: normal    Strength: manual muscle test grades below   Upper Extremity Strength   RUE LUE   Shoulder Flexion: 4-/5 4-/5   Shoulder Abduction: 4-/5 4-/5   Shoulder Extension: 4/5 4/5   Shoulder External  Rotation:   3+/5 3+/5   Shoulder Internal  Rotation:   3+/5 3+/5   Elbow Flexion:     4+/5 4+/5   Elbow Extension: 4/5 4/5   Wrist Flexion: 4/5 4/5   Wrist Extension: 4/5 4/5   : 45 lbs 40 lbs     Lower Extremity Strength   RLE LLE   Hip Flexion: 3+/5 3-/5   Hip Extension:  3-/5 3-/5   Hip Abduction: 3/5 3/5   Hip Adduction: 3-/5 3-/5   Knee Extension: 4/5 4/5   Knee Flexion: 4-/5 4-/5   Ankle Dorsiflexion: 4-/5 4-/5   Ankle Plantarflexion: 3+/5 3+/5     Abdominal Strength: 4-/5       Evaluation   Single Limb Stance R LE 0  (<10 sec = HIGH FALL RISK)   Single Limb Stance L LE 0  (<10 sec = HIGH FALL RISK)   5 times sit-stand 9 seconds with BUE  >12 sec= fall risk for general elderly  >16 sec= fall risk for Parkinson's disease  >10 sec= balance/vestibular dysfunction (<61 y/o)  >14.2 sec= balance/vestibular dysfunction (>61 y/o)  >12 sec= fall risk for CVA   FGA 13/30     Postural control:  MCTSIB:  NT    Gait  Assessment:   - AD used: none, brought SPC with her today  - Assistance: SBA  - Distance: 50'  - Curb: Mod I  - Ramp:  Mod I  - Stairs: Mod I    Functional Gait Assessment:   1. Gait on level surface =  2   (3) Normal: less than 5.5 sec, no A.D., no imbalance, normal gait pattern, deviates< 6in   (2) Mild impairment: 7-5.6 sec, uses A.D., mild gait deviations, or deviates 6-10 in   (1) Moderate impairment: > 7 sec, slow speed, imbalance, deviates 10-15 in.   (0) Severe impairment: needs assist, deviates >15 in, reach/touch wall  2. Change in Gait Speed = 2   (3) Normal: smooth change w/o loss of balance or gait deviation, deviates < 6 in, significant difference between speeds   (2) Mild impairment: changes speed, but demonstrates mild gait deviations, deviates 6-10 in, OR no deviations but unable to significantly speed, OR uses A.D.   (1) Moderate impairment: minor changes to speed, OR changes speed w/ significant deviations, deviates 10-15 in, OR  Changes speed , but loses balance & recovers   (0) Severe impairment: cannot change speed, deviates >15 in, or loses balance & needs assist  3. Gait with horizontal head turns  = 1   (3) Normal: no change in gait, deviates <6 in   (2) Mild impairment: slight change in speed, deviates 6-10 in, OR uses A.D.   (1) Moderate impairment: moderate change in speed, deviates 10-15 in   (0) Severe impairment: severe disruption of gait, deviates >15in  4. Gait with vertical head turns = 1   (3) Normal: no change in gait, deviates <6 in   (2) Mild impairment: slight change in speed, deviates 6-10 in OR uses A.D.   (1) Moderate impairment: moderate change in speed, deviates 10-15 in   (0) Severe impairment: severe disruption of gait, deviates >15 in  5. Gait with pivot turns = 2   (3) Normal: performs safely in 3 sec, no LOB   (2) Mild impairment: performs in >3 sec & no LOB, OR turns safely & requires several steps to regain LOB   (1) Moderate impairment: turns slow, OR requires  "several small steps for balance following turn & stop   (0) Severe impairment: cannot turn safely, needs assist  6. Step over obstacle = 2   (3) Normal: steps over 2 stacked boxes w/o change in speed or LOB   (2) Mild impairment: able to step over 1 box w/o change in speed or LOB   (1) Moderate impairment: steps over 1 box but must slow down, may require VC   (0) Severe impairment: cannot perform w/o assist  7. Gait with Narrow CT = 0   (3) Normal: 10 steps no staggering   (2) Mild impairment: 7-9 steps   (1) Moderate impairment: 4-7 steps   (0) Severe impairment: < 4 steps or cannot perform w/o assist  8. Gait with eyes closed = 1   (3) Normal: < 7 sec, no A.D., no LOB, normal gait pattern, deviates <6 in   (2) Mild impairment: 7.1-9 sec, mild gait deviations, deviates 6-10 in   (1) Moderate impairment: > 9 sec, abnormal pattern, LOB, deviates 10-15 in   (0) Severe impairment: cannot perform w/o assist, LOB, deviates >15in  9. Ambulating Backwards = 1   (3) Normal: no A.D., no LOB, normal gait pattern, deviates <6in   (2) Mild impairment: uses A.D., slower speed, mild gait deviations, deviates 6-10 in   (1) Moderate impairment: slow speed, abnormal gait pattern, LOB, deviates 10-15 in   (0) Severe impairment: severe gait deviations or LOB, deviates >15in  10. Steps = 1   (3) Normal: alternating feet, no rail   (2) Mild Impairment: alternating feet, uses rail   (1) Moderate impairment: step-to, uses rail   (0) Severe impairment: cannot perform safely    Score 13/30     Score:   <22/30 fall risk   <20/30 fall risk in older adults   <18/30 fall risk in Parkinsons       GAIT DEVIATIONS:  Gait component performance:   Shuffle gait with kyphotic posture. Ambulates holding SPC but does not use cane about 75% of the time. Pt reports she only puts cane down when she "loses her balance".      Impairments contributing to deviations: impaired motor control, impaired balance and proprioception,    Endurance Deficit: NT      " Evaluation   Timed Up and Go 15 sec without AD,  < 20 sec safe for independent transfers,     < 30 sec assist required for transfers   6 meter walk test 1 m/sec (6m/6s)   6 min walk test NT     Functional Mobility (Bed mobility, transfers)  Bed mobility: Mod I  Supine to sit: Mod I  Sit to supine: Mod I  Rolling: Mod I  Transfers to bed: Mod I  Transfers to toilet: Mod I per pt report   Sit to stand:  Mod I  Stand pivot:  NA  Car transfers: Mod I  Wheelchair mobility: NA  Floor transfers: NT       CMS Impairment/Limitation/Restriction for FOTO Survey    Therapist reviewed FOTO scores for Cheryl Abreu on 9/25/2020.   FOTO documents entered into [employer] - see Media section.    Limitation Score: NV  Category: Mobility         TREATMENT   No treatment performed today secondary to time.     Assessment   Cheryl is a 75 y.o. female referred to outpatient Physical Therapy with a medical diagnosis of gait instability. Pt presents with report of frequent falls over the last month, impaired balance and proprioception, kyphotic posture, low back pain, and decreased strength. Pt ambulates with shuffle gait and increased trunk flexion. Carries SPC with her but does not consistently use it for support. Discussed use of FWW to decrease frequency of falls, but pt has difficulty transporting FWW independently. Pt is at increase risk of falls as evidenced by Functional Gait Assessment score of 13/30. Poor single leg balance increasing risk of falls during swing phases of gait bilaterally. Pt is appropriate for physical therapy at this time to address balance and gait instability.     Pt prognosis is Fair.   Pt will benefit from skilled outpatient Physical Therapy to address the deficits stated above and in the chart below, provide pt/family education, and to maximize pt's level of independence.     Plan of care discussed with patient: Yes  Pt's spiritual, cultural and educational needs considered and patient is agreeable to the  plan of care and goals as stated below:     Anticipated Barriers for therapy: age    Medical Necessity is demonstrated by the following  History  Co-morbidities and personal factors that may impact the plan of care Co-morbidities:   advanced age and arthritis, DM, osteoporosis, scoliosis, thyroid disease, back pain    Personal Factors:   age  lifestyle     high   Examination  Body Structures and Functions, activity limitations and participation restrictions that may impact the plan of care Body Regions:   back  lower extremities  trunk    Body Systems:    gross symmetry  ROM  strength  gross coordinated movement  balance  gait  transfers  transitions  motor control    Participation Restrictions:   Difficulty standing for >/=10 minutes, frequent falls, unable to lift FWW out of car    Activity limitations:   Learning and applying knowledge  solving problems    General Tasks and Commands  undertaking multiple tasks    Communication  no deficits    Mobility  lifting and carrying objects  walking  moving around using equipment (WC)  stairs    Self care  washing oneself (bathing, drying, washing hands)  dressing  looking after one's health    Domestic Life  shopping  cooking  doing house work (cleaning house, washing dishes, laundry)    Interactions/Relationships  no deficits    Life Areas  no deficits    Community and Social Life  community life  recreation and leisure         moderate   Clinical Presentation evolving clinical presentation with changing clinical characteristics moderate   Decision Making/ Complexity Score: moderate     Goals:  Short Term Goals: 5 weeks     1. Pt will report compliance of HEP>/= 3 x/week to improve carry over.   2. Pt will improve TUG score without AD to </= 12 seconds without loss of balance.   3. Pt will improve FGA score to 16/30 to decrease fall risk during gait.   4. Pt will perform single leg stance bilaterally for >/=3 seconds to improve balance for stance time during gait.   5.  Pt will ascend/descend 2 steps with reciprocal gait with single handrail to decrease risk of falls in community.   6. Pt will be able to stand for >/=15 minutes without significant low back pain to improve standing tolerance for community outings.     Long Term Goals: 10 weeks   1. Pt will report compliance of HEP >/= 4x/week to improve carry over.   2. Pt will improve TUG score without AD to </= 10 seconds without loss of balance.   3. Pt will perform FGA to >/= 20/30 to decrease risk of falls in community.   4. Pt will perform single leg stance to >/= 5 seconds bilaterally to improve transfers into bathtub and decrease risk of falling with dressing.   5. Pt will ascend/descend 2 steps with reciprocal gait without use of handrail to decrease risk of falls in community.   6. Pt to improve BLE MMT scores by 1/2 MMT score to improve functional strength for transfers.    Plan   Plan of care Certification: 9/25/2020 to 12/25/20.    Outpatient Physical Therapy 2 times weekly for 10 weeks to include the following interventions: Gait Training, Manual Therapy, Moist Heat/ Ice, Neuromuscular Re-ed, Patient Education, Self Care and Therapeutic Exercise.     Janel Saldivar, PT, DPT

## 2020-10-05 ENCOUNTER — OFFICE VISIT (OUTPATIENT)
Dept: PODIATRY | Facility: CLINIC | Age: 75
End: 2020-10-05
Payer: MEDICARE

## 2020-10-05 VITALS
DIASTOLIC BLOOD PRESSURE: 70 MMHG | BODY MASS INDEX: 24.65 KG/M2 | WEIGHT: 148.13 LBS | HEART RATE: 85 BPM | SYSTOLIC BLOOD PRESSURE: 134 MMHG

## 2020-10-05 DIAGNOSIS — E11.00 TYPE 2 DIABETES MELLITUS WITH HYPEROSMOLARITY WITHOUT COMA, WITH LONG-TERM CURRENT USE OF INSULIN: Primary | ICD-10-CM

## 2020-10-05 DIAGNOSIS — L84 CORN OR CALLUS: ICD-10-CM

## 2020-10-05 DIAGNOSIS — B35.1 ONYCHOMYCOSIS DUE TO DERMATOPHYTE: ICD-10-CM

## 2020-10-05 DIAGNOSIS — Z79.4 TYPE 2 DIABETES MELLITUS WITH HYPEROSMOLARITY WITHOUT COMA, WITH LONG-TERM CURRENT USE OF INSULIN: Primary | ICD-10-CM

## 2020-10-05 PROCEDURE — 1101F PR PT FALLS ASSESS DOC 0-1 FALLS W/OUT INJ PAST YR: ICD-10-PCS | Mod: CPTII,S$GLB,, | Performed by: PODIATRIST

## 2020-10-05 PROCEDURE — 1101F PT FALLS ASSESS-DOCD LE1/YR: CPT | Mod: CPTII,S$GLB,, | Performed by: PODIATRIST

## 2020-10-05 PROCEDURE — 3008F BODY MASS INDEX DOCD: CPT | Mod: CPTII,S$GLB,, | Performed by: PODIATRIST

## 2020-10-05 PROCEDURE — 1159F PR MEDICATION LIST DOCUMENTED IN MEDICAL RECORD: ICD-10-PCS | Mod: S$GLB,,, | Performed by: PODIATRIST

## 2020-10-05 PROCEDURE — 11056 PARNG/CUTG B9 HYPRKR LES 2-4: CPT | Mod: GY,Q9,S$GLB, | Performed by: PODIATRIST

## 2020-10-05 PROCEDURE — 11721 DEBRIDE NAIL 6 OR MORE: CPT | Mod: GY,59,Q9,S$GLB | Performed by: PODIATRIST

## 2020-10-05 PROCEDURE — 11056 PR TRIM BENIGN HYPERKERATOTIC SKIN LESION,2-4: ICD-10-PCS | Mod: GY,Q9,S$GLB, | Performed by: PODIATRIST

## 2020-10-05 PROCEDURE — 99203 PR OFFICE/OUTPT VISIT, NEW, LEVL III, 30-44 MIN: ICD-10-PCS | Mod: 25,S$GLB,, | Performed by: PODIATRIST

## 2020-10-05 PROCEDURE — 99203 OFFICE O/P NEW LOW 30 MIN: CPT | Mod: 25,S$GLB,, | Performed by: PODIATRIST

## 2020-10-05 PROCEDURE — 1159F MED LIST DOCD IN RCRD: CPT | Mod: S$GLB,,, | Performed by: PODIATRIST

## 2020-10-05 PROCEDURE — 11721 PR DEBRIDEMENT OF NAILS, 6 OR MORE: ICD-10-PCS | Mod: GY,59,Q9,S$GLB | Performed by: PODIATRIST

## 2020-10-05 PROCEDURE — 99999 PR PBB SHADOW E&M-EST. PATIENT-LVL III: CPT | Mod: PBBFAC,,, | Performed by: PODIATRIST

## 2020-10-05 PROCEDURE — 99999 PR PBB SHADOW E&M-EST. PATIENT-LVL III: ICD-10-PCS | Mod: PBBFAC,,, | Performed by: PODIATRIST

## 2020-10-05 PROCEDURE — 3008F PR BODY MASS INDEX (BMI) DOCUMENTED: ICD-10-PCS | Mod: CPTII,S$GLB,, | Performed by: PODIATRIST

## 2020-10-07 NOTE — PROGRESS NOTES
Subjective:      Patient ID: Cheryl Abreu is a 75 y.o. female.    Chief Complaint: Diabetes Mellitus (PCP ), Nail Care, Nail Problem, and Routine Foot Care    Cheryl is a 75 y.o. female who presents to the clinic for evaluation and treatment of high risk feet. Cheryl has a past medical history of Arthritis, Diabetes mellitus, type 2, Hearing loss, High blood pressure, Hyperlipidemia, Osteoporosis, Scoliosis, and Thyroid disease. The patient's chief complaint is long, thick toenails.     PCP: Tomás Clark MD    Date Last Seen by PCP:   Chief Complaint   Patient presents with    Diabetes Mellitus     PCP     Nail Care    Nail Problem    Routine Foot Care         Current shoe gear:  Affected Foot: Casual shoes     Unaffected Foot: Casual shoes    No results found for: HGBA1C    Review of Systems   Constitution: Negative for chills, decreased appetite, fever and night sweats.   HENT: Negative for congestion, ear discharge, nosebleeds and tinnitus.    Eyes: Negative for double vision, pain and visual disturbance.   Cardiovascular: Negative for chest pain, claudication, cyanosis and palpitations.   Respiratory: Negative for cough, hemoptysis, shortness of breath and wheezing.    Endocrine: Negative for cold intolerance and heat intolerance.   Hematologic/Lymphatic: Negative for adenopathy and bleeding problem.   Skin: Positive for color change, dry skin, nail changes and unusual hair distribution.   Musculoskeletal: Positive for arthritis, joint pain and stiffness. Negative for myalgias.   Gastrointestinal: Negative for abdominal pain, dysphagia, nausea and vomiting.   Genitourinary: Negative for dysuria, flank pain, hematuria and pelvic pain.   Neurological: Positive for disturbances in coordination, numbness, paresthesias and sensory change.   Psychiatric/Behavioral: Negative for altered mental status, hallucinations and suicidal ideas. The patient does not have insomnia.    Allergic/Immunologic:  Negative for environmental allergies and persistent infections.           Objective:      Physical Exam  Vitals signs reviewed.   Constitutional:       Appearance: She is well-developed.   Cardiovascular:      Pulses:           Dorsalis pedis pulses are 0 on the right side and 0 on the left side.        Posterior tibial pulses are 1+ on the right side and 1+ on the left side.   Pulmonary:      Effort: Pulmonary effort is normal.   Musculoskeletal: Normal range of motion.      Comments: Anterior, lateral, and posterior muscle groups bilateral lower extremities show strength 4 over 5 symmetrically. Inspection and palpation of the joints and bones reveal no crepitus or joint effusion. No tenderness upon palpation. Mild plantar flexor contractures noted to digits 2 through 5 bilaterally.  Angle and base of gait are normal.   Feet:      Right foot:      Skin integrity: Callus and dry skin present.      Left foot:      Skin integrity: Callus and dry skin present.   Skin:     General: Skin is warm and dry.      Capillary Refill: Capillary refill takes 2 to 3 seconds.      Coloration: Skin is pale.      Comments: Skin bilateral lower extremities noted to be thin, dry, and atrophic.  Toenails thickened, discolored, with subungual fungal debris and tenderness noted.  Hyperkeratotic lesions noted to both feet plantarly with tenderness.   Neurological:      Mental Status: She is alert and oriented to person, place, and time.      Sensory: Sensory deficit present.      Motor: Atrophy present.      Deep Tendon Reflexes: Reflexes abnormal.      Reflex Scores:       Patellar reflexes are 1+ on the right side and 1+ on the left side.       Achilles reflexes are 1+ on the right side and 1+ on the left side.              Assessment:       No diagnosis found.      Plan:       There are no diagnoses linked to this encounter.  I counseled the patient on her conditions, their implications and medical management.      Routine Foot  Care    Performed by:  Adonis Vences. DPM  Authorized by:  Patient     Consent Done?:  Yes (Verbal)     Nail Care Type:  Debride  Location(s): All  (Left 1st Toe, Left 3rd Toe, Left 2nd Toe, Left 4th Toe, Left 5th Toe, Right 1st Toe, Right 2nd Toe, Right 3rd Toe, Right 4th Toe and Right 5th Toe)  Patient tolerance:  Patient tolerated the procedure well with no immediate complications     With patient's permission, the toenails mentioned above were aggressively reduced and debrided using a nail nipper, removing all offending nail and debris. The patient will continue to monitor the areas daily, inspect the feet, wear protective shoe gear when ambulatory, and moisturizer to maintain skin integrity.      Callus Care Type: Debride    With patient's permission, the calluses/hyperkeratotic lesions mentioned above were aggressively reduced and debrided using a number 15 blade. The patient will continue to monitor the areas daily, inspect the feet, wear protective shoe gear when ambulatory, and moisturizer to maintain skin integrity.       Shoe inspection. Diabetic Foot Education. Patient reminded of the importance of good nutrition and blood sugar control to help prevent podiatric complications of diabetes. Patient instructed on proper foot hygeine. We discussed wearing proper shoe gear, daily foot inspections and Diabetic foot education in detail.    Return to clinic in 3-6 months or sooner if problems arise     .

## 2020-10-09 ENCOUNTER — CLINICAL SUPPORT (OUTPATIENT)
Dept: REHABILITATION | Facility: HOSPITAL | Age: 75
End: 2020-10-09
Attending: PHYSICAL MEDICINE & REHABILITATION
Payer: MEDICARE

## 2020-10-09 DIAGNOSIS — R53.1 WEAKNESS: ICD-10-CM

## 2020-10-09 DIAGNOSIS — Z91.81 AT RISK FOR FALLING: ICD-10-CM

## 2020-10-09 DIAGNOSIS — Z74.09 IMPAIRED FUNCTIONAL MOBILITY, BALANCE, AND ENDURANCE: ICD-10-CM

## 2020-10-09 PROCEDURE — 97116 GAIT TRAINING THERAPY: CPT | Mod: PN

## 2020-10-09 PROCEDURE — 97112 NEUROMUSCULAR REEDUCATION: CPT | Mod: PN

## 2020-10-09 NOTE — PROGRESS NOTES
Physical Therapy Daily Treatment Note     Name: Cheryl Abreu  Clinic Number: [id number]    Therapy Diagnosis:   Encounter Diagnoses   Name Primary?    At risk for falling     Weakness     Impaired functional mobility, balance, and endurance        Physician: Brisa Boyle, *    Visit Date: 10/9/2020    Physician Orders: PT Eval and Treat   Medical Diagnosis from Referral: R26.81 (ICD-10-CM) - Gait instability  Evaluation Date: 9/25/2020  Authorization Period Expiration: 10/2/2020  Plan of Care Expiration: 12/25/20  Visit # / Visits authorized: 1/ 1  PN Due: 10/25/20     Time In: 11:32 am  Time Out: 12:15 pm  Total Billable Time: 43 minutes    Precautions:  Standard, Fall and [place], osteoporosis, scoliosis    Subjective     Pt reports: She had a fall the other day with the use of FWW. Fell laterally onto R shoulder.     She was compliant with home exercise program.  Response to previous treatment: with HB prior, felt good  Functional change: decreased back pain    Pain: 3/10  Location: right shoulder      Objective     Cheryl received therapeutic exercises to develop strength, endurance, ROM, flexibility, posture and core stabilization for 6 minutes including:    Nustep x 6'    Cheryl participated in neuromuscular re-education activities to improve: Balance, Coordination, Kinesthetic, Proprioception and Posture for 28 minutes. The following activities were included:    Large amplitude movements:   Floor to ceiling x 8   Reach big and hold x 8   Forward step and reach x 8 each   Lateral step and reach x 8 each   Rock back and reach x 8   Forward rock and reach x 8 each   Step and twist x 8 each    Cheryl participated in gait training to improve functional mobility and safety for 10 minutes, including:    Gait with large amplitude arm swings and steps  Gait with cognitive tasks x 3 laps    Home Exercises Provided and Patient Education Provided     Education provided:   Cont to perform HEP as provided.      Written Home Exercises Provided: yes.  Exercises were reviewed and Cheryl was able to demonstrate them prior to the end of the session.  Cheryl demonstrated good  understanding of the education provided.     See EMR under Patient Instructions for exercises provided 10/9/2020.    Assessment     Cheryl tolerated initial treatment session well today. Static balance was fair and dynamic balance was fair-. Focused on large amplitude movements to improve stride length, extremity movements, and improve posture. Improvement in stride following performance of gait activities.     Cheryl is progressing well towards her goals.   Pt prognosis is Fair.     Pt will continue to benefit from skilled outpatient physical therapy to address the deficits listed in the problem list box on initial evaluation, provide pt/family education and to maximize pt's level of independence in the home and community environment.     Pt's spiritual, cultural and educational needs considered and pt agreeable to plan of care and goals.    Anticipated barriers to physical therapy: age    Goals:   Short Term Goals: 5 weeks      1. Pt will report compliance of HEP>/= 3 x/week to improve carry over.   2. Pt will improve TUG score without AD to </= 12 seconds without loss of balance.   3. Pt will improve FGA score to 16/30 to decrease fall risk during gait.   4. Pt will perform single leg stance bilaterally for >/=3 seconds to improve balance for stance time during gait.   5. Pt will ascend/descend 2 steps with reciprocal gait with single handrail to decrease risk of falls in community.   6. Pt will be able to stand for >/=15 minutes without significant low back pain to improve standing tolerance for community outings.      Long Term Goals: 10 weeks   1. Pt will report compliance of HEP >/= 4x/week to improve carry over.   2. Pt will improve TUG score without AD to </= 10 seconds without loss of balance.   3. Pt will perform FGA to >/= 20/30 to decrease  risk of falls in community.   4. Pt will perform single leg stance to >/= 5 seconds bilaterally to improve transfers into bathtub and decrease risk of falling with dressing.   5. Pt will ascend/descend 2 steps with reciprocal gait without use of handrail to decrease risk of falls in community.   6. Pt to improve BLE MMT scores by 1/2 MMT score to improve functional strength for transfers.    Plan     Certification date: 9/25/2020 to 12/25/20.     Outpatient Physical Therapy 2 times weekly for 10 weeks to include the following interventions: Gait Training, Manual Therapy, Moist Heat/ Ice, Neuromuscular Re-ed, Patient Education, Self Care and Therapeutic Exercise.     Cont skilled PT session towards PT and patient's goals.    Janel Saldivar, PT   10/09/2020

## 2020-10-09 NOTE — PROGRESS NOTES
"  Physical Therapy Daily Treatment Note     Name: Cheryl Abreu  Clinic Number: [id number]    Therapy Diagnosis: No diagnosis found.  Physician: Brisa Boyle, *    Visit Date: 10/13/2020    Physician Orders: PT Eval and Treat   Medical Diagnosis from Referral: R26.81 (ICD-10-CM) - Gait instability  Evaluation Date: 9/25/2020  Authorization Period Expiration: 10/2/2020  Plan of Care Expiration: 12/25/20  Visit # / Visits authorized: 6/ 20  PN Due: 10-25-20     Time In: ***  Time Out: ***  Total Billable Time: *** minutes    Precautions: Standard, Fall, [place], Osteoporosis and scoliosis    Subjective     Pt reports: ***.  She {Actions; was/was not:12702} compliant with home exercise program.  Response to previous treatment: ***  Functional change: ***    Pain: {0-10:52390::"0"}/10  Location: bilateral back      Objective     Cheryl received therapeutic exercises to develop strength, endurance, ROM, flexibility, posture and core stabilization for *** minutes including:  NuStep BUE/LE x 5 mins    Standing hip ext 2 x 10  Standing hip abd 2 x 10  Standing calf raises 2 x 10    Sit to stands from high-low mat 2 x 10        Cheryl participated in neuromuscular re-education activities to improve: Balance, Coordination, Sense and Proprioception for *** minutes. The following activities were included:  NBOS x 30"  NBOS with head turns (vertical and horizontal) 2 x 30" ea  NBOS on foam 2 x 30"  NBO eyes closed 2 x 30"  Modified SLS on 6" step 2 x 30" ea      Cheryl received hot pack for *** minutes to ***.    Cheryl received cold pack for *** minutes to ***.      Home Exercises Provided and Patient Education Provided     Education provided:   Cont to perform HEP as provided.     Written Home Exercises Provided: {Blank single:89638::"yes","Patient instructed to cont prior HEP"}.  Exercises were reviewed and Cheryl was able to demonstrate them prior to the end of the session.  Cheryl demonstrated {Desc; " "good/fair/poor:30603} understanding of the education provided.     See EMR under {Blank single:05457::"Media","Patient Instructions"} for exercises provided {Blank single:65295::"10/13/2020","prior visit"}.    Assessment     Pt tolerated 1st treatment session post PT eval ***. She presents with reports of B back pain and *** falls. Introduced and prescribed therex performed to address her limitations in LE strength, ms endurance and overall activity tolerance. Introduced and prescribed neuro re-ed activity to address her deficits both static and dynamic balance, stability and to aid in reducing her risk of falls.         Cheryl {IS/IS NOT:22929} progressing well towards her goals.   Pt prognosis is {REHAB PROGNOSIS OHS:97970}.     Pt will continue to benefit from skilled outpatient physical therapy to address the deficits listed in the problem list box on initial evaluation, provide pt/family education and to maximize pt's level of independence in the home and community environment.     Pt's spiritual, cultural and educational needs considered and pt agreeable to plan of care and goals.    Anticipated barriers to physical therapy: age    Goals:   Short Term Goals: 5 weeks      1. Pt will report compliance of HEP>/= 3 x/week to improve carry over.   2. Pt will improve TUG score without AD to </= 12 seconds without loss of balance.   3. Pt will improve FGA score to 16/30 to decrease fall risk during gait.   4. Pt will perform single leg stance bilaterally for >/=3 seconds to improve balance for stance time during gait.   5. Pt will ascend/descend 2 steps with reciprocal gait with single handrail to decrease risk of falls in community.   6. Pt will be able to stand for >/=15 minutes without significant low back pain to improve standing tolerance for community outings.      Long Term Goals: 10 weeks   1. Pt will report compliance of HEP >/= 4x/week to improve carry over.   2. Pt will improve TUG score without AD to </= " 10 seconds without loss of balance.   3. Pt will perform FGA to >/= 20/30 to decrease risk of falls in community.   4. Pt will perform single leg stance to >/= 5 seconds bilaterally to improve transfers into bathtub and decrease risk of falling with dressing.   5. Pt will ascend/descend 2 steps with reciprocal gait without use of handrail to decrease risk of falls in community.   6. Pt to improve BLE MMT scores by 1/2 MMT score to improve functional strength for transfers.      Plan     Plan of care Certification: 9/25/2020 to 12/25/20.     Outpatient Physical Therapy 2 times weekly for 10 weeks to include the following interventions: Gait Training, Manual Therapy, Moist Heat/ Ice, Neuromuscular Re-ed, Patient Education, Self Care and Therapeutic Exercise.       Cont skilled PT session towards PT and patient's goals.    Richa Osborne, PTA   10/09/2020

## 2020-10-13 ENCOUNTER — CLINICAL SUPPORT (OUTPATIENT)
Dept: REHABILITATION | Facility: HOSPITAL | Age: 75
End: 2020-10-13
Attending: PHYSICAL MEDICINE & REHABILITATION
Payer: MEDICARE

## 2020-10-13 DIAGNOSIS — Z91.81 AT RISK FOR FALLING: ICD-10-CM

## 2020-10-13 DIAGNOSIS — Z74.09 IMPAIRED FUNCTIONAL MOBILITY, BALANCE, AND ENDURANCE: ICD-10-CM

## 2020-10-13 DIAGNOSIS — R53.1 WEAKNESS: ICD-10-CM

## 2020-10-13 PROCEDURE — 97112 NEUROMUSCULAR REEDUCATION: CPT | Mod: PN

## 2020-10-13 PROCEDURE — 97110 THERAPEUTIC EXERCISES: CPT | Mod: PN

## 2020-10-13 PROCEDURE — 97116 GAIT TRAINING THERAPY: CPT | Mod: PN

## 2020-10-13 NOTE — PROGRESS NOTES
"  Physical Therapy Daily Treatment Note     Name: Cheryl Abreu  Clinic Number: [id number]    Therapy Diagnosis:   Encounter Diagnoses   Name Primary?    At risk for falling     Weakness     Impaired functional mobility, balance, and endurance        Physician: Brisa Boyle, *    Visit Date: 10/13/2020    Physician Orders: PT Eval and Treat   Medical Diagnosis from Referral: R26.81 (ICD-10-CM) - Gait instability  Evaluation Date: 9/25/2020  Authorization Period Expiration: 10/2/2020  Plan of Care Expiration: 12/25/20  Visit # / Visits authorized: 2/  Pending +eval PN Due: 10/25/20     Time In: 12:00 pm  Time Out: 12:57 pm  Total Billable Time: 57 minutes    Precautions:  Standard, Fall and [place], osteoporosis, scoliosis    Subjective     Pt reports: She attempted HEP at home with some difficulty. Has questions about large amplitude exercises     She was compliant with home exercise program.  Response to previous treatment: with HB prior, felt good  Functional change: decreased back pain    Pain: 3/10  Location: right shoulder      Objective     Cheryl received therapeutic exercises to develop strength, endurance, ROM, flexibility, posture and core stabilization for 8 minutes including:    Nustep x 6'    3 x 30" for seated QL stretch on L    Cheryl participated in neuromuscular re-education activities to improve: Balance, Coordination, Kinesthetic, Proprioception and Posture for 44 minutes. The following activities were included:    Large amplitude movements:   Floor to ceiling x 10   Reach big and hold x 10   Forward step and reach x 10 each   Lateral step and reach x 10 each   Rock back and reach x 10   Forward rock and reach x 10 each   Step and twist x 10 each    Cheryl participated in gait training to improve functional mobility and safety for 8 minutes, including:    Gait with large amplitude arm swings and steps  Gait with cognitive tasks x 3 laps    Home Exercises Provided and Patient Education " Provided     Education provided:   Cont to perform HEP as provided.     Written Home Exercises Provided: yes.  Exercises were reviewed and Cherly was able to demonstrate them prior to the end of the session.  Cheryl demonstrated good  understanding of the education provided.     See EMR under Patient Instructions for exercises provided 10/9/2020.    Assessment     Cheryl tolerated treatment session well today. Review of HEP with patient prior to performance to improve technique at home. Continued performance with large amplitude movements to improve posture, coordination, and functional mobility. Pt requires moderate visual and verbal cues for technique and CGA-Sajan for standing exercises. Difficulty coordinating movements crossing midline and use of alternate UE/LE.     Cheryl is progressing well towards her goals.   Pt prognosis is Fair.     Pt will continue to benefit from skilled outpatient physical therapy to address the deficits listed in the problem list box on initial evaluation, provide pt/family education and to maximize pt's level of independence in the home and community environment.     Pt's spiritual, cultural and educational needs considered and pt agreeable to plan of care and goals.    Anticipated barriers to physical therapy: age    Goals:   Short Term Goals: 5 weeks Progressing     1. Pt will report compliance of HEP>/= 3 x/week to improve carry over.   2. Pt will improve TUG score without AD to </= 12 seconds without loss of balance.   3. Pt will improve FGA score to 16/30 to decrease fall risk during gait.   4. Pt will perform single leg stance bilaterally for >/=3 seconds to improve balance for stance time during gait.   5. Pt will ascend/descend 2 steps with reciprocal gait with single handrail to decrease risk of falls in community.   6. Pt will be able to stand for >/=15 minutes without significant low back pain to improve standing tolerance for community outings.      Long Term Goals: 10 weeks    1. Pt will report compliance of HEP >/= 4x/week to improve carry over.   2. Pt will improve TUG score without AD to </= 10 seconds without loss of balance.   3. Pt will perform FGA to >/= 20/30 to decrease risk of falls in community.   4. Pt will perform single leg stance to >/= 5 seconds bilaterally to improve transfers into bathtub and decrease risk of falling with dressing.   5. Pt will ascend/descend 2 steps with reciprocal gait without use of handrail to decrease risk of falls in community.   6. Pt to improve BLE MMT scores by 1/2 MMT score to improve functional strength for transfers.    Plan     Certification date: 9/25/2020 to 12/25/20.     Outpatient Physical Therapy 2 times weekly for 10 weeks to include the following interventions: Gait Training, Manual Therapy, Moist Heat/ Ice, Neuromuscular Re-ed, Patient Education, Self Care and Therapeutic Exercise.     Cont skilled PT session towards PT and patient's goals.    Janel Saldivar, PT   10/13/2020

## 2020-10-16 ENCOUNTER — CLINICAL SUPPORT (OUTPATIENT)
Dept: REHABILITATION | Facility: HOSPITAL | Age: 75
End: 2020-10-16
Attending: PHYSICAL MEDICINE & REHABILITATION
Payer: MEDICARE

## 2020-10-16 DIAGNOSIS — Z74.09 IMPAIRED FUNCTIONAL MOBILITY, BALANCE, AND ENDURANCE: ICD-10-CM

## 2020-10-16 DIAGNOSIS — Z91.81 AT RISK FOR FALLING: ICD-10-CM

## 2020-10-16 DIAGNOSIS — R53.1 WEAKNESS: ICD-10-CM

## 2020-10-16 PROCEDURE — 97112 NEUROMUSCULAR REEDUCATION: CPT | Mod: PN

## 2020-10-16 PROCEDURE — 97116 GAIT TRAINING THERAPY: CPT | Mod: PN

## 2020-10-16 NOTE — PROGRESS NOTES
"  Physical Therapy Daily Treatment Note     Name: Cheryl Abreu  Clinic Number: [id number]    Therapy Diagnosis:   Encounter Diagnoses   Name Primary?    At risk for falling     Weakness     Impaired functional mobility, balance, and endurance        Physician: Brisa Boyle, *    Visit Date: 10/16/2020    Physician Orders: PT Eval and Treat   Medical Diagnosis from Referral: R26.81 (ICD-10-CM) - Gait instability  Evaluation Date: 9/25/2020  Authorization Period Expiration: 10/2/2020  Plan of Care Expiration: 12/25/20  Visit # / Visits authorized: 3/  Pending +eval PN Due: 10/25/20     Time In: 10:45 am  Time Out: 11:32 am  Total Billable Time: 47 minutes    Precautions:  Standard, Fall and [place], osteoporosis, scoliosis    Subjective     Pt reports: She feels the exercises are getting easier for her to perform at home. States she is having a good day with gait. Arrives without use of AD.     She was compliant with home exercise program.  Response to previous treatment: with HB prior, felt good  Functional change: decreased back pain    Pain: 3/10  Location: right shoulder      Objective     Cheryl received therapeutic exercises to develop strength, endurance, ROM, flexibility, posture and core stabilization for 6 minutes including:    Nustep x 6'    3 x 30" for seated QL stretch on L    Cheryl participated in neuromuscular re-education activities to improve: Balance, Coordination, Kinesthetic, Proprioception and Posture for 33 minutes. The following activities were included:    Large amplitude movements:   Floor to ceiling x 10   Reach big and hold x 10   Forward step and reach x 10 each   Lateral step and reach x 10 each   Rock back and reach x 10   Forward rock and reach x 10 each   Step and twist x 10 each  +sit to stand without UE support and open chest x 10    Cheryl participated in gait training to improve functional mobility and safety for 10 minutes, including:    Gait with large amplitude arm " swings and steps x 1 large lap  Gait with cognitive tasks x 1 large lap    Home Exercises Provided and Patient Education Provided     Education provided:   Cont to perform HEP as provided.     Written Home Exercises Provided: yes.  Exercises were reviewed and Cheryl was able to demonstrate them prior to the end of the session.  Cheryl demonstrated good  understanding of the education provided.     See EMR under Patient Instructions for exercises provided 10/9/2020.    Assessment     Cheryl tolerated treatment session well today. Continued performance with large amplitude movements to improve posture, coordination, and functional mobility. Pt requires minimal visual and verbal cues for technique and CGA-Sajan for standing exercises. Improvement in carry over of exercises today. Able to perform sit to stand without UE support today, Min A for 3 repetitions to prevent posterior LOB.    Cheryl is progressing well towards her goals.   Pt prognosis is Fair.     Pt will continue to benefit from skilled outpatient physical therapy to address the deficits listed in the problem list box on initial evaluation, provide pt/family education and to maximize pt's level of independence in the home and community environment.     Pt's spiritual, cultural and educational needs considered and pt agreeable to plan of care and goals.    Anticipated barriers to physical therapy: age    Goals:   Short Term Goals: 5 weeks Progressing     1. Pt will report compliance of HEP>/= 3 x/week to improve carry over.   2. Pt will improve TUG score without AD to </= 12 seconds without loss of balance.   3. Pt will improve FGA score to 16/30 to decrease fall risk during gait.   4. Pt will perform single leg stance bilaterally for >/=3 seconds to improve balance for stance time during gait.   5. Pt will ascend/descend 2 steps with reciprocal gait with single handrail to decrease risk of falls in community.   6. Pt will be able to stand for >/=15 minutes  without significant low back pain to improve standing tolerance for community outings.      Long Term Goals: 10 weeks   1. Pt will report compliance of HEP >/= 4x/week to improve carry over.   2. Pt will improve TUG score without AD to </= 10 seconds without loss of balance.   3. Pt will perform FGA to >/= 20/30 to decrease risk of falls in community.   4. Pt will perform single leg stance to >/= 5 seconds bilaterally to improve transfers into bathtub and decrease risk of falling with dressing.   5. Pt will ascend/descend 2 steps with reciprocal gait without use of handrail to decrease risk of falls in community.   6. Pt to improve BLE MMT scores by 1/2 MMT score to improve functional strength for transfers.    Plan     Certification date: 9/25/2020 to 12/25/20.     Outpatient Physical Therapy 2 times weekly for 10 weeks to include the following interventions: Gait Training, Manual Therapy, Moist Heat/ Ice, Neuromuscular Re-ed, Patient Education, Self Care and Therapeutic Exercise.     Cont skilled PT session towards PT and patient's goals.    Janel Saldivar, PT   10/16/2020

## 2020-10-23 ENCOUNTER — CLINICAL SUPPORT (OUTPATIENT)
Dept: REHABILITATION | Facility: HOSPITAL | Age: 75
End: 2020-10-23
Attending: PHYSICAL MEDICINE & REHABILITATION
Payer: MEDICARE

## 2020-10-23 DIAGNOSIS — Z74.09 IMPAIRED FUNCTIONAL MOBILITY, BALANCE, AND ENDURANCE: ICD-10-CM

## 2020-10-23 DIAGNOSIS — R53.1 WEAKNESS: ICD-10-CM

## 2020-10-23 DIAGNOSIS — Z91.81 AT RISK FOR FALLING: Primary | ICD-10-CM

## 2020-10-23 PROCEDURE — 97116 GAIT TRAINING THERAPY: CPT | Mod: PN,CQ

## 2020-10-23 PROCEDURE — 97112 NEUROMUSCULAR REEDUCATION: CPT | Mod: PN,CQ

## 2020-10-23 PROCEDURE — 97110 THERAPEUTIC EXERCISES: CPT | Mod: PN,CQ

## 2020-10-23 NOTE — PROGRESS NOTES
"  Physical Therapy Daily Treatment Note     Name: Cheryl Abreu  Clinic Number: [id number]    Therapy Diagnosis:   Encounter Diagnoses   Name Primary?    At risk for falling Yes    Weakness     Impaired functional mobility, balance, and endurance        Physician: Brisa Boyle, *    Visit Date: 10/23/2020    Physician Orders: PT Eval and Treat   Medical Diagnosis from Referral: R26.81 (ICD-10-CM) - Gait instability  Evaluation Date: 9/25/2020  Authorization Period Expiration: 10/2/2020  Plan of Care Expiration: 12/25/20  Visit # / Visits authorized: 5/  Pending +eval PN Due: 10/25/20     Time In: 1030  Time Out: 1115  Total Billable Time: 47 minutes    Precautions:  Standard, Fall and [place], osteoporosis, scoliosis    Subjective     Pt reports: she has to admit she has not been doing her exercises as much as she should      She was somewhat compliant with home exercise program.  Response to previous treatment: with HB prior, felt good  Functional change: decreased back pain    Pain: 3/10  Location: right shoulder      Objective     Cheryl received therapeutic exercises to develop strength, endurance, ROM, flexibility, posture and core stabilization for 6 minutes including:    Nustep x 6'    3 x 30" for seated QL stretch on L    Cheryl participated in neuromuscular re-education activities to improve: Balance, Coordination, Kinesthetic, Proprioception and Posture for 33 minutes. The following activities were included:    Step over small agnieszka x10 each LE  Step over and back small agnieszka x10 each LE   Toe tap and hold 5secs 8'' box x15 each LE     Large amplitude movements:   Floor to ceiling x 10   Reach big and hold x 10   Forward step and reach x 10 each   Lateral step and reach x 10 each   Rock back and reach x 10   Forward rock and reach x 10 each   Step and twist x 10 each  +sit to stand without UE support and open chest 2 x 10    Cheryl participated in gait training to improve functional mobility " and safety for 10 minutes, including:    Ladder drills 1 foot in each x4 laps big arm swings  Gait with large amplitude arm swings and steps x 1 large lap  Gait with cognitive tasks x 1 large lap    Home Exercises Provided and Patient Education Provided     Education provided:   Cont to perform HEP as provided.     Written Home Exercises Provided: yes.  Exercises were reviewed and Cheryl was able to demonstrate them prior to the end of the session.  Cheryl demonstrated good  understanding of the education provided.     See EMR under Patient Instructions for exercises provided 10/9/2020.    Assessment     Cheryl tolerated treatment session well today. Continued performance with large amplitude movements to improve posture, coordination, and functional mobility. Pt was challenged today with static and dynamic balance activities. Pt did well with airex pad standing, most difficult with horizontal head turns. Pt had a few lateral LOB stepping over agnieszka. Pt requiring VCs during gait for increased arm swing     Cheryl is progressing well towards her goals.   Pt prognosis is Fair.     Pt will continue to benefit from skilled outpatient physical therapy to address the deficits listed in the problem list box on initial evaluation, provide pt/family education and to maximize pt's level of independence in the home and community environment.     Pt's spiritual, cultural and educational needs considered and pt agreeable to plan of care and goals.    Anticipated barriers to physical therapy: age    Goals:   Short Term Goals: 5 weeks Progressing     1. Pt will report compliance of HEP>/= 3 x/week to improve carry over.   2. Pt will improve TUG score without AD to </= 12 seconds without loss of balance.   3. Pt will improve FGA score to 16/30 to decrease fall risk during gait.   4. Pt will perform single leg stance bilaterally for >/=3 seconds to improve balance for stance time during gait.   5. Pt will ascend/descend 2 steps with  reciprocal gait with single handrail to decrease risk of falls in community.   6. Pt will be able to stand for >/=15 minutes without significant low back pain to improve standing tolerance for community outings.      Long Term Goals: 10 weeks   1. Pt will report compliance of HEP >/= 4x/week to improve carry over.   2. Pt will improve TUG score without AD to </= 10 seconds without loss of balance.   3. Pt will perform FGA to >/= 20/30 to decrease risk of falls in community.   4. Pt will perform single leg stance to >/= 5 seconds bilaterally to improve transfers into bathtub and decrease risk of falling with dressing.   5. Pt will ascend/descend 2 steps with reciprocal gait without use of handrail to decrease risk of falls in community.   6. Pt to improve BLE MMT scores by 1/2 MMT score to improve functional strength for transfers.    Plan     Certification date: 9/25/2020 to 12/25/20.     Outpatient Physical Therapy 2 times weekly for 10 weeks to include the following interventions: Gait Training, Manual Therapy, Moist Heat/ Ice, Neuromuscular Re-ed, Patient Education, Self Care and Therapeutic Exercise.     Cont skilled PT session towards PT and patient's goals.    Bryce Jordan, PTA   10/23/2020

## 2020-10-28 ENCOUNTER — CLINICAL SUPPORT (OUTPATIENT)
Dept: REHABILITATION | Facility: HOSPITAL | Age: 75
End: 2020-10-28
Attending: PHYSICAL MEDICINE & REHABILITATION
Payer: MEDICARE

## 2020-10-28 DIAGNOSIS — Z91.81 AT RISK FOR FALLING: ICD-10-CM

## 2020-10-28 DIAGNOSIS — R53.1 WEAKNESS: ICD-10-CM

## 2020-10-28 DIAGNOSIS — Z74.09 IMPAIRED FUNCTIONAL MOBILITY, BALANCE, AND ENDURANCE: ICD-10-CM

## 2020-10-28 PROCEDURE — 97116 GAIT TRAINING THERAPY: CPT | Mod: PN

## 2020-10-28 PROCEDURE — 97112 NEUROMUSCULAR REEDUCATION: CPT | Mod: PN

## 2020-10-28 NOTE — PROGRESS NOTES
"  Physical Therapy Daily Treatment Note     Name: Cheryl Abreu  Clinic Number: [id number]    Therapy Diagnosis:   Encounter Diagnoses   Name Primary?    At risk for falling     Weakness     Impaired functional mobility, balance, and endurance        Physician: Brisa Boyle, *    Visit Date: 10/28/2020    Physician Orders: PT Eval and Treat   Medical Diagnosis from Referral: R26.81 (ICD-10-CM) - Gait instability  Evaluation Date: 9/25/2020  Authorization Period Expiration: 10/2/2020  Plan of Care Expiration: 12/25/20  Visit # / Visits authorized: 6/  Pending +eval PN Due: 10/25/20     Time In: 10:01 am  Time Out: 10:48 am  Total Billable Time: 47 minutes    Precautions:  Standard, Fall and [place], osteoporosis, scoliosis    Subjective     Pt reports: she has noticed improvement to balance and mobility when she is consistent with exercises, but does not always perform them. Has had a fall but is unsure of when or with what activity    She was somewhat compliant with home exercise program.  Response to previous treatment: with HB prior, felt good  Functional change: decreased back pain    Pain: 3/10  Location: right shoulder      Objective     Cheryl received therapeutic exercises to develop strength, endurance, ROM, flexibility, posture and core stabilization for 6 minutes including:    Nustep x 6'    3 x 30" for seated QL stretch on L    Cheryl participated in neuromuscular re-education activities to improve: Balance, Coordination, Kinesthetic, Proprioception and Posture for 31 minutes. The following activities were included:    Large amplitude movements:   Floor to ceiling x 10   Reach big and hold x 10   Forward step and reach x 10 each   Lateral step and reach x 10 each   Rock back and reach x 10   Forward rock and reach x 10 each   Step and twist x 10 each  sit to stand without UE support and open chest 2 x 10    Lumbar extensor machine 45# x 15  Matrix trunk rotation 20# x 15 each    NP:  Step over " small agnieszka x10 each LE  Step over and back small agnieszka x10 each LE   Toe tap and hold 5secs 8'' box x15 each LE     Cheryl participated in gait training to improve functional mobility and safety for 10 minutes, including:      Gait with large amplitude arm swings and steps x 3 large lap  Gait with cognitive tasks x 1 large lap    NP:  Ladder drills 1 foot in each x4 laps big arm swings    Home Exercises Provided and Patient Education Provided     Education provided:   Cont to perform HEP as provided.     Written Home Exercises Provided: yes.  Exercises were reviewed and Cheryl was able to demonstrate them prior to the end of the session.  Cheryl demonstrated good  understanding of the education provided.     See EMR under Patient Instructions for exercises provided 10/9/2020.    Assessment     Cheryl tolerated treatment session well today. Continued performance with large amplitude movements to improve posture, coordination, and functional mobility. Addition of lumbar extension machine and trunk rotation machine to improve lumbar strength and stability due to report of continued low back pain. Improved carry over with large amplitude exercises with less frequent Min A from therapist today.    Cheryl is progressing well towards her goals.   Pt prognosis is Fair.     Pt will continue to benefit from skilled outpatient physical therapy to address the deficits listed in the problem list box on initial evaluation, provide pt/family education and to maximize pt's level of independence in the home and community environment.     Pt's spiritual, cultural and educational needs considered and pt agreeable to plan of care and goals.    Anticipated barriers to physical therapy: age    Goals:   Short Term Goals: 5 weeks Progressing     1. Pt will report compliance of HEP>/= 3 x/week to improve carry over. Not met  2. Pt will improve TUG score without AD to </= 12 seconds without loss of balance. Met 10/28/20, 11 seconds  3. Pt will  improve FGA score to 16/30 to decrease fall risk during gait.   4. Pt will perform single leg stance bilaterally for >/=3 seconds to improve balance for stance time during gait. 11 seconds on L, 10 on R  5. Pt will ascend/descend 2 steps with reciprocal gait with single handrail to decrease risk of falls in community.   6. Pt will be able to stand for >/=15 minutes without significant low back pain to improve standing tolerance for community outings. Not met     Long Term Goals: 10 weeks   1. Pt will report compliance of HEP >/= 4x/week to improve carry over.   2. Pt will improve TUG score without AD to </= 10 seconds without loss of balance.   3. Pt will perform FGA to >/= 20/30 to decrease risk of falls in community.   4. Pt will perform single leg stance to >/= 5 seconds bilaterally to improve transfers into bathtub and decrease risk of falling with dressing.   5. Pt will ascend/descend 2 steps with reciprocal gait without use of handrail to decrease risk of falls in community.   6. Pt to improve BLE MMT scores by 1/2 MMT score to improve functional strength for transfers.    Plan     Certification date: 9/25/2020 to 12/25/20.     Outpatient Physical Therapy 2 times weekly for 10 weeks to include the following interventions: Gait Training, Manual Therapy, Moist Heat/ Ice, Neuromuscular Re-ed, Patient Education, Self Care and Therapeutic Exercise.     Cont skilled PT session towards PT and patient's goals.    Janel Saldivar, PT   10/28/2020

## 2020-11-04 ENCOUNTER — OFFICE VISIT (OUTPATIENT)
Dept: NEUROLOGY | Facility: CLINIC | Age: 75
End: 2020-11-04
Payer: MEDICARE

## 2020-11-04 VITALS
WEIGHT: 142.44 LBS | HEIGHT: 65 IN | BODY MASS INDEX: 23.73 KG/M2 | DIASTOLIC BLOOD PRESSURE: 53 MMHG | HEART RATE: 89 BPM | SYSTOLIC BLOOD PRESSURE: 112 MMHG

## 2020-11-04 DIAGNOSIS — G20.C PARKINSONISM, UNSPECIFIED PARKINSONISM TYPE: Primary | ICD-10-CM

## 2020-11-04 DIAGNOSIS — R26.81 GAIT INSTABILITY: ICD-10-CM

## 2020-11-04 PROCEDURE — 1125F AMNT PAIN NOTED PAIN PRSNT: CPT | Mod: S$GLB,,, | Performed by: NEUROLOGICAL SURGERY

## 2020-11-04 PROCEDURE — 3008F PR BODY MASS INDEX (BMI) DOCUMENTED: ICD-10-PCS | Mod: CPTII,S$GLB,, | Performed by: NEUROLOGICAL SURGERY

## 2020-11-04 PROCEDURE — 1101F PR PT FALLS ASSESS DOC 0-1 FALLS W/OUT INJ PAST YR: ICD-10-PCS | Mod: CPTII,S$GLB,, | Performed by: NEUROLOGICAL SURGERY

## 2020-11-04 PROCEDURE — 1159F MED LIST DOCD IN RCRD: CPT | Mod: S$GLB,,, | Performed by: NEUROLOGICAL SURGERY

## 2020-11-04 PROCEDURE — 1101F PT FALLS ASSESS-DOCD LE1/YR: CPT | Mod: CPTII,S$GLB,, | Performed by: NEUROLOGICAL SURGERY

## 2020-11-04 PROCEDURE — 3008F BODY MASS INDEX DOCD: CPT | Mod: CPTII,S$GLB,, | Performed by: NEUROLOGICAL SURGERY

## 2020-11-04 PROCEDURE — 1125F PR PAIN SEVERITY QUANTIFIED, PAIN PRESENT: ICD-10-PCS | Mod: S$GLB,,, | Performed by: NEUROLOGICAL SURGERY

## 2020-11-04 PROCEDURE — 99999 PR PBB SHADOW E&M-EST. PATIENT-LVL IV: CPT | Mod: PBBFAC,,, | Performed by: NEUROLOGICAL SURGERY

## 2020-11-04 PROCEDURE — 99999 PR PBB SHADOW E&M-EST. PATIENT-LVL IV: ICD-10-PCS | Mod: PBBFAC,,, | Performed by: NEUROLOGICAL SURGERY

## 2020-11-04 PROCEDURE — 99204 PR OFFICE/OUTPT VISIT, NEW, LEVL IV, 45-59 MIN: ICD-10-PCS | Mod: S$GLB,,, | Performed by: NEUROLOGICAL SURGERY

## 2020-11-04 PROCEDURE — 99204 OFFICE O/P NEW MOD 45 MIN: CPT | Mod: S$GLB,,, | Performed by: NEUROLOGICAL SURGERY

## 2020-11-04 PROCEDURE — 1159F PR MEDICATION LIST DOCUMENTED IN MEDICAL RECORD: ICD-10-PCS | Mod: S$GLB,,, | Performed by: NEUROLOGICAL SURGERY

## 2020-11-04 RX ORDER — ALPHA LIPOIC ACID 100 MG
CAPSULE ORAL
Status: ON HOLD | COMMUNITY
End: 2021-02-26 | Stop reason: HOSPADM

## 2020-11-04 RX ORDER — INSULIN ASPART 100 [IU]/ML
INJECTION, SOLUTION INTRAVENOUS; SUBCUTANEOUS
COMMUNITY
Start: 2019-12-04 | End: 2020-11-30

## 2020-11-04 RX ORDER — CARBIDOPA AND LEVODOPA 25; 100 MG/1; MG/1
1 TABLET ORAL 3 TIMES DAILY
Qty: 90 TABLET | Refills: 11 | Status: SHIPPED | OUTPATIENT
Start: 2020-11-04 | End: 2021-11-04

## 2020-11-04 RX ORDER — UBIDECARENONE 30 MG
30 CAPSULE ORAL
COMMUNITY

## 2020-11-04 RX ORDER — VITAMIN B COMPLEX
1 CAPSULE ORAL
COMMUNITY
End: 2020-12-31 | Stop reason: CLARIF

## 2020-11-04 NOTE — LETTER
November 4, 2020      Brisa Boyle MD  [address]  [address]  [facility]  [facility] [address]           [facility]  [address]  [facility] [address]  Phone: [phone]  Fax: [phone]          Patient: Cheryl Abreu   MR Number: [id number]   YOB: 1945   Date of Visit: 11/4/2020       Dear Dr. Brisa Boyle:    Thank you for referring Cheryl Abreu to me for evaluation. Attached you will find relevant portions of my assessment and plan of care.    If you have questions, please do not hesitate to call me. I look forward to following Cheryl Abreu along with you.    Sincerely,    Paul uFentes MD    Enclosure  CC:  No Recipients    If you would like to receive this communication electronically, please contact [email or web address] or [phone] to request more information on [employer] Link access.    For providers and/or their staff who would like to refer a patient to [facility], please contact us through our one-stop-shop provider referral line, [facility], at [phone].    If you feel you have received this communication in error or would no longer like to receive these types of communications, please e-mail [email or web address]

## 2020-11-05 NOTE — PROGRESS NOTES
Chief Complaint   Patient presents with    Gait Problem        Cheryl Abreu is a 75 y.o. female with a history of multiple medical diagnoses as listed below that presents for evaluation of suspected parkinson disease. She has been having slowed walking, slight tremor, and memory loss so she was referred for evaluation. She feels that she has been having some difficulty with her walking at times. There have been some changes in her handwriting as she feels that it has been less legible and smaller at times and she also feels that some times her hands lock up and seem to be stuck for several moments at a time. Walking has been problematic as she seems to be walking slower and getting off balance when she has been walking causing several falls. Recently as she was trying to walk near the home she was unable to control her pace and was planning to hold on to a garbage can to stop herself, but as she was unable she fell backward striking her head and causing an abrasion and SDH. She has been sleeping well and has vivid dreams, but has not had any nocturnal movements.     PAST MEDICAL HISTORY:  Past Medical History:   Diagnosis Date    Arthritis     Diabetes mellitus, type 2     Hearing loss     High blood pressure     Hyperlipidemia     Osteoporosis     Scoliosis     Thyroid disease        PAST SURGICAL HISTORY:  Past Surgical History:   Procedure Laterality Date    arthroscopic left knee Left 2012    EYE SURGERY Bilateral     cataract    OOPHORECTOMY Right     RENAL ARTERY STENT Left 2001    TRANSFORAMINAL EPIDURAL INJECTION OF STEROID Bilateral 9/3/2020    Procedure: LUMBAR TRANSFORAMINAL BILATERAL L5/S1 DIRECT REFERRAL;  Surgeon: Lavonne Vaughn MD;  Location: [facility];  Service: Pain Management;  Laterality: Bilateral;  NEEDS CONSENT, PLAVIX    WISDOM TOOTH EXTRACTION         SOCIAL HISTORY:  Social History     Socioeconomic History    Marital status:      Spouse name: Not on file     Number of children: Not on file    Years of education: Not on file    Highest education level: Not on file   Occupational History    Not on file   Social Needs    Financial resource strain: Not on file    Food insecurity     Worry: Not on file     Inability: Not on file    Transportation needs     Medical: Not on file     Non-medical: Not on file   Tobacco Use    Smoking status: Former Smoker     Packs/day: 2.00     Years: 25.00     Pack years: 50.00     Quit date: 3/7/2001     Years since quittin.6    Smokeless tobacco: Never Used    Tobacco comment:  3 children   Substance and Sexual Activity    Alcohol use: Yes     Comment: 1-2 drinks a week    Drug use: No    Sexual activity: Not on file   Lifestyle    Physical activity     Days per week: Not on file     Minutes per session: Not on file    Stress: Not on file   Relationships    Social connections     Talks on phone: Not on file     Gets together: Not on file     Attends Confucianist service: Not on file     Active member of club or organization: Not on file     Attends meetings of clubs or organizations: Not on file     Relationship status: Not on file   Other Topics Concern    Not on file   Social History Narrative    Not on file       FAMILY HISTORY:  Family History   Problem Relation Age of Onset    Inflammatory bowel disease Neg Hx     Kidney disease Neg Hx     Osteoarthritis Neg Hx     Rheum arthritis Neg Hx     Psoriasis Neg Hx     Thyroid disease Neg Hx        ALLERGIES AND MEDICATIONS: updated and reviewed.  Review of patient's allergies indicates:   Allergen Reactions    Nickel Itching and Other (See Comments)    Pcn [penicillins] Rash     Current Outpatient Medications   Medication Sig Dispense Refill    ACCU-CHEK SMARTVIEW TEST STRIP Strp       alpha lipoic acid 100 mg Cap Take by mouth.      amLODIPine (NORVASC) 10 MG tablet Take 10 mg by mouth once daily.       aspirin (ECOTRIN) 81 MG EC tablet Take 81 mg by  mouth once daily.      b complex vitamins capsule Take 1 capsule by mouth.      biotin 2,500 mcg Cap Take 1 capsule by mouth once daily.       buPROPion (WELLBUTRIN SR) 150 MG TBSR 12 hr tablet Take 150 mg by mouth 2 (two) times daily.       BYSTOLIC 5 mg Tab Take 5 mg by mouth once daily.       clopidogrel (PLAVIX) 75 mg tablet Take 75 mg by mouth once daily.       co-enzyme Q-10 30 mg capsule Take 30 mg by mouth.      fluticasone (FLONASE) 50 mcg/actuation nasal spray 1 spray by Each Nare route once daily.      insulin aspart U-100 (NOVOLOG) 100 unit/mL injection Use sliding scale 3-6 units tid      insulin lispro (HUMALOG) 100 unit/mL injection Inject into the skin 3 (three) times daily before meals.      levothyroxine (SYNTHROID) 50 MCG tablet Take 50 mcg by mouth once daily.      losartan (COZAAR) 50 MG tablet Take 50 mg by mouth 2 (two) times daily.       rosuvastatin (CRESTOR) 10 MG tablet Take 10 mg by mouth every evening.       semaglutide (OZEMPIC) 0.25 mg or 0.5 mg(2 mg/1.5 mL) PnIj Inject into the skin once a week.      sodium chloride (OCEAN) 0.65 % nasal spray 1 spray by Nasal route as needed for Congestion.      tiZANidine (ZANAFLEX) 2 MG tablet TAKE 1-2 TABLETS (2-4 MG TOTAL) BY MOUTH EVERY 8 (EIGHT) HOURS AS NEEDED. 180 tablet 2    alendronate (FOSAMAX) 70 MG tablet Take 70 mg by mouth every 7 days.       carbidopa-levodopa  mg (SINEMET)  mg per tablet Take 1 tablet by mouth 3 (three) times daily. 90 tablet 11    MULTIVIT-IRON-MIN-FOLIC ACID 3,500-18-0.4 UNIT-MG-MG ORAL CHEW Take by mouth.       No current facility-administered medications for this visit.        Review of Systems   Constitutional: Negative for activity change, appetite change, fever and unexpected weight change.   HENT: Negative for trouble swallowing and voice change.    Eyes: Negative for photophobia and visual disturbance.   Respiratory: Negative for apnea and shortness of breath.    Cardiovascular:  Negative for chest pain and leg swelling.   Gastrointestinal: Negative for constipation and nausea.   Genitourinary: Negative for difficulty urinating.   Musculoskeletal: Positive for gait problem. Negative for back pain and neck pain.   Skin: Negative for color change and pallor.   Neurological: Positive for weakness. Negative for dizziness, seizures, syncope and numbness.   Hematological: Negative for adenopathy.   Psychiatric/Behavioral: Negative for agitation, confusion and decreased concentration.       Neurologic Exam     Mental Status   Oriented to person, place, and time.   Registration: recalls 3 of 3 objects.   Attention: normal. Concentration: normal.   Speech: speech is normal   Level of consciousness: alert  Knowledge: good.     Cranial Nerves     CN II   Visual fields full to confrontation.   Right visual field deficit: none  Left visual field deficit: none     CN III, IV, VI   Pupils are equal, round, and reactive to light.  Extraocular motions are normal.   Right pupil: Size: 3 mm. Shape: regular. Accommodation: intact.   Left pupil: Size: 3 mm. Shape: regular. Accommodation: intact.   CN III: no CN III palsy  CN VI: no CN VI palsy  Nystagmus: none   Diplopia: none  Ophthalmoparesis: none  Upgaze: normal  Downgaze: normal  Conjugate gaze: present    CN V   Facial sensation intact.   Right facial sensation deficit: none  Left facial sensation deficit: none    CN VII   Facial expression full, symmetric.   Right facial weakness: none  Left facial weakness: none    CN VIII   CN VIII normal.     CN IX, X   CN IX normal.   CN X normal.   Palate: symmetric    CN XI   CN XI normal.   Right sternocleidomastoid strength: normal  Left sternocleidomastoid strength: normal  Right trapezius strength: normal  Left trapezius strength: normal    CN XII   CN XII normal.   Tongue deviation: none    Motor Exam   Muscle bulk: normal  Overall muscle tone: normal  Right arm tone: normal  Left arm tone: normal  Right leg  tone: normal  Left leg tone: normal    Strength   Strength 5/5 throughout.     Sensory Exam   Right arm light touch: normal  Left arm light touch: normal  Right leg light touch: normal  Left leg light touch: normal  Right arm vibration: normal  Left arm vibration: normal  Right leg vibration: normal  Left leg vibration: normal  Right arm proprioception: normal  Left arm proprioception: normal  Right leg proprioception: normal  Left leg proprioception: normal  Right arm pinprick: normal  Left arm pinprick: normal  Right leg pinprick: normal  Left leg pinprick: normal    Gait, Coordination, and Reflexes     Gait  Gait: shuffling    Coordination   Romberg: positive  Finger to nose coordination: normal  Heel to shin coordination: normal  Tandem walking coordination: abnormal    Tremor   Resting tremor: absent    Reflexes   Right brachioradialis: 1+  Left brachioradialis: 1+  Right biceps: 1+  Left biceps: 1+  Right triceps: 1+  Left triceps: 1+  Right patellar: 1+  Left patellar: 1+  Right achilles: 1+  Left achilles: 1+  Right plantar: normal  Left plantar: normal      Physical Exam  Eyes:      Extraocular Movements: EOM normal.      Pupils: Pupils are equal, round, and reactive to light.   Neurological:      Mental Status: She is oriented to person, place, and time.      Coordination: Romberg Test abnormal. Finger-Nose-Finger Test and Heel to Shin Test normal.      Gait: Tandem walk abnormal.      Deep Tendon Reflexes: Strength normal.      Reflex Scores:       Tricep reflexes are 1+ on the right side and 1+ on the left side.       Bicep reflexes are 1+ on the right side and 1+ on the left side.       Brachioradialis reflexes are 1+ on the right side and 1+ on the left side.       Patellar reflexes are 1+ on the right side and 1+ on the left side.       Achilles reflexes are 1+ on the right side and 1+ on the left side.  Psychiatric:         Speech: Speech normal.         Vitals:    11/04/20 1355   BP: (!) 112/53   BP  "Location: Right arm   Patient Position: Sitting   BP Method: Large (Automatic)   Pulse: 89   Weight: 64.6 kg (142 lb 6.7 oz)   Height: 5' 5" (1.651 m)       Assessment & Plan:    Problem List Items Addressed This Visit     None      Visit Diagnoses     Parkinsonism, unspecified Parkinsonism type    -  Primary    Relevant Medications    carbidopa-levodopa  mg (SINEMET)  mg per tablet    Other Relevant Orders    MRI Brain Without Contrast    MRI Cervical Spine Without Contrast    Gait instability            More than 50% of this 45 minute encounter was spent in counseling and coordinating care of parkinsonism  Follow-up: Follow up in about 1 month (around 12/4/2020).    This note was done with the assistance of voice recognition software. Some errors may be present after proofreading.        "

## 2020-11-06 ENCOUNTER — CLINICAL SUPPORT (OUTPATIENT)
Dept: REHABILITATION | Facility: HOSPITAL | Age: 75
End: 2020-11-06
Attending: PHYSICAL MEDICINE & REHABILITATION
Payer: MEDICARE

## 2020-11-06 DIAGNOSIS — Z91.81 AT RISK FOR FALLING: ICD-10-CM

## 2020-11-06 DIAGNOSIS — R53.1 WEAKNESS: ICD-10-CM

## 2020-11-06 DIAGNOSIS — Z74.09 IMPAIRED FUNCTIONAL MOBILITY, BALANCE, AND ENDURANCE: ICD-10-CM

## 2020-11-06 PROCEDURE — 97112 NEUROMUSCULAR REEDUCATION: CPT | Mod: PN,CQ

## 2020-11-06 NOTE — PROGRESS NOTES
"  Physical Therapy Daily Treatment Note     Name: Cheryl Abreu  Clinic Number: [id number]    Therapy Diagnosis:   No diagnosis found.    Physician: Brisa Boyle, *    Visit Date: 11/6/2020    Physician Orders: PT Eval and Treat   Medical Diagnosis from Referral: R26.81 (ICD-10-CM) - Gait instability  Evaluation Date: 9/25/2020  Authorization Period Expiration: 10/2/2020  Plan of Care Expiration: 12/25/20  Visit # / Visits authorized: 7/  Pending +eval PN Due: 10/25/20     Time In: 2:30 pm (15 minutes late)  Time Out: 3:00 pm   Total Billable Time: 30 minutes    Precautions:  Standard, Fall and [place], osteoporosis, scoliosis    Subjective     Pt reports: unsure if she has had any recent falls. No significant pain, however she knows her muscles are weak and they get tired.     She was somewhat compliant with home exercise program.  Response to previous treatment: with HB prior, felt good  Functional change: decreased back pain    Pain: 3/10  Location: low back    Objective     Cheryl received therapeutic exercises to develop strength, endurance, ROM, flexibility, posture and core stabilization for 5 minutes including:    Nustep x 5'    Lumbar extensor machine 45# x 15  Matrix trunk rotation 20# x 15 each    3 x 30" for seated QL stretch on L    Cheryl participated in neuromuscular re-education activities to improve: Balance, Coordination, Kinesthetic, Proprioception and Posture for 25 minutes. The following activities were included:     Large amplitude movements:   Floor to ceiling x 10   Reach big and hold x 10   Forward step and reach x 10 each   Lateral step and reach x 10 each   Rock back and reach x 10   Forward rock and reach x 10 each   Step and twist x 10 each  sit to stand without UE support and open chest 2 x 10    Step over small agnieszka x10 each LE  Step over and back small agnieszka x10 each LE   Toe tap and hold 5secs 8'' box x15 each LE     +4 square   CW x4  CCW x4    Cheryl participated in gait " training to improve functional mobility and safety for 00 minutes, including:      Gait with large amplitude arm swings and steps x 3 large lap  Gait with cognitive tasks x 1 large lap    NP:  Ladder drills 1 foot in each x4 laps big arm swings    Home Exercises Provided and Patient Education Provided     Education provided:   Cont to perform HEP as provided.     Written Home Exercises Provided: yes.  Exercises were reviewed and Cheryl was able to demonstrate them prior to the end of the session.  Cheryl demonstrated good  understanding of the education provided.     See EMR under Patient Instructions for exercises provided 10/9/2020.    Assessment     Cheryl tolerated treatment session well today. Continued performance with large amplitude movements to improve posture, coordination, and functional mobility.  Reintroduced agnieszka negotiation this date, in which pt demonstrated mod difficulty and use of UE for support. Addition of 4-square performed to promote multidirectional stepping strategies and balance. She experienced a few LOB's in which she required mod A for recover.  She arrived late to session by mistake, so session required some modification due to time constraint.       Cheryl is progressing well towards her goals.   Pt prognosis is Fair.     Pt will continue to benefit from skilled outpatient physical therapy to address the deficits listed in the problem list box on initial evaluation, provide pt/family education and to maximize pt's level of independence in the home and community environment.     Pt's spiritual, cultural and educational needs considered and pt agreeable to plan of care and goals.    Anticipated barriers to physical therapy: age    Goals:   Short Term Goals: 5 weeks Progressing     1. Pt will report compliance of HEP>/= 3 x/week to improve carry over. Not met  2. Pt will improve TUG score without AD to </= 12 seconds without loss of balance. Met 10/28/20, 11 seconds  3. Pt will improve FGA  score to 16/30 to decrease fall risk during gait.   4. Pt will perform single leg stance bilaterally for >/=3 seconds to improve balance for stance time during gait. 11 seconds on L, 10 on R  5. Pt will ascend/descend 2 steps with reciprocal gait with single handrail to decrease risk of falls in community.   6. Pt will be able to stand for >/=15 minutes without significant low back pain to improve standing tolerance for community outings. Not met     Long Term Goals: 10 weeks   1. Pt will report compliance of HEP >/= 4x/week to improve carry over.   2. Pt will improve TUG score without AD to </= 10 seconds without loss of balance.   3. Pt will perform FGA to >/= 20/30 to decrease risk of falls in community.   4. Pt will perform single leg stance to >/= 5 seconds bilaterally to improve transfers into bathtub and decrease risk of falling with dressing.   5. Pt will ascend/descend 2 steps with reciprocal gait without use of handrail to decrease risk of falls in community.   6. Pt to improve BLE MMT scores by 1/2 MMT score to improve functional strength for transfers.    Plan     Certification date: 9/25/2020 to 12/25/20.     Outpatient Physical Therapy 2 times weekly for 10 weeks to include the following interventions: Gait Training, Manual Therapy, Moist Heat/ Ice, Neuromuscular Re-ed, Patient Education, Self Care and Therapeutic Exercise.     Cont skilled PT session towards PT and patient's goals.    Richa Osborne, PTA   11/06/2020

## 2020-11-11 ENCOUNTER — CLINICAL SUPPORT (OUTPATIENT)
Dept: REHABILITATION | Facility: HOSPITAL | Age: 75
End: 2020-11-11
Attending: PHYSICAL MEDICINE & REHABILITATION
Payer: MEDICARE

## 2020-11-11 DIAGNOSIS — Z91.81 AT RISK FOR FALLING: ICD-10-CM

## 2020-11-11 DIAGNOSIS — R53.1 WEAKNESS: ICD-10-CM

## 2020-11-11 DIAGNOSIS — Z74.09 IMPAIRED FUNCTIONAL MOBILITY, BALANCE, AND ENDURANCE: ICD-10-CM

## 2020-11-11 PROCEDURE — 97110 THERAPEUTIC EXERCISES: CPT | Mod: PN,CQ

## 2020-11-11 PROCEDURE — 97112 NEUROMUSCULAR REEDUCATION: CPT | Mod: PN,CQ

## 2020-11-11 NOTE — PROGRESS NOTES
"  Physical Therapy Daily Treatment Note     Name: Cheryl Abreu  Clinic Number: [id number]    Therapy Diagnosis:   Encounter Diagnoses   Name Primary?    At risk for falling     Weakness     Impaired functional mobility, balance, and endurance        Physician: Brisa Boyle, *    Visit Date: 11/11/2020    Physician Orders: PT Eval and Treat   Medical Diagnosis from Referral: R26.81 (ICD-10-CM) - Gait instability  Evaluation Date: 9/25/2020  Authorization Period Expiration: 10/2/2020  Plan of Care Expiration: 12/25/20  Visit # / Visits authorized: 12/20 PN Due: 10/25/20     Time In: 1045  Time Out: 1140  Total Time: 55 minutes  Total Billable Time: 55 minutes    Precautions:  Standard, Fall and [place], osteoporosis, scoliosis    Subjective     Pt reports: she had a fall this week, but unsure of where it was or how it happened.  Pt then reports she thinks it was in her backyard and she fell forward, but does not recall hurting herself. Reports decreased stamina.    She was somewhat compliant with home exercise program.  Response to previous treatment: with HB prior, felt good  Functional change: decreased back pain    Pain: 4/10  Location: low back    Objective     Cheryl received therapeutic exercises to develop strength, endurance, ROM, flexibility, posture and core stabilization for 5 minutes including:    Nustep x 5'    Lumbar extensor machine 45# x 15  Matrix trunk rotation 20# x 15 each    3 x 30" for seated QL stretch on L    Cheryl participated in neuromuscular re-education activities to improve: Balance, Coordination, Kinesthetic, Proprioception and Posture for 50 minutes. The following activities were included:     Large amplitude movements:   Sitting Floor to ceiling x 10   Reach big and hold x 10   Forward step and reach x 10 each   Lateral step and reach x 10 each   Rock back and reach x 10   Forward rock and reach x 10 each   Step and twist x 10 each    sit to stand without UE support and " open chest 2 x 10  Step fwd over Medium agnieszka x10 each LE (intermittent R HHA)  Step over small agnieszka laterally x10 each LE   Toe tap and hold 5secs 8'' box x15 each LE     -4 square:  CW x5  CCW x5    Cheryl participated in gait training to improve functional mobility and safety for 00 minutes, including:      Gait with large amplitude arm swings and steps x 3 large lap  Gait with cognitive tasks x 1 large lap    NP:  Ladder drills 1 foot in each x4 laps big arm swings    Home Exercises Provided and Patient Education Provided     Education provided:   Cont to perform HEP as provided.     Written Home Exercises Provided: yes.  Exercises were reviewed and Cheryl was able to demonstrate them prior to the end of the session.  Cheryl demonstrated good  understanding of the education provided.     See EMR under Patient Instructions for exercises provided 10/9/2020.    Assessment     Pt tolerated treatment good today. Improved balance with multi-directional stepping while performing 4-square exercise. Pt able to perform without LOB, however, requires verbal and tactile cues for proper foot placement to prevent LOB. Fatigues easily with large amplitude exercises requiring frequent rest breaks. Limited with LSVT exercises due to R shoulder pain, exercises above performed within tolerance. Added fwd steps over medium agnieszka requiring intermittent R HHA. Pt did experience LOB requiring Mod A for recovery.  Continues to require max cues for upright posture, proper foot placement, proper step length and step width to decrease unsteadiness. Assisted pt to car due to increased LE muscle fatigue at end of session. Pt able to descend two curbs and ambulate to car without LOB. Encouraged pt to use AD for long distances or fatigue, pt verbalized understanding. Pt did experience a LOB this week, however, pt unable to recall exactly how fall happened. Follow up with pt next visit for recent falls.     .       Cheryl is progressing well  towards her goals.   Pt prognosis is Fair.     Pt will continue to benefit from skilled outpatient physical therapy to address the deficits listed in the problem list box on initial evaluation, provide pt/family education and to maximize pt's level of independence in the home and community environment.     Pt's spiritual, cultural and educational needs considered and pt agreeable to plan of care and goals.    Anticipated barriers to physical therapy: age    Goals:   Short Term Goals: 5 weeks Progressing     1. Pt will report compliance of HEP>/= 3 x/week to improve carry over. Not met  2. Pt will improve TUG score without AD to </= 12 seconds without loss of balance. Met 10/28/20, 11 seconds  3. Pt will improve FGA score to 16/30 to decrease fall risk during gait.   4. Pt will perform single leg stance bilaterally for >/=3 seconds to improve balance for stance time during gait. 11 seconds on L, 10 on R  5. Pt will ascend/descend 2 steps with reciprocal gait with single handrail to decrease risk of falls in community.   6. Pt will be able to stand for >/=15 minutes without significant low back pain to improve standing tolerance for community outings. Not met     Long Term Goals: 10 weeks   1. Pt will report compliance of HEP >/= 4x/week to improve carry over.   2. Pt will improve TUG score without AD to </= 10 seconds without loss of balance.   3. Pt will perform FGA to >/= 20/30 to decrease risk of falls in community.   4. Pt will perform single leg stance to >/= 5 seconds bilaterally to improve transfers into bathtub and decrease risk of falling with dressing.   5. Pt will ascend/descend 2 steps with reciprocal gait without use of handrail to decrease risk of falls in community.   6. Pt to improve BLE MMT scores by 1/2 MMT score to improve functional strength for transfers.    Plan     Certification date: 9/25/2020 to 12/25/20.     Outpatient Physical Therapy 2 times weekly for 10 weeks to include the following  interventions: Gait Training, Manual Therapy, Moist Heat/ Ice, Neuromuscular Re-ed, Patient Education, Self Care and Therapeutic Exercise.     Cont skilled PT session towards PT and patient's goals.    Jie Leyva, PTA   11/11/2020

## 2020-11-13 ENCOUNTER — CLINICAL SUPPORT (OUTPATIENT)
Dept: REHABILITATION | Facility: HOSPITAL | Age: 75
End: 2020-11-13
Attending: PHYSICAL MEDICINE & REHABILITATION
Payer: MEDICARE

## 2020-11-13 DIAGNOSIS — R53.1 WEAKNESS: ICD-10-CM

## 2020-11-13 DIAGNOSIS — Z91.81 AT RISK FOR FALLING: ICD-10-CM

## 2020-11-13 DIAGNOSIS — Z74.09 IMPAIRED FUNCTIONAL MOBILITY, BALANCE, AND ENDURANCE: ICD-10-CM

## 2020-11-13 PROCEDURE — 97110 THERAPEUTIC EXERCISES: CPT | Mod: PN,CQ

## 2020-11-13 NOTE — PROGRESS NOTES
"  Physical Therapy Daily Treatment Note     Name: Cheryl Abreu  Clinic Number: [id number]    Therapy Diagnosis:   Encounter Diagnoses   Name Primary?    At risk for falling     Weakness     Impaired functional mobility, balance, and endurance        Physician: Brisa Boyle, *    Visit Date: 11/13/2020    Physician Orders: PT Eval and Treat   Medical Diagnosis from Referral: R26.81 (ICD-10-CM) - Gait instability  Evaluation Date: 9/25/2020  Authorization Period Expiration: 10/2/2020  Plan of Care Expiration: 12/25/20  Visit # / Visits authorized: 13/20 PN Due: 10/25/20     Time In: 1050  Time Out: 1130  Total Time: 40 minutes  Total Billable Time: 40 minutes (one on one with pt for all exercises)    Precautions:  Standard, Fall and [place], osteoporosis, scoliosis    Subjective     Pt reports: she had a fall this week, but unsure of where it was or how it happened.  Pt then reports she thinks it was in her backyard and she fell forward, but does not recall hurting herself. Reports decreased stamina.    She was somewhat compliant with home exercise program.  Response to previous treatment: with HB prior, felt good  Functional change: decreased back pain    Pain: 4/10  Location: low back    Objective     Cheryl received therapeutic exercises to develop strength, endurance, ROM, flexibility, posture and core stabilization for 5 minutes including:    Nustep x 5'    Lumbar extensor machine 45# x 15  Matrix trunk rotation 20# x 15 each    3 x 30" for seated QL stretch on L    Cheryl participated in neuromuscular re-education activities to improve: Balance, Coordination, Kinesthetic, Proprioception and Posture for 35 minutes. The following activities were included:     Large amplitude movements:   Sitting Floor to ceiling x 10   Reach big and hold x 10   Forward step and reach x 10 each   Lateral step and reach x 10 each   Rock back and reach x 10   Forward rock and reach x 10 each   Step and twist x 10 " "each    sit to stand without UE support and open chest 2 x 10  Step fwd over Medium agnieszka x10 each LE (intermittent R HHA)  Step over Medium agnieszka laterally x10 each LE   Toe tap and hold 5secs 8'' box + standing on Blue foam x15 each LE   +Alternate toe taping to 8" box + standing on blue foam x 1 minute    -4 square:  CW x5  CCW x5    Cheryl participated in gait training to improve functional mobility and safety for 00 minutes, including:      Gait with large amplitude arm swings and steps x 3 large lap  Gait with cognitive tasks x 1 large lap    NP:  Ladder drills 1 foot in each x4 laps big arm swings    Home Exercises Provided and Patient Education Provided     Education provided:   Cont to perform HEP as provided.     Written Home Exercises Provided: yes.  Exercises were reviewed and Cheryl was able to demonstrate them prior to the end of the session.  Cheryl demonstrated good  understanding of the education provided.     See EMR under Patient Instructions for exercises provided 10/9/2020.    Assessment     Pt tolerated treatment good today. Improved balance with multi-directional stepping while performing 4-square exercise. Pt able to perform without LOB, however, requires verbal and tactile cues for proper foot placement to prevent LOB. Added airex with 8in step to further challenge pt's balance this date. Did not perform large amplitude exercises due to increasing R shoulder pain last session.  Difficulty stepping over medium sized hurdles due to decreased foot clearance, LE coordination and assessing balance prior to performing task. Multiple LOBs requiring min- Mod A for recovery. Once pt was able to control coordination, foot clearance and timing pt was able to perform with close CGA. Continues to require max cues for upright posture, proper foot placement, proper step length and step width to decrease unsteadiness. Encouraged pt to use AD for long distances or fatigue, pt verbalized understanding. " Progress balance training and LE strength as tolerated by pt.    .       Cheryl is progressing well towards her goals.   Pt prognosis is Fair.     Pt will continue to benefit from skilled outpatient physical therapy to address the deficits listed in the problem list box on initial evaluation, provide pt/family education and to maximize pt's level of independence in the home and community environment.     Pt's spiritual, cultural and educational needs considered and pt agreeable to plan of care and goals.    Anticipated barriers to physical therapy: age    Goals:   Short Term Goals: 5 weeks Progressing     1. Pt will report compliance of HEP>/= 3 x/week to improve carry over. Not met  2. Pt will improve TUG score without AD to </= 12 seconds without loss of balance. Met 10/28/20, 11 seconds  3. Pt will improve FGA score to 16/30 to decrease fall risk during gait.   4. Pt will perform single leg stance bilaterally for >/=3 seconds to improve balance for stance time during gait. 11 seconds on L, 10 on R  5. Pt will ascend/descend 2 steps with reciprocal gait with single handrail to decrease risk of falls in community.   6. Pt will be able to stand for >/=15 minutes without significant low back pain to improve standing tolerance for community outings. Not met     Long Term Goals: 10 weeks   1. Pt will report compliance of HEP >/= 4x/week to improve carry over.   2. Pt will improve TUG score without AD to </= 10 seconds without loss of balance.   3. Pt will perform FGA to >/= 20/30 to decrease risk of falls in community.   4. Pt will perform single leg stance to >/= 5 seconds bilaterally to improve transfers into bathtub and decrease risk of falling with dressing.   5. Pt will ascend/descend 2 steps with reciprocal gait without use of handrail to decrease risk of falls in community.   6. Pt to improve BLE MMT scores by 1/2 MMT score to improve functional strength for transfers.    Plan     Certification date: 9/25/2020 to  12/25/20.     Outpatient Physical Therapy 2 times weekly for 10 weeks to include the following interventions: Gait Training, Manual Therapy, Moist Heat/ Ice, Neuromuscular Re-ed, Patient Education, Self Care and Therapeutic Exercise.     Cont skilled PT session towards PT and patient's goals.    Jie Leyva, PTA   11/13/2020

## 2020-11-16 ENCOUNTER — PATIENT MESSAGE (OUTPATIENT)
Dept: NEUROLOGY | Facility: CLINIC | Age: 75
End: 2020-11-16

## 2020-11-17 ENCOUNTER — CLINICAL SUPPORT (OUTPATIENT)
Dept: REHABILITATION | Facility: HOSPITAL | Age: 75
End: 2020-11-17
Attending: PHYSICAL MEDICINE & REHABILITATION
Payer: MEDICARE

## 2020-11-17 DIAGNOSIS — Z74.09 IMPAIRED FUNCTIONAL MOBILITY, BALANCE, AND ENDURANCE: ICD-10-CM

## 2020-11-17 DIAGNOSIS — R53.1 WEAKNESS: ICD-10-CM

## 2020-11-17 DIAGNOSIS — Z91.81 AT RISK FOR FALLING: ICD-10-CM

## 2020-11-17 PROCEDURE — 97112 NEUROMUSCULAR REEDUCATION: CPT | Mod: PN

## 2020-11-17 NOTE — PROGRESS NOTES
"  Physical Therapy Daily Treatment Note     Name: Cheryl Abreu  Clinic Number: [id number]    Therapy Diagnosis:   Encounter Diagnoses   Name Primary?    At risk for falling     Weakness     Impaired functional mobility, balance, and endurance        Physician: Brisa Boyle, *    Visit Date: 11/17/2020    Physician Orders: PT Eval and Treat   Medical Diagnosis from Referral: R26.81 (ICD-10-CM) - Gait instability  Evaluation Date: 9/25/2020  Authorization Period Expiration: 10/2/2020  Plan of Care Expiration: 12/25/20  Visit # / Visits authorized: 14/20 PN Due: 11/28/20     Time In: 3:48 pm  Time Out: 4:30 pm  Total Time: 42 minutes  Total Billable Time: 42 minutes (one on one with pt for all exercises)    Precautions:  Standard, Fall and [place], osteoporosis, scoliosis    Subjective     Pt reports: she denies any recent falls upon arrival that she can remember. Has some low back pain but has not been consistent with HEP given. States she is getting an MRI next week.    She was somewhat compliant with home exercise program.  Response to previous treatment: with HB prior, felt good  Functional change: decreased back pain    Pain: 4/10  Location: low back    Objective     Cheryl received therapeutic exercises to develop strength, endurance, ROM, flexibility, posture and core stabilization for 02 minutes including:    Nustep x 5'    Lumbar extensor machine 45# x 15  Matrix trunk rotation 20# x 15 each    3 x 30" for seated QL stretch on L    Cheryl participated in neuromuscular re-education activities to improve: Balance, Coordination, Kinesthetic, Proprioception and Posture for 40 minutes. The following activities were included:     Large amplitude movements:   Sitting Floor to ceiling x 10   Reach big and hold x 10   Forward step and reach x 10 each   Lateral step and reach x 10 each   Rock back and reach x 10   Forward rock and reach x 10 each   Step and twist x 10 each    sit to stand without UE support " "and open chest  x 10  Step fwd over Medium agnieszka x10 each LE   Step over Medium agnieszka laterally x10 each LE   Toe tap and hold 5secs 8'' box + standing on Blue foam x15 each LE   Alternate toe taping to 8" box + standing on blue foam x 1 minute  +7.5# carries with RUE x 25'     -4 square:  CW x5  CCW x5    Cheryl participated in gait training to improve functional mobility and safety for 00 minutes, including:      Gait with large amplitude arm swings and steps x 3 large lap  Gait with cognitive tasks x 1 large lap    NP:  Ladder drills 1 foot in each x4 laps big arm swings    Home Exercises Provided and Patient Education Provided     Education provided:   Cont to perform HEP as provided.     Written Home Exercises Provided: yes.  Exercises were reviewed and Cheryl was able to demonstrate them prior to the end of the session.  Cheryl demonstrated good  understanding of the education provided.     See EMR under Patient Instructions for exercises provided 10/9/2020.    Assessment     Pt tolerated treatment good today. Returned to large amplitude exercises today without increase in shoulder pain. Pt demonstrated improved ankle and stepping strategies to maintain balance with CGA-Min A. Pt educated to continue lumbar stretches daily to assist in relief of back pain reported. States she has not been consistent with exercises at home due to having a lot going on right now. Use of QL stretch on R and carry with KB to improve posture. Pt ambulates leaving treatment with improved stride and decrease in trunk sway. Pt progress with balance is inconsistent, possibly due to no carry over at home. Progress balance training and LE strength as tolerated by pt..       Cheryl is progressing well towards her goals.   Pt prognosis is Fair.     Pt will continue to benefit from skilled outpatient physical therapy to address the deficits listed in the problem list box on initial evaluation, provide pt/family education and to maximize pt's " level of independence in the home and community environment.     Pt's spiritual, cultural and educational needs considered and pt agreeable to plan of care and goals.    Anticipated barriers to physical therapy: age    Goals:   Short Term Goals: 5 weeks Progressing     1. Pt will report compliance of HEP>/= 3 x/week to improve carry over. Not met  2. Pt will improve TUG score without AD to </= 12 seconds without loss of balance. Met 10/28/20, 11 seconds  3. Pt will improve FGA score to 16/30 to decrease fall risk during gait.   4. Pt will perform single leg stance bilaterally for >/=3 seconds to improve balance for stance time during gait. 11 seconds on L, 10 on R  5. Pt will ascend/descend 2 steps with reciprocal gait with single handrail to decrease risk of falls in community.   6. Pt will be able to stand for >/=15 minutes without significant low back pain to improve standing tolerance for community outings. Not met     Long Term Goals: 10 weeks   1. Pt will report compliance of HEP >/= 4x/week to improve carry over.   2. Pt will improve TUG score without AD to </= 10 seconds without loss of balance.   3. Pt will perform FGA to >/= 20/30 to decrease risk of falls in community.   4. Pt will perform single leg stance to >/= 5 seconds bilaterally to improve transfers into bathtub and decrease risk of falling with dressing.   5. Pt will ascend/descend 2 steps with reciprocal gait without use of handrail to decrease risk of falls in community.   6. Pt to improve BLE MMT scores by 1/2 MMT score to improve functional strength for transfers.    Plan     Certification date: 9/25/2020 to 12/25/20.     Outpatient Physical Therapy 2 times weekly for 10 weeks to include the following interventions: Gait Training, Manual Therapy, Moist Heat/ Ice, Neuromuscular Re-ed, Patient Education, Self Care and Therapeutic Exercise.     Cont skilled PT session towards PT and patient's goals.    Janel Saldivar, PT   11/17/2020

## 2020-11-20 ENCOUNTER — CLINICAL SUPPORT (OUTPATIENT)
Dept: REHABILITATION | Facility: HOSPITAL | Age: 75
End: 2020-11-20
Attending: PHYSICAL MEDICINE & REHABILITATION
Payer: MEDICARE

## 2020-11-20 DIAGNOSIS — Z91.81 AT RISK FOR FALLING: ICD-10-CM

## 2020-11-20 DIAGNOSIS — Z74.09 IMPAIRED FUNCTIONAL MOBILITY, BALANCE, AND ENDURANCE: ICD-10-CM

## 2020-11-20 DIAGNOSIS — R53.1 WEAKNESS: ICD-10-CM

## 2020-11-20 PROCEDURE — 97112 NEUROMUSCULAR REEDUCATION: CPT | Mod: PN

## 2020-11-20 PROCEDURE — 97110 THERAPEUTIC EXERCISES: CPT | Mod: PN

## 2020-11-20 NOTE — PROGRESS NOTES
"  Physical Therapy Daily Treatment Note     Name: Cheryl Abreu  Clinic Number: [id number]    Therapy Diagnosis:   Encounter Diagnoses   Name Primary?    At risk for falling     Weakness     Impaired functional mobility, balance, and endurance        Physician: Brisa Boyle, *    Visit Date: 11/20/2020    Physician Orders: PT Eval and Treat   Medical Diagnosis from Referral: R26.81 (ICD-10-CM) - Gait instability  Evaluation Date: 9/25/2020  Authorization Period Expiration: 10/2/2020  Plan of Care Expiration: 12/25/20  Visit # / Visits authorized: 15/20 PN Due: 12/8//20     Time In: 1:47 pm  Time Out: 2:41 pm  Total Time: 54 minutes  Total Billable Time: 54 minutes     Precautions:  Standard, Fall and Quechan, osteoporosis, scoliosis    Subjective     Pt reports: she denies any recent falls upon arrival that she can remember. Has some low back pain but has not been consistent with HEP given.     She was somewhat compliant with home exercise program.  Response to previous treatment: with HB prior, felt good  Functional change: decreased back pain    Pain: 4/10  Location: low back    Objective     Cheryl received therapeutic exercises to develop strength, endurance, ROM, flexibility, posture and core stabilization for 30 minutes including:    Nustep x 5'    Lumbar extensor machine 45# x 15  Matrix trunk rotation 20# x 15 each    3 x 30" for seated QL stretch on L    +lumbar ext machine 45# x 15  +matrix rotation 20# x 10 each  +matrix rows 35# 2 x 10  +matrix hip abd 35# 2 x 10  +matrix hip add 40# 2 x 10    Standing rows x 10 with GTB for HEP  Standing shoulder ext x 10 with GTB for HEP  Bent over rows with 5# x 8 for HEP    Cheryl participated in neuromuscular re-education activities to improve: Balance, Coordination, Kinesthetic, Proprioception and Posture for 24 minutes. The following activities were included:     Large amplitude movements:   Sitting Floor to ceiling x 10   Reach big and hold x " "10   Forward step and reach x 10 each   Lateral step and reach x 10 each   Rock back and reach x 10   Forward rock and reach x 10 each   Step and twist x 10 each    sit to stand without UE support and open chest  x 10  Step fwd over Medium agnieszka x10 each LE   Step over Medium agnieszka laterally x10 each LE   Toe tap and hold 5secs 8'' box + standing on Blue foam x15 each LE   Alternate toe taping to 8" box + standing on blue foam x 1 minute  7.5# carries with RUE x 25'     -4 square:  CW x5  CCW x5    Cheryl participated in gait training to improve functional mobility and safety for 00 minutes, including:      Gait with large amplitude arm swings and steps x 3 large lap  Gait with cognitive tasks x 1 large lap    NP:  Ladder drills 1 foot in each x4 laps big arm swings    Home Exercises Provided and Patient Education Provided     Education provided:   Cont to perform HEP as provided.     Written Home Exercises Provided: yes.  Exercises were reviewed and Cheryl was able to demonstrate them prior to the end of the session.  Cheryl demonstrated good  understanding of the education provided.     See EMR under Patient Instructions for exercises provided 10/9/2020.    Assessment     Pt tolerated treatment good today. Continued to large amplitude exercises today without increase in shoulder pain. Pt demonstrated improved ankle and stepping strategies to maintain balance with CGA-Min A. Pt educated to continue lumbar stretches daily to assist in relief of back pain reported. Given updated HEP with periscapular strengthening to improve posture. Returned to matrix machines for trunk and hip strengthening to reduce low back painProgress balance training and LE strength as tolerated by pt.     Cheryl is progressing well towards her goals.   Pt prognosis is Fair.     Pt will continue to benefit from skilled outpatient physical therapy to address the deficits listed in the problem list box on initial evaluation, provide pt/family " education and to maximize pt's level of independence in the home and community environment.     Pt's spiritual, cultural and educational needs considered and pt agreeable to plan of care and goals.    Anticipated barriers to physical therapy: age    Goals:   Short Term Goals: 5 weeks Progressing     1. Pt will report compliance of HEP>/= 3 x/week to improve carry over. Not met  2. Pt will improve TUG score without AD to </= 12 seconds without loss of balance. Met 10/28/20, 11 seconds  3. Pt will improve FGA score to 16/30 to decrease fall risk during gait.   4. Pt will perform single leg stance bilaterally for >/=3 seconds to improve balance for stance time during gait. 11 seconds on L, 10 on R  5. Pt will ascend/descend 2 steps with reciprocal gait with single handrail to decrease risk of falls in community.   6. Pt will be able to stand for >/=15 minutes without significant low back pain to improve standing tolerance for community outings. Not met     Long Term Goals: 10 weeks   1. Pt will report compliance of HEP >/= 4x/week to improve carry over.   2. Pt will improve TUG score without AD to </= 10 seconds without loss of balance.   3. Pt will perform FGA to >/= 20/30 to decrease risk of falls in community.   4. Pt will perform single leg stance to >/= 5 seconds bilaterally to improve transfers into bathtub and decrease risk of falling with dressing.   5. Pt will ascend/descend 2 steps with reciprocal gait without use of handrail to decrease risk of falls in community.   6. Pt to improve BLE MMT scores by 1/2 MMT score to improve functional strength for transfers.    Plan     Certification date: 9/25/2020 to 12/25/20.     Outpatient Physical Therapy 2 times weekly for 10 weeks to include the following interventions: Gait Training, Manual Therapy, Moist Heat/ Ice, Neuromuscular Re-ed, Patient Education, Self Care and Therapeutic Exercise.     Cont skilled PT session towards PT and patient's goals.    Janel Saldivar,  PT   11/20/2020

## 2020-11-25 ENCOUNTER — HOSPITAL ENCOUNTER (OUTPATIENT)
Dept: RADIOLOGY | Facility: HOSPITAL | Age: 75
Discharge: HOME | End: 2020-11-25
Attending: NEUROLOGICAL SURGERY
Payer: MEDICARE

## 2020-11-25 VITALS
SYSTOLIC BLOOD PRESSURE: 134 MMHG | HEART RATE: 77 BPM | OXYGEN SATURATION: 97 % | RESPIRATION RATE: 18 BRPM | DIASTOLIC BLOOD PRESSURE: 61 MMHG

## 2020-11-25 DIAGNOSIS — G20.C PARKINSONISM, UNSPECIFIED PARKINSONISM TYPE: ICD-10-CM

## 2020-11-25 PROCEDURE — 70551 MRI BRAIN STEM W/O DYE: CPT | Mod: 26,,, | Performed by: RADIOLOGY

## 2020-11-25 PROCEDURE — 72141 MRI NECK SPINE W/O DYE: CPT | Mod: 26,,, | Performed by: RADIOLOGY

## 2020-11-25 PROCEDURE — 63600175 PHARM REV CODE 636 W HCPCS: Performed by: STUDENT IN AN ORGANIZED HEALTH CARE EDUCATION/TRAINING PROGRAM

## 2020-11-25 PROCEDURE — 70551 MRI BRAIN WITHOUT CONTRAST: ICD-10-PCS | Mod: 26,,, | Performed by: RADIOLOGY

## 2020-11-25 PROCEDURE — 70551 MRI BRAIN STEM W/O DYE: CPT | Mod: TC

## 2020-11-25 PROCEDURE — 72141 MRI NECK SPINE W/O DYE: CPT | Mod: TC

## 2020-11-25 PROCEDURE — 72141 MRI CERVICAL SPINE WITHOUT CONTRAST: ICD-10-PCS | Mod: 26,,, | Performed by: RADIOLOGY

## 2020-11-25 RX ORDER — MIDAZOLAM HYDROCHLORIDE 1 MG/ML
2 INJECTION, SOLUTION INTRAMUSCULAR; INTRAVENOUS ONCE
Status: COMPLETED | OUTPATIENT
Start: 2020-11-25 | End: 2020-11-25

## 2020-11-25 RX ADMIN — MIDAZOLAM 2 MG: 1 INJECTION INTRAMUSCULAR; INTRAVENOUS at 12:11

## 2020-11-25 NOTE — PROGRESS NOTES
Patient completed MRI exam, patient stable, tolerated procedure well, monitored vitals, returned to baseline, printed and verbal D/C instruction given to patient and patient's  (),  pt expressed understanding of given instructions, D/C, escorted via W/C accompanied by spouse in NAD.

## 2020-11-25 NOTE — PROGRESS NOTES
Patient AAOX3, arrived via W/C accompanied by ,  Verified 2 Pt. Identifier, pre-procedure instruction given to patient and patient's accompanied ,(Spouse), instructed not to drive, make major decision, not to take any medication within 24 hours with s/e of drowsiness (muscle relaxant, or anti-anxiety), expressed understanding of given instructions, assessment performed, H&P, Midazolam 2mg will be administered as  ordered, patient will be  monitored during MRI exam.

## 2020-11-30 ENCOUNTER — OFFICE VISIT (OUTPATIENT)
Dept: SPINE | Facility: CLINIC | Age: 75
End: 2020-11-30
Attending: PHYSICAL MEDICINE & REHABILITATION
Payer: MEDICARE

## 2020-11-30 VITALS
DIASTOLIC BLOOD PRESSURE: 59 MMHG | BODY MASS INDEX: 23.73 KG/M2 | SYSTOLIC BLOOD PRESSURE: 127 MMHG | HEIGHT: 65 IN | WEIGHT: 142.44 LBS | HEART RATE: 81 BPM

## 2020-11-30 DIAGNOSIS — M47.816 SPONDYLOSIS OF LUMBAR REGION WITHOUT MYELOPATHY OR RADICULOPATHY: ICD-10-CM

## 2020-11-30 DIAGNOSIS — R26.81 GAIT INSTABILITY: ICD-10-CM

## 2020-11-30 DIAGNOSIS — M47.812 CERVICAL SPONDYLOSIS WITHOUT MYELOPATHY: ICD-10-CM

## 2020-11-30 DIAGNOSIS — M48.061 SPINAL STENOSIS OF LUMBAR REGION WITHOUT NEUROGENIC CLAUDICATION: Primary | ICD-10-CM

## 2020-11-30 PROCEDURE — 3008F BODY MASS INDEX DOCD: CPT | Mod: CPTII,S$GLB,, | Performed by: PHYSICAL MEDICINE & REHABILITATION

## 2020-11-30 PROCEDURE — 99999 PR PBB SHADOW E&M-EST. PATIENT-LVL V: CPT | Mod: PBBFAC,,, | Performed by: PHYSICAL MEDICINE & REHABILITATION

## 2020-11-30 PROCEDURE — 1126F AMNT PAIN NOTED NONE PRSNT: CPT | Mod: S$GLB,,, | Performed by: PHYSICAL MEDICINE & REHABILITATION

## 2020-11-30 PROCEDURE — 99214 PR OFFICE/OUTPT VISIT, EST, LEVL IV, 30-39 MIN: ICD-10-PCS | Mod: S$GLB,,, | Performed by: PHYSICAL MEDICINE & REHABILITATION

## 2020-11-30 PROCEDURE — 1126F PR PAIN SEVERITY QUANTIFIED, NO PAIN PRESENT: ICD-10-PCS | Mod: S$GLB,,, | Performed by: PHYSICAL MEDICINE & REHABILITATION

## 2020-11-30 PROCEDURE — 1159F PR MEDICATION LIST DOCUMENTED IN MEDICAL RECORD: ICD-10-PCS | Mod: S$GLB,,, | Performed by: PHYSICAL MEDICINE & REHABILITATION

## 2020-11-30 PROCEDURE — 1101F PR PT FALLS ASSESS DOC 0-1 FALLS W/OUT INJ PAST YR: ICD-10-PCS | Mod: CPTII,S$GLB,, | Performed by: PHYSICAL MEDICINE & REHABILITATION

## 2020-11-30 PROCEDURE — 99214 OFFICE O/P EST MOD 30 MIN: CPT | Mod: S$GLB,,, | Performed by: PHYSICAL MEDICINE & REHABILITATION

## 2020-11-30 PROCEDURE — 3008F PR BODY MASS INDEX (BMI) DOCUMENTED: ICD-10-PCS | Mod: CPTII,S$GLB,, | Performed by: PHYSICAL MEDICINE & REHABILITATION

## 2020-11-30 PROCEDURE — 1101F PT FALLS ASSESS-DOCD LE1/YR: CPT | Mod: CPTII,S$GLB,, | Performed by: PHYSICAL MEDICINE & REHABILITATION

## 2020-11-30 PROCEDURE — 3288F FALL RISK ASSESSMENT DOCD: CPT | Mod: CPTII,S$GLB,, | Performed by: PHYSICAL MEDICINE & REHABILITATION

## 2020-11-30 PROCEDURE — 3288F PR FALLS RISK ASSESSMENT DOCUMENTED: ICD-10-PCS | Mod: CPTII,S$GLB,, | Performed by: PHYSICAL MEDICINE & REHABILITATION

## 2020-11-30 PROCEDURE — 1159F MED LIST DOCD IN RCRD: CPT | Mod: S$GLB,,, | Performed by: PHYSICAL MEDICINE & REHABILITATION

## 2020-11-30 PROCEDURE — 99999 PR PBB SHADOW E&M-EST. PATIENT-LVL V: ICD-10-PCS | Mod: PBBFAC,,, | Performed by: PHYSICAL MEDICINE & REHABILITATION

## 2020-11-30 NOTE — PROGRESS NOTES
Subjective:      Patient ID: Cheryl Ramirez is a 75 y.o. female.    Chief Complaint: Follow-up    Referred by: No ref. provider found     Ms ramirez is a 76 yo female here for follow up of back pain.  She has had back pain off and on for the past 15 years.  Then 5 years ago she was working out and doing hamstring exercises and had left leg pain after a pop.  She tore her hamstring off her ischial tuberosity.  She feels like the back pain has been worse since then and could not get out of bed for 3 weeks and she had steroid injections which helped.  She was last seen by me on 8/17/20 and she was having more back pain.  She was sent for bilateral L5-S1 TF FAM done 9/3/20 and she was going to restart PT.   She has been going to therapy for her gait.  She does feel like it helps, but does not do the home program as much as she should.  She is not sure if the injection helps, she had no pain the first 24 hours.  She is not sure it helped.  The pain is mainly with walking.  She did have a fall.  She feels like the pain is a muscle and it gets tight and tired and weak when she is walking.   The pain does not go down the legs.  She had imaging of her neck.  She does have some neck pain.  She can stop and rest and get better and then get up and go again.  The neck pain is with falling asleep.  She has trouble getting comfortable at night.  She was started on carbidoba-levodoba and not sure if it helped. She fell yesterday in her sock feet.   She is taking tizanidine at night.  She does feel like it helps.      MRI brain and cervical 11/24/20  MRI brain: Generalized cerebral volume loss.  There is moderate prominence of the lateral and 3rd ventricles which may be compensatory to volume loss however normal pressure hydrocephalus not excluded.  There is no restricted diffusion to suggest acute infarction.     Patchy and confluent T2 FLAIR signal hyperintensity supratentorial white matter which is nonspecific and may be  sequela of chronic ischemic change.  There is no abnormal parenchymal susceptibility to suggest parenchymal hemorrhage.     MR cervical spine: There is slight reversal of the normal cervical lordosis centered at the C4/C5 level with stepwise grade 1 anterolisthesis of C3 on C4 and C4 on C5.  Degenerative disc disease with disc desiccation height loss and endplate degenerative change all levels.  There is trace grade 1 anterolisthesis of C7 on T1 and T2 on T3 included in the sagittal field of view.     Allowing for degenerative changes cervical vertebral body heights and contours are within normal is without evidence for acute fracture.  Craniocervical junction within normal limits.  Cerebellar tonsils appropriately located     Cervical spinal cord is grossly normal in signal and contour no cord signal abnormality to suggest edema.     C2/C3: No significant disc bulge or central canal stenosis.  Uncovertebral joint hypertrophy and facet joint arthropathy with moderate left and mild right neural foraminal stenosis     C3/C4: Posterior disc osteophyte complex with uncovertebral joint hypertrophy and facet joint arthropathy with mild central canal stenosis and moderate to severe bilateral neural foraminal stenosis.     C4/C5: Posterior disc osteophyte, uncovertebral joint hypertrophy and facet joint arthropathy with mild moderate central canal stenosis with moderate to severe neural foraminal stenosis bilaterally     C5/C6: Posterior disc osteophyte with uncovertebral joint hypertrophy and facet joint arthropathy mild central canal stenosis with moderate to severe bilateral neural foraminal stenosis     C6/C7: Posterior disc osteophyte with uncovertebral joint hypertrophy and facet joint arthropathy mild central canal stenosis with moderate to severe bilateral neural foraminal stenosis     C7/T1: Posterior disc osteophyte without significant central canal or neural foraminal stenosis.     This report was flagged in Epic  as abnormal.     Impression:     MRI brain: Moderate prominence lateral and 3rd ventricles somewhat disproportionate to the degree of volume loss cannot exclude normal pressure hydrocephalus.  Clinical correlation advised.     Patchy and confluent T2 FLAIR signal abnormality supratentorial white matter while nonspecific may be sequela of chronic ischemic change.     No evidence for acute infarction.     MRI cervical spine: Multilevel spondylo degenerative change of the cervical spine most pronounced at C4/C5 with posterior disc osteophyte complex, uncovertebral joint hypertrophy and facet joint arthropathy with mild moderate central canal stenosis and moderate to severe bilateral neural foraminal stenosis     Please see above for additional details.    MRI lumbar 5/2017 outside report  L1-2 moderate broad based disc bulge and left greater than right facet arthropathy  L2-3 DDD with left greater than right moderate facet arthopathy with NF narrowing  L3-4 broad based posterior disc bulge with left paracentral disc protrusion and moderate facet arthropathy with moderate central stenosis and moderate left NF narrowing  L4-5 disc dessication and moderate facet arthropathy with moderate right formainal narrowing  L5-S1 disc dessication and severe facet arthropathy and moderate central stenosis and moderate left NF narrowing    X-ray lumbar 6/22/2018  There is an S shaped scoliosis which is moderate.  The vertebral bodies are normal in height.  There is disc space narrowing throughout and mild to moderate osteophytic spurring.  Overall moderate facet degenerative changes are present in the mid to lower spine.  There is no significant alignment abnormality or change in alignment with flexion and extension.    Incidental note is made of a small vascular stent overlying the spine likely relating to the left renal artery.  Vascular calcification present along the wall of the aorta.    Impression      Scoliosis and moderate  degenerative changes      X-ray hips 6/22/2018  There is degenerative change within the lower spine.  The hips demonstrate no significant degenerative change.  There is no evidence of fracture.    Impression      As above      Past Medical History:  No date: Diabetes mellitus, type 2  No date: High blood pressure  No date: Scoliosis    History reviewed. No pertinent surgical history.    History reviewed.  No pertinent family history.      Social History    Marital status:              Spouse name:                       Years of education:                 Number of children:               Social History Main Topics    Smoking status: Former Smoker                                                                Packs/day: 0.00      Years: 0.00        Smokeless tobacco: Never Used                          Current Outpatient Prescriptions:  ACCU-CHEK SMARTVIEW TEST STRIP Strp, , Disp: , Rfl:   alendronate (FOSAMAX) 70 MG tablet, , Disp: , Rfl:   ALPRAZolam (XANAX) 0.5 MG tablet, Take 0.5 mg by mouth 3 (three) times daily., Disp: , Rfl:   amLODIPine (NORVASC) 10 MG tablet, , Disp: , Rfl:   aspirin (ECOTRIN) 81 MG EC tablet, Take 81 mg by mouth once daily., Disp: , Rfl:   buPROPion (WELLBUTRIN SR) 150 MG TBSR 12 hr tablet, , Disp: , Rfl:   BYSTOLIC 5 mg Tab, , Disp: , Rfl:   clopidogrel (PLAVIX) 75 mg tablet, , Disp: , Rfl:   fish oil-omega-3 fatty acids 300-1,000 mg capsule, Take by mouth once daily., Disp: , Rfl:   insulin lispro (HUMALOG) 100 unit/mL injection, Inject into the skin 3 (three) times daily before meals., Disp: , Rfl:   JUBLIA 10 % Marilyn, APPLY ONE DROP TO ALL SMALLER TOES AND 2 DROPS TO GREATER TOES DAILY, Disp: , Rfl: 10  levothyroxine (SYNTHROID) 50 MCG tablet, Take 50 mcg by mouth once daily., Disp: , Rfl:   losartan (COZAAR) 50 MG tablet, , Disp: , Rfl:   MULTIVIT-IRON-MIN-FOLIC ACID 3,500-18-0.4 UNIT-MG-MG ORAL CHEW, Take by mouth., Disp: , Rfl:   OZEMPIC 1 mg/0.75 mL (2 mg/1.5 mL) PnIj, INJECT  1MG EVERY WEEK, Disp: , Rfl: 5  pantoprazole (PROTONIX) 40 MG tablet, Take 40 mg by mouth once daily., Disp: , Rfl:   risedronate (ACTONEL) 150 MG Tab, , Disp: , Rfl:   rosuvastatin (CRESTOR) 10 MG tablet, , Disp: , Rfl:     No current facility-administered medications for this visit.       Review of patient's allergies indicates:   -- Pcn (penicillins)           Review of Systems   Constitution: Negative for weight gain and weight loss.   Cardiovascular: Positive for dyspnea on exertion. Negative for chest pain.   Respiratory: Negative for shortness of breath.    Musculoskeletal: Positive for back pain (right back). Negative for joint pain and joint swelling.   Gastrointestinal: Negative for abdominal pain, bowel incontinence, nausea and vomiting.   Genitourinary: Negative for bladder incontinence.   Neurological: Positive for loss of balance. Negative for numbness.           Objective:          General    Vitals reviewed.  Constitutional: She is oriented to person, place, and time. She appears well-developed and well-nourished.   HENT:   Head: Normocephalic and atraumatic.   Pulmonary/Chest: Effort normal.   Neurological: She is alert and oriented to person, place, and time.   Psychiatric: She has a normal mood and affect. Her behavior is normal. Judgment and thought content normal.     General Musculoskeletal Exam   Gait: abnormal (lack of arm swing and quick steps)     Back (L-Spine & T-Spine) / Neck (C-Spine) Exam     Back (L-Spine & T-Spine) Range of Motion   Extension: 20   Flexion: 80   Lateral bend right: 20   Lateral bend left: 20   Rotation right: 40   Rotation left: 40     Spinal Sensation   Right Side Sensation  C-Spine Level: normal   L-Spine Level: normal  S-Spine Level: normal  Left Side Sensation  C-Spine Level: normal  L-Spine Level: normal  S-Spine Level: normal    Other She has no scoliosis .  Spinal Kyphosis:  present    Comments:  Neg RICA        Muscle Strength   Right Upper Extremity    Biceps: 5/5   Deltoid:  5/5  Triceps:  5/5  Wrist extension: 5/5   Finger Flexors:  5/5  Left Upper Extremity  Biceps: 5/5   Deltoid:  5/5  Triceps:  5/5  Wrist extension: 5/5   Finger Flexors:  5/5  Right Lower Extremity   Hip Flexion: 5/5   Quadriceps:  5/5   Anterior tibial:  5/5   EHL:  5/5  Left Lower Extremity   Hip Flexion: 5/5   Quadriceps:  5/5   Anterior tibial:  5/5   EHL:  5/5    Reflexes     Left Side  Biceps:  2+  Triceps:  2+  Brachioradialis:  2+  Achilles:  2+  Left Steele's Sign:  Absent  Babinski Sign:  absent  Quadriceps:  2+    Right Side   Biceps:  2+  Triceps:  2+  Brachioradialis:  2+  Achilles:  2+  Right Steele's Sign:  absent  Babinski Sign:  absent  Quadriceps:  2+    Vascular Exam     Right Pulses        Carotid:                  2+    Left Pulses        Carotid:                  2+        Assessment:       Encounter Diagnoses   Name Primary?    Spinal stenosis of lumbar region without neurogenic claudication Yes    Spondylosis of lumbar region without myelopathy or radiculopathy     Gait instability     Cervical spondylosis without myelopathy          Plan:       Cheryl was seen today for follow-up.    Diagnoses and all orders for this visit:    Spinal stenosis of lumbar region without neurogenic claudication  -     Ambulatory referral/consult to Pain Clinic; Future    Spondylosis of lumbar region without myelopathy or radiculopathy  -     Ambulatory referral/consult to Pain Clinic; Future    Gait instability    Cervical spondylosis without myelopathy  -     Ambulatory referral/consult to Pain Clinic; Future           1.  She did not continue PT HEP as much as she should.  She is still falling.  She fell in socks yesterday.  We discussed no walking with socks only, she continues to have parkinson's type gait  2.  Tizanidine 2-4 TID, taking 1 at night  3. We discussed HEP, she knows it is important to continue.   4.  Discussed gait,  she does have lack of arm swing and masked  expression and feeling like cannot advance the legs and fenestrated.  She has not discussed with PCP.  I mention parkinsons. She saw neurology and she is going to schedule follow up.  She had MRI brain and cervical spine  5.  MRI report of the lumbar spine was reviewed.  She does have facet arthropathy and spinal stenosis.  She does have some back pain that gets better with sitting.  She will try the injections.  We discussed repeating MRI, we will wait for now  6.  We discussed balance and could be from neck, or could be PN, or could be neurologic.  She had MRI of the neck.  She has some stenosis and facet arthropathy that could cause neck pain but no signs of myelopathy on MRI or exam.  She does have some stenosis.  We could try a cervical injection  7.  Continue tylenol as needed  8.   She is not sure the Bilateral L5-S1 TF helped.  We discussed facet injections.  Will send her to discuss injections with Dr. Vaughn  9  We discussed shoe wear, SAS shoes or tennis shoes with good arch support  10  rtc 3 months

## 2020-12-01 ENCOUNTER — CLINICAL SUPPORT (OUTPATIENT)
Dept: REHABILITATION | Facility: HOSPITAL | Age: 75
End: 2020-12-01
Attending: PHYSICAL MEDICINE & REHABILITATION
Payer: MEDICARE

## 2020-12-01 ENCOUNTER — PATIENT MESSAGE (OUTPATIENT)
Dept: NEUROLOGY | Facility: CLINIC | Age: 75
End: 2020-12-01

## 2020-12-01 DIAGNOSIS — Z91.81 AT RISK FOR FALLING: ICD-10-CM

## 2020-12-01 DIAGNOSIS — R53.1 WEAKNESS: ICD-10-CM

## 2020-12-01 DIAGNOSIS — Z74.09 IMPAIRED FUNCTIONAL MOBILITY, BALANCE, AND ENDURANCE: ICD-10-CM

## 2020-12-01 PROCEDURE — 97112 NEUROMUSCULAR REEDUCATION: CPT | Mod: PN,CQ

## 2020-12-01 PROCEDURE — 97110 THERAPEUTIC EXERCISES: CPT | Mod: PN,CQ

## 2020-12-01 NOTE — PROGRESS NOTES
"  Physical Therapy Daily Treatment Note     Name: Cheryl Abreu  Clinic Number: [id number]    Therapy Diagnosis:   Encounter Diagnoses   Name Primary?    At risk for falling     Weakness     Impaired functional mobility, balance, and endurance        Physician: Brisa Boyle, *    Visit Date: 12/1/2020    Physician Orders: PT Eval and Treat   Medical Diagnosis from Referral: R26.81 (ICD-10-CM) - Gait instability  Evaluation Date: 9/25/2020  Authorization Period Expiration: 10/2/2020  Plan of Care Expiration: 12/25/20  Visit # / Visits authorized: 16/20 PN Due: 12/8//20     Time In: 1200  Time Out: 1245  Total Time:  45 minutes  Total Billable Time: 45 minutes     Precautions:  Standard, Fall and [place], osteoporosis, scoliosis    Subjective     Pt reports: she is doing fair. Her back and neck have been bothering her. She has not been doing her exercises at home consistently. She had a fall in her kitchen the other day, she slipped in her socks. No injury to note.     She was somewhat compliant with home exercise program.  Response to previous treatment: with HB prior, felt good  Functional change: decreased back pain    Pain: 4/10  Location: low back    Objective     Cheryl received therapeutic exercises to develop strength, endurance, ROM, flexibility, posture and core stabilization for 25 minutes including:    Nustep x 5'      3 x 30" for seated QL stretch on L    lumbar ext machine 45# x 15  matrix rotation 20# x 10 each  matrix rows 35# 2 x 10  matrix hip abd 35# 2 x 10  matrix hip add 40# 2 x 10    Standing rows x 10 with GTB for HEP  Standing shoulder ext x 10 with GTB for HEP  Bent over rows with 5# x 8 for HEP    Cheryl participated in neuromuscular re-education activities to improve: Balance, Coordination, Kinesthetic, Proprioception and Posture for 20 minutes. The following activities were included:     Large amplitude movements:   Sitting Floor to ceiling x 10   Reach big and hold x " "10   Forward step and reach x 10 each   Lateral step and reach x 10 each   Rock back and reach x 10   Forward rock and reach x 10 each   Step and twist x 10 each    sit to stand without UE support and open chest  x 10  Step fwd over Medium agnieszka x10 each LE   Step over Medium agnieszka laterally x10 each LE   Toe tap and hold 5secs 8'' box + standing on Blue foam x15 each LE   Alternate toe taping to 8" box + standing on blue foam x 1 minute  7.5# carries with RUE x 25'     -4 square:  CW x5  CCW x5    Cheryl participated in gait training to improve functional mobility and safety for 00 minutes, including:      Gait with large amplitude arm swings and steps x 3 large lap  Gait with cognitive tasks x 1 large lap    NP:  Ladder drills 1 foot in each x4 laps big arm swings    Home Exercises Provided and Patient Education Provided     Education provided:   Cont to perform HEP as provided.     Written Home Exercises Provided: yes.  Exercises were reviewed and Cheryl was able to demonstrate them prior to the end of the session.  Cheryl demonstrated good  understanding of the education provided.     See EMR under Patient Instructions for exercises provided 10/9/2020.    Assessment     Pt tolerated treatment good today. Continued with large amplitude movements, in which she required CGA-Min A during forward and lateral step for balance/safety. She requires occasional cueing to remain on task and for recall while performing exercises 2' memory/cognitive deficits.great tolerance to Matrix machine exercises for strengthening and back pain reduction. Will continue to porogress balance training and LE strength as tolerated by pt.     Cheryl is progressing well towards her goals.   Pt prognosis is Fair.     Pt will continue to benefit from skilled outpatient physical therapy to address the deficits listed in the problem list box on initial evaluation, provide pt/family education and to maximize pt's level of independence in the home " and community environment.     Pt's spiritual, cultural and educational needs considered and pt agreeable to plan of care and goals.    Anticipated barriers to physical therapy: age    Goals:   Short Term Goals: 5 weeks Progressing     1. Pt will report compliance of HEP>/= 3 x/week to improve carry over. Not met  2. Pt will improve TUG score without AD to </= 12 seconds without loss of balance. Met 10/28/20, 11 seconds  3. Pt will improve FGA score to 16/30 to decrease fall risk during gait.   4. Pt will perform single leg stance bilaterally for >/=3 seconds to improve balance for stance time during gait. 11 seconds on L, 10 on R  5. Pt will ascend/descend 2 steps with reciprocal gait with single handrail to decrease risk of falls in community.   6. Pt will be able to stand for >/=15 minutes without significant low back pain to improve standing tolerance for community outings. Not met     Long Term Goals: 10 weeks   1. Pt will report compliance of HEP >/= 4x/week to improve carry over.   2. Pt will improve TUG score without AD to </= 10 seconds without loss of balance.   3. Pt will perform FGA to >/= 20/30 to decrease risk of falls in community.   4. Pt will perform single leg stance to >/= 5 seconds bilaterally to improve transfers into bathtub and decrease risk of falling with dressing.   5. Pt will ascend/descend 2 steps with reciprocal gait without use of handrail to decrease risk of falls in community.   6. Pt to improve BLE MMT scores by 1/2 MMT score to improve functional strength for transfers.    Plan     Certification date: 9/25/2020 to 12/25/20.     Outpatient Physical Therapy 2 times weekly for 10 weeks to include the following interventions: Gait Training, Manual Therapy, Moist Heat/ Ice, Neuromuscular Re-ed, Patient Education, Self Care and Therapeutic Exercise.     Cont skilled PT session towards PT and patient's goals.    Richa Osborne, PTA   12/01/2020

## 2020-12-03 ENCOUNTER — PATIENT MESSAGE (OUTPATIENT)
Dept: NEUROLOGY | Facility: CLINIC | Age: 75
End: 2020-12-03

## 2020-12-03 ENCOUNTER — CLINICAL SUPPORT (OUTPATIENT)
Dept: REHABILITATION | Facility: HOSPITAL | Age: 75
End: 2020-12-03
Attending: PHYSICAL MEDICINE & REHABILITATION
Payer: MEDICARE

## 2020-12-03 DIAGNOSIS — Z74.09 IMPAIRED FUNCTIONAL MOBILITY, BALANCE, AND ENDURANCE: ICD-10-CM

## 2020-12-03 DIAGNOSIS — R53.1 WEAKNESS: ICD-10-CM

## 2020-12-03 DIAGNOSIS — Z91.81 AT RISK FOR FALLING: Primary | ICD-10-CM

## 2020-12-03 PROCEDURE — 97110 THERAPEUTIC EXERCISES: CPT | Mod: PN,CQ

## 2020-12-03 PROCEDURE — 97112 NEUROMUSCULAR REEDUCATION: CPT | Mod: PN,CQ

## 2020-12-03 NOTE — PROGRESS NOTES
"  Physical Therapy Daily Treatment Note     Name: Cheryl Abreu  Clinic Number: [id number]    Therapy Diagnosis:   Encounter Diagnoses   Name Primary?    At risk for falling Yes    Weakness     Impaired functional mobility, balance, and endurance        Physician: Brisa Boyle, *    Visit Date: 12/3/2020    Physician Orders: PT Eval and Treat   Medical Diagnosis from Referral: R26.81 (ICD-10-CM) - Gait instability  Evaluation Date: 9/25/2020  Authorization Period Expiration: 10/2/2020  Plan of Care Expiration: 12/25/20  Visit # / Visits authorized: 17/20 PN Due: 12/8//20     Time In: 1045  Time Out: 1130  Total Time:  45 minutes  Total Billable Time: 45 minutes     Precautions:  Standard, Fall and [place], osteoporosis, scoliosis    Subjective     Pt reports: she is doing well. Was told by neurologist that I have water on my brain which could contribute to my walking     She was somewhat compliant with home exercise program.  Response to previous treatment: with HB prior, felt good  Functional change: decreased back pain    Pain: 4/10  Location: low back    Objective     Cheryl received therapeutic exercises to develop strength, endurance, ROM, flexibility, posture and core stabilization for 25 minutes including:    Nustep x 5'      3 x 30" for seated QL stretch on L    lumbar ext machine 45# x 15  matrix rotation 20# x 10 each  matrix rows 40# 2 x 10  matrix hip abd 40# 2 x 10  matrix hip add 40# 2 x 10    Standing rows x 10 with GTB for HEP  Standing shoulder ext x 10 with GTB for HEP  Bent over rows with 5# x 8 for HEP    Cheryl participated in neuromuscular re-education activities to improve: Balance, Coordination, Kinesthetic, Proprioception and Posture for 20 minutes. The following activities were included:     Large amplitude movements:   Sitting Floor to ceiling x 10   Reach big and hold x 10   Forward step and reach x 10 each   Lateral step and reach x 10 each   Rock back and reach x " "10   Forward rock and reach x 10 each   Step and twist x 10 each    sit to stand without UE support and open chest  2x10  Step fwd over Medium agnieszka x10 each LE   Step over Medium agnieszka laterally x10 each LE   Toe tap and hold 5secs 8'' box + standing on Blue foam x15 each LE   Alternate toe taping to 8" box + standing on blue foam x 1 minute  7.5# carries with RUE x 25'     Standing on airex moving ball up/down and side/side 2x10 each   -4 square:  CW x5  CCW x5    Cheryl participated in gait training to improve functional mobility and safety for 00 minutes, including:      Gait with large amplitude arm swings and steps x 3 large lap  Gait with cognitive tasks x 1 large lap      Ladder drills 1 foot in each x4 laps big arm swings  Side stepping in ladder x3 laps    Home Exercises Provided and Patient Education Provided     Education provided:   Cont to perform HEP as provided.     Written Home Exercises Provided: yes.  Exercises were reviewed and Cheryl was able to demonstrate them prior to the end of the session.  Cheryl demonstrated good  understanding of the education provided.     See EMR under Patient Instructions for exercises provided 10/9/2020.    Assessment     Pt tolerated treatment good today. Pt enters clinic with improved gait, less shuffle pattern. Pt requires cues throughout session for improved posture. Pt did well with balance activities, no LOB with airex pad or 4 square. Pt was able to progress with all matrix machine exercises, appropriate level of fatigue.     Cheryl is progressing well towards her goals.   Pt prognosis is Fair.     Pt will continue to benefit from skilled outpatient physical therapy to address the deficits listed in the problem list box on initial evaluation, provide pt/family education and to maximize pt's level of independence in the home and community environment.     Pt's spiritual, cultural and educational needs considered and pt agreeable to plan of care and " goals.    Anticipated barriers to physical therapy: age    Goals:   Short Term Goals: 5 weeks Progressing     1. Pt will report compliance of HEP>/= 3 x/week to improve carry over. Not met  2. Pt will improve TUG score without AD to </= 12 seconds without loss of balance. Met 10/28/20, 11 seconds  3. Pt will improve FGA score to 16/30 to decrease fall risk during gait.   4. Pt will perform single leg stance bilaterally for >/=3 seconds to improve balance for stance time during gait. 11 seconds on L, 10 on R  5. Pt will ascend/descend 2 steps with reciprocal gait with single handrail to decrease risk of falls in community.   6. Pt will be able to stand for >/=15 minutes without significant low back pain to improve standing tolerance for community outings. Not met     Long Term Goals: 10 weeks   1. Pt will report compliance of HEP >/= 4x/week to improve carry over.   2. Pt will improve TUG score without AD to </= 10 seconds without loss of balance.   3. Pt will perform FGA to >/= 20/30 to decrease risk of falls in community.   4. Pt will perform single leg stance to >/= 5 seconds bilaterally to improve transfers into bathtub and decrease risk of falling with dressing.   5. Pt will ascend/descend 2 steps with reciprocal gait without use of handrail to decrease risk of falls in community.   6. Pt to improve BLE MMT scores by 1/2 MMT score to improve functional strength for transfers.    Plan     Certification date: 9/25/2020 to 12/25/20.     Outpatient Physical Therapy 2 times weekly for 10 weeks to include the following interventions: Gait Training, Manual Therapy, Moist Heat/ Ice, Neuromuscular Re-ed, Patient Education, Self Care and Therapeutic Exercise.     Cont skilled PT session towards PT and patient's goals.    Bryce Jordan, PTA   12/03/2020

## 2020-12-07 ENCOUNTER — PATIENT MESSAGE (OUTPATIENT)
Dept: REHABILITATION | Facility: HOSPITAL | Age: 75
End: 2020-12-07

## 2020-12-14 ENCOUNTER — PATIENT MESSAGE (OUTPATIENT)
Dept: NEUROLOGY | Facility: CLINIC | Age: 75
End: 2020-12-14

## 2020-12-14 DIAGNOSIS — R26.81 GAIT INSTABILITY: Primary | ICD-10-CM

## 2020-12-14 DIAGNOSIS — G91.2 NPH (NORMAL PRESSURE HYDROCEPHALUS): ICD-10-CM

## 2020-12-14 DIAGNOSIS — G20.C PARKINSONISM, UNSPECIFIED PARKINSONISM TYPE: ICD-10-CM

## 2020-12-15 ENCOUNTER — TELEPHONE (OUTPATIENT)
Dept: PAIN MEDICINE | Facility: CLINIC | Age: 75
End: 2020-12-15

## 2020-12-15 NOTE — TELEPHONE ENCOUNTER
"This message is for patient in regards to his/her appointment 12/16/20 at 01:00p       [facility] Policy: For the safety of all patients and staff members.     Patient Visitor policy: Due to social distancing and limited seating staff are requesting patient to arrive to their schedule appointments alone. If patient do not need assistance with their visit, we're asking all visitors to remain outside the waiting area.    Upon arriving to facility; patient will have temperature taking and are required to wear a face mask, if patient do not have a face mask one will be provided. Upon arriving patient we ask patients to contact clinic at this number [phone] to notify staff that they have arrived or they may do so by utilizing the [facility] in [place](if patient have patient portal; clinical staff will send a message through there letting them know it's okay to proceed to their visit). Staff will give the patient the "okay" to come up or wait inside their vehicle until clinic is ready for patient to be seen by Dr. Lavonne Vaughn MD. If you have any questions or concerns please contact [phone]       also inquired IPM within message.    [facility] providers and Mid-levels offer interventional, procedure--based options to treat chronic pain. The goal is to manage chronic pain by reducing pain frequency and intensity and address your functional goals for activities of daily living while simultaneously reducing or eliminating your reliance on medications. Please bring any records or images that you have from prior treatments for your pain. You will be presented with multi-modal treatment plan that may or may not include imaging, interventional procedures, physical/occupational/aqua therapy, pain creams, and non-narcotic pain medications used for the treatments of chronic pain.      Staff left a voicemail reminding patient of their schedule appointment.    "

## 2020-12-16 ENCOUNTER — OFFICE VISIT (OUTPATIENT)
Dept: PAIN MEDICINE | Facility: CLINIC | Age: 75
End: 2020-12-16
Attending: PHYSICAL MEDICINE & REHABILITATION
Payer: MEDICARE

## 2020-12-16 VITALS
TEMPERATURE: 98 F | BODY MASS INDEX: 23.49 KG/M2 | DIASTOLIC BLOOD PRESSURE: 64 MMHG | HEART RATE: 84 BPM | OXYGEN SATURATION: 100 % | SYSTOLIC BLOOD PRESSURE: 131 MMHG | HEIGHT: 65 IN | RESPIRATION RATE: 18 BRPM | WEIGHT: 141 LBS

## 2020-12-16 DIAGNOSIS — M48.061 SPINAL STENOSIS OF LUMBAR REGION WITHOUT NEUROGENIC CLAUDICATION: ICD-10-CM

## 2020-12-16 DIAGNOSIS — M47.812 CERVICAL SPONDYLOSIS WITHOUT MYELOPATHY: ICD-10-CM

## 2020-12-16 DIAGNOSIS — M47.816 SPONDYLOSIS OF LUMBAR REGION WITHOUT MYELOPATHY OR RADICULOPATHY: ICD-10-CM

## 2020-12-16 PROCEDURE — 3008F PR BODY MASS INDEX (BMI) DOCUMENTED: ICD-10-PCS | Mod: CPTII,S$GLB,, | Performed by: ANESTHESIOLOGY

## 2020-12-16 PROCEDURE — 3008F BODY MASS INDEX DOCD: CPT | Mod: CPTII,S$GLB,, | Performed by: ANESTHESIOLOGY

## 2020-12-16 PROCEDURE — 99214 PR OFFICE/OUTPT VISIT, EST, LEVL IV, 30-39 MIN: ICD-10-PCS | Mod: S$GLB,,, | Performed by: ANESTHESIOLOGY

## 2020-12-16 PROCEDURE — 99214 OFFICE O/P EST MOD 30 MIN: CPT | Mod: S$GLB,,, | Performed by: ANESTHESIOLOGY

## 2020-12-16 PROCEDURE — 1159F MED LIST DOCD IN RCRD: CPT | Mod: S$GLB,,, | Performed by: ANESTHESIOLOGY

## 2020-12-16 PROCEDURE — 3288F PR FALLS RISK ASSESSMENT DOCUMENTED: ICD-10-PCS | Mod: CPTII,S$GLB,, | Performed by: ANESTHESIOLOGY

## 2020-12-16 PROCEDURE — 3288F FALL RISK ASSESSMENT DOCD: CPT | Mod: CPTII,S$GLB,, | Performed by: ANESTHESIOLOGY

## 2020-12-16 PROCEDURE — 1125F AMNT PAIN NOTED PAIN PRSNT: CPT | Mod: S$GLB,,, | Performed by: ANESTHESIOLOGY

## 2020-12-16 PROCEDURE — 99999 PR PBB SHADOW E&M-EST. PATIENT-LVL V: ICD-10-PCS | Mod: PBBFAC,,, | Performed by: ANESTHESIOLOGY

## 2020-12-16 PROCEDURE — 1125F PR PAIN SEVERITY QUANTIFIED, PAIN PRESENT: ICD-10-PCS | Mod: S$GLB,,, | Performed by: ANESTHESIOLOGY

## 2020-12-16 PROCEDURE — 1101F PT FALLS ASSESS-DOCD LE1/YR: CPT | Mod: CPTII,S$GLB,, | Performed by: ANESTHESIOLOGY

## 2020-12-16 PROCEDURE — 99999 PR PBB SHADOW E&M-EST. PATIENT-LVL V: CPT | Mod: PBBFAC,,, | Performed by: ANESTHESIOLOGY

## 2020-12-16 PROCEDURE — 1159F PR MEDICATION LIST DOCUMENTED IN MEDICAL RECORD: ICD-10-PCS | Mod: S$GLB,,, | Performed by: ANESTHESIOLOGY

## 2020-12-16 PROCEDURE — 1101F PR PT FALLS ASSESS DOC 0-1 FALLS W/OUT INJ PAST YR: ICD-10-PCS | Mod: CPTII,S$GLB,, | Performed by: ANESTHESIOLOGY

## 2020-12-16 NOTE — PROGRESS NOTES
Subjective:      Patient ID: Cheryl Abreu is a 75 y.o. female.    Chief Complaint:  Follow-up low back pain    Referred by: Brisa Boyle, *     Patient returns to clinic today for follow-up.  She underwent bilateral L5/S1 TFESI in September 2020 that she says did not provide her any significant relief.  While she is no longer having any pain in her legs, she continues to have significant low back pain.  She reports that her pain is usually fine at rest but worsens with any activity.  She also reports stiffness in her back.  She reports near total pain relief in the past after completing the Healthy Back program; however, her symptoms returned after she stopped doing her home exercises.  She has recently returned to physical therapy, and she has noticed some mild improvement.  She denies any new symptoms.        Past Medical History:   Diagnosis Date    Arthritis     Diabetes mellitus, type 2     Hearing loss     High blood pressure     Hyperlipidemia     Osteoporosis     Scoliosis     Thyroid disease        Past Surgical History:   Procedure Laterality Date    arthroscopic left knee Left 2012    EYE SURGERY Bilateral     cataract    OOPHORECTOMY Right     RENAL ARTERY STENT Left 2001    TRANSFORAMINAL EPIDURAL INJECTION OF STEROID Bilateral 9/3/2020    Procedure: LUMBAR TRANSFORAMINAL BILATERAL L5/S1 DIRECT REFERRAL;  Surgeon: Lavonne Vaughn MD;  Location: [facility];  Service: Pain Management;  Laterality: Bilateral;  NEEDS CONSENT, PLAVIX    WISDOM TOOTH EXTRACTION         Review of patient's allergies indicates:   Allergen Reactions    Nickel Itching and Other (See Comments)    Pcn [penicillins] Rash       Current Outpatient Medications   Medication Sig Dispense Refill    ACCU-CHEK SMARTVIEW TEST STRIP Strp       alpha lipoic acid 100 mg Cap Take by mouth.      amLODIPine (NORVASC) 10 MG tablet Take 10 mg by mouth once daily.       aspirin (ECOTRIN) 81 MG EC tablet Take 81  mg by mouth once daily.      b complex vitamins capsule Take 1 capsule by mouth.      biotin 2,500 mcg Cap Take 1 capsule by mouth once daily.       buPROPion (WELLBUTRIN SR) 150 MG TBSR 12 hr tablet Take 150 mg by mouth 2 (two) times daily.       BYSTOLIC 5 mg Tab Take 5 mg by mouth once daily.       carbidopa-levodopa  mg (SINEMET)  mg per tablet Take 1 tablet by mouth 3 (three) times daily. 90 tablet 11    clopidogrel (PLAVIX) 75 mg tablet Take 75 mg by mouth once daily.       co-enzyme Q-10 30 mg capsule Take 30 mg by mouth.      fluticasone (FLONASE) 50 mcg/actuation nasal spray 1 spray by Each Nare route once daily.      insulin lispro (HUMALOG) 100 unit/mL injection Inject into the skin 3 (three) times daily before meals.      levothyroxine (SYNTHROID) 50 MCG tablet Take 50 mcg by mouth once daily.      losartan (COZAAR) 50 MG tablet Take 50 mg by mouth 2 (two) times daily.       MULTIVIT-IRON-MIN-FOLIC ACID 3,500-18-0.4 UNIT-MG-MG ORAL CHEW Take by mouth.      rosuvastatin (CRESTOR) 10 MG tablet Take 10 mg by mouth every evening.       semaglutide (OZEMPIC) 0.25 mg or 0.5 mg(2 mg/1.5 mL) PnIj Inject into the skin once a week.      sodium chloride (OCEAN) 0.65 % nasal spray 1 spray by Nasal route as needed for Congestion.      tiZANidine (ZANAFLEX) 2 MG tablet TAKE 1-2 TABLETS (2-4 MG TOTAL) BY MOUTH EVERY 8 (EIGHT) HOURS AS NEEDED. 180 tablet 2     No current facility-administered medications for this visit.        Family History   Problem Relation Age of Onset    Inflammatory bowel disease Neg Hx     Kidney disease Neg Hx     Osteoarthritis Neg Hx     Rheum arthritis Neg Hx     Psoriasis Neg Hx     Thyroid disease Neg Hx        Social History     Socioeconomic History    Marital status:      Spouse name: Not on file    Number of children: Not on file    Years of education: Not on file    Highest education level: Not on file   Occupational History    Not on file  "  Social Needs    Financial resource strain: Not on file    Food insecurity     Worry: Not on file     Inability: Not on file    Transportation needs     Medical: Not on file     Non-medical: Not on file   Tobacco Use    Smoking status: Former Smoker     Packs/day: 2.00     Years: 25.00     Pack years: 50.00     Quit date: 3/7/2001     Years since quittin.7    Smokeless tobacco: Never Used    Tobacco comment:  3 children   Substance and Sexual Activity    Alcohol use: Yes     Comment: 1-2 drinks a week    Drug use: No    Sexual activity: Not on file   Lifestyle    Physical activity     Days per week: Not on file     Minutes per session: Not on file    Stress: Not on file   Relationships    Social connections     Talks on phone: Not on file     Gets together: Not on file     Attends Religion service: Not on file     Active member of club or organization: Not on file     Attends meetings of clubs or organizations: Not on file     Relationship status: Not on file   Other Topics Concern    Not on file   Social History Narrative    Not on file           Review of Systems   Constitution: Negative.   HENT: Negative.    Eyes: Negative.    Cardiovascular: Negative.    Respiratory: Negative.    Endocrine: Negative.    Hematologic/Lymphatic: Negative.    Musculoskeletal:        As per HPI.   All other systems reviewed and are negative.          Objective:   /64   Pulse 84   Temp 97.7 °F (36.5 °C)   Resp 18   Ht 5' 5" (1.651 m)   Wt 64 kg (141 lb)   SpO2 100%   BMI 23.46 kg/m²   Pain Disability Index Review:  Last 3 PDI Scores 2020   Pain Disability Index (PDI) 28     Normocephalic.  Atraumatic.  Affect appropriate.  Breathing unlabored.  Extra ocular muscles intact.        Thoracolumbar scoliosis with right-sided curve  Decreased range of motion with flexion and extension secondary to pain  Tenderness to palpation over lower lumbar facets and bilateral SI joints  Positive facet " loading  Negative straight leg raise bilaterally  No clonus      Assessment:       Encounter Diagnoses   Name Primary?    Spinal stenosis of lumbar region without neurogenic claudication     Spondylosis of lumbar region without myelopathy or radiculopathy     Cervical spondylosis without myelopathy          Plan:   We discussed with the patient the assessment and recommendations. The following is the plan we agreed on:    1.  Will schedule patient for bilateral L4/5 and L5/S1 facet joint injections  2.  Continue physical therapy  3.  We will see her back in clinic following the injections      Diagnoses and all orders for this visit:    Spinal stenosis of lumbar region without neurogenic claudication  -     Ambulatory referral/consult to Pain Clinic    Spondylosis of lumbar region without myelopathy or radiculopathy  -     Ambulatory referral/consult to Pain Clinic    Cervical spondylosis without myelopathy  -     Ambulatory referral/consult to Pain Clinic         Carlos Milian MD  [facility], PGY-2     I have personally taken the history and examined this patient and agree with the resident's note as stated above.

## 2020-12-16 NOTE — LETTER
December 17, 2020      Brisa Boyle MD  [address]  [address]  [facility]  [facility] [address]           [facility]  [address]  [facility] [address]  Phone: [phone]  Fax: [phone]          Patient: Cheryl Abreu   MR Number: [id number]   YOB: 1945   Date of Visit: 12/16/2020       Dear Dr. Brisa Boyle:    Thank you for referring Cheryl Abreu to me for evaluation. Attached you will find relevant portions of my assessment and plan of care.    If you have questions, please do not hesitate to call me. I look forward to following Cheryl Abreu along with you.    Sincerely,    Lavonne Vaughn MD    Enclosure  CC:  No Recipients    If you would like to receive this communication electronically, please contact [email or web address] or [phone] to request more information on [employer] access.    For providers and/or their staff who would like to refer a patient to [facility], please contact us through our one-stop-shop provider referral line, [facility], at [phone].    If you feel you have received this communication in error or would no longer like to receive these types of communications, please e-mail [email or web address]

## 2020-12-28 ENCOUNTER — PATIENT MESSAGE (OUTPATIENT)
Dept: NEUROLOGY | Facility: CLINIC | Age: 75
End: 2020-12-28

## 2020-12-31 ENCOUNTER — HOSPITAL ENCOUNTER (OUTPATIENT)
Dept: PREADMISSION TESTING | Facility: HOSPITAL | Age: 75
Discharge: HOME | End: 2020-12-31
Attending: RADIOLOGY
Payer: MEDICARE

## 2020-12-31 VITALS — BODY MASS INDEX: 23.32 KG/M2 | WEIGHT: 140 LBS | HEIGHT: 65 IN

## 2020-12-31 RX ORDER — ALPRAZOLAM 0.25 MG/1
TABLET ORAL
COMMUNITY

## 2021-01-06 ENCOUNTER — HOSPITAL ENCOUNTER (OUTPATIENT)
Facility: HOSPITAL | Age: 76
Discharge: HOME | End: 2021-01-06
Attending: RADIOLOGY | Admitting: RADIOLOGY
Payer: MEDICARE

## 2021-01-06 ENCOUNTER — HOSPITAL ENCOUNTER (OUTPATIENT)
Dept: INTERVENTIONAL RADIOLOGY/VASCULAR | Facility: HOSPITAL | Age: 76
Discharge: HOME | End: 2021-01-06
Attending: NEUROLOGICAL SURGERY
Payer: MEDICARE

## 2021-01-06 VITALS
DIASTOLIC BLOOD PRESSURE: 56 MMHG | OXYGEN SATURATION: 98 % | SYSTOLIC BLOOD PRESSURE: 144 MMHG | RESPIRATION RATE: 20 BRPM | TEMPERATURE: 98 F | WEIGHT: 140 LBS | BODY MASS INDEX: 23.3 KG/M2 | HEART RATE: 80 BPM

## 2021-01-06 DIAGNOSIS — G91.2 NPH (NORMAL PRESSURE HYDROCEPHALUS): Primary | ICD-10-CM

## 2021-01-06 DIAGNOSIS — G91.2 NPH (NORMAL PRESSURE HYDROCEPHALUS): ICD-10-CM

## 2021-01-06 DIAGNOSIS — G20.C PARKINSONISM, UNSPECIFIED PARKINSONISM TYPE: ICD-10-CM

## 2021-01-06 LAB
CLARITY CSF: CLEAR
COLOR CSF: COLORLESS
GLUCOSE CSF-MCNC: 72 MG/DL (ref 40–70)
PROT CSF-MCNC: 63 MG/DL (ref 15–40)
RBC # CSF: 0 /CU MM
SPECIMEN VOL CSF: 5 ML
WBC # CSF: 1 /CU MM (ref 0–5)

## 2021-01-06 PROCEDURE — 84157 ASSAY OF PROTEIN OTHER: CPT

## 2021-01-06 PROCEDURE — 89051 BODY FLUID CELL COUNT: CPT

## 2021-01-06 PROCEDURE — 82945 GLUCOSE OTHER FLUID: CPT

## 2021-01-06 PROCEDURE — 87205 SMEAR GRAM STAIN: CPT

## 2021-01-06 PROCEDURE — A4215 STERILE NEEDLE: HCPCS

## 2021-01-06 PROCEDURE — 87070 CULTURE OTHR SPECIMN AEROBIC: CPT

## 2021-01-06 PROCEDURE — 62328 IR LUMBAR PUNCTURE DIAGNOSTIC (XPD): ICD-10-PCS | Mod: ,,, | Performed by: RADIOLOGY

## 2021-01-06 PROCEDURE — 62328 DX LMBR SPI PNXR W/FLUOR/CT: CPT | Performed by: RADIOLOGY

## 2021-01-06 RX ORDER — ACETAMINOPHEN 325 MG/1
650 TABLET ORAL EVERY 4 HOURS PRN
Status: DISCONTINUED | OUTPATIENT
Start: 2021-01-06 | End: 2021-01-06 | Stop reason: HOSPADM

## 2021-01-06 RX ORDER — ACETAMINOPHEN 325 MG/1
650 TABLET ORAL EVERY 4 HOURS PRN
Status: CANCELLED | OUTPATIENT
Start: 2021-01-06

## 2021-01-09 ENCOUNTER — IMMUNIZATION (OUTPATIENT)
Dept: OBSTETRICS AND GYNECOLOGY | Facility: CLINIC | Age: 76
End: 2021-01-09
Payer: MEDICARE

## 2021-01-09 DIAGNOSIS — Z23 NEED FOR VACCINATION: ICD-10-CM

## 2021-01-09 LAB
BACTERIA CSF CULT: NO GROWTH
GRAM STN SPEC: NORMAL
GRAM STN SPEC: NORMAL

## 2021-01-09 PROCEDURE — 91300 COVID-19, MRNA, LNP-S, PF, 30 MCG/0.3 ML DOSE VACCINE: CPT | Mod: PBBFAC | Performed by: FAMILY MEDICINE

## 2021-01-11 ENCOUNTER — OFFICE VISIT (OUTPATIENT)
Dept: PODIATRY | Facility: CLINIC | Age: 76
End: 2021-01-11
Payer: MEDICARE

## 2021-01-11 VITALS
DIASTOLIC BLOOD PRESSURE: 74 MMHG | BODY MASS INDEX: 23.32 KG/M2 | HEART RATE: 79 BPM | SYSTOLIC BLOOD PRESSURE: 137 MMHG | WEIGHT: 140 LBS | HEIGHT: 65 IN

## 2021-01-11 DIAGNOSIS — E11.00 TYPE 2 DIABETES MELLITUS WITH HYPEROSMOLARITY WITHOUT COMA, WITH LONG-TERM CURRENT USE OF INSULIN: Primary | ICD-10-CM

## 2021-01-11 DIAGNOSIS — L84 CORN OR CALLUS: ICD-10-CM

## 2021-01-11 DIAGNOSIS — B35.1 ONYCHOMYCOSIS DUE TO DERMATOPHYTE: ICD-10-CM

## 2021-01-11 DIAGNOSIS — L60.0 INGROWN NAIL: ICD-10-CM

## 2021-01-11 DIAGNOSIS — Z79.4 TYPE 2 DIABETES MELLITUS WITH HYPEROSMOLARITY WITHOUT COMA, WITH LONG-TERM CURRENT USE OF INSULIN: Primary | ICD-10-CM

## 2021-01-11 PROCEDURE — 3008F PR BODY MASS INDEX (BMI) DOCUMENTED: ICD-10-PCS | Mod: CPTII,S$GLB,, | Performed by: PODIATRIST

## 2021-01-11 PROCEDURE — 99999 PR PBB SHADOW E&M-EST. PATIENT-LVL IV: CPT | Mod: PBBFAC,,, | Performed by: PODIATRIST

## 2021-01-11 PROCEDURE — 99999 PR PBB SHADOW E&M-EST. PATIENT-LVL IV: ICD-10-PCS | Mod: PBBFAC,,, | Performed by: PODIATRIST

## 2021-01-11 PROCEDURE — 99213 OFFICE O/P EST LOW 20 MIN: CPT | Mod: 25,S$GLB,, | Performed by: PODIATRIST

## 2021-01-11 PROCEDURE — 3008F BODY MASS INDEX DOCD: CPT | Mod: CPTII,S$GLB,, | Performed by: PODIATRIST

## 2021-01-11 PROCEDURE — 11056 PARNG/CUTG B9 HYPRKR LES 2-4: CPT | Mod: Q9,S$GLB,, | Performed by: PODIATRIST

## 2021-01-11 PROCEDURE — 11721 PR DEBRIDEMENT OF NAILS, 6 OR MORE: ICD-10-PCS | Mod: 59,Q9,S$GLB, | Performed by: PODIATRIST

## 2021-01-11 PROCEDURE — 99213 PR OFFICE/OUTPT VISIT, EST, LEVL III, 20-29 MIN: ICD-10-PCS | Mod: 25,S$GLB,, | Performed by: PODIATRIST

## 2021-01-11 PROCEDURE — 1159F PR MEDICATION LIST DOCUMENTED IN MEDICAL RECORD: ICD-10-PCS | Mod: S$GLB,,, | Performed by: PODIATRIST

## 2021-01-11 PROCEDURE — 11721 DEBRIDE NAIL 6 OR MORE: CPT | Mod: 59,Q9,S$GLB, | Performed by: PODIATRIST

## 2021-01-11 PROCEDURE — 1125F AMNT PAIN NOTED PAIN PRSNT: CPT | Mod: S$GLB,,, | Performed by: PODIATRIST

## 2021-01-11 PROCEDURE — 11056 PR TRIM BENIGN HYPERKERATOTIC SKIN LESION,2-4: ICD-10-PCS | Mod: Q9,S$GLB,, | Performed by: PODIATRIST

## 2021-01-11 PROCEDURE — 1125F PR PAIN SEVERITY QUANTIFIED, PAIN PRESENT: ICD-10-PCS | Mod: S$GLB,,, | Performed by: PODIATRIST

## 2021-01-11 PROCEDURE — 1159F MED LIST DOCD IN RCRD: CPT | Mod: S$GLB,,, | Performed by: PODIATRIST

## 2021-01-11 RX ORDER — LIDOCAINE HYDROCHLORIDE 20 MG/ML
JELLY TOPICAL
Qty: 30 ML | Refills: 2 | Status: SHIPPED | OUTPATIENT
Start: 2021-01-11

## 2021-01-11 RX ORDER — KETOCONAZOLE 20 MG/G
CREAM TOPICAL DAILY
Qty: 1 TUBE | Refills: 2 | Status: SHIPPED | OUTPATIENT
Start: 2021-01-11 | End: 2021-09-26

## 2021-01-15 ENCOUNTER — TELEPHONE (OUTPATIENT)
Dept: NEUROLOGY | Facility: CLINIC | Age: 76
End: 2021-01-15

## 2021-01-20 ENCOUNTER — OFFICE VISIT (OUTPATIENT)
Dept: NEUROLOGY | Facility: CLINIC | Age: 76
End: 2021-01-20
Payer: MEDICARE

## 2021-01-20 VITALS
HEART RATE: 79 BPM | WEIGHT: 135.81 LBS | DIASTOLIC BLOOD PRESSURE: 63 MMHG | SYSTOLIC BLOOD PRESSURE: 138 MMHG | BODY MASS INDEX: 22.63 KG/M2 | HEIGHT: 65 IN

## 2021-01-20 DIAGNOSIS — G91.2 NPH (NORMAL PRESSURE HYDROCEPHALUS): Primary | ICD-10-CM

## 2021-01-20 PROCEDURE — 1159F MED LIST DOCD IN RCRD: CPT | Mod: S$GLB,,, | Performed by: NEUROLOGICAL SURGERY

## 2021-01-20 PROCEDURE — 1101F PR PT FALLS ASSESS DOC 0-1 FALLS W/OUT INJ PAST YR: ICD-10-PCS | Mod: CPTII,S$GLB,, | Performed by: NEUROLOGICAL SURGERY

## 2021-01-20 PROCEDURE — 3288F PR FALLS RISK ASSESSMENT DOCUMENTED: ICD-10-PCS | Mod: CPTII,S$GLB,, | Performed by: NEUROLOGICAL SURGERY

## 2021-01-20 PROCEDURE — 3288F FALL RISK ASSESSMENT DOCD: CPT | Mod: CPTII,S$GLB,, | Performed by: NEUROLOGICAL SURGERY

## 2021-01-20 PROCEDURE — 99999 PR PBB SHADOW E&M-EST. PATIENT-LVL IV: CPT | Mod: PBBFAC,,, | Performed by: NEUROLOGICAL SURGERY

## 2021-01-20 PROCEDURE — 1126F AMNT PAIN NOTED NONE PRSNT: CPT | Mod: S$GLB,,, | Performed by: NEUROLOGICAL SURGERY

## 2021-01-20 PROCEDURE — 3008F PR BODY MASS INDEX (BMI) DOCUMENTED: ICD-10-PCS | Mod: CPTII,S$GLB,, | Performed by: NEUROLOGICAL SURGERY

## 2021-01-20 PROCEDURE — 99214 OFFICE O/P EST MOD 30 MIN: CPT | Mod: S$GLB,,, | Performed by: NEUROLOGICAL SURGERY

## 2021-01-20 PROCEDURE — 99214 PR OFFICE/OUTPT VISIT, EST, LEVL IV, 30-39 MIN: ICD-10-PCS | Mod: S$GLB,,, | Performed by: NEUROLOGICAL SURGERY

## 2021-01-20 PROCEDURE — 1126F PR PAIN SEVERITY QUANTIFIED, NO PAIN PRESENT: ICD-10-PCS | Mod: S$GLB,,, | Performed by: NEUROLOGICAL SURGERY

## 2021-01-20 PROCEDURE — 1159F PR MEDICATION LIST DOCUMENTED IN MEDICAL RECORD: ICD-10-PCS | Mod: S$GLB,,, | Performed by: NEUROLOGICAL SURGERY

## 2021-01-20 PROCEDURE — 1101F PT FALLS ASSESS-DOCD LE1/YR: CPT | Mod: CPTII,S$GLB,, | Performed by: NEUROLOGICAL SURGERY

## 2021-01-20 PROCEDURE — 99999 PR PBB SHADOW E&M-EST. PATIENT-LVL IV: ICD-10-PCS | Mod: PBBFAC,,, | Performed by: NEUROLOGICAL SURGERY

## 2021-01-20 PROCEDURE — 3008F BODY MASS INDEX DOCD: CPT | Mod: CPTII,S$GLB,, | Performed by: NEUROLOGICAL SURGERY

## 2021-01-25 ENCOUNTER — PROCEDURE VISIT (OUTPATIENT)
Dept: PODIATRY | Facility: CLINIC | Age: 76
End: 2021-01-25
Payer: MEDICARE

## 2021-01-25 VITALS
BODY MASS INDEX: 22.6 KG/M2 | HEART RATE: 77 BPM | WEIGHT: 135.81 LBS | DIASTOLIC BLOOD PRESSURE: 69 MMHG | SYSTOLIC BLOOD PRESSURE: 141 MMHG

## 2021-01-25 DIAGNOSIS — L60.0 INGROWN NAIL: ICD-10-CM

## 2021-01-25 PROCEDURE — 11750 EXCISION NAIL&NAIL MATRIX: CPT | Mod: T5,S$GLB,, | Performed by: PODIATRIST

## 2021-01-25 PROCEDURE — 11750 PR REMOVAL OF NAIL BED: ICD-10-PCS | Mod: T5,S$GLB,, | Performed by: PODIATRIST

## 2021-01-30 ENCOUNTER — IMMUNIZATION (OUTPATIENT)
Dept: OBSTETRICS AND GYNECOLOGY | Facility: CLINIC | Age: 76
End: 2021-01-30
Payer: MEDICARE

## 2021-01-30 DIAGNOSIS — Z23 NEED FOR VACCINATION: Primary | ICD-10-CM

## 2021-01-30 PROCEDURE — 0002A COVID-19, MRNA, LNP-S, PF, 30 MCG/0.3 ML DOSE VACCINE: CPT | Mod: PBBFAC | Performed by: FAMILY MEDICINE

## 2021-01-30 PROCEDURE — 91300 COVID-19, MRNA, LNP-S, PF, 30 MCG/0.3 ML DOSE VACCINE: CPT | Mod: PBBFAC | Performed by: FAMILY MEDICINE

## 2021-02-02 ENCOUNTER — OFFICE VISIT (OUTPATIENT)
Dept: NEUROSURGERY | Facility: CLINIC | Age: 76
End: 2021-02-02
Payer: MEDICARE

## 2021-02-02 VITALS
HEART RATE: 79 BPM | HEIGHT: 65 IN | WEIGHT: 135.56 LBS | DIASTOLIC BLOOD PRESSURE: 73 MMHG | OXYGEN SATURATION: 99 % | BODY MASS INDEX: 22.59 KG/M2 | SYSTOLIC BLOOD PRESSURE: 139 MMHG | RESPIRATION RATE: 14 BRPM

## 2021-02-02 DIAGNOSIS — G91.2 NPH (NORMAL PRESSURE HYDROCEPHALUS): Primary | ICD-10-CM

## 2021-02-02 PROCEDURE — 3008F BODY MASS INDEX DOCD: CPT | Mod: CPTII,S$GLB,, | Performed by: NEUROLOGICAL SURGERY

## 2021-02-02 PROCEDURE — 1159F MED LIST DOCD IN RCRD: CPT | Mod: S$GLB,,, | Performed by: NEUROLOGICAL SURGERY

## 2021-02-02 PROCEDURE — 99205 OFFICE O/P NEW HI 60 MIN: CPT | Mod: S$GLB,,, | Performed by: NEUROLOGICAL SURGERY

## 2021-02-02 PROCEDURE — 1159F PR MEDICATION LIST DOCUMENTED IN MEDICAL RECORD: ICD-10-PCS | Mod: S$GLB,,, | Performed by: NEUROLOGICAL SURGERY

## 2021-02-02 PROCEDURE — 1126F PR PAIN SEVERITY QUANTIFIED, NO PAIN PRESENT: ICD-10-PCS | Mod: S$GLB,,, | Performed by: NEUROLOGICAL SURGERY

## 2021-02-02 PROCEDURE — 99999 PR PBB SHADOW E&M-EST. PATIENT-LVL IV: CPT | Mod: PBBFAC,,, | Performed by: NEUROLOGICAL SURGERY

## 2021-02-02 PROCEDURE — 1126F AMNT PAIN NOTED NONE PRSNT: CPT | Mod: S$GLB,,, | Performed by: NEUROLOGICAL SURGERY

## 2021-02-02 PROCEDURE — 99999 PR PBB SHADOW E&M-EST. PATIENT-LVL IV: ICD-10-PCS | Mod: PBBFAC,,, | Performed by: NEUROLOGICAL SURGERY

## 2021-02-02 PROCEDURE — 99205 PR OFFICE/OUTPT VISIT, NEW, LEVL V, 60-74 MIN: ICD-10-PCS | Mod: S$GLB,,, | Performed by: NEUROLOGICAL SURGERY

## 2021-02-02 PROCEDURE — 3008F PR BODY MASS INDEX (BMI) DOCUMENTED: ICD-10-PCS | Mod: CPTII,S$GLB,, | Performed by: NEUROLOGICAL SURGERY

## 2021-02-03 PROBLEM — G91.2 NPH (NORMAL PRESSURE HYDROCEPHALUS): Status: ACTIVE | Noted: 2021-02-03

## 2021-02-05 ENCOUNTER — TELEPHONE (OUTPATIENT)
Dept: NEUROSURGERY | Facility: CLINIC | Age: 76
End: 2021-02-05

## 2021-02-05 DIAGNOSIS — G91.2 NPH (NORMAL PRESSURE HYDROCEPHALUS): Primary | ICD-10-CM

## 2021-02-05 DIAGNOSIS — Z01.818 PRE-OP TESTING: ICD-10-CM

## 2021-02-08 ENCOUNTER — TELEPHONE (OUTPATIENT)
Dept: PREADMISSION TESTING | Facility: HOSPITAL | Age: 76
End: 2021-02-08

## 2021-02-09 ENCOUNTER — HOSPITAL ENCOUNTER (OUTPATIENT)
Dept: RADIOLOGY | Facility: HOSPITAL | Age: 76
Discharge: HOME | End: 2021-02-09
Attending: NEUROLOGICAL SURGERY
Payer: MEDICARE

## 2021-02-09 ENCOUNTER — LAB VISIT (OUTPATIENT)
Dept: FAMILY MEDICINE | Facility: CLINIC | Age: 76
End: 2021-02-09
Payer: MEDICARE

## 2021-02-09 ENCOUNTER — TELEPHONE (OUTPATIENT)
Dept: PREADMISSION TESTING | Facility: HOSPITAL | Age: 76
End: 2021-02-09

## 2021-02-09 DIAGNOSIS — Z01.818 PREOPERATIVE TESTING: Primary | ICD-10-CM

## 2021-02-09 DIAGNOSIS — Z01.818 PRE-OP TESTING: ICD-10-CM

## 2021-02-09 DIAGNOSIS — M79.604 PAIN OF RIGHT LOWER EXTREMITY: ICD-10-CM

## 2021-02-09 DIAGNOSIS — G91.2 NPH (NORMAL PRESSURE HYDROCEPHALUS): ICD-10-CM

## 2021-02-09 DIAGNOSIS — E11.9 DIABETES MELLITUS WITHOUT COMPLICATION: ICD-10-CM

## 2021-02-09 PROCEDURE — 70450 CT HEAD/BRAIN W/O DYE: CPT | Mod: 26,,, | Performed by: RADIOLOGY

## 2021-02-09 PROCEDURE — U0003 INFECTIOUS AGENT DETECTION BY NUCLEIC ACID (DNA OR RNA); SEVERE ACUTE RESPIRATORY SYNDROME CORONAVIRUS 2 (SARS-COV-2) (CORONAVIRUS DISEASE [COVID-19]), AMPLIFIED PROBE TECHNIQUE, MAKING USE OF HIGH THROUGHPUT TECHNOLOGIES AS DESCRIBED BY CMS-2020-01-R: HCPCS

## 2021-02-09 PROCEDURE — 70450 CT HEAD/BRAIN W/O DYE: CPT | Mod: TC

## 2021-02-09 PROCEDURE — U0005 INFEC AGEN DETEC AMPLI PROBE: HCPCS

## 2021-02-09 PROCEDURE — 70450 CT HEAD STEALTH W/O CONTRAST: ICD-10-PCS | Mod: 26,,, | Performed by: RADIOLOGY

## 2021-02-10 DIAGNOSIS — E03.9 HYPOTHYROIDISM (ACQUIRED): ICD-10-CM

## 2021-02-10 DIAGNOSIS — Z01.818 PREOPERATIVE TESTING: Primary | ICD-10-CM

## 2021-02-10 LAB — SARS-COV-2 RNA RESP QL NAA+PROBE: NOT DETECTED

## 2021-02-11 ENCOUNTER — HOSPITAL ENCOUNTER (OUTPATIENT)
Dept: PREADMISSION TESTING | Facility: HOSPITAL | Age: 76
Discharge: HOME | End: 2021-02-11
Attending: ANESTHESIOLOGY
Payer: MEDICARE

## 2021-02-11 VITALS
WEIGHT: 136 LBS | HEIGHT: 65 IN | TEMPERATURE: 99 F | BODY MASS INDEX: 22.66 KG/M2 | SYSTOLIC BLOOD PRESSURE: 152 MMHG | DIASTOLIC BLOOD PRESSURE: 70 MMHG | HEART RATE: 80 BPM | OXYGEN SATURATION: 100 %

## 2021-02-11 PROBLEM — Z79.02 LONG TERM (CURRENT) USE OF ANTITHROMBOTICS/ANTIPLATELETS: Status: ACTIVE | Noted: 2021-02-11

## 2021-02-11 PROBLEM — G47.30 SLEEP APNEA: Status: ACTIVE | Noted: 2021-02-11

## 2021-02-11 PROBLEM — I70.1 RENAL ARTERY STENOSIS: Status: ACTIVE | Noted: 2021-02-11

## 2021-02-11 PROBLEM — I77.9 CAROTID DISEASE, BILATERAL: Status: ACTIVE | Noted: 2021-02-11

## 2021-02-11 PROBLEM — I10 ESSENTIAL HYPERTENSION: Status: ACTIVE | Noted: 2021-02-11

## 2021-02-11 PROBLEM — I50.32 CHRONIC DIASTOLIC HEART FAILURE: Status: ACTIVE | Noted: 2019-09-03

## 2021-02-11 PROBLEM — E11.42 DIABETIC PERIPHERAL NEUROPATHY: Status: ACTIVE | Noted: 2019-06-06

## 2021-02-11 PROBLEM — N18.30 CKD (CHRONIC KIDNEY DISEASE), STAGE III: Status: ACTIVE | Noted: 2020-03-04

## 2021-02-11 PROBLEM — S06.5XAA SUBDURAL HEMATOMA: Status: ACTIVE | Noted: 2020-09-17

## 2021-02-12 ENCOUNTER — TELEPHONE (OUTPATIENT)
Dept: NEUROSURGERY | Facility: HOSPITAL | Age: 76
End: 2021-02-12

## 2021-02-12 DIAGNOSIS — Z01.818 PRE-OP TESTING: Primary | ICD-10-CM

## 2021-02-22 ENCOUNTER — LAB VISIT (OUTPATIENT)
Dept: FAMILY MEDICINE | Facility: CLINIC | Age: 76
DRG: 033 | End: 2021-02-22
Payer: MEDICARE

## 2021-02-22 DIAGNOSIS — Z01.818 PRE-OP TESTING: ICD-10-CM

## 2021-02-22 PROCEDURE — U0003 INFECTIOUS AGENT DETECTION BY NUCLEIC ACID (DNA OR RNA); SEVERE ACUTE RESPIRATORY SYNDROME CORONAVIRUS 2 (SARS-COV-2) (CORONAVIRUS DISEASE [COVID-19]), AMPLIFIED PROBE TECHNIQUE, MAKING USE OF HIGH THROUGHPUT TECHNOLOGIES AS DESCRIBED BY CMS-2020-01-R: HCPCS

## 2021-02-22 PROCEDURE — U0005 INFEC AGEN DETEC AMPLI PROBE: HCPCS

## 2021-02-23 LAB — SARS-COV-2 RNA RESP QL NAA+PROBE: NOT DETECTED

## 2021-02-24 ENCOUNTER — TELEPHONE (OUTPATIENT)
Dept: NEUROSURGERY | Facility: CLINIC | Age: 76
End: 2021-02-24

## 2021-02-24 ENCOUNTER — ANESTHESIA EVENT (OUTPATIENT)
Dept: SURGERY | Facility: HOSPITAL | Age: 76
DRG: 033 | End: 2021-02-24
Payer: MEDICARE

## 2021-02-25 ENCOUNTER — HOSPITAL ENCOUNTER (INPATIENT)
Facility: HOSPITAL | Age: 76
LOS: 1 days | Discharge: HOME HEALTH | DRG: 033 | End: 2021-02-26
Attending: NEUROLOGICAL SURGERY | Admitting: NEUROLOGICAL SURGERY
Payer: MEDICARE

## 2021-02-25 ENCOUNTER — ANESTHESIA (OUTPATIENT)
Dept: SURGERY | Facility: HOSPITAL | Age: 76
DRG: 033 | End: 2021-02-25
Payer: MEDICARE

## 2021-02-25 DIAGNOSIS — G91.2 NORMAL PRESSURE HYDROCEPHALUS: ICD-10-CM

## 2021-02-25 DIAGNOSIS — Z01.818 PREOPERATIVE TESTING: ICD-10-CM

## 2021-02-25 LAB
ABO + RH BLD: NORMAL
ANION GAP SERPL CALC-SCNC: 10 MMOL/L (ref 8–16)
APTT BLDCRRT: 25.7 SEC (ref 21–32)
BASOPHILS # BLD AUTO: 0.04 K/UL (ref 0–0.2)
BASOPHILS NFR BLD: 0.7 % (ref 0–1.9)
BILIRUB UR QL STRIP: NEGATIVE
BLD GP AB SCN CELLS X3 SERPL QL: NORMAL
BUN SERPL-MCNC: 15 MG/DL (ref 8–23)
CALCIUM SERPL-MCNC: 9.9 MG/DL (ref 8.7–10.5)
CHLORIDE SERPL-SCNC: 103 MMOL/L (ref 95–110)
CLARITY UR REFRACT.AUTO: CLEAR
CO2 SERPL-SCNC: 30 MMOL/L (ref 23–29)
COLOR UR AUTO: YELLOW
CREAT SERPL-MCNC: 1 MG/DL (ref 0.5–1.4)
DIFFERENTIAL METHOD: ABNORMAL
EOSINOPHIL # BLD AUTO: 0.2 K/UL (ref 0–0.5)
EOSINOPHIL NFR BLD: 2.7 % (ref 0–8)
ERYTHROCYTE [DISTWIDTH] IN BLOOD BY AUTOMATED COUNT: 12.9 % (ref 11.5–14.5)
EST. GFR  (AFRICAN AMERICAN): >60 ML/MIN/1.73 M^2
EST. GFR  (NON AFRICAN AMERICAN): 55.6 ML/MIN/1.73 M^2
GLUCOSE SERPL-MCNC: 134 MG/DL (ref 70–110)
GLUCOSE UR QL STRIP: NEGATIVE
HCT VFR BLD AUTO: 36.1 % (ref 37–48.5)
HGB BLD-MCNC: 12.3 G/DL (ref 12–16)
HGB UR QL STRIP: NEGATIVE
IMM GRANULOCYTES # BLD AUTO: 0.02 K/UL (ref 0–0.04)
IMM GRANULOCYTES NFR BLD AUTO: 0.4 % (ref 0–0.5)
INR PPP: 0.9 (ref 0.8–1.2)
KETONES UR QL STRIP: NEGATIVE
LEUKOCYTE ESTERASE UR QL STRIP: ABNORMAL
LYMPHOCYTES # BLD AUTO: 1.2 K/UL (ref 1–4.8)
LYMPHOCYTES NFR BLD: 20.7 % (ref 18–48)
MCH RBC QN AUTO: 31.4 PG (ref 27–31)
MCHC RBC AUTO-ENTMCNC: 34.1 G/DL (ref 32–36)
MCV RBC AUTO: 92 FL (ref 82–98)
MICROSCOPIC COMMENT: NORMAL
MONOCYTES # BLD AUTO: 0.8 K/UL (ref 0.3–1)
MONOCYTES NFR BLD: 13.8 % (ref 4–15)
NEUTROPHILS # BLD AUTO: 3.4 K/UL (ref 1.8–7.7)
NEUTROPHILS NFR BLD: 61.7 % (ref 38–73)
NITRITE UR QL STRIP: NEGATIVE
NRBC BLD-RTO: 0 /100 WBC
PH UR STRIP: 6 [PH] (ref 5–8)
PLATELET # BLD AUTO: 197 K/UL (ref 150–350)
PMV BLD AUTO: 10.8 FL (ref 9.2–12.9)
POCT GLUCOSE: 141 MG/DL (ref 70–110)
POCT GLUCOSE: 167 MG/DL (ref 70–110)
POCT GLUCOSE: 242 MG/DL (ref 70–110)
POTASSIUM SERPL-SCNC: 2.8 MMOL/L (ref 3.5–5.1)
PROT UR QL STRIP: NEGATIVE
PROTHROMBIN TIME: 10.4 SEC (ref 9–12.5)
RBC # BLD AUTO: 3.92 M/UL (ref 4–5.4)
RBC #/AREA URNS AUTO: 1 /HPF (ref 0–4)
SODIUM SERPL-SCNC: 143 MMOL/L (ref 136–145)
SP GR UR STRIP: 1.01 (ref 1–1.03)
SQUAMOUS #/AREA URNS AUTO: 3 /HPF
URN SPEC COLLECT METH UR: ABNORMAL
WBC # BLD AUTO: 5.56 K/UL (ref 3.9–12.7)
WBC #/AREA URNS AUTO: 5 /HPF (ref 0–5)

## 2021-02-25 PROCEDURE — 63600175 PHARM REV CODE 636 W HCPCS: Performed by: NEUROLOGICAL SURGERY

## 2021-02-25 PROCEDURE — 37000009 HC ANESTHESIA EA ADD 15 MINS: Performed by: NEUROLOGICAL SURGERY

## 2021-02-25 PROCEDURE — 63600175 PHARM REV CODE 636 W HCPCS: Performed by: ANESTHESIOLOGY

## 2021-02-25 PROCEDURE — 62223 ESTABLISH BRAIN CAVITY SHUNT: CPT | Mod: 62,,, | Performed by: NEUROLOGICAL SURGERY

## 2021-02-25 PROCEDURE — 37000008 HC ANESTHESIA 1ST 15 MINUTES: Performed by: NEUROLOGICAL SURGERY

## 2021-02-25 PROCEDURE — 82962 GLUCOSE BLOOD TEST: CPT | Performed by: NEUROLOGICAL SURGERY

## 2021-02-25 PROCEDURE — 62223 PR CREATE SHUNT:VENTRIC-PERITONEAL: ICD-10-PCS | Mod: 62,,, | Performed by: NEUROLOGICAL SURGERY

## 2021-02-25 PROCEDURE — 11000001 HC ACUTE MED/SURG PRIVATE ROOM

## 2021-02-25 PROCEDURE — 85025 COMPLETE CBC W/AUTO DIFF WBC: CPT

## 2021-02-25 PROCEDURE — 71000016 HC POSTOP RECOV ADDL HR: Performed by: NEUROLOGICAL SURGERY

## 2021-02-25 PROCEDURE — C1729 CATH, DRAINAGE: HCPCS | Performed by: NEUROLOGICAL SURGERY

## 2021-02-25 PROCEDURE — 27201423 OPTIME MED/SURG SUP & DEVICES STERILE SUPPLY: Performed by: NEUROLOGICAL SURGERY

## 2021-02-25 PROCEDURE — 61781 PR STEREOTACTIC COMP ASSIST PROC,CRANIAL,INTRADURAL: ICD-10-PCS | Mod: ,,, | Performed by: NEUROLOGICAL SURGERY

## 2021-02-25 PROCEDURE — 62223 PR CREATE SHUNT:VENTRIC-PERITONEAL: ICD-10-PCS | Mod: 62,,, | Performed by: SURGERY

## 2021-02-25 PROCEDURE — 80048 BASIC METABOLIC PNL TOTAL CA: CPT

## 2021-02-25 PROCEDURE — 81001 URINALYSIS AUTO W/SCOPE: CPT

## 2021-02-25 PROCEDURE — D9220A PRA ANESTHESIA: ICD-10-PCS | Mod: ANES,,, | Performed by: ANESTHESIOLOGY

## 2021-02-25 PROCEDURE — D9220A PRA ANESTHESIA: Mod: ANES,,, | Performed by: ANESTHESIOLOGY

## 2021-02-25 PROCEDURE — 25000003 PHARM REV CODE 250: Performed by: STUDENT IN AN ORGANIZED HEALTH CARE EDUCATION/TRAINING PROGRAM

## 2021-02-25 PROCEDURE — 94761 N-INVAS EAR/PLS OXIMETRY MLT: CPT

## 2021-02-25 PROCEDURE — 25000003 PHARM REV CODE 250: Performed by: NURSE ANESTHETIST, CERTIFIED REGISTERED

## 2021-02-25 PROCEDURE — 36000711: Performed by: NEUROLOGICAL SURGERY

## 2021-02-25 PROCEDURE — 85610 PROTHROMBIN TIME: CPT

## 2021-02-25 PROCEDURE — 71000015 HC POSTOP RECOV 1ST HR: Performed by: NEUROLOGICAL SURGERY

## 2021-02-25 PROCEDURE — 27800903 OPTIME MED/SURG SUP & DEVICES OTHER IMPLANTS: Performed by: NEUROLOGICAL SURGERY

## 2021-02-25 PROCEDURE — 63600175 PHARM REV CODE 636 W HCPCS: Performed by: NURSE ANESTHETIST, CERTIFIED REGISTERED

## 2021-02-25 PROCEDURE — 36000710: Performed by: NEUROLOGICAL SURGERY

## 2021-02-25 PROCEDURE — 62223 ESTABLISH BRAIN CAVITY SHUNT: CPT | Mod: 62,,, | Performed by: SURGERY

## 2021-02-25 PROCEDURE — 86900 BLOOD TYPING SEROLOGIC ABO: CPT

## 2021-02-25 PROCEDURE — 25000003 PHARM REV CODE 250: Performed by: NEUROLOGICAL SURGERY

## 2021-02-25 PROCEDURE — D9220A PRA ANESTHESIA: Mod: CRNA,,, | Performed by: NURSE ANESTHETIST, CERTIFIED REGISTERED

## 2021-02-25 PROCEDURE — 61781 SCAN PROC CRANIAL INTRA: CPT | Mod: ,,, | Performed by: NEUROLOGICAL SURGERY

## 2021-02-25 PROCEDURE — 85730 THROMBOPLASTIN TIME PARTIAL: CPT

## 2021-02-25 PROCEDURE — 71000033 HC RECOVERY, INTIAL HOUR: Performed by: NEUROLOGICAL SURGERY

## 2021-02-25 PROCEDURE — D9220A PRA ANESTHESIA: ICD-10-PCS | Mod: CRNA,,, | Performed by: NURSE ANESTHETIST, CERTIFIED REGISTERED

## 2021-02-25 DEVICE — KIT CATH WITH BACTISEAL: Type: IMPLANTABLE DEVICE | Site: CRANIAL | Status: FUNCTIONAL

## 2021-02-25 DEVICE — VALVE CSF INLINE CERTAS PLUS: Type: IMPLANTABLE DEVICE | Site: CRANIAL | Status: FUNCTIONAL

## 2021-02-25 RX ORDER — CARBIDOPA AND LEVODOPA 25; 100 MG/1; MG/1
1 TABLET ORAL 3 TIMES DAILY
Status: DISCONTINUED | OUTPATIENT
Start: 2021-02-25 | End: 2021-02-26 | Stop reason: HOSPADM

## 2021-02-25 RX ORDER — LIDOCAINE HCL/EPINEPHRINE/PF 2%-1:200K
VIAL (ML) INJECTION
Status: DISCONTINUED | OUTPATIENT
Start: 2021-02-25 | End: 2021-02-25 | Stop reason: HOSPADM

## 2021-02-25 RX ORDER — NEOSTIGMINE METHYLSULFATE 0.5 MG/ML
INJECTION, SOLUTION INTRAVENOUS
Status: DISCONTINUED | OUTPATIENT
Start: 2021-02-25 | End: 2021-02-25

## 2021-02-25 RX ORDER — VANCOMYCIN HCL IN 5 % DEXTROSE 1G/250ML
1000 PLASTIC BAG, INJECTION (ML) INTRAVENOUS
Status: DISCONTINUED | OUTPATIENT
Start: 2021-02-26 | End: 2021-02-26

## 2021-02-25 RX ORDER — FENTANYL CITRATE 50 UG/ML
INJECTION, SOLUTION INTRAMUSCULAR; INTRAVENOUS
Status: DISCONTINUED | OUTPATIENT
Start: 2021-02-25 | End: 2021-02-25

## 2021-02-25 RX ORDER — HYDROCODONE BITARTRATE AND ACETAMINOPHEN 5; 325 MG/1; MG/1
1 TABLET ORAL EVERY 4 HOURS PRN
Status: DISCONTINUED | OUTPATIENT
Start: 2021-02-25 | End: 2021-02-26 | Stop reason: HOSPADM

## 2021-02-25 RX ORDER — ONDANSETRON 2 MG/ML
INJECTION INTRAMUSCULAR; INTRAVENOUS
Status: DISCONTINUED | OUTPATIENT
Start: 2021-02-25 | End: 2021-02-25

## 2021-02-25 RX ORDER — BUPIVACAINE HYDROCHLORIDE 2.5 MG/ML
INJECTION, SOLUTION EPIDURAL; INFILTRATION; INTRACAUDAL
Status: DISCONTINUED | OUTPATIENT
Start: 2021-02-25 | End: 2021-02-25 | Stop reason: HOSPADM

## 2021-02-25 RX ORDER — ROSUVASTATIN CALCIUM 10 MG/1
10 TABLET, COATED ORAL NIGHTLY
Status: DISCONTINUED | OUTPATIENT
Start: 2021-02-25 | End: 2021-02-26 | Stop reason: HOSPADM

## 2021-02-25 RX ORDER — SODIUM CHLORIDE 0.9 % (FLUSH) 0.9 %
3 SYRINGE (ML) INJECTION
Status: DISCONTINUED | OUTPATIENT
Start: 2021-02-25 | End: 2021-02-25 | Stop reason: HOSPADM

## 2021-02-25 RX ORDER — GENTAMICIN SULFATE 40 MG/ML
INJECTION, SOLUTION INTRAMUSCULAR; INTRAVENOUS
Status: DISCONTINUED | OUTPATIENT
Start: 2021-02-25 | End: 2021-02-25 | Stop reason: HOSPADM

## 2021-02-25 RX ORDER — MUPIROCIN 20 MG/G
1 OINTMENT TOPICAL 2 TIMES DAILY
Status: DISCONTINUED | OUTPATIENT
Start: 2021-02-25 | End: 2021-02-25 | Stop reason: HOSPADM

## 2021-02-25 RX ORDER — LEVOTHYROXINE SODIUM 50 UG/1
50 TABLET ORAL DAILY
Status: DISCONTINUED | OUTPATIENT
Start: 2021-02-26 | End: 2021-02-26

## 2021-02-25 RX ORDER — LIDOCAINE HYDROCHLORIDE 20 MG/ML
INJECTION INTRAVENOUS
Status: DISCONTINUED | OUTPATIENT
Start: 2021-02-25 | End: 2021-02-25

## 2021-02-25 RX ORDER — BUPROPION HYDROCHLORIDE 150 MG/1
150 TABLET ORAL DAILY
Status: DISCONTINUED | OUTPATIENT
Start: 2021-02-26 | End: 2021-02-26 | Stop reason: HOSPADM

## 2021-02-25 RX ORDER — LOSARTAN POTASSIUM 50 MG/1
50 TABLET ORAL 2 TIMES DAILY
Status: DISCONTINUED | OUTPATIENT
Start: 2021-02-25 | End: 2021-02-26 | Stop reason: HOSPADM

## 2021-02-25 RX ORDER — ONDANSETRON 2 MG/ML
4 INJECTION INTRAMUSCULAR; INTRAVENOUS EVERY 12 HOURS PRN
Status: DISCONTINUED | OUTPATIENT
Start: 2021-02-25 | End: 2021-02-26 | Stop reason: HOSPADM

## 2021-02-25 RX ORDER — FLUTICASONE PROPIONATE 50 MCG
1 SPRAY, SUSPENSION (ML) NASAL DAILY
Status: DISCONTINUED | OUTPATIENT
Start: 2021-02-26 | End: 2021-02-26 | Stop reason: HOSPADM

## 2021-02-25 RX ORDER — MUPIROCIN 20 MG/G
OINTMENT TOPICAL
Status: DISCONTINUED | OUTPATIENT
Start: 2021-02-25 | End: 2021-02-25 | Stop reason: HOSPADM

## 2021-02-25 RX ORDER — AMLODIPINE BESYLATE 10 MG/1
10 TABLET ORAL DAILY
Status: DISCONTINUED | OUTPATIENT
Start: 2021-02-26 | End: 2021-02-26 | Stop reason: HOSPADM

## 2021-02-25 RX ORDER — FENTANYL CITRATE 50 UG/ML
25 INJECTION, SOLUTION INTRAMUSCULAR; INTRAVENOUS EVERY 5 MIN PRN
Status: DISCONTINUED | OUTPATIENT
Start: 2021-02-25 | End: 2021-02-25 | Stop reason: HOSPADM

## 2021-02-25 RX ORDER — ROCURONIUM BROMIDE 10 MG/ML
INJECTION, SOLUTION INTRAVENOUS
Status: DISCONTINUED | OUTPATIENT
Start: 2021-02-25 | End: 2021-02-25

## 2021-02-25 RX ORDER — HYDROMORPHONE HYDROCHLORIDE 1 MG/ML
0.5 INJECTION, SOLUTION INTRAMUSCULAR; INTRAVENOUS; SUBCUTANEOUS
Status: DISCONTINUED | OUTPATIENT
Start: 2021-02-25 | End: 2021-02-26 | Stop reason: HOSPADM

## 2021-02-25 RX ORDER — HEPARIN SODIUM 5000 [USP'U]/ML
5000 INJECTION, SOLUTION INTRAVENOUS; SUBCUTANEOUS EVERY 8 HOURS
Status: DISCONTINUED | OUTPATIENT
Start: 2021-02-26 | End: 2021-02-26 | Stop reason: HOSPADM

## 2021-02-25 RX ORDER — DEXAMETHASONE SODIUM PHOSPHATE 4 MG/ML
INJECTION, SOLUTION INTRA-ARTICULAR; INTRALESIONAL; INTRAMUSCULAR; INTRAVENOUS; SOFT TISSUE
Status: DISCONTINUED | OUTPATIENT
Start: 2021-02-25 | End: 2021-02-25

## 2021-02-25 RX ORDER — NEBIVOLOL 5 MG/1
5 TABLET ORAL DAILY
Status: DISCONTINUED | OUTPATIENT
Start: 2021-02-26 | End: 2021-02-26 | Stop reason: HOSPADM

## 2021-02-25 RX ORDER — PROPOFOL 10 MG/ML
VIAL (ML) INTRAVENOUS
Status: DISCONTINUED | OUTPATIENT
Start: 2021-02-25 | End: 2021-02-25

## 2021-02-25 RX ADMIN — FENTANYL CITRATE 25 MCG: 50 INJECTION, SOLUTION INTRAMUSCULAR; INTRAVENOUS at 01:02

## 2021-02-25 RX ADMIN — PROPOFOL 30 MG: 10 INJECTION, EMULSION INTRAVENOUS at 01:02

## 2021-02-25 RX ADMIN — DEXAMETHASONE SODIUM PHOSPHATE 4 MG: 4 INJECTION, SOLUTION INTRAMUSCULAR; INTRAVENOUS at 11:02

## 2021-02-25 RX ADMIN — FENTANYL CITRATE 50 MCG: 50 INJECTION, SOLUTION INTRAMUSCULAR; INTRAVENOUS at 12:02

## 2021-02-25 RX ADMIN — FENTANYL CITRATE 25 MCG: 50 INJECTION, SOLUTION INTRAMUSCULAR; INTRAVENOUS at 03:02

## 2021-02-25 RX ADMIN — LIDOCAINE HYDROCHLORIDE 100 MG: 20 INJECTION, SOLUTION INTRAVENOUS at 11:02

## 2021-02-25 RX ADMIN — CARBIDOPA AND LEVODOPA 1 TABLET: 25; 100 TABLET ORAL at 09:02

## 2021-02-25 RX ADMIN — MUPIROCIN: 20 OINTMENT TOPICAL at 09:02

## 2021-02-25 RX ADMIN — VANCOMYCIN HYDROCHLORIDE 1000 MG: 1 INJECTION, POWDER, LYOPHILIZED, FOR SOLUTION INTRAVENOUS at 11:02

## 2021-02-25 RX ADMIN — PROPOFOL 30 MG: 10 INJECTION, EMULSION INTRAVENOUS at 11:02

## 2021-02-25 RX ADMIN — ONDANSETRON 4 MG: 2 INJECTION, SOLUTION INTRAMUSCULAR; INTRAVENOUS at 01:02

## 2021-02-25 RX ADMIN — ROSUVASTATIN CALCIUM 10 MG: 10 TABLET, FILM COATED ORAL at 09:02

## 2021-02-25 RX ADMIN — ROCURONIUM BROMIDE 50 MG: 10 INJECTION, SOLUTION INTRAVENOUS at 11:02

## 2021-02-25 RX ADMIN — HYDROCODONE BITARTRATE AND ACETAMINOPHEN 1 TABLET: 5; 325 TABLET ORAL at 07:02

## 2021-02-25 RX ADMIN — HYDROCODONE BITARTRATE AND ACETAMINOPHEN 1 TABLET: 5; 325 TABLET ORAL at 11:02

## 2021-02-25 RX ADMIN — HYDROCODONE BITARTRATE AND ACETAMINOPHEN 1 TABLET: 5; 325 TABLET ORAL at 02:02

## 2021-02-25 RX ADMIN — PROPOFOL 140 MG: 10 INJECTION, EMULSION INTRAVENOUS at 11:02

## 2021-02-25 RX ADMIN — LOSARTAN POTASSIUM 50 MG: 50 TABLET, FILM COATED ORAL at 09:02

## 2021-02-25 RX ADMIN — SODIUM CHLORIDE: 0.9 INJECTION, SOLUTION INTRAVENOUS at 10:02

## 2021-02-25 RX ADMIN — NEOSTIGMINE METHYLSULFATE 5 MG: 0.5 INJECTION INTRAVENOUS at 01:02

## 2021-02-25 RX ADMIN — GLYCOPYRROLATE 0.8 MG: 0.2 INJECTION, SOLUTION INTRAMUSCULAR; INTRAVITREAL at 01:02

## 2021-02-26 VITALS
HEART RATE: 75 BPM | HEIGHT: 65 IN | SYSTOLIC BLOOD PRESSURE: 134 MMHG | RESPIRATION RATE: 18 BRPM | OXYGEN SATURATION: 97 % | DIASTOLIC BLOOD PRESSURE: 59 MMHG | BODY MASS INDEX: 22.48 KG/M2 | WEIGHT: 134.94 LBS | TEMPERATURE: 98 F

## 2021-02-26 LAB
POCT GLUCOSE: 122 MG/DL (ref 70–110)
POCT GLUCOSE: 132 MG/DL (ref 70–110)
POCT GLUCOSE: 149 MG/DL (ref 70–110)

## 2021-02-26 PROCEDURE — 25000003 PHARM REV CODE 250: Performed by: STUDENT IN AN ORGANIZED HEALTH CARE EDUCATION/TRAINING PROGRAM

## 2021-02-26 PROCEDURE — 97165 OT EVAL LOW COMPLEX 30 MIN: CPT

## 2021-02-26 PROCEDURE — 97530 THERAPEUTIC ACTIVITIES: CPT

## 2021-02-26 PROCEDURE — 63600175 PHARM REV CODE 636 W HCPCS: Performed by: STUDENT IN AN ORGANIZED HEALTH CARE EDUCATION/TRAINING PROGRAM

## 2021-02-26 PROCEDURE — 99024 PR POST-OP FOLLOW-UP VISIT: ICD-10-PCS | Mod: ,,, | Performed by: NEUROLOGICAL SURGERY

## 2021-02-26 PROCEDURE — 97535 SELF CARE MNGMENT TRAINING: CPT

## 2021-02-26 PROCEDURE — 25000242 PHARM REV CODE 250 ALT 637 W/ HCPCS: Performed by: STUDENT IN AN ORGANIZED HEALTH CARE EDUCATION/TRAINING PROGRAM

## 2021-02-26 PROCEDURE — 97161 PT EVAL LOW COMPLEX 20 MIN: CPT

## 2021-02-26 PROCEDURE — 94799 UNLISTED PULMONARY SVC/PX: CPT

## 2021-02-26 PROCEDURE — 99024 POSTOP FOLLOW-UP VISIT: CPT | Mod: ,,, | Performed by: NEUROLOGICAL SURGERY

## 2021-02-26 RX ORDER — IBUPROFEN 200 MG
16 TABLET ORAL
Status: DISCONTINUED | OUTPATIENT
Start: 2021-02-26 | End: 2021-02-26 | Stop reason: HOSPADM

## 2021-02-26 RX ORDER — HYDROCODONE BITARTRATE AND ACETAMINOPHEN 5; 325 MG/1; MG/1
1 TABLET ORAL EVERY 6 HOURS PRN
Qty: 20 TABLET | Refills: 0 | Status: SHIPPED | OUTPATIENT
Start: 2021-02-26 | End: 2021-02-26 | Stop reason: SDUPTHER

## 2021-02-26 RX ORDER — LEVOTHYROXINE SODIUM 50 UG/1
50 TABLET ORAL
Status: DISCONTINUED | OUTPATIENT
Start: 2021-02-27 | End: 2021-02-26 | Stop reason: HOSPADM

## 2021-02-26 RX ORDER — HYDROCODONE BITARTRATE AND ACETAMINOPHEN 5; 325 MG/1; MG/1
1 TABLET ORAL EVERY 6 HOURS PRN
Qty: 20 TABLET | Refills: 0 | Status: SHIPPED | OUTPATIENT
Start: 2021-02-26 | End: 2021-03-03

## 2021-02-26 RX ORDER — IBUPROFEN 200 MG
24 TABLET ORAL
Status: DISCONTINUED | OUTPATIENT
Start: 2021-02-26 | End: 2021-02-26 | Stop reason: HOSPADM

## 2021-02-26 RX ORDER — INSULIN ASPART 100 [IU]/ML
0-5 INJECTION, SOLUTION INTRAVENOUS; SUBCUTANEOUS
Status: DISCONTINUED | OUTPATIENT
Start: 2021-02-26 | End: 2021-02-26 | Stop reason: HOSPADM

## 2021-02-26 RX ORDER — GLUCAGON 1 MG
1 KIT INJECTION
Status: DISCONTINUED | OUTPATIENT
Start: 2021-02-26 | End: 2021-02-26 | Stop reason: HOSPADM

## 2021-02-26 RX ADMIN — CARBIDOPA AND LEVODOPA 1 TABLET: 25; 100 TABLET ORAL at 02:02

## 2021-02-26 RX ADMIN — LEVOTHYROXINE SODIUM 50 MCG: 50 TABLET ORAL at 08:02

## 2021-02-26 RX ADMIN — HYDROCODONE BITARTRATE AND ACETAMINOPHEN 1 TABLET: 5; 325 TABLET ORAL at 11:02

## 2021-02-26 RX ADMIN — BUPROPION HYDROCHLORIDE 150 MG: 150 TABLET, FILM COATED, EXTENDED RELEASE ORAL at 08:02

## 2021-02-26 RX ADMIN — HEPARIN SODIUM 5000 UNITS: 5000 INJECTION INTRAVENOUS; SUBCUTANEOUS at 05:02

## 2021-02-26 RX ADMIN — HEPARIN SODIUM 5000 UNITS: 5000 INJECTION INTRAVENOUS; SUBCUTANEOUS at 02:02

## 2021-02-26 RX ADMIN — HYDROCODONE BITARTRATE AND ACETAMINOPHEN 1 TABLET: 5; 325 TABLET ORAL at 05:02

## 2021-02-26 RX ADMIN — FLUTICASONE PROPIONATE 50 MCG: 50 SPRAY, METERED NASAL at 09:02

## 2021-02-26 RX ADMIN — AMLODIPINE BESYLATE 10 MG: 10 TABLET ORAL at 08:02

## 2021-02-26 RX ADMIN — NEBIVOLOL HYDROCHLORIDE 5 MG: 5 TABLET ORAL at 08:02

## 2021-02-26 RX ADMIN — CARBIDOPA AND LEVODOPA 1 TABLET: 25; 100 TABLET ORAL at 08:02

## 2021-02-26 RX ADMIN — LOSARTAN POTASSIUM 50 MG: 50 TABLET, FILM COATED ORAL at 08:02

## 2021-03-01 PROCEDURE — G0180 MD CERTIFICATION HHA PATIENT: HCPCS | Mod: ,,, | Performed by: NEUROLOGICAL SURGERY

## 2021-03-01 PROCEDURE — G0180 PR HOME HEALTH MD CERTIFICATION: ICD-10-PCS | Mod: ,,, | Performed by: NEUROLOGICAL SURGERY

## 2021-03-12 ENCOUNTER — EXTERNAL HOME HEALTH (OUTPATIENT)
Dept: HOME HEALTH SERVICES | Facility: HOSPITAL | Age: 76
End: 2021-03-12
Payer: MEDICARE

## 2021-03-23 ENCOUNTER — OFFICE VISIT (OUTPATIENT)
Dept: NEUROSURGERY | Facility: CLINIC | Age: 76
End: 2021-03-23
Payer: MEDICARE

## 2021-03-23 VITALS
HEART RATE: 78 BPM | WEIGHT: 131.63 LBS | TEMPERATURE: 97 F | DIASTOLIC BLOOD PRESSURE: 71 MMHG | HEIGHT: 65 IN | SYSTOLIC BLOOD PRESSURE: 118 MMHG | BODY MASS INDEX: 21.93 KG/M2

## 2021-03-23 DIAGNOSIS — G91.2 NPH (NORMAL PRESSURE HYDROCEPHALUS): Primary | ICD-10-CM

## 2021-03-23 DIAGNOSIS — Z98.2 S/P VENTRICULOPERITONEAL SHUNT: ICD-10-CM

## 2021-03-23 PROCEDURE — 99999 PR PBB SHADOW E&M-EST. PATIENT-LVL IV: CPT | Mod: PBBFAC,,, | Performed by: NEUROLOGICAL SURGERY

## 2021-03-23 PROCEDURE — 99024 POSTOP FOLLOW-UP VISIT: CPT | Mod: S$GLB,,, | Performed by: NEUROLOGICAL SURGERY

## 2021-03-23 PROCEDURE — 99024 PR POST-OP FOLLOW-UP VISIT: ICD-10-PCS | Mod: S$GLB,,, | Performed by: NEUROLOGICAL SURGERY

## 2021-03-23 PROCEDURE — 3008F BODY MASS INDEX DOCD: CPT | Mod: CPTII,S$GLB,, | Performed by: NEUROLOGICAL SURGERY

## 2021-03-23 PROCEDURE — 99999 PR PBB SHADOW E&M-EST. PATIENT-LVL IV: ICD-10-PCS | Mod: PBBFAC,,, | Performed by: NEUROLOGICAL SURGERY

## 2021-03-23 PROCEDURE — 3008F PR BODY MASS INDEX (BMI) DOCUMENTED: ICD-10-PCS | Mod: CPTII,S$GLB,, | Performed by: NEUROLOGICAL SURGERY

## 2021-03-23 PROCEDURE — 1125F PR PAIN SEVERITY QUANTIFIED, PAIN PRESENT: ICD-10-PCS | Mod: S$GLB,,, | Performed by: NEUROLOGICAL SURGERY

## 2021-03-23 PROCEDURE — 1125F AMNT PAIN NOTED PAIN PRSNT: CPT | Mod: S$GLB,,, | Performed by: NEUROLOGICAL SURGERY

## 2021-04-09 ENCOUNTER — CLINICAL SUPPORT (OUTPATIENT)
Dept: REHABILITATION | Facility: HOSPITAL | Age: 76
End: 2021-04-09
Attending: NEUROLOGICAL SURGERY
Payer: MEDICARE

## 2021-04-09 DIAGNOSIS — R26.89 BALANCE PROBLEMS: ICD-10-CM

## 2021-04-09 DIAGNOSIS — R29.898 DECREASED STRENGTH OF TRUNK AND BACK: ICD-10-CM

## 2021-04-09 DIAGNOSIS — R53.1 DECREASED STRENGTH, ENDURANCE, AND MOBILITY: ICD-10-CM

## 2021-04-09 DIAGNOSIS — Z74.09 DECREASED STRENGTH, ENDURANCE, AND MOBILITY: ICD-10-CM

## 2021-04-09 DIAGNOSIS — G91.2 NPH (NORMAL PRESSURE HYDROCEPHALUS): ICD-10-CM

## 2021-04-09 DIAGNOSIS — R26.9 GAIT ABNORMALITY: Primary | ICD-10-CM

## 2021-04-09 DIAGNOSIS — R68.89 DECREASED STRENGTH, ENDURANCE, AND MOBILITY: ICD-10-CM

## 2021-04-09 PROCEDURE — 97161 PT EVAL LOW COMPLEX 20 MIN: CPT | Mod: PN

## 2021-04-13 PROBLEM — R29.898 DECREASED STRENGTH OF TRUNK AND BACK: Status: ACTIVE | Noted: 2021-04-13

## 2021-04-13 PROBLEM — R26.89 BALANCE PROBLEMS: Status: ACTIVE | Noted: 2021-04-13

## 2021-04-13 PROBLEM — R68.89 DECREASED STRENGTH, ENDURANCE, AND MOBILITY: Status: ACTIVE | Noted: 2021-04-13

## 2021-04-13 PROBLEM — R53.1 DECREASED STRENGTH, ENDURANCE, AND MOBILITY: Status: ACTIVE | Noted: 2021-04-13

## 2021-04-13 PROBLEM — Z74.09 DECREASED STRENGTH, ENDURANCE, AND MOBILITY: Status: ACTIVE | Noted: 2021-04-13

## 2021-04-14 ENCOUNTER — CLINICAL SUPPORT (OUTPATIENT)
Dept: REHABILITATION | Facility: HOSPITAL | Age: 76
End: 2021-04-14
Attending: NEUROLOGICAL SURGERY
Payer: MEDICARE

## 2021-04-14 DIAGNOSIS — R68.89 DECREASED STRENGTH, ENDURANCE, AND MOBILITY: ICD-10-CM

## 2021-04-14 DIAGNOSIS — R53.1 DECREASED STRENGTH, ENDURANCE, AND MOBILITY: ICD-10-CM

## 2021-04-14 DIAGNOSIS — R29.898 DECREASED STRENGTH OF TRUNK AND BACK: ICD-10-CM

## 2021-04-14 DIAGNOSIS — R26.89 BALANCE PROBLEMS: ICD-10-CM

## 2021-04-14 DIAGNOSIS — Z74.09 DECREASED STRENGTH, ENDURANCE, AND MOBILITY: ICD-10-CM

## 2021-04-14 PROCEDURE — 97110 THERAPEUTIC EXERCISES: CPT | Mod: PN

## 2021-04-21 ENCOUNTER — CLINICAL SUPPORT (OUTPATIENT)
Dept: REHABILITATION | Facility: HOSPITAL | Age: 76
End: 2021-04-21
Attending: NEUROLOGICAL SURGERY
Payer: MEDICARE

## 2021-04-21 DIAGNOSIS — R53.1 DECREASED STRENGTH, ENDURANCE, AND MOBILITY: ICD-10-CM

## 2021-04-21 DIAGNOSIS — R26.89 BALANCE PROBLEMS: ICD-10-CM

## 2021-04-21 DIAGNOSIS — Z74.09 DECREASED STRENGTH, ENDURANCE, AND MOBILITY: ICD-10-CM

## 2021-04-21 DIAGNOSIS — R29.898 DECREASED STRENGTH OF TRUNK AND BACK: ICD-10-CM

## 2021-04-21 DIAGNOSIS — R68.89 DECREASED STRENGTH, ENDURANCE, AND MOBILITY: ICD-10-CM

## 2021-04-21 PROCEDURE — 97110 THERAPEUTIC EXERCISES: CPT | Mod: PN

## 2021-04-22 PROBLEM — R53.1 WEAKNESS: Status: RESOLVED | Noted: 2020-09-28 | Resolved: 2021-04-22

## 2021-04-22 PROBLEM — Z74.09 IMPAIRED FUNCTIONAL MOBILITY, BALANCE, AND ENDURANCE: Status: RESOLVED | Noted: 2020-09-28 | Resolved: 2021-04-22

## 2021-04-22 PROBLEM — Z91.81 AT RISK FOR FALLING: Status: RESOLVED | Noted: 2020-09-28 | Resolved: 2021-04-22

## 2021-05-11 ENCOUNTER — CLINICAL SUPPORT (OUTPATIENT)
Dept: REHABILITATION | Facility: HOSPITAL | Age: 76
End: 2021-05-11
Attending: NEUROLOGICAL SURGERY
Payer: MEDICARE

## 2021-05-11 DIAGNOSIS — R53.1 DECREASED STRENGTH, ENDURANCE, AND MOBILITY: ICD-10-CM

## 2021-05-11 DIAGNOSIS — R26.89 BALANCE PROBLEMS: ICD-10-CM

## 2021-05-11 DIAGNOSIS — Z74.09 DECREASED STRENGTH, ENDURANCE, AND MOBILITY: ICD-10-CM

## 2021-05-11 DIAGNOSIS — R68.89 DECREASED STRENGTH, ENDURANCE, AND MOBILITY: ICD-10-CM

## 2021-05-11 DIAGNOSIS — R29.898 DECREASED STRENGTH OF TRUNK AND BACK: ICD-10-CM

## 2021-05-11 PROCEDURE — 97110 THERAPEUTIC EXERCISES: CPT | Mod: PN

## 2021-05-14 ENCOUNTER — CLINICAL SUPPORT (OUTPATIENT)
Dept: REHABILITATION | Facility: HOSPITAL | Age: 76
End: 2021-05-14
Attending: NEUROLOGICAL SURGERY
Payer: MEDICARE

## 2021-05-14 DIAGNOSIS — R26.89 BALANCE PROBLEMS: ICD-10-CM

## 2021-05-14 DIAGNOSIS — R53.1 DECREASED STRENGTH, ENDURANCE, AND MOBILITY: ICD-10-CM

## 2021-05-14 DIAGNOSIS — R68.89 DECREASED STRENGTH, ENDURANCE, AND MOBILITY: ICD-10-CM

## 2021-05-14 DIAGNOSIS — Z74.09 DECREASED STRENGTH, ENDURANCE, AND MOBILITY: ICD-10-CM

## 2021-05-14 DIAGNOSIS — R29.898 DECREASED STRENGTH OF TRUNK AND BACK: ICD-10-CM

## 2021-05-14 PROCEDURE — 97110 THERAPEUTIC EXERCISES: CPT | Mod: PN

## 2021-05-17 ENCOUNTER — CLINICAL SUPPORT (OUTPATIENT)
Dept: REHABILITATION | Facility: HOSPITAL | Age: 76
End: 2021-05-17
Attending: NEUROLOGICAL SURGERY
Payer: MEDICARE

## 2021-05-17 DIAGNOSIS — R26.89 BALANCE PROBLEMS: ICD-10-CM

## 2021-05-17 DIAGNOSIS — Z74.09 DECREASED STRENGTH, ENDURANCE, AND MOBILITY: ICD-10-CM

## 2021-05-17 DIAGNOSIS — R29.898 DECREASED STRENGTH OF TRUNK AND BACK: ICD-10-CM

## 2021-05-17 DIAGNOSIS — R53.1 DECREASED STRENGTH, ENDURANCE, AND MOBILITY: ICD-10-CM

## 2021-05-17 DIAGNOSIS — R68.89 DECREASED STRENGTH, ENDURANCE, AND MOBILITY: ICD-10-CM

## 2021-05-17 PROCEDURE — 97110 THERAPEUTIC EXERCISES: CPT | Mod: PN

## 2021-05-17 PROCEDURE — 97112 NEUROMUSCULAR REEDUCATION: CPT | Mod: PN

## 2021-05-18 ENCOUNTER — OFFICE VISIT (OUTPATIENT)
Dept: NEUROSURGERY | Facility: CLINIC | Age: 76
End: 2021-05-18
Payer: MEDICARE

## 2021-05-18 ENCOUNTER — HOSPITAL ENCOUNTER (OUTPATIENT)
Dept: RADIOLOGY | Facility: HOSPITAL | Age: 76
Discharge: HOME | End: 2021-05-18
Attending: NEUROLOGICAL SURGERY
Payer: MEDICARE

## 2021-05-18 VITALS
OXYGEN SATURATION: 99 % | BODY MASS INDEX: 22.47 KG/M2 | WEIGHT: 131.63 LBS | HEIGHT: 64 IN | SYSTOLIC BLOOD PRESSURE: 160 MMHG | HEART RATE: 71 BPM | DIASTOLIC BLOOD PRESSURE: 72 MMHG

## 2021-05-18 DIAGNOSIS — G91.2 NPH (NORMAL PRESSURE HYDROCEPHALUS): ICD-10-CM

## 2021-05-18 DIAGNOSIS — Z98.2 S/P VENTRICULOPERITONEAL SHUNT: Primary | ICD-10-CM

## 2021-05-18 PROCEDURE — 1126F AMNT PAIN NOTED NONE PRSNT: CPT | Mod: S$GLB,,, | Performed by: NEUROLOGICAL SURGERY

## 2021-05-18 PROCEDURE — 3288F FALL RISK ASSESSMENT DOCD: CPT | Mod: CPTII,S$GLB,, | Performed by: NEUROLOGICAL SURGERY

## 2021-05-18 PROCEDURE — 70450 CT HEAD/BRAIN W/O DYE: CPT | Mod: TC

## 2021-05-18 PROCEDURE — 1159F MED LIST DOCD IN RCRD: CPT | Mod: S$GLB,,, | Performed by: NEUROLOGICAL SURGERY

## 2021-05-18 PROCEDURE — 99024 PR POST-OP FOLLOW-UP VISIT: ICD-10-PCS | Mod: S$GLB,,, | Performed by: NEUROLOGICAL SURGERY

## 2021-05-18 PROCEDURE — 99999 PR PBB SHADOW E&M-EST. PATIENT-LVL IV: CPT | Mod: PBBFAC,,, | Performed by: NEUROLOGICAL SURGERY

## 2021-05-18 PROCEDURE — 1126F PR PAIN SEVERITY QUANTIFIED, NO PAIN PRESENT: ICD-10-PCS | Mod: S$GLB,,, | Performed by: NEUROLOGICAL SURGERY

## 2021-05-18 PROCEDURE — 3288F PR FALLS RISK ASSESSMENT DOCUMENTED: ICD-10-PCS | Mod: CPTII,S$GLB,, | Performed by: NEUROLOGICAL SURGERY

## 2021-05-18 PROCEDURE — 1159F PR MEDICATION LIST DOCUMENTED IN MEDICAL RECORD: ICD-10-PCS | Mod: S$GLB,,, | Performed by: NEUROLOGICAL SURGERY

## 2021-05-18 PROCEDURE — 70450 CT HEAD WITHOUT CONTRAST: ICD-10-PCS | Mod: 26,,, | Performed by: RADIOLOGY

## 2021-05-18 PROCEDURE — 1101F PT FALLS ASSESS-DOCD LE1/YR: CPT | Mod: CPTII,S$GLB,, | Performed by: NEUROLOGICAL SURGERY

## 2021-05-18 PROCEDURE — 70450 CT HEAD/BRAIN W/O DYE: CPT | Mod: 26,,, | Performed by: RADIOLOGY

## 2021-05-18 PROCEDURE — 1101F PR PT FALLS ASSESS DOC 0-1 FALLS W/OUT INJ PAST YR: ICD-10-PCS | Mod: CPTII,S$GLB,, | Performed by: NEUROLOGICAL SURGERY

## 2021-05-18 PROCEDURE — 99999 PR PBB SHADOW E&M-EST. PATIENT-LVL IV: ICD-10-PCS | Mod: PBBFAC,,, | Performed by: NEUROLOGICAL SURGERY

## 2021-05-18 PROCEDURE — 99024 POSTOP FOLLOW-UP VISIT: CPT | Mod: S$GLB,,, | Performed by: NEUROLOGICAL SURGERY

## 2021-05-21 ENCOUNTER — CLINICAL SUPPORT (OUTPATIENT)
Dept: REHABILITATION | Facility: HOSPITAL | Age: 76
End: 2021-05-21
Attending: NEUROLOGICAL SURGERY
Payer: MEDICARE

## 2021-05-21 DIAGNOSIS — R53.1 DECREASED STRENGTH, ENDURANCE, AND MOBILITY: ICD-10-CM

## 2021-05-21 DIAGNOSIS — R68.89 DECREASED STRENGTH, ENDURANCE, AND MOBILITY: ICD-10-CM

## 2021-05-21 DIAGNOSIS — R26.89 BALANCE PROBLEMS: ICD-10-CM

## 2021-05-21 DIAGNOSIS — Z74.09 DECREASED STRENGTH, ENDURANCE, AND MOBILITY: ICD-10-CM

## 2021-05-21 DIAGNOSIS — R29.898 DECREASED STRENGTH OF TRUNK AND BACK: ICD-10-CM

## 2021-05-21 PROCEDURE — 97110 THERAPEUTIC EXERCISES: CPT | Mod: PN

## 2021-05-21 PROCEDURE — 97112 NEUROMUSCULAR REEDUCATION: CPT | Mod: PN

## 2021-05-25 ENCOUNTER — CLINICAL SUPPORT (OUTPATIENT)
Dept: REHABILITATION | Facility: HOSPITAL | Age: 76
End: 2021-05-25
Attending: NEUROLOGICAL SURGERY
Payer: MEDICARE

## 2021-05-25 DIAGNOSIS — R53.1 DECREASED STRENGTH, ENDURANCE, AND MOBILITY: ICD-10-CM

## 2021-05-25 DIAGNOSIS — R26.89 BALANCE PROBLEMS: ICD-10-CM

## 2021-05-25 DIAGNOSIS — R29.898 DECREASED STRENGTH OF TRUNK AND BACK: ICD-10-CM

## 2021-05-25 DIAGNOSIS — R68.89 DECREASED STRENGTH, ENDURANCE, AND MOBILITY: ICD-10-CM

## 2021-05-25 DIAGNOSIS — Z74.09 DECREASED STRENGTH, ENDURANCE, AND MOBILITY: ICD-10-CM

## 2021-05-25 PROCEDURE — 97110 THERAPEUTIC EXERCISES: CPT | Mod: PN

## 2021-05-28 ENCOUNTER — CLINICAL SUPPORT (OUTPATIENT)
Dept: REHABILITATION | Facility: HOSPITAL | Age: 76
End: 2021-05-28
Attending: NEUROLOGICAL SURGERY
Payer: MEDICARE

## 2021-05-28 DIAGNOSIS — R68.89 DECREASED STRENGTH, ENDURANCE, AND MOBILITY: ICD-10-CM

## 2021-05-28 DIAGNOSIS — Z74.09 DECREASED STRENGTH, ENDURANCE, AND MOBILITY: ICD-10-CM

## 2021-05-28 DIAGNOSIS — R29.898 DECREASED STRENGTH OF TRUNK AND BACK: ICD-10-CM

## 2021-05-28 DIAGNOSIS — R26.89 BALANCE PROBLEMS: ICD-10-CM

## 2021-05-28 DIAGNOSIS — R53.1 DECREASED STRENGTH, ENDURANCE, AND MOBILITY: ICD-10-CM

## 2021-05-28 PROCEDURE — 97110 THERAPEUTIC EXERCISES: CPT | Mod: PN

## 2021-06-01 ENCOUNTER — CLINICAL SUPPORT (OUTPATIENT)
Dept: REHABILITATION | Facility: HOSPITAL | Age: 76
End: 2021-06-01
Attending: NEUROLOGICAL SURGERY
Payer: MEDICARE

## 2021-06-01 DIAGNOSIS — R68.89 DECREASED STRENGTH, ENDURANCE, AND MOBILITY: ICD-10-CM

## 2021-06-01 DIAGNOSIS — Z74.09 DECREASED STRENGTH, ENDURANCE, AND MOBILITY: ICD-10-CM

## 2021-06-01 DIAGNOSIS — R53.1 DECREASED STRENGTH, ENDURANCE, AND MOBILITY: ICD-10-CM

## 2021-06-01 DIAGNOSIS — R26.89 BALANCE PROBLEMS: ICD-10-CM

## 2021-06-01 DIAGNOSIS — R29.898 DECREASED STRENGTH OF TRUNK AND BACK: ICD-10-CM

## 2021-06-01 PROCEDURE — 97110 THERAPEUTIC EXERCISES: CPT | Mod: PN

## 2021-06-03 ENCOUNTER — CLINICAL SUPPORT (OUTPATIENT)
Dept: REHABILITATION | Facility: HOSPITAL | Age: 76
End: 2021-06-03
Attending: NEUROLOGICAL SURGERY
Payer: MEDICARE

## 2021-06-03 DIAGNOSIS — R68.89 DECREASED STRENGTH, ENDURANCE, AND MOBILITY: ICD-10-CM

## 2021-06-03 DIAGNOSIS — R53.1 DECREASED STRENGTH, ENDURANCE, AND MOBILITY: ICD-10-CM

## 2021-06-03 DIAGNOSIS — R29.898 DECREASED STRENGTH OF TRUNK AND BACK: ICD-10-CM

## 2021-06-03 DIAGNOSIS — R26.89 BALANCE PROBLEMS: ICD-10-CM

## 2021-06-03 DIAGNOSIS — Z74.09 DECREASED STRENGTH, ENDURANCE, AND MOBILITY: ICD-10-CM

## 2021-06-03 PROCEDURE — 97110 THERAPEUTIC EXERCISES: CPT | Mod: PN

## 2021-06-08 ENCOUNTER — CLINICAL SUPPORT (OUTPATIENT)
Dept: REHABILITATION | Facility: HOSPITAL | Age: 76
End: 2021-06-08
Attending: NEUROLOGICAL SURGERY
Payer: MEDICARE

## 2021-06-08 DIAGNOSIS — R53.1 DECREASED STRENGTH, ENDURANCE, AND MOBILITY: ICD-10-CM

## 2021-06-08 DIAGNOSIS — R26.89 BALANCE PROBLEMS: ICD-10-CM

## 2021-06-08 DIAGNOSIS — R68.89 DECREASED STRENGTH, ENDURANCE, AND MOBILITY: ICD-10-CM

## 2021-06-08 DIAGNOSIS — R29.898 DECREASED STRENGTH OF TRUNK AND BACK: ICD-10-CM

## 2021-06-08 DIAGNOSIS — Z74.09 DECREASED STRENGTH, ENDURANCE, AND MOBILITY: ICD-10-CM

## 2021-06-08 PROCEDURE — 97110 THERAPEUTIC EXERCISES: CPT | Mod: PN

## 2021-06-10 ENCOUNTER — CLINICAL SUPPORT (OUTPATIENT)
Dept: REHABILITATION | Facility: HOSPITAL | Age: 76
End: 2021-06-10
Attending: NEUROLOGICAL SURGERY
Payer: MEDICARE

## 2021-06-10 DIAGNOSIS — R29.898 DECREASED STRENGTH OF TRUNK AND BACK: ICD-10-CM

## 2021-06-10 DIAGNOSIS — R26.89 BALANCE PROBLEMS: ICD-10-CM

## 2021-06-10 DIAGNOSIS — Z74.09 DECREASED STRENGTH, ENDURANCE, AND MOBILITY: ICD-10-CM

## 2021-06-10 DIAGNOSIS — R53.1 DECREASED STRENGTH, ENDURANCE, AND MOBILITY: ICD-10-CM

## 2021-06-10 DIAGNOSIS — R68.89 DECREASED STRENGTH, ENDURANCE, AND MOBILITY: ICD-10-CM

## 2021-06-10 PROCEDURE — 97110 THERAPEUTIC EXERCISES: CPT | Mod: PN

## 2021-06-16 ENCOUNTER — CLINICAL SUPPORT (OUTPATIENT)
Dept: REHABILITATION | Facility: HOSPITAL | Age: 76
End: 2021-06-16
Attending: NEUROLOGICAL SURGERY
Payer: MEDICARE

## 2021-06-16 DIAGNOSIS — R26.89 BALANCE PROBLEMS: ICD-10-CM

## 2021-06-16 DIAGNOSIS — R53.1 DECREASED STRENGTH, ENDURANCE, AND MOBILITY: ICD-10-CM

## 2021-06-16 DIAGNOSIS — R29.898 DECREASED STRENGTH OF TRUNK AND BACK: ICD-10-CM

## 2021-06-16 DIAGNOSIS — Z74.09 DECREASED STRENGTH, ENDURANCE, AND MOBILITY: ICD-10-CM

## 2021-06-16 DIAGNOSIS — R68.89 DECREASED STRENGTH, ENDURANCE, AND MOBILITY: ICD-10-CM

## 2021-06-16 PROCEDURE — 97110 THERAPEUTIC EXERCISES: CPT | Mod: PN

## 2021-06-18 ENCOUNTER — CLINICAL SUPPORT (OUTPATIENT)
Dept: REHABILITATION | Facility: HOSPITAL | Age: 76
End: 2021-06-18
Attending: NEUROLOGICAL SURGERY
Payer: MEDICARE

## 2021-06-18 DIAGNOSIS — R68.89 DECREASED STRENGTH, ENDURANCE, AND MOBILITY: ICD-10-CM

## 2021-06-18 DIAGNOSIS — R26.89 BALANCE PROBLEMS: ICD-10-CM

## 2021-06-18 DIAGNOSIS — Z74.09 DECREASED STRENGTH, ENDURANCE, AND MOBILITY: ICD-10-CM

## 2021-06-18 DIAGNOSIS — R29.898 DECREASED STRENGTH OF TRUNK AND BACK: ICD-10-CM

## 2021-06-18 DIAGNOSIS — R53.1 DECREASED STRENGTH, ENDURANCE, AND MOBILITY: ICD-10-CM

## 2021-06-18 PROCEDURE — 97110 THERAPEUTIC EXERCISES: CPT | Mod: PN

## 2021-06-21 ENCOUNTER — CLINICAL SUPPORT (OUTPATIENT)
Dept: REHABILITATION | Facility: HOSPITAL | Age: 76
End: 2021-06-21
Attending: NEUROLOGICAL SURGERY
Payer: MEDICARE

## 2021-06-21 DIAGNOSIS — R26.89 BALANCE PROBLEMS: ICD-10-CM

## 2021-06-21 DIAGNOSIS — Z74.09 DECREASED STRENGTH, ENDURANCE, AND MOBILITY: ICD-10-CM

## 2021-06-21 DIAGNOSIS — R68.89 DECREASED STRENGTH, ENDURANCE, AND MOBILITY: ICD-10-CM

## 2021-06-21 DIAGNOSIS — R53.1 DECREASED STRENGTH, ENDURANCE, AND MOBILITY: ICD-10-CM

## 2021-06-21 DIAGNOSIS — R29.898 DECREASED STRENGTH OF TRUNK AND BACK: ICD-10-CM

## 2021-06-21 PROCEDURE — 97110 THERAPEUTIC EXERCISES: CPT | Mod: PN

## 2021-07-02 ENCOUNTER — CLINICAL SUPPORT (OUTPATIENT)
Dept: REHABILITATION | Facility: HOSPITAL | Age: 76
End: 2021-07-02
Attending: NEUROLOGICAL SURGERY
Payer: MEDICARE

## 2021-07-02 DIAGNOSIS — R53.1 DECREASED STRENGTH, ENDURANCE, AND MOBILITY: ICD-10-CM

## 2021-07-02 DIAGNOSIS — R26.89 BALANCE PROBLEMS: ICD-10-CM

## 2021-07-02 DIAGNOSIS — Z74.09 DECREASED STRENGTH, ENDURANCE, AND MOBILITY: ICD-10-CM

## 2021-07-02 DIAGNOSIS — R68.89 DECREASED STRENGTH, ENDURANCE, AND MOBILITY: ICD-10-CM

## 2021-07-02 DIAGNOSIS — R29.898 DECREASED STRENGTH OF TRUNK AND BACK: ICD-10-CM

## 2021-07-02 PROCEDURE — 97110 THERAPEUTIC EXERCISES: CPT | Mod: PN

## 2021-07-06 ENCOUNTER — CLINICAL SUPPORT (OUTPATIENT)
Dept: REHABILITATION | Facility: HOSPITAL | Age: 76
End: 2021-07-06
Attending: NEUROLOGICAL SURGERY
Payer: MEDICARE

## 2021-07-06 DIAGNOSIS — Z74.09 DECREASED STRENGTH, ENDURANCE, AND MOBILITY: ICD-10-CM

## 2021-07-06 DIAGNOSIS — R29.898 DECREASED STRENGTH OF TRUNK AND BACK: ICD-10-CM

## 2021-07-06 DIAGNOSIS — R53.1 DECREASED STRENGTH, ENDURANCE, AND MOBILITY: ICD-10-CM

## 2021-07-06 DIAGNOSIS — R68.89 DECREASED STRENGTH, ENDURANCE, AND MOBILITY: ICD-10-CM

## 2021-07-06 DIAGNOSIS — M54.50 CHRONIC BILATERAL LOW BACK PAIN WITHOUT SCIATICA: Primary | ICD-10-CM

## 2021-07-06 DIAGNOSIS — G89.29 CHRONIC BILATERAL LOW BACK PAIN WITHOUT SCIATICA: Primary | ICD-10-CM

## 2021-07-06 DIAGNOSIS — R26.89 BALANCE PROBLEMS: ICD-10-CM

## 2021-07-06 PROCEDURE — 97110 THERAPEUTIC EXERCISES: CPT | Mod: PN

## 2021-07-08 ENCOUNTER — CLINICAL SUPPORT (OUTPATIENT)
Dept: REHABILITATION | Facility: HOSPITAL | Age: 76
End: 2021-07-08
Attending: NEUROLOGICAL SURGERY
Payer: MEDICARE

## 2021-07-08 DIAGNOSIS — R29.898 DECREASED STRENGTH OF TRUNK AND BACK: ICD-10-CM

## 2021-07-08 DIAGNOSIS — R53.1 DECREASED STRENGTH, ENDURANCE, AND MOBILITY: ICD-10-CM

## 2021-07-08 DIAGNOSIS — R26.89 BALANCE PROBLEMS: ICD-10-CM

## 2021-07-08 DIAGNOSIS — Z74.09 DECREASED STRENGTH, ENDURANCE, AND MOBILITY: ICD-10-CM

## 2021-07-08 DIAGNOSIS — R68.89 DECREASED STRENGTH, ENDURANCE, AND MOBILITY: ICD-10-CM

## 2021-07-08 PROCEDURE — 97110 THERAPEUTIC EXERCISES: CPT | Mod: PN

## 2021-07-30 ENCOUNTER — PATIENT MESSAGE (OUTPATIENT)
Dept: SPORTS MEDICINE | Facility: CLINIC | Age: 76
End: 2021-07-30

## 2021-08-03 ENCOUNTER — HOSPITAL ENCOUNTER (OUTPATIENT)
Dept: RADIOLOGY | Facility: HOSPITAL | Age: 76
Discharge: HOME | End: 2021-08-03
Attending: PHYSICIAN ASSISTANT
Payer: MEDICARE

## 2021-08-03 ENCOUNTER — OFFICE VISIT (OUTPATIENT)
Dept: SPORTS MEDICINE | Facility: CLINIC | Age: 76
End: 2021-08-03
Payer: MEDICARE

## 2021-08-03 VITALS
TEMPERATURE: 99 F | DIASTOLIC BLOOD PRESSURE: 66 MMHG | BODY MASS INDEX: 25.16 KG/M2 | WEIGHT: 147.38 LBS | HEART RATE: 77 BPM | SYSTOLIC BLOOD PRESSURE: 131 MMHG | HEIGHT: 64 IN

## 2021-08-03 DIAGNOSIS — M25.522 LEFT ELBOW PAIN: ICD-10-CM

## 2021-08-03 DIAGNOSIS — M25.521 RIGHT ELBOW PAIN: ICD-10-CM

## 2021-08-03 DIAGNOSIS — M25.511 RIGHT SHOULDER PAIN, UNSPECIFIED CHRONICITY: Primary | ICD-10-CM

## 2021-08-03 DIAGNOSIS — M25.511 RIGHT SHOULDER PAIN, UNSPECIFIED CHRONICITY: ICD-10-CM

## 2021-08-03 DIAGNOSIS — M75.101 ROTATOR CUFF SYNDROME OF RIGHT SHOULDER: ICD-10-CM

## 2021-08-03 PROCEDURE — 1101F PT FALLS ASSESS-DOCD LE1/YR: CPT | Mod: CPTII,S$GLB,, | Performed by: PHYSICIAN ASSISTANT

## 2021-08-03 PROCEDURE — 3288F PR FALLS RISK ASSESSMENT DOCUMENTED: ICD-10-PCS | Mod: CPTII,S$GLB,, | Performed by: PHYSICIAN ASSISTANT

## 2021-08-03 PROCEDURE — 1125F AMNT PAIN NOTED PAIN PRSNT: CPT | Mod: CPTII,S$GLB,, | Performed by: PHYSICIAN ASSISTANT

## 2021-08-03 PROCEDURE — 3044F HG A1C LEVEL LT 7.0%: CPT | Mod: CPTII,S$GLB,, | Performed by: PHYSICIAN ASSISTANT

## 2021-08-03 PROCEDURE — 99999 PR PBB SHADOW E&M-EST. PATIENT-LVL V: ICD-10-PCS | Mod: PBBFAC,,, | Performed by: PHYSICIAN ASSISTANT

## 2021-08-03 PROCEDURE — 3078F PR MOST RECENT DIASTOLIC BLOOD PRESSURE < 80 MM HG: ICD-10-PCS | Mod: CPTII,S$GLB,, | Performed by: PHYSICIAN ASSISTANT

## 2021-08-03 PROCEDURE — 3075F SYST BP GE 130 - 139MM HG: CPT | Mod: CPTII,S$GLB,, | Performed by: PHYSICIAN ASSISTANT

## 2021-08-03 PROCEDURE — 1101F PR PT FALLS ASSESS DOC 0-1 FALLS W/OUT INJ PAST YR: ICD-10-PCS | Mod: CPTII,S$GLB,, | Performed by: PHYSICIAN ASSISTANT

## 2021-08-03 PROCEDURE — 73070 XR ELBOW 2 VIEWS LEFT: ICD-10-PCS | Mod: 26,LT,, | Performed by: RADIOLOGY

## 2021-08-03 PROCEDURE — 73030 X-RAY EXAM OF SHOULDER: CPT | Mod: TC,RT

## 2021-08-03 PROCEDURE — 73070 X-RAY EXAM OF ELBOW: CPT | Mod: 26,LT,, | Performed by: RADIOLOGY

## 2021-08-03 PROCEDURE — 99213 OFFICE O/P EST LOW 20 MIN: CPT | Mod: 25,S$GLB,, | Performed by: PHYSICIAN ASSISTANT

## 2021-08-03 PROCEDURE — 20610 PR DRAIN/INJECT LARGE JOINT/BURSA: ICD-10-PCS | Mod: RT,S$GLB,, | Performed by: PHYSICIAN ASSISTANT

## 2021-08-03 PROCEDURE — 1125F PR PAIN SEVERITY QUANTIFIED, PAIN PRESENT: ICD-10-PCS | Mod: CPTII,S$GLB,, | Performed by: PHYSICIAN ASSISTANT

## 2021-08-03 PROCEDURE — 1159F MED LIST DOCD IN RCRD: CPT | Mod: CPTII,S$GLB,, | Performed by: PHYSICIAN ASSISTANT

## 2021-08-03 PROCEDURE — 3075F PR MOST RECENT SYSTOLIC BLOOD PRESS GE 130-139MM HG: ICD-10-PCS | Mod: CPTII,S$GLB,, | Performed by: PHYSICIAN ASSISTANT

## 2021-08-03 PROCEDURE — 99999 PR PBB SHADOW E&M-EST. PATIENT-LVL V: CPT | Mod: PBBFAC,,, | Performed by: PHYSICIAN ASSISTANT

## 2021-08-03 PROCEDURE — 73030 XR SHOULDER COMPLETE 2 OR MORE VIEWS RIGHT: ICD-10-PCS | Mod: 26,RT,, | Performed by: RADIOLOGY

## 2021-08-03 PROCEDURE — 20610 DRAIN/INJ JOINT/BURSA W/O US: CPT | Mod: RT,S$GLB,, | Performed by: PHYSICIAN ASSISTANT

## 2021-08-03 PROCEDURE — 3044F PR MOST RECENT HEMOGLOBIN A1C LEVEL <7.0%: ICD-10-PCS | Mod: CPTII,S$GLB,, | Performed by: PHYSICIAN ASSISTANT

## 2021-08-03 PROCEDURE — 73070 X-RAY EXAM OF ELBOW: CPT | Mod: TC,LT

## 2021-08-03 PROCEDURE — 73030 X-RAY EXAM OF SHOULDER: CPT | Mod: 26,RT,, | Performed by: RADIOLOGY

## 2021-08-03 PROCEDURE — 1159F PR MEDICATION LIST DOCUMENTED IN MEDICAL RECORD: ICD-10-PCS | Mod: CPTII,S$GLB,, | Performed by: PHYSICIAN ASSISTANT

## 2021-08-03 PROCEDURE — 99213 PR OFFICE/OUTPT VISIT, EST, LEVL III, 20-29 MIN: ICD-10-PCS | Mod: 25,S$GLB,, | Performed by: PHYSICIAN ASSISTANT

## 2021-08-03 PROCEDURE — 1160F PR REVIEW ALL MEDS BY PRESCRIBER/CLIN PHARMACIST DOCUMENTED: ICD-10-PCS | Mod: CPTII,S$GLB,, | Performed by: PHYSICIAN ASSISTANT

## 2021-08-03 PROCEDURE — 3288F FALL RISK ASSESSMENT DOCD: CPT | Mod: CPTII,S$GLB,, | Performed by: PHYSICIAN ASSISTANT

## 2021-08-03 PROCEDURE — 3078F DIAST BP <80 MM HG: CPT | Mod: CPTII,S$GLB,, | Performed by: PHYSICIAN ASSISTANT

## 2021-08-03 PROCEDURE — 1160F RVW MEDS BY RX/DR IN RCRD: CPT | Mod: CPTII,S$GLB,, | Performed by: PHYSICIAN ASSISTANT

## 2021-08-03 RX ORDER — TRIAMCINOLONE ACETONIDE 40 MG/ML
40 INJECTION, SUSPENSION INTRA-ARTICULAR; INTRAMUSCULAR
Status: COMPLETED | OUTPATIENT
Start: 2021-08-03 | End: 2021-08-03

## 2021-08-03 RX ADMIN — TRIAMCINOLONE ACETONIDE 40 MG: 40 INJECTION, SUSPENSION INTRA-ARTICULAR; INTRAMUSCULAR at 01:08

## 2021-08-26 ENCOUNTER — CLINICAL SUPPORT (OUTPATIENT)
Dept: REHABILITATION | Facility: HOSPITAL | Age: 76
End: 2021-08-26
Attending: NEUROLOGICAL SURGERY
Payer: MEDICARE

## 2021-08-26 DIAGNOSIS — R29.898 DECREASED STRENGTH OF TRUNK AND BACK: Primary | ICD-10-CM

## 2021-08-26 DIAGNOSIS — M54.50 CHRONIC BILATERAL LOW BACK PAIN WITHOUT SCIATICA: ICD-10-CM

## 2021-08-26 DIAGNOSIS — G89.29 CHRONIC BILATERAL LOW BACK PAIN WITHOUT SCIATICA: ICD-10-CM

## 2021-08-26 DIAGNOSIS — R29.3 POOR POSTURE: ICD-10-CM

## 2021-08-26 DIAGNOSIS — M25.69 DECREASED ROM OF TRUNK AND BACK: ICD-10-CM

## 2021-08-26 PROCEDURE — 97110 THERAPEUTIC EXERCISES: CPT | Mod: PN

## 2021-08-26 PROCEDURE — 97162 PT EVAL MOD COMPLEX 30 MIN: CPT | Mod: PN

## 2021-09-03 ENCOUNTER — PATIENT MESSAGE (OUTPATIENT)
Dept: NEUROSURGERY | Facility: CLINIC | Age: 76
End: 2021-09-03

## 2021-09-07 ENCOUNTER — PATIENT MESSAGE (OUTPATIENT)
Dept: PODIATRY | Facility: CLINIC | Age: 76
End: 2021-09-07

## 2021-09-20 ENCOUNTER — CLINICAL SUPPORT (OUTPATIENT)
Dept: REHABILITATION | Facility: HOSPITAL | Age: 76
End: 2021-09-20
Attending: NEUROLOGICAL SURGERY
Payer: MEDICARE

## 2021-09-20 DIAGNOSIS — R29.898 DECREASED STRENGTH OF TRUNK AND BACK: ICD-10-CM

## 2021-09-20 DIAGNOSIS — M25.69 DECREASED ROM OF TRUNK AND BACK: ICD-10-CM

## 2021-09-20 DIAGNOSIS — R29.3 POOR POSTURE: ICD-10-CM

## 2021-09-20 PROCEDURE — 97110 THERAPEUTIC EXERCISES: CPT | Mod: PN,CQ

## 2021-09-22 ENCOUNTER — CLINICAL SUPPORT (OUTPATIENT)
Dept: REHABILITATION | Facility: HOSPITAL | Age: 76
End: 2021-09-22
Attending: NEUROLOGICAL SURGERY
Payer: MEDICARE

## 2021-09-22 DIAGNOSIS — R29.3 POOR POSTURE: ICD-10-CM

## 2021-09-22 DIAGNOSIS — R29.898 DECREASED STRENGTH OF TRUNK AND BACK: ICD-10-CM

## 2021-09-22 DIAGNOSIS — M25.69 DECREASED ROM OF TRUNK AND BACK: ICD-10-CM

## 2021-09-22 PROCEDURE — 97110 THERAPEUTIC EXERCISES: CPT | Mod: PN,CQ

## 2021-09-27 ENCOUNTER — CLINICAL SUPPORT (OUTPATIENT)
Dept: REHABILITATION | Facility: HOSPITAL | Age: 76
End: 2021-09-27
Attending: NEUROLOGICAL SURGERY
Payer: MEDICARE

## 2021-09-27 DIAGNOSIS — R29.3 POOR POSTURE: ICD-10-CM

## 2021-09-27 DIAGNOSIS — R29.898 DECREASED STRENGTH OF TRUNK AND BACK: ICD-10-CM

## 2021-09-27 DIAGNOSIS — M25.69 DECREASED ROM OF TRUNK AND BACK: ICD-10-CM

## 2021-09-27 PROCEDURE — 97110 THERAPEUTIC EXERCISES: CPT | Mod: PN,CQ

## 2021-09-29 ENCOUNTER — CLINICAL SUPPORT (OUTPATIENT)
Dept: REHABILITATION | Facility: HOSPITAL | Age: 76
End: 2021-09-29
Attending: NEUROLOGICAL SURGERY
Payer: MEDICARE

## 2021-09-29 DIAGNOSIS — R29.898 DECREASED STRENGTH OF TRUNK AND BACK: ICD-10-CM

## 2021-09-29 DIAGNOSIS — R29.3 POOR POSTURE: ICD-10-CM

## 2021-09-29 DIAGNOSIS — M25.69 DECREASED ROM OF TRUNK AND BACK: ICD-10-CM

## 2021-09-29 PROCEDURE — 97110 THERAPEUTIC EXERCISES: CPT | Mod: PN,CQ

## 2021-10-04 ENCOUNTER — CLINICAL SUPPORT (OUTPATIENT)
Dept: REHABILITATION | Facility: HOSPITAL | Age: 76
End: 2021-10-04
Attending: NEUROLOGICAL SURGERY
Payer: MEDICARE

## 2021-10-04 DIAGNOSIS — R29.3 POOR POSTURE: ICD-10-CM

## 2021-10-04 DIAGNOSIS — M25.69 DECREASED ROM OF TRUNK AND BACK: ICD-10-CM

## 2021-10-04 DIAGNOSIS — R29.898 DECREASED STRENGTH OF TRUNK AND BACK: ICD-10-CM

## 2021-10-04 PROCEDURE — 97110 THERAPEUTIC EXERCISES: CPT | Mod: PN

## 2021-10-06 ENCOUNTER — CLINICAL SUPPORT (OUTPATIENT)
Dept: REHABILITATION | Facility: HOSPITAL | Age: 76
End: 2021-10-06
Attending: NEUROLOGICAL SURGERY
Payer: MEDICARE

## 2021-10-06 DIAGNOSIS — R29.898 DECREASED STRENGTH OF TRUNK AND BACK: ICD-10-CM

## 2021-10-06 DIAGNOSIS — M25.69 DECREASED ROM OF TRUNK AND BACK: ICD-10-CM

## 2021-10-06 DIAGNOSIS — R29.3 POOR POSTURE: ICD-10-CM

## 2021-10-06 PROCEDURE — 97110 THERAPEUTIC EXERCISES: CPT | Mod: PN

## 2021-10-11 ENCOUNTER — DOCUMENTATION ONLY (OUTPATIENT)
Dept: REHABILITATION | Facility: HOSPITAL | Age: 76
End: 2021-10-11
Attending: NEUROLOGICAL SURGERY
Payer: MEDICARE

## 2021-10-11 DIAGNOSIS — R29.3 POOR POSTURE: ICD-10-CM

## 2021-10-11 DIAGNOSIS — R29.898 DECREASED STRENGTH OF TRUNK AND BACK: ICD-10-CM

## 2021-10-11 DIAGNOSIS — M25.69 DECREASED ROM OF TRUNK AND BACK: ICD-10-CM

## 2021-10-11 PROCEDURE — 97110 THERAPEUTIC EXERCISES: CPT | Mod: PN,CQ

## 2021-10-13 ENCOUNTER — CLINICAL SUPPORT (OUTPATIENT)
Dept: REHABILITATION | Facility: HOSPITAL | Age: 76
End: 2021-10-13
Attending: NEUROLOGICAL SURGERY
Payer: MEDICARE

## 2021-10-13 DIAGNOSIS — R29.3 POOR POSTURE: ICD-10-CM

## 2021-10-13 DIAGNOSIS — M25.69 DECREASED ROM OF TRUNK AND BACK: ICD-10-CM

## 2021-10-13 DIAGNOSIS — R29.898 DECREASED STRENGTH OF TRUNK AND BACK: ICD-10-CM

## 2021-10-13 PROCEDURE — 97110 THERAPEUTIC EXERCISES: CPT | Mod: PN

## 2021-10-18 ENCOUNTER — DOCUMENTATION ONLY (OUTPATIENT)
Dept: REHABILITATION | Facility: HOSPITAL | Age: 76
End: 2021-10-18
Attending: NEUROLOGICAL SURGERY
Payer: MEDICARE

## 2021-10-18 DIAGNOSIS — M25.69 DECREASED ROM OF TRUNK AND BACK: ICD-10-CM

## 2021-10-18 DIAGNOSIS — R29.3 POOR POSTURE: ICD-10-CM

## 2021-10-18 DIAGNOSIS — R29.898 DECREASED STRENGTH OF TRUNK AND BACK: ICD-10-CM

## 2021-10-18 PROCEDURE — 97110 THERAPEUTIC EXERCISES: CPT | Mod: PN,CQ

## 2021-10-20 ENCOUNTER — CLINICAL SUPPORT (OUTPATIENT)
Dept: REHABILITATION | Facility: HOSPITAL | Age: 76
End: 2021-10-20
Attending: NEUROLOGICAL SURGERY
Payer: MEDICARE

## 2021-10-20 DIAGNOSIS — R29.3 POOR POSTURE: ICD-10-CM

## 2021-10-20 DIAGNOSIS — M25.69 DECREASED ROM OF TRUNK AND BACK: ICD-10-CM

## 2021-10-20 DIAGNOSIS — R29.898 DECREASED STRENGTH OF TRUNK AND BACK: ICD-10-CM

## 2021-10-20 PROCEDURE — 97110 THERAPEUTIC EXERCISES: CPT | Mod: PN

## 2021-10-25 ENCOUNTER — CLINICAL SUPPORT (OUTPATIENT)
Dept: REHABILITATION | Facility: HOSPITAL | Age: 76
End: 2021-10-25
Attending: NEUROLOGICAL SURGERY
Payer: MEDICARE

## 2021-10-25 DIAGNOSIS — R29.3 POOR POSTURE: ICD-10-CM

## 2021-10-25 DIAGNOSIS — R29.898 DECREASED STRENGTH OF TRUNK AND BACK: ICD-10-CM

## 2021-10-25 DIAGNOSIS — M25.69 DECREASED ROM OF TRUNK AND BACK: ICD-10-CM

## 2021-10-25 PROCEDURE — 97110 THERAPEUTIC EXERCISES: CPT | Mod: KX,PN

## 2021-10-27 ENCOUNTER — CLINICAL SUPPORT (OUTPATIENT)
Dept: REHABILITATION | Facility: HOSPITAL | Age: 76
End: 2021-10-27
Attending: NEUROLOGICAL SURGERY
Payer: MEDICARE

## 2021-10-27 DIAGNOSIS — M25.69 DECREASED ROM OF TRUNK AND BACK: ICD-10-CM

## 2021-10-27 DIAGNOSIS — R29.898 DECREASED STRENGTH OF TRUNK AND BACK: ICD-10-CM

## 2021-10-27 DIAGNOSIS — R29.3 POOR POSTURE: ICD-10-CM

## 2021-10-27 PROCEDURE — 97110 THERAPEUTIC EXERCISES: CPT | Mod: KX,PN

## 2021-11-01 ENCOUNTER — DOCUMENTATION ONLY (OUTPATIENT)
Dept: REHABILITATION | Facility: HOSPITAL | Age: 76
End: 2021-11-01
Attending: NEUROLOGICAL SURGERY
Payer: MEDICARE

## 2021-11-05 ENCOUNTER — CLINICAL SUPPORT (OUTPATIENT)
Dept: REHABILITATION | Facility: HOSPITAL | Age: 76
End: 2021-11-05
Attending: NEUROLOGICAL SURGERY
Payer: MEDICARE

## 2021-11-05 DIAGNOSIS — R29.898 DECREASED STRENGTH OF TRUNK AND BACK: ICD-10-CM

## 2021-11-05 DIAGNOSIS — R29.3 POOR POSTURE: ICD-10-CM

## 2021-11-05 DIAGNOSIS — M25.69 DECREASED ROM OF TRUNK AND BACK: ICD-10-CM

## 2021-11-05 PROCEDURE — 97110 THERAPEUTIC EXERCISES: CPT | Mod: PN,CQ

## 2021-11-09 ENCOUNTER — DOCUMENTATION ONLY (OUTPATIENT)
Dept: REHABILITATION | Facility: HOSPITAL | Age: 76
End: 2021-11-09
Attending: NEUROLOGICAL SURGERY
Payer: MEDICARE

## 2021-11-09 DIAGNOSIS — R29.898 DECREASED STRENGTH OF TRUNK AND BACK: ICD-10-CM

## 2021-11-09 DIAGNOSIS — R29.3 POOR POSTURE: ICD-10-CM

## 2021-11-09 DIAGNOSIS — M25.69 DECREASED ROM OF TRUNK AND BACK: Primary | ICD-10-CM

## 2021-11-09 PROCEDURE — 97110 THERAPEUTIC EXERCISES: CPT | Mod: KX,PN | Performed by: PHYSICAL MEDICINE & REHABILITATION

## 2021-11-12 ENCOUNTER — CLINICAL SUPPORT (OUTPATIENT)
Dept: REHABILITATION | Facility: HOSPITAL | Age: 76
End: 2021-11-12
Attending: NEUROLOGICAL SURGERY
Payer: MEDICARE

## 2021-11-12 DIAGNOSIS — R29.3 POOR POSTURE: ICD-10-CM

## 2021-11-12 DIAGNOSIS — M25.69 DECREASED ROM OF TRUNK AND BACK: ICD-10-CM

## 2021-11-12 DIAGNOSIS — R29.898 DECREASED STRENGTH OF TRUNK AND BACK: ICD-10-CM

## 2021-11-12 PROCEDURE — 97110 THERAPEUTIC EXERCISES: CPT | Mod: KX,PN

## 2021-11-16 ENCOUNTER — DOCUMENTATION ONLY (OUTPATIENT)
Dept: REHABILITATION | Facility: HOSPITAL | Age: 76
End: 2021-11-16
Attending: NEUROLOGICAL SURGERY
Payer: MEDICARE

## 2021-11-16 DIAGNOSIS — R29.3 POOR POSTURE: ICD-10-CM

## 2021-11-16 DIAGNOSIS — R29.898 DECREASED STRENGTH OF TRUNK AND BACK: ICD-10-CM

## 2021-11-16 DIAGNOSIS — M25.69 DECREASED ROM OF TRUNK AND BACK: Primary | ICD-10-CM

## 2021-11-16 PROCEDURE — 97110 THERAPEUTIC EXERCISES: CPT | Mod: KX,PN,CQ

## 2021-11-18 ENCOUNTER — CLINICAL SUPPORT (OUTPATIENT)
Dept: REHABILITATION | Facility: HOSPITAL | Age: 76
End: 2021-11-18
Attending: NEUROLOGICAL SURGERY
Payer: MEDICARE

## 2021-11-18 DIAGNOSIS — M25.69 DECREASED ROM OF TRUNK AND BACK: Primary | ICD-10-CM

## 2021-11-18 DIAGNOSIS — R29.898 DECREASED STRENGTH OF TRUNK AND BACK: ICD-10-CM

## 2021-11-18 DIAGNOSIS — R29.3 POOR POSTURE: ICD-10-CM

## 2021-11-18 PROCEDURE — 97110 THERAPEUTIC EXERCISES: CPT | Mod: KX,PN | Performed by: PHYSICAL MEDICINE & REHABILITATION

## 2021-11-23 ENCOUNTER — DOCUMENTATION ONLY (OUTPATIENT)
Dept: REHABILITATION | Facility: HOSPITAL | Age: 76
End: 2021-11-23
Attending: NEUROLOGICAL SURGERY
Payer: MEDICARE

## 2021-11-23 DIAGNOSIS — M25.69 DECREASED ROM OF TRUNK AND BACK: ICD-10-CM

## 2021-11-23 DIAGNOSIS — R29.3 POOR POSTURE: ICD-10-CM

## 2021-11-23 DIAGNOSIS — R29.898 DECREASED STRENGTH OF TRUNK AND BACK: ICD-10-CM

## 2021-11-23 PROCEDURE — 97110 THERAPEUTIC EXERCISES: CPT | Mod: KX,PN

## 2021-11-26 ENCOUNTER — CLINICAL SUPPORT (OUTPATIENT)
Dept: REHABILITATION | Facility: HOSPITAL | Age: 76
End: 2021-11-26
Attending: NEUROLOGICAL SURGERY
Payer: MEDICARE

## 2021-11-26 DIAGNOSIS — R29.898 DECREASED STRENGTH OF TRUNK AND BACK: ICD-10-CM

## 2021-11-26 DIAGNOSIS — R29.3 POOR POSTURE: ICD-10-CM

## 2021-11-26 DIAGNOSIS — M25.69 DECREASED ROM OF TRUNK AND BACK: ICD-10-CM

## 2021-11-26 PROCEDURE — 97110 THERAPEUTIC EXERCISES: CPT | Mod: KX,PN

## 2021-11-29 ENCOUNTER — DOCUMENTATION ONLY (OUTPATIENT)
Dept: REHABILITATION | Facility: HOSPITAL | Age: 76
End: 2021-11-29
Attending: NEUROLOGICAL SURGERY
Payer: MEDICARE

## 2021-12-06 ENCOUNTER — DOCUMENTATION ONLY (OUTPATIENT)
Dept: REHABILITATION | Facility: HOSPITAL | Age: 76
End: 2021-12-06
Attending: NEUROLOGICAL SURGERY
Payer: MEDICARE

## 2022-01-10 ENCOUNTER — DOCUMENTATION ONLY (OUTPATIENT)
Dept: REHABILITATION | Facility: HOSPITAL | Age: 77
End: 2022-01-10
Attending: NEUROLOGICAL SURGERY
Payer: MEDICARE

## 2022-01-10 NOTE — PROGRESS NOTES
"Health  Wellness Visit Note    Name: Cheryl Abreu  Clinic Number: [id number]  Physician: Ravindra Loera, *  Diagnosis: No diagnosis found.  Past Medical History:   Diagnosis Date    Arthritis     Diabetes mellitus, type 2     Hearing loss     High blood pressure     Hyperlipidemia     Osteoporosis     Scoliosis     Thyroid disease      Visit Number: 23  Precautions: Balance, shoulder ROM      1st PT visit:  8/26/2021  Year of care end date:  8/26/2022  6 Month test  Performed: May 2022  12 Month test performed: August 2022  Mind body plan: 7 month plan  Patient level:C    Time In: 10:20 AM  Time Out: 11:20 M  Total Treatment Time: 60 minutes    [facility]  Handout on this week's wellness topic "Get Outdoors" provided along with a discussion on what it means, the benefits, and suggestions for practice.  Reviewed last week's topic of NA-missed 1 month traveling.    Subjective:   Patient reports for her wellness visit after missing 1 month.  She visited family in [place].  Her back is still stiff, just slightly.  Pt has been trying to stand up straight to strengthen her neck and back. Leg extension felt stronger to patient.  Added CC - 7 month plan    Objective:   Cheryl completed therapeutic stretches (EIL, STARLA) and the following MedX exercise machines: core lumbar, torso rotation l/r, leg extension, leg curl, upright row, chest press, biceps curl, triceps extension, leg press    See exercise log in patient folder for rate of exertion and repetitions completed.       Fitness Machine Education Key:  E=education on equipment initiated and further follow up and education needed  I=independent with  and exercise.  The patient:   Adjusts machines to his/her settings   Uses equipment levers, pins, weights safely   Maintains safe and correct posture while exercising   Moves through exercise with correct pace and control   Gets on and off equipment " safely      Lumbar/Cervical Ext.  Torso Rotation  Leg Press    Leg Extension  Seated Leg Curl  Chest Press    Seated Row  Hip ADD  Hip ABD    Triceps Extension  Bicep Curl  Other:        Assessment:   Patient tolerated Patient tolerated MedX Core Lumbar Strength and all other peripheral exercises without an increase in symptoms. Patient warmed up on recumbent bike for 6 minutes, warm up set on lumbar machine, and iced low back for 10 minutes after the workout.    Plan:  Continue with established plan of care towards wellness goals.     Health  : Khalida Oneil  1/10/2022

## 2022-01-21 ENCOUNTER — DOCUMENTATION ONLY (OUTPATIENT)
Dept: REHABILITATION | Facility: HOSPITAL | Age: 77
End: 2022-01-21
Attending: NEUROLOGICAL SURGERY
Payer: MEDICARE

## 2022-01-21 NOTE — PROGRESS NOTES
"Health  Wellness Visit Note    Name: Cheryl Abreu  Clinic Number: [id number]  Physician: Ravindra Loera, *  Diagnosis: No diagnosis found.  Past Medical History:   Diagnosis Date    Arthritis     Diabetes mellitus, type 2     Hearing loss     High blood pressure     Hyperlipidemia     Osteoporosis     Scoliosis     Thyroid disease      Visit Number: 24  Precautions: Balance, shoulder ROM      1st PT visit:  8/26/2021  Year of care end date:  8/26/2022  6 Month test  Performed: May 2022  12 Month test performed: August 2022  Mind body plan: 7 month plan  Patient level:C    Time In: 2:03 PM  Time Out: 3:08 PM  Total Treatment Time: 65 minutes    [facility]22  Handout on this week's wellness topic "Meditation" provided along with a discussion on what it means, the benefits, and suggestions for practice.  Reviewed last week's topic of Get Outdoors.    Subjective:   Patient reports for her wellness visit feeling achy in lower back along hip bones on both sides. Her back remains mostly still stiff.  Difficult to stand up straight.  Can't cook like she used to,  Pt has been trying to stand up straight to strengthen her neck and back. Health  reminds her to keep head back during visit.    Objective:   Cheryl completed therapeutic stretches (EIL, STARLA) and the following MedX exercise machines: core lumbar, torso rotation l/r, leg extension, leg curl, upright row, chest press, biceps curl, triceps extension, leg press    See exercise log in patient folder for rate of exertion and repetitions completed.       Fitness Machine Education Key:  E=education on equipment initiated and further follow up and education needed  I=independent with  and exercise.  The patient:   Adjusts machines to his/her settings   Uses equipment levers, pins, weights safely   Maintains safe and correct posture while exercising   Moves through exercise with correct pace and control   Gets on and " off equipment safely      Lumbar/Cervical Ext.  Torso Rotation  Leg Press    Leg Extension  Seated Leg Curl  Chest Press    Seated Row  Hip ADD  Hip ABD    Triceps Extension  Bicep Curl  Other:        Assessment:   Patient tolerated Patient tolerated MedX Core Lumbar Strength and all other peripheral exercises without an increase in symptoms. Patient warmed up on recumbent bike for 6 minutes, warm up set on lumbar machine, and iced low back for 10 minutes after the workout.    Plan:  Continue with established plan of care towards wellness goals.     Health  : Khalida Oneil  1/21/2022

## 2022-01-24 ENCOUNTER — DOCUMENTATION ONLY (OUTPATIENT)
Dept: REHABILITATION | Facility: HOSPITAL | Age: 77
End: 2022-01-24
Attending: NEUROLOGICAL SURGERY
Payer: MEDICARE

## 2022-01-24 NOTE — PROGRESS NOTES
"Health  Wellness Visit Note    Name: Cheryl Abreu  Clinic Number: [id number]  Physician: Ravindra Loera, *  Diagnosis: No diagnosis found.  Past Medical History:   Diagnosis Date    Arthritis     Diabetes mellitus, type 2     Hearing loss     High blood pressure     Hyperlipidemia     Osteoporosis     Scoliosis     Thyroid disease      Visit Number: 25  Precautions: Balance, shoulder ROM      1st PT visit:  8/26/2021  Year of care end date:  8/26/2022  6 Month test  Performed: May 2022  12 Month test performed: August 2022  Mind body plan: 7 month plan  Patient level:C    Time In: 1:05 PM  Time Out: 2:18 PM  Total Treatment Time: 73 minutes    [facility]22  Handout on this week's wellness topic "Kindness" provided along with a discussion on what it means, the benefits, and suggestions for practice.  Reviewed last week's topic of Meditation.    Subjective:   Patient reports for her wellness visit feeling slightly achy in lower back along hip bones on both sides.  She is walking taller.  She was sore 24-48 hours post wellness because she used her cervical and abdominal muscles when exercising on the machines. It is still difficult for pt to stand up straight. Health  reminds her to keep head back during visit.    Objective:   Cheryl completed therapeutic stretches (EIL, STARLA) and the following MedX exercise machines: core lumbar, torso rotation l/r, leg extension, leg curl, upright row, chest press, biceps curl, triceps extension, leg press    See exercise log in patient folder for rate of exertion and repetitions completed.       Fitness Machine Education Key:  E=education on equipment initiated and further follow up and education needed  I=independent with  and exercise.  The patient:   Adjusts machines to his/her settings   Uses equipment levers, pins, weights safely   Maintains safe and correct posture while exercising   Moves through exercise with correct " pace and control   Gets on and off equipment safely      Lumbar/Cervical Ext.  Torso Rotation  Leg Press    Leg Extension  Seated Leg Curl  Chest Press    Seated Row  Hip ADD  Hip ABD    Triceps Extension  Bicep Curl  Other:        Assessment:   Patient tolerated Patient tolerated MedX Core Lumbar Strength and all other peripheral exercises without an increase in symptoms. Patient warmed up on recumbent bike for 6 minutes, warm up set on lumbar machine, and iced low back for 10 minutes after the workout.    Plan:  Continue with established plan of care towards wellness goals.     Health  : Khalida Oneil  1/24/2022

## 2022-01-31 ENCOUNTER — DOCUMENTATION ONLY (OUTPATIENT)
Dept: REHABILITATION | Facility: HOSPITAL | Age: 77
End: 2022-01-31
Attending: NEUROLOGICAL SURGERY
Payer: MEDICARE

## 2022-01-31 NOTE — PROGRESS NOTES
"Health  Wellness Visit Note    Name: Cheryl Abreu  Clinic Number: [id number]  Physician: Ravnidra Loera, *  Diagnosis: No diagnosis found.  Past Medical History:   Diagnosis Date    Arthritis     Diabetes mellitus, type 2     Hearing loss     High blood pressure     Hyperlipidemia     Osteoporosis     Scoliosis     Thyroid disease      Visit Number: 25  Precautions: Balance, shoulder ROM      1st PT visit:  8/26/2021  Year of care end date:  8/26/2022  6 Month test  Performed: May 2022  12 Month test performed: August 2022  Mind body plan: 7 month plan  Patient level:C    Time In: 1:00 PM  Time Out: 2:05 PM  Total Treatment Time: 65 minutes    [facility]  Handout on this month's "MOVING" wellness topic with "Muscular Strengthening" as the weekly focus; provided along with a discussion on what it means, the benefits, and suggestions for practice.  Reviewed last week's topic of "Kindness".    Subjective:   Patient reports for her wellness visit feeling better than usual.  She can tell she is getting strong.  Pt is more aware of her posture, she is walking taller.  She did not experience soreness this past week.  Health  reminds her to keep head back during visit.    Objective:   Cheryl completed therapeutic stretches (EIL, STARLA) and the following MedX exercise machines: core lumbar, torso rotation l/r, leg extension, leg curl, upright row, chest press, biceps curl, triceps extension, leg press    See exercise log in patient folder for rate of exertion and repetitions completed.       Fitness Machine Education Key:  E=education on equipment initiated and further follow up and education needed  I=independent with  and exercise.  The patient:   Adjusts machines to his/her settings   Uses equipment levers, pins, weights safely   Maintains safe and correct posture while exercising   Moves through exercise with correct pace and control   Gets on and off equipment " safely      Lumbar/Cervical Ext.  Torso Rotation  Leg Press    Leg Extension  Seated Leg Curl  Chest Press    Seated Row  Hip ADD  Hip ABD    Triceps Extension  Bicep Curl  Other:        Assessment:   Patient tolerated Patient tolerated MedX Core Lumbar Strength and all other peripheral exercises without an increase in symptoms. Patient warmed up on recumbent elliptical bike for 5 minutes, warm up set on lumbar machine, and iced low back for 8 minutes after the workout.    Plan:  Continue with established plan of care towards wellness goals.     Health  : Khalida Oneil  1/31/2022

## 2022-02-07 ENCOUNTER — DOCUMENTATION ONLY (OUTPATIENT)
Dept: REHABILITATION | Facility: HOSPITAL | Age: 77
End: 2022-02-07
Attending: NEUROLOGICAL SURGERY
Payer: MEDICARE

## 2022-02-07 NOTE — PROGRESS NOTES
"Health  Wellness Visit Note    Name: Cheryl Abreu  Clinic Number: [id number]  Physician: Ravindra Loera, *  Diagnosis: No diagnosis found.  Past Medical History:   Diagnosis Date    Arthritis     Diabetes mellitus, type 2     Hearing loss     High blood pressure     Hyperlipidemia     Osteoporosis     Scoliosis     Thyroid disease      Visit Number: 27  Precautions: Balance, shoulder ROM      1st PT visit:  8/26/2021  Year of care end date:  8/26/2022  6 Month test  Performed: May 2022  12 Month test performed: August 2022  Mind body plan: 7 month plan  Patient level:C    Time In: 1:07 PM  Time Out: 2:00 PM  Total Treatment Time: 53 minutes    [facility]  Handout on wellness topic "Cardiovascular Exercise" was provided along with a discussion on what it means, the benefits, and suggestions for practice.  Reviewed last week's topic of "Muscular Strengthening".    Subjective:   Patient reports for her wellness visit feeling tired and a bit stressed with a lot going on around her house ([place]).  Pt is more aware of her posture, she is walking taller when she remembers.  She did not experience soreness this past week.  Health  reminds her to keep head back during visit.    Objective:   Cheryl completed therapeutic stretches (EIL, STARLA) and the following MedX exercise machines: core lumbar, torso rotation l/r, leg extension, leg curl, upright row, chest press, biceps curl, triceps extension, leg press    See exercise log in patient folder for rate of exertion and repetitions completed.       Fitness Machine Education Key:  E=education on equipment initiated and further follow up and education needed  I=independent with  and exercise.  The patient:   Adjusts machines to his/her settings   Uses equipment levers, pins, weights safely   Maintains safe and correct posture while exercising   Moves through exercise with correct pace and control   Gets on and off equipment " safely      Lumbar/Cervical Ext.  Torso Rotation  Leg Press    Leg Extension  Seated Leg Curl  Chest Press    Seated Row  Hip ADD  Hip ABD    Triceps Extension  Bicep Curl  Other:        Assessment:   Patient tolerated Patient tolerated MedX Core Lumbar Strength and all other peripheral exercises without an increase in symptoms. Patient warmed up on recumbent elliptical bike for 5 minutes, warm up set on lumbar machine, and iced low back for 8 minutes after the workout.    Plan:  Continue with established plan of care towards wellness goals.     Health  : Khalida Oneil  2/7/2022

## 2022-02-14 ENCOUNTER — DOCUMENTATION ONLY (OUTPATIENT)
Dept: REHABILITATION | Facility: HOSPITAL | Age: 77
End: 2022-02-14
Attending: NEUROLOGICAL SURGERY
Payer: MEDICARE

## 2022-02-14 NOTE — PROGRESS NOTES
"Health  Wellness Visit Note    Name: Cheryl Abreu  Clinic Number: [id number]  Physician: Ravindra Loera, *  Diagnosis: No diagnosis found.  Past Medical History:   Diagnosis Date    Arthritis     Diabetes mellitus, type 2     Hearing loss     High blood pressure     Hyperlipidemia     Osteoporosis     Scoliosis     Thyroid disease      Visit Number: 28   Precautions: Balance, shoulder ROM      1st PT visit:  8/26/2021  Year of care end date:  8/26/2022  6 Month test  Performed: May 2022  12 Month test performed: August 2022  Mind body plan: 7 month plan  Patient level:C    Time In: 1:07 PM  Time Out: 2:03 PM  Total Treatment Time: 55 minutes    [facility]  Handout on wellness topic "Flexibility" was provided along with a discussion on what it means, the benefits, and suggestions for practice.  Reviewed last week's topic of "Cardiovascular Exercise".    Subjective:   Patient reports for her wellness visit feeling tired and low energy today. Patient continues to feel a bit stressed with a lot going on around her house and other personal stuff.  Pt is walking taller when she remembers. Health  reminds her to keep head back during visit.    Objective:   Cheryl completed therapeutic stretches (EIL, STARLA) and the following MedX exercise machines: core lumbar, torso rotation l/r, leg extension, leg curl, upright row, chest press, biceps curl, triceps extension, leg press    See exercise log in patient folder for rate of exertion and repetitions completed.       Fitness Machine Education Key:  E=education on equipment initiated and further follow up and education needed  I=independent with  and exercise.  The patient:   Adjusts machines to his/her settings   Uses equipment levers, pins, weights safely   Maintains safe and correct posture while exercising   Moves through exercise with correct pace and control   Gets on and off equipment safely      Lumbar/Cervical Ext.  " Torso Rotation  Leg Press    Leg Extension  Seated Leg Curl  Chest Press    Seated Row  Hip ADD  Hip ABD    Triceps Extension  Bicep Curl  Other:        Assessment:   Patient tolerated Patient tolerated MedX Core Lumbar Strength and all other peripheral exercises without an increase in symptoms. Patient warmed up on recumbent elliptical bike for 5 minutes, warm up set on lumbar machine, and iced low back for 8 minutes after the workout.    Plan:  Continue with established plan of care towards wellness goals.     Health  : Khalida Oneil  2/14/2022

## 2022-02-21 ENCOUNTER — DOCUMENTATION ONLY (OUTPATIENT)
Dept: REHABILITATION | Facility: HOSPITAL | Age: 77
End: 2022-02-21
Attending: NEUROLOGICAL SURGERY
Payer: MEDICARE

## 2022-02-21 NOTE — PROGRESS NOTES
"Health  Wellness Visit Note    Name: Cheryl Abreu  Clinic Number: [id number]  Physician: Ravindra Loera, *  Diagnosis: No diagnosis found.  Past Medical History:   Diagnosis Date    Arthritis     Diabetes mellitus, type 2     Hearing loss     High blood pressure     Hyperlipidemia     Osteoporosis     Scoliosis     Thyroid disease      Visit Number: 29   Precautions: Balance, shoulder ROM      1st PT visit:  8/26/2021  Year of care end date:  8/26/2022  6 Month test  Performed: May 2022  12 Month test performed: August 2022  Mind body plan: 7 month plan  Patient level:C    Time In: 11:05 AM  Time Out: 12:13 PM  Total Treatment Time: 68 minutes    [facility]  Handout on wellness topic "Balance" was provided along with a discussion on what it means, the benefits, and suggestions for practice.  Reviewed last week's topic of "Flexibility".    Subjective:   Patient reports for her wellness visit feeling stronger.  She was able to do more housework over the past few days than she was able to in several months.   Patient continues to feel a bit stressed about dealing with home and financials since her 's death, but showing improvement and increased independence.  Pt is aware of reminding herself to walk taller. Health  reminds her to keep head back during visit.    Objective:   Cheryl completed therapeutic stretches (EIL, STARLA) and the following MedX exercise machines: core lumbar, torso rotation l/r, leg extension, leg curl, upright row, chest press, biceps curl, triceps extension, leg press    See exercise log in patient folder for rate of exertion and repetitions completed.       Fitness Machine Education Key:  E=education on equipment initiated and further follow up and education needed  I=independent with  and exercise.  The patient:   Adjusts machines to his/her settings   Uses equipment levers, pins, weights safely   Maintains safe and correct posture while " exercising   Moves through exercise with correct pace and control   Gets on and off equipment safely      Lumbar/Cervical Ext.  Torso Rotation  Leg Press    Leg Extension  Seated Leg Curl  Chest Press    Seated Row  Hip ADD  Hip ABD    Triceps Extension  Bicep Curl  Other:        Assessment:   Patient tolerated Patient tolerated MedX Core Lumbar Strength and all other peripheral exercises without an increase in symptoms. Patient warmed up on recumbent elliptical bike for 5 minutes, warm up set on lumbar machine, and iced low back for 8 minutes after the workout.    Plan:  Continue with established plan of care towards wellness goals.     Health  : Khalida Oneil  2/21/2022

## 2022-02-28 ENCOUNTER — DOCUMENTATION ONLY (OUTPATIENT)
Dept: REHABILITATION | Facility: HOSPITAL | Age: 77
End: 2022-02-28
Attending: NEUROLOGICAL SURGERY
Payer: MEDICARE

## 2022-02-28 NOTE — PROGRESS NOTES
"Health  Wellness Visit Note    Name: Cheryl Abreu  Clinic Number: [id number]  Physician: Ravindra Loera, *  Diagnosis: No diagnosis found.  Past Medical History:   Diagnosis Date    Arthritis     Diabetes mellitus, type 2     Hearing loss     High blood pressure     Hyperlipidemia     Osteoporosis     Scoliosis     Thyroid disease      Visit Number: 30   Precautions: Balance, shoulder ROM      1st PT visit:  8/26/2021  Year of care end date:  8/26/2022  6 Month test  Performed: May 2022  12 Month test performed: August 2022  Mind body plan: 7 month plan  Patient level:C    Time In: 1:05 PM  Time Out: 1:59 PM  Total Treatment Time: 54 minutes    [facility]  Handout on wellness topic NA was provided along with a discussion on what it means, the benefits, and suggestions for practice.  Reviewed last week's topic of "Balance".    Subjective:   Patient reports for her wellness visit continuing to feel stronger.  She was able to walk more without feeling back lethargic.  Also noticed her ability to twist better.  Pt is reminding herself to walk taller. Health  reminds her to keep head back during visit.    Objective:   Cheryl completed therapeutic stretches (EIL, STARLA) and the following MedX exercise machines: core lumbar, torso rotation l/r, leg extension, leg curl, upright row, chest press, biceps curl, triceps extension, leg press    See exercise log in patient folder for rate of exertion and repetitions completed.       Fitness Machine Education Key:  E=education on equipment initiated and further follow up and education needed  I=independent with  and exercise.  The patient:   Adjusts machines to his/her settings   Uses equipment levers, pins, weights safely   Maintains safe and correct posture while exercising   Moves through exercise with correct pace and control   Gets on and off equipment safely      Lumbar/Cervical Ext.  Torso Rotation  Leg Press    Leg " Extension  Seated Leg Curl  Chest Press    Seated Row  Hip ADD  Hip ABD    Triceps Extension  Bicep Curl  Other:        Assessment:   Patient tolerated Patient tolerated MedX Core Lumbar Strength and all other peripheral exercises without an increase in symptoms. Patient warmed up on recumbent elliptical bike for 5 minutes, warm up set on lumbar machine, and iced low back for 8 minutes after the workout.    Plan:  Continue with established plan of care towards wellness goals.     Health  : Khalida Oneil  2/28/2022

## 2022-03-07 ENCOUNTER — DOCUMENTATION ONLY (OUTPATIENT)
Dept: REHABILITATION | Facility: HOSPITAL | Age: 77
End: 2022-03-07
Attending: NEUROLOGICAL SURGERY
Payer: MEDICARE

## 2022-03-07 NOTE — PROGRESS NOTES
"Health  Wellness Visit Note    Name: Cheryl Abreu  Clinic Number: [id number]  Physician: Ravindra Loera, *  Diagnosis: No diagnosis found.  Past Medical History:   Diagnosis Date    Arthritis     Diabetes mellitus, type 2     Hearing loss     High blood pressure     Hyperlipidemia     Osteoporosis     Scoliosis     Thyroid disease      Visit Number: 31   Precautions: Balance, shoulder ROM      1st PT visit:  8/26/2021  Year of care end date:  8/26/2022  6 Month test  Performed: May 2022  12 Month test performed: August 2022  Mind body plan: 7 month plan  Patient level:C    Time In: 1:05 PM  Time Out: 2:05 PM  Total Treatment Time: 60 minutes    [facility]  Handout on wellness topic "Hydration" was provided along with a discussion on what it means, the benefits, and suggestions for practice.  Reviewed last week's topic of JACKELINE Morales.    Subjective:   Patient reported for her wellness visit feeling stronger.  She was able to walk faster and longer before feeling back lethargic. Pt is reminding herself to walk taller. Health  reminds her to keep head back during visit.    Objective:   Cheryl completed therapeutic stretches (EIL, STARLA) and the following MedX exercise machines: core lumbar, torso rotation l/r, leg extension, leg curl, upright row, chest press, biceps curl, triceps extension, leg press    See exercise log in patient folder for rate of exertion and repetitions completed.       Fitness Machine Education Key:  E=education on equipment initiated and further follow up and education needed  I=independent with  and exercise.  The patient:   Adjusts machines to his/her settings   Uses equipment levers, pins, weights safely   Maintains safe and correct posture while exercising   Moves through exercise with correct pace and control   Gets on and off equipment safely      Lumbar/Cervical Ext. E Torso Rotation  Leg Press    Leg Extension  Seated Leg Curl  " Chest Press    Seated Row E Hip ADD E Hip ABD E   Triceps Extension  Bicep Curl  Other:        Assessment:   Patient tolerated Patient tolerated MedX Core Lumbar Strength and all other peripheral exercises without an increase in symptoms. Patient warmed up on recumbent elliptical bike for 5 minutes, warm up set on lumbar machine, and iced low back for 8 minutes after the workout.    Plan:  Continue with established plan of care towards wellness goals.     Health  : Khalida Oneil  3/7/2022

## 2022-03-14 ENCOUNTER — DOCUMENTATION ONLY (OUTPATIENT)
Dept: REHABILITATION | Facility: HOSPITAL | Age: 77
End: 2022-03-14
Attending: NEUROLOGICAL SURGERY
Payer: MEDICARE

## 2022-03-14 NOTE — PROGRESS NOTES
"Health  Wellness Visit Note    Name: Cheryl Abreu  Clinic Number: [id number]  Physician: Ravindra Loera, *  Diagnosis: No diagnosis found.  Past Medical History:   Diagnosis Date    Arthritis     Diabetes mellitus, type 2     Hearing loss     High blood pressure     Hyperlipidemia     Osteoporosis     Scoliosis     Thyroid disease      Visit Number: 32   Precautions: Balance, shoulder ROM      1st PT visit:  8/26/2021  Year of care end date:  8/26/2022  6 Month test  Performed: May 2022  12 Month test performed: August 2022  Mind body plan: 7 month plan  Patient level:C    Time In: 1:08 PM  Time Out: 2:10 PM  Total Treatment Time: 62 minutes    [facility]  Handout on wellness topic "Hydration" was provided along with a discussion on what it means, the benefits, and suggestions for practice.  Reviewed last week's topic of JACKELINE Morales.    Subjective:   Patient reported for her wellness visit with no notable concerns.  She did fall a couple of times over the past couple of weeks.  She did not sustain significant injury but had some bruising.  She is reminding herself to walk taller. Health  reminds her to keep head back during visit.    Objective:   Cheryl completed therapeutic stretches (EIL, STARLA) and the following MedX exercise machines: core lumbar, torso rotation l/r, leg extension, leg curl, upright row, chest press, biceps curl, triceps extension, leg press    See exercise log in patient folder for rate of exertion and repetitions completed.       Fitness Machine Education Key:  E=education on equipment initiated and further follow up and education needed  I=independent with  and exercise.  The patient:   Adjusts machines to his/her settings   Uses equipment levers, pins, weights safely   Maintains safe and correct posture while exercising   Moves through exercise with correct pace and control   Gets on and off equipment safely      Lumbar/Cervical Ext. E " Torso Rotation  Leg Press    Leg Extension  Seated Leg Curl  Chest Press    Seated Row E Hip ADD E Hip ABD E   Triceps Extension  Bicep Curl  Other:        Assessment:   Patient tolerated Patient tolerated MedX Core Lumbar Strength and all other peripheral exercises without an increase in symptoms. Patient warmed up on recumbent bike for 8 minutes, warm up set on lumbar machine, and iced low back for 8 minutes after the workout.    Plan:  Continue with established plan of care towards wellness goals.     Health  : Khalida Oneil  3/14/2022

## 2022-03-21 ENCOUNTER — DOCUMENTATION ONLY (OUTPATIENT)
Dept: REHABILITATION | Facility: HOSPITAL | Age: 77
End: 2022-03-21
Attending: NEUROLOGICAL SURGERY
Payer: MEDICARE

## 2022-03-21 NOTE — PROGRESS NOTES
"Health  Wellness Visit Note    Name: Cheryl bAreu  Clinic Number: [id number]  Physician: Ravindra Loera, *  Diagnosis: No diagnosis found.  Past Medical History:   Diagnosis Date    Arthritis     Diabetes mellitus, type 2     Hearing loss     High blood pressure     Hyperlipidemia     Osteoporosis     Scoliosis     Thyroid disease      Visit Number: 33   Precautions: Balance, shoulder ROM      1st PT visit:  8/26/2021  Year of care end date:  8/26/2022  6 Month test  Performed: May 2022  12 Month test performed: August 2022  Mind body plan: 7 month plan  Patient level:C    Time In: 1:05 PM  Time Out: 2:08 PM  Total Treatment Time: 63 minutes    [facility]  Handout on wellness topic "Anti-Inflammatory Diet" was provided along with a discussion on what it means, the benefits, and suggestions for practice.  Reviewed last week's topic of Portion Control.    Subjective:   Patient reports for wellness with no notable physical concerns.  She has been mindful of her posture by standing and walking taller. She can feel that she is stronger. The health  reminds her to keep head back during exercises.    Objective:   Cheryl completed therapeutic stretches (EIL, STARLA) and the following MedX exercise machines: core lumbar, torso rotation l/r, leg extension, leg curl, upright row, chest press, biceps curl, triceps extension, leg press    See exercise log in patient folder for rate of exertion and repetitions completed.       Fitness Machine Education Key:  E=education on equipment initiated and further follow up and education needed  I=independent with  and exercise.  The patient:   Adjusts machines to his/her settings   Uses equipment levers, pins, weights safely   Maintains safe and correct posture while exercising   Moves through exercise with correct pace and control   Gets on and off equipment safely      Lumbar/Cervical Ext. E Torso Rotation  Leg Press    Leg Extension  " Seated Leg Curl  Chest Press    Seated Row E Hip ADD E Hip ABD E   Triceps Extension  Bicep Curl  Other:        Assessment:   Patient tolerated Patient tolerated MedX Core Lumbar Strength and all other peripheral exercises without an increase in symptoms. Patient warmed up on recumbent bike for 8 minutes, warm up set on lumbar machine, and iced low back for 8 minutes after the workout.    Plan:  Continue with established plan of care towards wellness goals.     Health  : Khalida Oneil  3/21/2022

## 2022-03-28 ENCOUNTER — DOCUMENTATION ONLY (OUTPATIENT)
Dept: REHABILITATION | Facility: HOSPITAL | Age: 77
End: 2022-03-28
Attending: NEUROLOGICAL SURGERY
Payer: MEDICARE

## 2022-03-28 NOTE — PROGRESS NOTES
"Health  Wellness Visit Note    Name: Cheryl Abreu  Clinic Number: [id number]  Physician: Ravindra Loera, *  Diagnosis: No diagnosis found.  Past Medical History:   Diagnosis Date    Arthritis     Diabetes mellitus, type 2     Hearing loss     High blood pressure     Hyperlipidemia     Osteoporosis     Scoliosis     Thyroid disease      Visit Number: 34   Precautions: Balance, shoulder ROM      1st PT visit:  8/26/2021  Year of care end date:  8/26/2022  6 Month test  Performed: May 2022  12 Month test performed: August 2022  Mind body plan: 7 month plan  Patient level:C    Time In: 1:10 PM  Time Out: 2:16 PM  Total Treatment Time: 66 minutes    [facility]  Handout on wellness topic "Fruits and Vegetables" was provided along with a discussion on what it means, the benefits, and suggestions for practice.  Reviewed last week's topic of Anti-Inflammatory Diet.    Subjective:   Patient reports for wellness a bit more lethargic.  She had a relaxed weekend.  HC encouraged pt to try to get more physical activity during the week to keep her strong and improve sleep. The health  reminds her to keep head back during exercises.    Objective:   Cheryl completed therapeutic stretches (EIL, STARLA) and the following MedX exercise machines: core lumbar, torso rotation l/r, leg extension, leg curl, upright row, chest press, biceps curl, triceps extension, leg press    See exercise log in patient folder for rate of exertion and repetitions completed.       Fitness Machine Education Key:  E=education on equipment initiated and further follow up and education needed  I=independent with  and exercise.  The patient:   Adjusts machines to his/her settings   Uses equipment levers, pins, weights safely   Maintains safe and correct posture while exercising   Moves through exercise with correct pace and control   Gets on and off equipment safely      Lumbar/Cervical Ext. E Torso Rotation  " Leg Press    Leg Extension  Seated Leg Curl  Chest Press    Seated Row E Hip ADD E Hip ABD E   Triceps Extension  Bicep Curl  Other:        Assessment:   Patient tolerated Patient tolerated MedX Core Lumbar Strength and all other peripheral exercises without an increase in symptoms. Patient warmed up on recumbent bike for 6 minutes, warm up set on lumbar machine, and iced low back for 8 minutes after the workout.    Plan:  Continue with established plan of care towards wellness goals.     Health  : Khalida Oneil  3/28/2022

## 2022-04-05 ENCOUNTER — DOCUMENTATION ONLY (OUTPATIENT)
Dept: REHABILITATION | Facility: HOSPITAL | Age: 77
End: 2022-04-05
Attending: NEUROLOGICAL SURGERY
Payer: MEDICARE

## 2022-04-05 NOTE — PROGRESS NOTES
"Health  Wellness Visit Note    Name: Cheryl Abreu  Clinic Number: [id number]  Physician: Ravindra Loera, *  Diagnosis: No diagnosis found.  Past Medical History:   Diagnosis Date    Arthritis     Diabetes mellitus, type 2     Hearing loss     High blood pressure     Hyperlipidemia     Osteoporosis     Scoliosis     Thyroid disease      Visit Number: 34   Precautions: Balance, shoulder ROM      1st PT visit:  8/26/2021  Year of care end date:  8/26/2022  6 Month test  Performed: May 2022  12 Month test performed: August 2022  Mind body plan: 7 month plan  Patient level:C    Time In: 3:05 PM  Time Out: 4:15 PM  Total Treatment Time: 70 minutes    [facility]  Handout on wellness topic "Postural Awareness" was provided along with a discussion on what it means, the benefits, and suggestions for practice.  Reviewed last week's topic of Fruits and Vegetables.    Subjective:   Patient reports for wellness a bit more lethargic; like last week.  Her leg strength feels weaker over the past few days.  She has not been doing HEP or strengthening except at Wellness session. HC encouraged pt to try to get more physical activity during the week to keep her strong and improve sleep. The health  reminds her to keep head back during exercises.    Will practice resistance band exercises.    Objective:   Cheryl completed therapeutic stretches (EIL, STARLA) and the following MedX exercise machines: core lumbar, torso rotation l/r, leg extension, leg curl, upright row, chest press, biceps curl, triceps extension, leg press    See exercise log in patient folder for rate of exertion and repetitions completed.       Fitness Machine Education Key:  E=education on equipment initiated and further follow up and education needed  I=independent with  and exercise.  The patient:   Adjusts machines to his/her settings   Uses equipment levers, pins, weights safely   Maintains safe and correct posture " while exercising   Moves through exercise with correct pace and control   Gets on and off equipment safely      Lumbar/Cervical Ext. E Torso Rotation  Leg Press    Leg Extension  Seated Leg Curl E Chest Press    Seated Row E Hip ADD E Hip ABD E   Triceps Extension E Bicep Curl E Other:        Assessment:   Patient tolerated Patient tolerated MedX Core Lumbar Strength and all other peripheral exercises without an increase in symptoms. Patient warmed up on recumbent bike for 6 minutes, warm up set on lumbar machine, and iced low back for 8 minutes after the workout.    Plan:  Continue with established plan of care towards wellness goals.     Health  : Khalida Oneil  4/5/2022

## 2022-04-12 ENCOUNTER — DOCUMENTATION ONLY (OUTPATIENT)
Dept: REHABILITATION | Facility: HOSPITAL | Age: 77
End: 2022-04-12
Attending: NEUROLOGICAL SURGERY
Payer: MEDICARE

## 2022-04-12 NOTE — PROGRESS NOTES
"Health  Wellness Visit Note    Name: Cheryl Abreu  Clinic Number: [id number]  Physician: Ravindra Loera, *  Diagnosis: No diagnosis found.  Past Medical History:   Diagnosis Date    Arthritis     Diabetes mellitus, type 2     Hearing loss     High blood pressure     Hyperlipidemia     Osteoporosis     Scoliosis     Thyroid disease      Visit Number: 36   Precautions: Balance, shoulder ROM      1st PT visit:  2021  Year of care end date:  2022  6 Month test  Performed: May 2022  12 Month test performed: 2022  Mind body plan: 7 month plan  Patient level:C    Time In: 3:05 PM  Time Out: 4:10 PM  Total Treatment Time: 65 minutes    [facility]  Handout on wellness topic "Breathing Awareness" was provided along with a discussion on what it means, the benefits, and suggestions for practice.  Reviewed last week's topic of Postural Awareness.    Subjective:   Patient reports for wellness with no pain in back.  Her shoulder does still have limited range of motion. She has not been doing HEP or strengthening except at Wellness session. She had a busy week for her 's  with family staying with her all week.HC encouraged pt to try to get more physical activity during the week to keep her strong and improve sleep. The health  reminds her to keep head back during exercises.    Will practice resistance band exercises.    Objective:   Cheryl completed therapeutic stretches (EIL, STARLA) and the following MedX exercise machines: core lumbar, torso rotation l/r, leg extension, leg curl, upright row, chest press, biceps curl, triceps extension, leg press    See exercise log in patient folder for rate of exertion and repetitions completed.       Fitness Machine Education Key:  E=education on equipment initiated and further follow up and education needed  I=independent with  and exercise.  The patient:   Adjusts machines to his/her settings   Uses equipment " levers, pins, weights safely   Maintains safe and correct posture while exercising   Moves through exercise with correct pace and control   Gets on and off equipment safely      Lumbar/Cervical Ext. E Torso Rotation  Leg Press    Leg Extension E Seated Leg Curl E Chest Press    Seated Row E Hip ADD E Hip ABD E   Triceps Extension E Bicep Curl E Other:        Assessment:   Patient tolerated Patient tolerated MedX Core Lumbar Strength and all other peripheral exercises without an increase in symptoms. Patient warmed up on elliptical recumbent bike for 6 minutes, warm up set on lumbar machine, and iced low back for 8 minutes after the workout.    Plan:  Continue with established plan of care towards wellness goals.     Health  : Khalida Oneil  4/12/2022

## 2022-04-18 ENCOUNTER — DOCUMENTATION ONLY (OUTPATIENT)
Dept: REHABILITATION | Facility: HOSPITAL | Age: 77
End: 2022-04-18
Attending: NEUROLOGICAL SURGERY
Payer: MEDICARE

## 2022-04-18 NOTE — PROGRESS NOTES
"Health  Wellness Visit Note    Name: Cheryl Abreu  Clinic Number: [id number]  Physician: Ravindra Loera, *  Diagnosis: No diagnosis found.  Past Medical History:   Diagnosis Date    Arthritis     Diabetes mellitus, type 2     Hearing loss     High blood pressure     Hyperlipidemia     Osteoporosis     Scoliosis     Thyroid disease      Visit Number: 37   Precautions: Balance, shoulder ROM      1st PT visit:  8/26/2021  Year of care end date:  8/26/2022  6 Month test  Performed: May 2022  12 Month test performed: August 2022  Mind body plan: 7 month plan  Patient level:C    Time In: 1:05 PM  Time Out: 2:10 PM  Total Treatment Time: 65 minutes    [facility]  Handout on wellness topic "Air Quality and Oxygen Utilization" was provided along with a discussion on what it means, the benefits, and suggestions for practice.  Reviewed last week's topic of Breathing Awareness.    Subjective:   Patient reports for wellness with no pain in back, but her shoulder does still have limited range of motion with some pain. She has not been doing HEP or strengthening except at Wellness session.  began teaching Cheryl some at-home exercises with the elastic band.    The health  reminds her to keep head back during exercises.  Plans to practice resistance band exercises.    Objective:   Cheryl completed therapeutic stretches (EIL, STARLA) and the following MedX exercise machines: core lumbar, torso rotation l/r, leg extension, leg curl, upright row, chest press, biceps curl, triceps extension, leg press    See exercise log in patient folder for rate of exertion and repetitions completed.       Fitness Machine Education Key:  E=education on equipment initiated and further follow up and education needed  I=independent with  and exercise.  The patient:   Adjusts machines to his/her settings   Uses equipment levers, pins, weights safely   Maintains safe and correct posture while " exercising   Moves through exercise with correct pace and control   Gets on and off equipment safely      Lumbar/Cervical Ext. E Torso Rotation  Leg Press    Leg Extension E Seated Leg Curl E Chest Press    Seated Row E Hip ADD E Hip ABD E   Triceps Extension E Bicep Curl E Other:        Assessment:   Patient tolerated Patient tolerated MedX Core Lumbar Strength and all other peripheral exercises without an increase in symptoms. Patient warmed up on recumbent bike for 7 minutes, skip warm up, and iced low back for 8 minutes after the workout.    Plan:  Continue with established plan of care towards wellness goals.     Health  : Khalida Oneil  4/18/2022

## 2022-05-10 ENCOUNTER — DOCUMENTATION ONLY (OUTPATIENT)
Dept: REHABILITATION | Facility: HOSPITAL | Age: 77
End: 2022-05-10
Attending: NEUROLOGICAL SURGERY
Payer: MEDICARE

## 2022-05-10 NOTE — PROGRESS NOTES
Health  Wellness Visit Note    Name: Cheryl Abreu  Clinic Number: [id number]  Physician: Ravindra Loera, *  Diagnosis: No diagnosis found.  Past Medical History:   Diagnosis Date    Arthritis     Diabetes mellitus, type 2     Hearing loss     High blood pressure     Hyperlipidemia     Osteoporosis     Scoliosis     Thyroid disease      Visit Number: 38   Precautions: Balance, shoulder ROM      1st PT visit:  8/26/2021  Year of care end date:  8/26/2022  6 Month test  Performed: May 2022  12 Month test performed: August 2022  Mind body plan: 7 month plan  Patient level:C    Time In: 3:10 PM  Time Out: 4:15 PM  Total Treatment Time: 65 minutes    [facility]  Handout on wellness topic Learning was provided along with a discussion on what it means, the benefits, and suggestions for practice.  Reviewed last week's topic of N/A-missed past 2 weeks.    Subjective:   Patient reports for wellness with no pain in back.  Her sciatica is flared up today.  Her shoulder does have limited range of motion with some pain. She has not been doing HEP or strengthening at home.  began teaching Cheryl some at-home exercises with the elastic band.    The health  reminds her to keep head back during exercises.  Plans to practice resistance band exercises. Choose time of day that works best.    Objective:   Cheryl completed therapeutic stretches (EIL, STARLA) and the following MedX exercise machines: core lumbar, torso rotation l/r, leg extension, leg curl, upright row, chest press, biceps curl, triceps extension, leg press    See exercise log in patient folder for rate of exertion and repetitions completed.       Fitness Machine Education Key:  E=education on equipment initiated and further follow up and education needed  I=independent with  and exercise.  The patient:   Adjusts machines to his/her settings   Uses equipment levers, pins, weights safely   Maintains safe and correct posture  while exercising   Moves through exercise with correct pace and control   Gets on and off equipment safely      Lumbar/Cervical Ext. E Torso Rotation  Leg Press    Leg Extension E Seated Leg Curl E Chest Press    Seated Row E Hip ADD E Hip ABD E   Triceps Extension E Bicep Curl E Other:        Assessment:   Patient tolerated Patient tolerated MedX Core Lumbar Strength and all other peripheral exercises without an increase in symptoms. Patient warmed up on recumbent bike for 5 minutes, stretches, and iced low back for 6 minutes after the workout.    Plan:  Continue with established plan of care towards wellness goals.     Health  : Khalida Oneil  5/10/2022

## 2022-05-17 ENCOUNTER — DOCUMENTATION ONLY (OUTPATIENT)
Dept: REHABILITATION | Facility: HOSPITAL | Age: 77
End: 2022-05-17
Attending: NEUROLOGICAL SURGERY
Payer: MEDICARE

## 2022-05-17 NOTE — PROGRESS NOTES
Health  Wellness Visit Note    Name: Cheryl Abreu  Clinic Number: [id number]  Physician: Ravindra Loera, *  Diagnosis: No diagnosis found.  Past Medical History:   Diagnosis Date    Arthritis     Diabetes mellitus, type 2     Hearing loss     High blood pressure     Hyperlipidemia     Osteoporosis     Scoliosis     Thyroid disease      Visit Number: 39  Precautions: Balance, shoulder ROM      1st PT visit:  8/26/2021  Year of care end date:  8/26/2022  6 Month test  Performed: May 2022  12 Month test performed: August 2022  Mind body plan: 7 month plan  Patient level:C    Time In: 3:10 PM  Time Out: 4:20 PM  Total Treatment Time: 65 minutes    [facility]  Handout on wellness topic Positive Thinking was provided along with a discussion on what it means, the benefits, and suggestions for practice.  Reviewed last week's topic of learning.    Subjective:   Patient reports for wellness with mild pain in back.  Her sciatica feels better compared to last session.  Her shoulder continues to have limited range of motion with some pain. She has not been doing HEP or strengthening at home.   The health  reminds her to keep head back during exercises.  Plans to practice resistance band exercises. Choose time of day that works best.    Objective:   Cheryl completed therapeutic stretches (EIL, STARLA) and the following MedX exercise machines: core lumbar, torso rotation l/r, leg extension, leg curl, upright row, chest press, biceps curl, triceps extension, leg press    See exercise log in patient folder for rate of exertion and repetitions completed.       Fitness Machine Education Key:  E=education on equipment initiated and further follow up and education needed  I=independent with  and exercise.  The patient:   Adjusts machines to his/her settings   Uses equipment levers, pins, weights safely   Maintains safe and correct posture while exercising   Moves through exercise with  correct pace and control   Gets on and off equipment safely      Lumbar/Cervical Ext. E Torso Rotation  Leg Press    Leg Extension E Seated Leg Curl E Chest Press    Seated Row E Hip ADD E Hip ABD E   Triceps Extension E Bicep Curl E Other:        Assessment:   Patient tolerated Patient tolerated MedX Core Lumbar Strength and all other peripheral exercises without an increase in symptoms. Patient warmed up on recumbent bike for 5 minutes, stretches, and iced low back for 6 minutes after the workout.    Plan:  Continue with established plan of care towards wellness goals.     Health  : Khalida Oneil  5/17/2022

## 2022-05-24 ENCOUNTER — DOCUMENTATION ONLY (OUTPATIENT)
Dept: REHABILITATION | Facility: HOSPITAL | Age: 77
End: 2022-05-24
Attending: NEUROLOGICAL SURGERY
Payer: MEDICARE

## 2022-05-24 NOTE — PROGRESS NOTES
Health  Wellness Visit Note    Name: Cheryl Abreu  Clinic Number: [id number]  Physician: Ravindra Loera, *  Diagnosis: No diagnosis found.  Past Medical History:   Diagnosis Date    Arthritis     Diabetes mellitus, type 2     Hearing loss     High blood pressure     Hyperlipidemia     Osteoporosis     Scoliosis     Thyroid disease      Visit Number: 40  Precautions: Balance, shoulder ROM      1st PT visit:  8/26/2021  Year of care end date:  8/26/2022  6 Month test  Performed: May 2022  12 Month test performed: August 2022  Mind body plan: 7 month plan  Patient level:C    Time In: 3:06 PM  Time Out: 4:11 PM  Total Treatment Time: 65 minutes    [facility]  Handout on wellness topic Memory was provided along with a discussion on what it means, the benefits, and suggestions for practice.  Reviewed last week's topic of Positive Thinking.    Subjective:   Patient reports for wellness with minimal/mild pain in back. Pt did not notice symptoms of sciatica except for the stretches. It feels better compared to last session. She will continue to try exercising more at home.    Objective:   Cheryl completed therapeutic stretches (EIL, STARLA) and the following MedX exercise machines: core lumbar, torso rotation l/r, leg extension, leg curl, upright row, chest press, biceps curl, triceps extension, leg press    See exercise log in patient folder for rate of exertion and repetitions completed.       Fitness Machine Education Key:  E=education on equipment initiated and further follow up and education needed  I=independent with  and exercise.  The patient:   Adjusts machines to his/her settings   Uses equipment levers, pins, weights safely   Maintains safe and correct posture while exercising   Moves through exercise with correct pace and control   Gets on and off equipment safely      Lumbar/Cervical Ext. E Torso Rotation  Leg Press    Leg Extension E Seated Leg Curl E Chest Press     Seated Row E Hip ADD E Hip ABD E   Triceps Extension E Bicep Curl E Other:        Assessment:   Patient tolerated Patient tolerated MedX Core Lumbar Strength and all other peripheral exercises without an increase in symptoms. Patient warmed up on recumbent bike for 7 minutes, stretches, and iced low back for 7 minutes after the workout.    Plan:  Continue with established plan of care towards wellness goals.     Health  : Khalida Oneil  5/24/2022

## 2022-05-31 ENCOUNTER — DOCUMENTATION ONLY (OUTPATIENT)
Dept: REHABILITATION | Facility: HOSPITAL | Age: 77
End: 2022-05-31
Attending: NEUROLOGICAL SURGERY
Payer: MEDICARE

## 2022-05-31 NOTE — PROGRESS NOTES
Health  Wellness Visit Note    Name: Cheryl Abreu  Clinic Number: [id number]  Physician: Ravindra Loera, *  Diagnosis: No diagnosis found.  Past Medical History:   Diagnosis Date    Arthritis     Diabetes mellitus, type 2     Hearing loss     High blood pressure     Hyperlipidemia     Osteoporosis     Scoliosis     Thyroid disease      Visit Number: 41  Precautions: Balance, shoulder ROM      1st PT visit:  8/26/2021  Year of care end date:  8/26/2022  6 Month test  Performed: May 2022  12 Month test performed: August 2022  Mind body plan: 7 month plan  Patient level:C    Time In: 3:08 PM  Time Out: 4:11 PM  Total Treatment Time: 63 minutes    [facility]  Handout on wellness topic Mindfulness was provided along with a discussion on what it means, the benefits, and suggestions for practice.  Reviewed last week's topic of Memory.    Subjective:   Patient reports for wellness with minimal/mild pain in back. Pt did  notice her shoulder flare up a bit this week. It feels better compared to last session. She will continue to try exercising more at home.    Objective:   Cheryl completed therapeutic stretches (EIL, STARLA) and the following MedX exercise machines: core lumbar, torso rotation l/r, leg extension, leg curl, upright row, chest press, biceps curl, triceps extension, leg press    See exercise log in patient folder for rate of exertion and repetitions completed.       Fitness Machine Education Key:  E=education on equipment initiated and further follow up and education needed  I=independent with  and exercise.  The patient:   Adjusts machines to his/her settings   Uses equipment levers, pins, weights safely   Maintains safe and correct posture while exercising   Moves through exercise with correct pace and control   Gets on and off equipment safely      Lumbar/Cervical Ext. E Torso Rotation  Leg Press    Leg Extension E Seated Leg Curl E Chest Press    Seated Row E  Hip ADD E Hip ABD E   Triceps Extension E Bicep Curl E Other:        Assessment:   Patient tolerated Patient tolerated MedX Core Lumbar Strength and all other peripheral exercises without an increase in symptoms. Patient warmed up on recumbent bike for 7 minutes, stretches, and iced low back for 7 minutes after the workout.    Plan:  Continue with established plan of care towards wellness goals.     Health  : Khalida Oneil  5/31/2022

## 2022-06-07 ENCOUNTER — DOCUMENTATION ONLY (OUTPATIENT)
Dept: REHABILITATION | Facility: HOSPITAL | Age: 77
End: 2022-06-07
Attending: NEUROLOGICAL SURGERY
Payer: MEDICARE

## 2022-06-07 NOTE — PROGRESS NOTES
Health  Wellness Visit Note    Name: Cheryl Abreu  Clinic Number: [id number]  Physician: Ravindra Loera, *  Diagnosis: No diagnosis found.  Past Medical History:   Diagnosis Date    Arthritis     Diabetes mellitus, type 2     Hearing loss     High blood pressure     Hyperlipidemia     Osteoporosis     Scoliosis     Thyroid disease      Visit Number: 42  Precautions: Balance, shoulder ROM      1st PT visit:  8/26/2021  Year of care end date:  8/26/2022  6 Month test  Performed: May 2022  12 Month test performed: August 2022  Mind body plan: 7 month plan  Patient level:C    Time In: 3:05 PM  Time Out: 4:05 PM  Total Treatment Time: 60 minutes    [facility]  Handout on wellness topic Guilt vs Shame was provided along with a discussion on what it means, the benefits, and suggestions for practice.  Reviewed last week's topic of Mindfulness.    Subjective:   Patient reports for wellness with no pain in her back or neck. Back begins to hurt with walking or standing for periods of time. Her shoulder continues to be a bit sore this week. It feels better compared to last session. She will continue to try exercising more at home.    Objective:   Cheryl completed therapeutic stretches (EIL, STARLA) and the following MedX exercise machines: core lumbar, torso rotation l/r, leg extension, leg curl, upright row, chest press, biceps curl, triceps extension, leg press    See exercise log in patient folder for rate of exertion and repetitions completed.       Fitness Machine Education Key:  E=education on equipment initiated and further follow up and education needed  I=independent with  and exercise.  The patient:   Adjusts machines to his/her settings   Uses equipment levers, pins, weights safely   Maintains safe and correct posture while exercising   Moves through exercise with correct pace and control   Gets on and off equipment safely      Lumbar/Cervical Ext. E Torso Rotation   Leg Press    Leg Extension E Seated Leg Curl E Chest Press    Seated Row E Hip ADD E Hip ABD E   Triceps Extension E Bicep Curl E Other:        Assessment:   Patient tolerated Patient tolerated MedX Core Lumbar Strength and all other peripheral exercises without an increase in symptoms. Patient warmed up on recumbent bike for 7 minutes, stretches, and iced low back for 7 minutes after the workout.    Plan:  Continue with established plan of care towards wellness goals.     Health  : Khalida Oneil  6/7/2022

## 2022-06-13 ENCOUNTER — DOCUMENTATION ONLY (OUTPATIENT)
Dept: REHABILITATION | Facility: HOSPITAL | Age: 77
End: 2022-06-13
Attending: NEUROLOGICAL SURGERY
Payer: MEDICARE

## 2022-06-13 NOTE — PROGRESS NOTES
Health  Wellness Visit Note    Name: Cheryl Abreu  Clinic Number: [id number]  Physician: Ravindra Loera, *  Diagnosis: No diagnosis found.  Past Medical History:   Diagnosis Date    Arthritis     Diabetes mellitus, type 2     Hearing loss     High blood pressure     Hyperlipidemia     Osteoporosis     Scoliosis     Thyroid disease      Visit Number: 43  Precautions: Balance, shoulder ROM      1st PT visit:  8/26/2021  Year of care end date:  8/26/2022  6 Month test  Performed: May 2022  12 Month test performed: August 2022  Mind body plan: 7 month plan  Patient level:C    Time In: 11:05 AM  Time Out: 12:05 PM  Total Treatment Time: 60 minutes    [facility]  Handout on wellness topic Anxiety was provided along with a discussion on what it means, the benefits, and suggestions for practice.  Reviewed last week's topic of Guilt vs Shame.    Subjective:   Patient reported for wellness with no changes in the moderate pain/tightness in her back or neck. Her back hurts most with walking or standing for periods of time. Her shoulder continues to be a bit sore. She is going out of town for a few days, family reunion. She will continue to try exercising more at home, hasn't found the motivation.    Objective:   Cheryl completed therapeutic stretches (EIL, STARLA) and the following MedX exercise machines: core lumbar, torso rotation l/r, leg extension, leg curl, upright row, chest press, biceps curl, triceps extension, leg press    See exercise log in patient folder for rate of exertion and repetitions completed.       Fitness Machine Education Key:  E=education on equipment initiated and further follow up and education needed  I=independent with  and exercise.  The patient:   Adjusts machines to his/her settings   Uses equipment levers, pins, weights safely   Maintains safe and correct posture while exercising   Moves through exercise with correct pace and control   Gets on and  off equipment safely      Lumbar/Cervical Ext. E Torso Rotation  Leg Press    Leg Extension E Seated Leg Curl E Chest Press    Seated Row E Hip ADD E Hip ABD E   Triceps Extension E Bicep Curl E Other:        Assessment:   Patient tolerated Patient tolerated MedX Core Lumbar Strength and all other peripheral exercises without an increase in symptoms. Patient warmed up on recumbent bike for 7 minutes, stretches, and iced low back for 7 minutes after the workout.    Plan:  Continue with established plan of care towards wellness goals.     Health  : Khalida Oneil  6/13/2022

## 2022-06-21 ENCOUNTER — DOCUMENTATION ONLY (OUTPATIENT)
Dept: REHABILITATION | Facility: HOSPITAL | Age: 77
End: 2022-06-21
Attending: NEUROLOGICAL SURGERY
Payer: MEDICARE

## 2022-06-21 NOTE — PROGRESS NOTES
Health  Wellness Visit Note    Name: Cheryl Abreu  Clinic Number: [id number]  Physician: Ravindra Loera, *  Diagnosis: No diagnosis found.  Past Medical History:   Diagnosis Date    Arthritis     Diabetes mellitus, type 2     Hearing loss     High blood pressure     Hyperlipidemia     Osteoporosis     Scoliosis     Thyroid disease      Visit Number: 44  Precautions: Balance, shoulder ROM      1st PT visit:  8/26/2021  Year of care end date:  8/26/2022  6 Month test  Performed: May 2022  12 Month test performed: August 2022  Mind body plan: 7 month plan  Patient level:C    Time In: 3:05 PM  Time Out: 4:07 PM  Total Treatment Time: 62 minutes    [facility]  Handout on wellness topic Fear Avoidance was provided along with a discussion on what it means, the benefits, and suggestions for practice.  Reviewed last week's topic of Anxiety.    Subjective:   Patient reported for wellness after returning home from a week in [place] to visit family.  She feels stiffer lately.  Possibly from the bed she's been sleeping on.  She expressed no changes in the moderate pain/tightness in her back or neck. She will continue to try exercising more at home, or look at gyms to join.    Objective:   Cheryl completed therapeutic stretches (EIL, STARLA) and the following MedX exercise machines: core lumbar, torso rotation l/r, leg extension, leg curl, upright row, chest press, biceps curl, triceps extension, leg press    See exercise log in patient folder for rate of exertion and repetitions completed.       Fitness Machine Education Key:  E=education on equipment initiated and further follow up and education needed  I=independent with  and exercise.  The patient:   Adjusts machines to his/her settings   Uses equipment levers, pins, weights safely   Maintains safe and correct posture while exercising   Moves through exercise with correct pace and control   Gets on and off equipment  safely      Lumbar/Cervical Ext. E Torso Rotation  Leg Press    Leg Extension E Seated Leg Curl E Chest Press    Seated Row E Hip ADD E Hip ABD E   Triceps Extension E Bicep Curl E Other:        Assessment:   Patient tolerated Patient tolerated MedX Core Lumbar Strength and all other peripheral exercises without an increase in symptoms. Patient warmed up on recumbent bike for 7 minutes, stretches, and iced low back for 7 minutes after the workout.    Plan:  Continue with established plan of care towards wellness goals.     Health  : Khalida Oneil  6/21/2022

## 2022-06-27 ENCOUNTER — OFFICE VISIT (OUTPATIENT)
Dept: PODIATRY | Facility: CLINIC | Age: 77
End: 2022-06-27
Payer: MEDICARE

## 2022-06-27 VITALS
HEART RATE: 76 BPM | BODY MASS INDEX: 25.32 KG/M2 | WEIGHT: 147.5 LBS | DIASTOLIC BLOOD PRESSURE: 68 MMHG | SYSTOLIC BLOOD PRESSURE: 130 MMHG

## 2022-06-27 DIAGNOSIS — Z79.4 TYPE 2 DIABETES MELLITUS WITH HYPEROSMOLARITY WITHOUT COMA, WITH LONG-TERM CURRENT USE OF INSULIN: Primary | ICD-10-CM

## 2022-06-27 DIAGNOSIS — L60.0 INGROWN NAIL: ICD-10-CM

## 2022-06-27 DIAGNOSIS — B35.1 ONYCHOMYCOSIS DUE TO DERMATOPHYTE: ICD-10-CM

## 2022-06-27 DIAGNOSIS — E11.00 TYPE 2 DIABETES MELLITUS WITH HYPEROSMOLARITY WITHOUT COMA, WITH LONG-TERM CURRENT USE OF INSULIN: Primary | ICD-10-CM

## 2022-06-27 DIAGNOSIS — L84 CORN OR CALLUS: ICD-10-CM

## 2022-06-27 PROCEDURE — 11056 PARNG/CUTG B9 HYPRKR LES 2-4: CPT | Mod: Q9,S$GLB,, | Performed by: PODIATRIST

## 2022-06-27 PROCEDURE — 1125F PR PAIN SEVERITY QUANTIFIED, PAIN PRESENT: ICD-10-PCS | Mod: CPTII,S$GLB,, | Performed by: PODIATRIST

## 2022-06-27 PROCEDURE — 99999 PR PBB SHADOW E&M-EST. PATIENT-LVL II: CPT | Mod: PBBFAC,,, | Performed by: PODIATRIST

## 2022-06-27 PROCEDURE — 3078F PR MOST RECENT DIASTOLIC BLOOD PRESSURE < 80 MM HG: ICD-10-PCS | Mod: CPTII,S$GLB,, | Performed by: PODIATRIST

## 2022-06-27 PROCEDURE — 11056 PR TRIM BENIGN HYPERKERATOTIC SKIN LESION,2-4: ICD-10-PCS | Mod: Q9,S$GLB,, | Performed by: PODIATRIST

## 2022-06-27 PROCEDURE — 3075F PR MOST RECENT SYSTOLIC BLOOD PRESS GE 130-139MM HG: ICD-10-PCS | Mod: CPTII,S$GLB,, | Performed by: PODIATRIST

## 2022-06-27 PROCEDURE — 11721 PR DEBRIDEMENT OF NAILS, 6 OR MORE: ICD-10-PCS | Mod: 59,Q9,S$GLB, | Performed by: PODIATRIST

## 2022-06-27 PROCEDURE — 3078F DIAST BP <80 MM HG: CPT | Mod: CPTII,S$GLB,, | Performed by: PODIATRIST

## 2022-06-27 PROCEDURE — 1125F AMNT PAIN NOTED PAIN PRSNT: CPT | Mod: CPTII,S$GLB,, | Performed by: PODIATRIST

## 2022-06-27 PROCEDURE — 99213 OFFICE O/P EST LOW 20 MIN: CPT | Mod: 25,S$GLB,, | Performed by: PODIATRIST

## 2022-06-27 PROCEDURE — 11721 DEBRIDE NAIL 6 OR MORE: CPT | Mod: 59,Q9,S$GLB, | Performed by: PODIATRIST

## 2022-06-27 PROCEDURE — 99213 PR OFFICE/OUTPT VISIT, EST, LEVL III, 20-29 MIN: ICD-10-PCS | Mod: 25,S$GLB,, | Performed by: PODIATRIST

## 2022-06-27 PROCEDURE — 99999 PR PBB SHADOW E&M-EST. PATIENT-LVL II: ICD-10-PCS | Mod: PBBFAC,,, | Performed by: PODIATRIST

## 2022-06-27 PROCEDURE — 3075F SYST BP GE 130 - 139MM HG: CPT | Mod: CPTII,S$GLB,, | Performed by: PODIATRIST

## 2022-06-27 RX ORDER — CICLOPIROX 80 MG/ML
SOLUTION TOPICAL NIGHTLY
Qty: 6.6 ML | Refills: 11 | Status: SHIPPED | OUTPATIENT
Start: 2022-06-27 | End: 2022-07-12

## 2022-06-28 ENCOUNTER — DOCUMENTATION ONLY (OUTPATIENT)
Dept: REHABILITATION | Facility: HOSPITAL | Age: 77
End: 2022-06-28
Attending: NEUROLOGICAL SURGERY
Payer: MEDICARE

## 2022-06-28 NOTE — PROGRESS NOTES
Health  Wellness Visit Note    Name: Cheryl Abreu  Clinic Number: [id number]  Physician: Ravindra Loera, *  Diagnosis: No diagnosis found.  Past Medical History:   Diagnosis Date    Arthritis     Diabetes mellitus, type 2     Hearing loss     High blood pressure     Hyperlipidemia     Osteoporosis     Scoliosis     Thyroid disease      Visit Number: 45  Precautions: Balance, shoulder ROM      1st PT visit:  8/26/2021  Year of care end date:  8/26/2022  6 Month test  Performed: May 2022  12 Month test performed: August 2022  Mind body plan: 7 month plan  Patient level:C    Time In: 5:30 PM  Time Out: 6:32 PM  Total Treatment Time: 62 minutes    [facility]  Handout on wellness topic Emotional Expression was provided along with a discussion on what it means, the benefits, and suggestions for practice.  Reviewed last week's topic of Fear Avoidance.    Subjective:   Patient reported for wellness feeling mild back pain/tightness. She isn't as stiff this week bc taking Tylenol. She will continue to try exercising more at home, or look at gyms to join.    Objective:   Cheryl completed therapeutic stretches (EIL, STARLA) and the following MedX exercise machines: core lumbar, torso rotation l/r, leg extension, leg curl, upright row, chest press, biceps curl, triceps extension, leg press    See exercise log in patient folder for rate of exertion and repetitions completed.       Fitness Machine Education Key:  E=education on equipment initiated and further follow up and education needed  I=independent with  and exercise.  The patient:   Adjusts machines to his/her settings   Uses equipment levers, pins, weights safely   Maintains safe and correct posture while exercising   Moves through exercise with correct pace and control   Gets on and off equipment safely      Lumbar/Cervical Ext. E Torso Rotation  Leg Press    Leg Extension E Seated Leg Curl E Chest Press    Seated Row E Hip  ADD E Hip ABD E   Triceps Extension E Bicep Curl E Other:        Assessment:   Patient tolerated Patient tolerated MedX Core Lumbar Strength and all other peripheral exercises without an increase in symptoms. Patient warmed up on recumbent elliptical for 7 minutes, stretches, and iced low back for 7 minutes after the workout.    Plan:  Continue with established plan of care towards wellness goals.     Health  : Khalida Oneil  6/28/2022

## 2022-07-02 NOTE — PROGRESS NOTES
Subjective:      Patient ID: Cheryl Abreu is a 77 y.o. female.    Chief Complaint: Diabetes Mellitus (Tomás Clark MD PCP three months ago) and Diabetic Foot Exam    Cheryl is a 77 y.o. female who presents to the clinic for evaluation and treatment of high risk feet. Cheryl has a past medical history of Arthritis, Diabetes mellitus, type 2, Hearing loss, High blood pressure, Hyperlipidemia, Osteoporosis, Scoliosis, and Thyroid disease. The patient's chief complaint is long, thick toenails.    PCP: Tomás Clark MD    Date Last Seen by PCP:   Chief Complaint   Patient presents with    Diabetes Mellitus     Tomás Clark MD PCP three months ago    Diabetic Foot Exam         Current shoe gear:  Affected Foot: Casual shoes     Unaffected Foot: Casual shoes    Hemoglobin A1C   Date Value Ref Range Status   02/11/2021 5.8 (H) 4.0 - 5.6 % Final     Comment:     ADA Screening Guidelines:  5.7-6.4%  Consistent with prediabetes  >or=6.5%  Consistent with diabetes    High levels of fetal hemoglobin interfere with the HbA1C  assay. Heterozygous hemoglobin variants (HbS, HgC, etc)do  not significantly interfere with this assay.   However, presence of multiple variants may affect accuracy.         Review of Systems   Constitutional: Negative for chills, decreased appetite, fever and night sweats.   HENT: Negative for congestion, ear discharge, nosebleeds and tinnitus.    Eyes: Negative for double vision, pain and visual disturbance.   Cardiovascular: Negative for chest pain, claudication, cyanosis and palpitations.   Respiratory: Negative for cough, hemoptysis, shortness of breath and wheezing.    Endocrine: Negative for cold intolerance and heat intolerance.   Hematologic/Lymphatic: Negative for adenopathy and bleeding problem.   Skin: Positive for color change, dry skin, nail changes and unusual hair distribution.   Musculoskeletal: Positive for arthritis, joint pain and stiffness. Negative for myalgias.    Gastrointestinal: Negative for abdominal pain, dysphagia, nausea and vomiting.   Genitourinary: Negative for dysuria, flank pain, hematuria and pelvic pain.   Neurological: Positive for disturbances in coordination, numbness, paresthesias and sensory change.   Psychiatric/Behavioral: Negative for altered mental status, hallucinations and suicidal ideas. The patient does not have insomnia.    Allergic/Immunologic: Negative for environmental allergies and persistent infections.           Objective:      Physical Exam  Vitals reviewed.   Constitutional:       Appearance: She is well-developed.   Cardiovascular:      Pulses:           Dorsalis pedis pulses are 0 on the right side and 0 on the left side.        Posterior tibial pulses are 1+ on the right side and 1+ on the left side.   Pulmonary:      Effort: Pulmonary effort is normal.   Musculoskeletal:         General: Normal range of motion.      Comments: Anterior, lateral, and posterior muscle groups bilateral lower extremities show strength 4 over 5 symmetrically. Inspection and palpation of the joints and bones reveal no crepitus or joint effusion. No tenderness upon palpation. Mild plantar flexor contractures noted to digits 2 through 5 bilaterally.  Angle and base of gait are normal.  Severe degenerative joint disease noted to the right 1st metatarsophalangeal joint with limited range of motion and mild tenderness noted with palpation.  Medial deviation of lesser digits 2 through 5 right foot.   Feet:      Right foot:      Skin integrity: Callus and dry skin present.      Left foot:      Skin integrity: Callus and dry skin present.   Skin:     General: Skin is warm and dry.      Capillary Refill: Capillary refill takes 2 to 3 seconds.      Coloration: Skin is pale.      Comments: Skin bilateral lower extremities noted to be thin, dry, and atrophic.  Toenails thickened, discolored, with subungual fungal debris and tenderness noted.  Hyperkeratotic lesions  noted to both feet plantarly with tenderness.  Right hallux nail incurvated at the lateral border with no signs of bacterial infection however there is tenderness.   Neurological:      Mental Status: She is alert and oriented to person, place, and time.      Sensory: Sensory deficit present.      Motor: Atrophy present.      Deep Tendon Reflexes: Reflexes abnormal.      Reflex Scores:       Patellar reflexes are 1+ on the right side and 1+ on the left side.       Achilles reflexes are 1+ on the right side and 1+ on the left side.     Comments: Provocation sign and positive Tinel sign bilateral tarsal tunnel and right medial dorsal cutaneous nerves.               Assessment:       Encounter Diagnoses   Name Primary?    Type 2 diabetes mellitus with hyperosmolarity without coma, with long-term current use of insulin Yes    Corn or callus     Ingrown nail     Onychomycosis due to dermatophyte          Plan:       Cheryl was seen today for diabetes mellitus and diabetic foot exam.    Diagnoses and all orders for this visit:    Type 2 diabetes mellitus with hyperosmolarity without coma, with long-term current use of insulin  -     DIABETIC SHOES FOR HOME USE    Corn or callus  -     DIABETIC SHOES FOR HOME USE    Ingrown nail    Onychomycosis due to dermatophyte    Other orders  -     ciclopirox (PENLAC) 8 % Soln; Apply topically nightly.      I counseled the patient on her conditions, their implications and medical management.      Routine Foot Care    Performed by:  Adonis Vences. DPM  Authorized by:  Patient     Consent Done?:  Yes (Verbal)     Nail Care Type:  Debride  Location(s): All  (Left 1st Toe, Left 3rd Toe, Left 2nd Toe, Left 4th Toe, Left 5th Toe, Right 1st Toe, Right 2nd Toe, Right 3rd Toe, Right 4th Toe and Right 5th Toe)  Patient tolerance:  Patient tolerated the procedure well with no immediate complications     With patient's permission, the toenails mentioned above were aggressively reduced and  debrided using a nail nipper, removing all offending nail and debris. The patient will continue to monitor the areas daily, inspect the feet, wear protective shoe gear when ambulatory, and moisturizer to maintain skin integrity.      Callus Care Type: Debride    With patient's permission, the calluses/hyperkeratotic lesions mentioned above were aggressively reduced and debrided using a number 15 blade. The patient will continue to monitor the areas daily, inspect the feet, wear protective shoe gear when ambulatory, and moisturizer to maintain skin integrity.       Shoe inspection. Diabetic Foot Education. Patient reminded of the importance of good nutrition and blood sugar control to help prevent podiatric complications of diabetes. Patient instructed on proper foot hygeine. We discussed wearing proper shoe gear, daily foot inspections and Diabetic foot education in detail.    Follow-up in 3 months.    .

## 2022-07-05 ENCOUNTER — DOCUMENTATION ONLY (OUTPATIENT)
Dept: REHABILITATION | Facility: HOSPITAL | Age: 77
End: 2022-07-05
Attending: NEUROLOGICAL SURGERY
Payer: MEDICARE

## 2022-07-05 NOTE — PROGRESS NOTES
Health  Wellness Visit Note    Name: Cheryl Abreu  Clinic Number: [id number]  Physician: Ravindra Loera, *  Diagnosis: No diagnosis found.  Past Medical History:   Diagnosis Date    Arthritis     Diabetes mellitus, type 2     Hearing loss     High blood pressure     Hyperlipidemia     Osteoporosis     Scoliosis     Thyroid disease      Visit Number: 46  Precautions: Balance, shoulder ROM      1st PT visit:  8/26/2021  Year of care end date:  8/26/2022  6 Month test  Performed: May 2022  12 Month test performed: August 2022  Mind body plan: 7 month plan  Patient level:C    Time In: 3:15 PM  Time Out: 4:20 PM  Total Treatment Time: 65 minutes    [facility]  Handout on wellness topic Workaholic was provided along with a discussion on what it means, the benefits, and suggestions for practice.  Reviewed last week's topic of Emotional Expression.    Subjective:   Patient reported for wellness feeling moderate back pain/tightness that has sciatica flared up. She will continue to try exercising more at home, or look at gyms to join.  She is hoping to find more motivation to get active at home.    Objective:   Cheryl completed therapeutic stretches (EIL, STARLA) and the following MedX exercise machines: core lumbar, torso rotation l/r, leg extension, leg curl, upright row, chest press, biceps curl, triceps extension, leg press    See exercise log in patient folder for rate of exertion and repetitions completed.       Fitness Machine Education Key:  E=education on equipment initiated and further follow up and education needed  I=independent with  and exercise.  The patient:   Adjusts machines to his/her settings   Uses equipment levers, pins, weights safely   Maintains safe and correct posture while exercising   Moves through exercise with correct pace and control   Gets on and off equipment safely      Lumbar/Cervical Ext. E Torso Rotation  Leg Press    Leg Extension E Seated  Leg Curl E Chest Press    Seated Row E Hip ADD E Hip ABD E   Triceps Extension E Bicep Curl E Other:        Assessment:   Patient tolerated Patient tolerated MedX Core Lumbar Strength and all other peripheral exercises without an increase in symptoms. Patient warmed up on recumbent elliptical for 7 minutes, stretches, and iced low back for 7 minutes after the workout.    Plan:  Continue with established plan of care towards wellness goals.     Health  : Khalida Oneil  7/5/2022

## 2022-07-12 RX ORDER — KETOCONAZOLE 20 MG/G
1 CREAM TOPICAL DAILY
Qty: 60 G | Refills: 2 | Status: SHIPPED | OUTPATIENT
Start: 2022-07-12

## 2022-07-12 RX ORDER — KETOCONAZOLE 20 MG/G
1 CREAM TOPICAL DAILY
Qty: 15 G | Refills: 2 | OUTPATIENT
Start: 2022-07-12

## 2022-07-12 NOTE — TELEPHONE ENCOUNTER
"Call placed to preferred number to clarify request for Nizoral in light of recent [place] rx.  Female answered phone and when I identified myself as a nurse from Podiatry she stated "You need to call back later" and hung up phone.  "

## 2022-07-18 ENCOUNTER — DOCUMENTATION ONLY (OUTPATIENT)
Dept: REHABILITATION | Facility: HOSPITAL | Age: 77
End: 2022-07-18
Attending: NEUROLOGICAL SURGERY
Payer: MEDICARE

## 2022-07-18 NOTE — PROGRESS NOTES
Health  Wellness Visit Note    Name: Cheryl Abreu  Clinic Number: [id number]  Physician: Ravindra Loera, *  Diagnosis: No diagnosis found.  Past Medical History:   Diagnosis Date    Arthritis     Diabetes mellitus, type 2     Hearing loss     High blood pressure     Hyperlipidemia     Osteoporosis     Scoliosis     Thyroid disease      Visit Number: 47  Precautions: Balance, shoulder ROM      1st PT visit:  8/26/2021  Year of care end date:  8/26/2022  6 Month test  Performed: May 2022  12 Month test performed: August 2022  Mind body plan: 7 month plan  Patient level:C    Time In: 3:08 PM  Time Out: 4:08 PM  Total Treatment Time: 60 minutes    [facility]  Handout on wellness topic Laughter Therapy was provided along with a discussion on what it means, the benefits, and suggestions for practice.  Reviewed last week's topic of Play.    Subjective:   Patient reported for wellness feeling a bit deconditioned after missing last week and not exercising at home during the week.  back pain/tightness that has sciatica flared up. She will continue to try exercising more at home, or look at gyms to join.  She is hoping to find more motivation to get active at home.    Objective:   Cheryl completed therapeutic stretches (EIL, STARLA) and the following MedX exercise machines: core lumbar, torso rotation l/r, leg extension, leg curl, upright row, chest press, biceps curl, triceps extension, leg press    See exercise log in patient folder for rate of exertion and repetitions completed.       Fitness Machine Education Key:  E=education on equipment initiated and further follow up and education needed  I=independent with  and exercise.  The patient:   Adjusts machines to his/her settings   Uses equipment levers, pins, weights safely   Maintains safe and correct posture while exercising   Moves through exercise with correct pace and control   Gets on and off equipment  safely      Lumbar/Cervical Ext. E Torso Rotation  Leg Press    Leg Extension E Seated Leg Curl E Chest Press    Seated Row E Hip ADD E Hip ABD E   Triceps Extension E Bicep Curl E Other:        Assessment:   Patient tolerated Patient tolerated MedX Core Lumbar Strength and all other peripheral exercises without an increase in symptoms. Patient warmed up on recumbent elliptical for 7 minutes, stretches, and iced low back for 7 minutes after the workout.    Plan:  Continue with established plan of care towards wellness goals.     Health  : Khalida Oneil  7/18/2022

## 2022-07-25 ENCOUNTER — DOCUMENTATION ONLY (OUTPATIENT)
Dept: REHABILITATION | Facility: HOSPITAL | Age: 77
End: 2022-07-25
Attending: NEUROLOGICAL SURGERY
Payer: MEDICARE

## 2022-07-25 NOTE — PROGRESS NOTES
Health  Wellness Visit Note    Name: Cheryl Aberu  Clinic Number: [id number]  Physician: Ravindra Loera, *  Diagnosis: No diagnosis found.  Past Medical History:   Diagnosis Date    Arthritis     Diabetes mellitus, type 2     Hearing loss     High blood pressure     Hyperlipidemia     Osteoporosis     Scoliosis     Thyroid disease      Visit Number: 48  Precautions: Balance, shoulder ROM      1st PT visit:  8/26/2021  Year of care end date:  8/26/2022  6 Month test  Performed: May 2022  12 Month test performed: August 2022  Mind body plan: 7 month plan  Patient level:C    Time In: 4:01 PM  Time Out: 5:15 PM  Total Treatment Time: 74 minutes    [facility]  Handout on wellness topic of Scheduled Self-Dating was provided along with a discussion on what it means, the benefits, and suggestions for practice.  Reviewed last week's topic of Laughter Therapy .    Subjective:   Patient reports she did stretches once. She knows they will help, and had a great experience with [facility] years ago, but doesn't want to do them right now. HC may explore further small incremental steps toward implementing her home exercise plan. She reports a negative feeling about it.     She has experienced a lot of loss in the past two years.  gave her phone numbers for [facility] and chair yoga. She may also look into [employer] therapists. She also mentioned looking for a gym to join, or swimming, though she doesn't like getting in and out of a swimsuit. She mostly enjoys watching TV, but knows she needs to move more to gain flexibility and the freedom to move around her house like she wants to.     Objective:   Cheryl completed therapeutic stretches (EIL, STARLA) and the following MedX exercise machines: core lumbar, torso rotation l/r, leg extension, leg curl, upright row, chest press, biceps curl, triceps extension, leg press    See exercise log in patient folder for rate of exertion and repetitions  completed.       Fitness Machine Education Key:  E=education on equipment initiated and further follow up and education needed  I=independent with  and exercise.  The patient:   Adjusts machines to his/her settings   Uses equipment levers, pins, weights safely   Maintains safe and correct posture while exercising   Moves through exercise with correct pace and control   Gets on and off equipment safely      Lumbar/Cervical Ext. E Torso Rotation  Leg Press    Leg Extension E Seated Leg Curl E Chest Press    Seated Row E Hip ADD E Hip ABD E   Triceps Extension E Bicep Curl E Other:        Assessment:   Patient tolerated Patient tolerated MedX Core Lumbar Strength and all other peripheral exercises without an increase in symptoms. Patient warmed up on recumbent elliptical for 7 minutes, stretches, and iced low back for 7 minutes after the workout.    Plan:  Continue with established plan of care towards wellness goals.     Health  : Joyce Long  7/25/2022

## 2022-08-01 ENCOUNTER — DOCUMENTATION ONLY (OUTPATIENT)
Dept: REHABILITATION | Facility: HOSPITAL | Age: 77
End: 2022-08-01
Attending: NEUROLOGICAL SURGERY
Payer: MEDICARE

## 2022-08-01 NOTE — PROGRESS NOTES
Health  Wellness Visit Note    Name: Cheryl Abreu  Clinic Number: [id number]  Physician: Ravindra Loera, *  Diagnosis: No diagnosis found.  Past Medical History:   Diagnosis Date    Arthritis     Diabetes mellitus, type 2     Hearing loss     High blood pressure     Hyperlipidemia     Osteoporosis     Scoliosis     Thyroid disease      Visit Number: 49  Precautions: Balance, shoulder ROM      1st PT visit:  8/26/2021  Year of care end date:  8/26/2022  6 Month test  Performed: May 2022  12 Month test performed: August 2022  Mind body plan: 7 month plan  Patient level:C    Time In: 4:05 PM  Time Out: 5:15 PM  Total Treatment Time: 70 minutes    [facility]  Handout on wellness topic of SMART goals was provided along with a discussion on what it means, the benefits, and suggestions for practice.  Reviewed last week's topic of Scheduled Self-Dating.    Subjective:   Patient reports she did not do stretches and only sat in her chair watching TV this week. She knows they will help, and had a great experience with [facility] years ago, but doesn't want to do them right now. HC may explore further small incremental steps toward implementing her home exercise plan. She reports a negative feeling about it. She does get up and do something around the house at every commercial.    She did not use ice or heat and states that her back is feeling the same as usual.     Objective:   Cheryl completed therapeutic stretches (EIL, STARLA) and the following MedX exercise machines: core lumbar, torso rotation l/r, leg extension, leg curl, upright row, chest press, biceps curl, triceps extension, leg press    See exercise log in patient folder for rate of exertion and repetitions completed.       Fitness Machine Education Key:  E=education on equipment initiated and further follow up and education needed  I=independent with  and exercise.  The patient:   Adjusts machines to his/her  settings   Uses equipment levers, pins, weights safely   Maintains safe and correct posture while exercising   Moves through exercise with correct pace and control   Gets on and off equipment safely      Lumbar/Cervical Ext. E Torso Rotation  Leg Press    Leg Extension E Seated Leg Curl E Chest Press    Seated Row E Hip ADD E Hip ABD E   Triceps Extension E Bicep Curl E Other:        Assessment:   Patient tolerated Patient tolerated MedX Core Lumbar Strength and all other peripheral exercises without an increase in symptoms. Patient warmed up on recumbent elliptical for 7 minutes, stretches, and iced low back for 7 minutes after the workout.    Plan:  Continue with established plan of care towards wellness goals.     Health  : Joyce Long  8/1/2022

## 2022-08-08 ENCOUNTER — DOCUMENTATION ONLY (OUTPATIENT)
Dept: REHABILITATION | Facility: HOSPITAL | Age: 77
End: 2022-08-08
Attending: NEUROLOGICAL SURGERY
Payer: MEDICARE

## 2022-08-08 NOTE — PROGRESS NOTES
Health  Wellness Visit Note    Name: Cheryl Abreu  Clinic Number: [id number]  Physician: Ravindra Loera, *  Diagnosis: No diagnosis found.  Past Medical History:   Diagnosis Date    Arthritis     Diabetes mellitus, type 2     Hearing loss     High blood pressure     Hyperlipidemia     Osteoporosis     Scoliosis     Thyroid disease      Visit Number: 50  Precautions: Balance, shoulder ROM      1st PT visit:  8/26/2021  Year of care end date:  8/26/2022  6 Month test  Performed: May 2022  12 Month test performed: August 2022  Mind body plan: 7 month plan  Patient level:C    Time In: 2:08 PM  Time Out: 3:10 PM  Total Treatment Time: 62 minutes    [facility]  Handout on wellness topic of Small Steps was provided along with a discussion on what it means, the benefits, and suggestions for practice.  Reviewed last week's topic of SMART goals.    Subjective:   Patient reports that she hasn't been doing her stretches and only sat in her chair watching TV this week. She knows they will help, difficulty finding the motivation and overcoming recent loss in strength. HC may explore further small incremental steps toward implementing her home exercise plan. She reports a negative feeling about it. She does get up and do something around the house at every commercial.    She did not use ice or heat and states that her back is feeling the same as usual.     Objective:   Cheryl completed therapeutic stretches (EIL, STARLA) and the following MedX exercise machines: core lumbar, torso rotation l/r, leg extension, leg curl, upright row, chest press, biceps curl, triceps extension, leg press    See exercise log in patient folder for rate of exertion and repetitions completed.       Fitness Machine Education Key:  E=education on equipment initiated and further follow up and education needed  I=independent with  and exercise.  The patient:   Adjusts machines to his/her settings   Uses  equipment levers, pins, weights safely   Maintains safe and correct posture while exercising   Moves through exercise with correct pace and control   Gets on and off equipment safely      Lumbar/Cervical Ext. E Torso Rotation  Leg Press    Leg Extension E Seated Leg Curl E Chest Press    Seated Row E Hip ADD E Hip ABD E   Triceps Extension E Bicep Curl E Other:        Assessment:   Patient tolerated Patient tolerated MedX Core Lumbar Strength and all other peripheral exercises without an increase in symptoms. Patient warmed up on recumbent elliptical for 7 minutes, stretches, and iced low back for 7 minutes after the workout.    Plan:  Continue with established plan of care towards wellness goals.     Health  : Khalida Oneil  8/8/2022

## 2022-08-15 ENCOUNTER — DOCUMENTATION ONLY (OUTPATIENT)
Dept: REHABILITATION | Facility: HOSPITAL | Age: 77
End: 2022-08-15
Attending: NEUROLOGICAL SURGERY
Payer: MEDICARE

## 2022-08-15 NOTE — PROGRESS NOTES
Health  Wellness Visit Note    Name: Cheryl Abreu  Clinic Number: [id number]  Physician: Ravindra Loera, *  Diagnosis: No diagnosis found.  Past Medical History:   Diagnosis Date    Arthritis     Diabetes mellitus, type 2     Hearing loss     High blood pressure     Hyperlipidemia     Osteoporosis     Scoliosis     Thyroid disease      Visit Number: 51  Precautions: Balance, shoulder ROM      1st PT visit:  8/26/2021  Year of care end date:  8/26/2022  6 Month test  Performed: May 2022  12 Month test performed: August 2022  Mind body plan: 7 month plan  Patient level:C    Time In: 2:05 PM  Time Out: 3:15 PM  Total Treatment Time: 70 minutes    [facility]  Handout on wellness topic of Stages of Change was provided along with a discussion on what it means, the benefits, and suggestions for practice.  Reviewed last week's topic of Small Steps.    Subjective:   Patient reports for her last wellness visit (12-months). She hasn't been doing her stretches; sat in her chair watching TV this week. She knows exercising will help, difficulty finding the motivation and overcoming recent loss in strength. HC showed pt exercises she can do at home.  She expressed an interest in going to the gym.    She is 362% stronger compared to her initial visit 1 year ago.    Objective:   Cheryl completed therapeutic stretches (EIL, STARLA) and the following MedX exercise machines: core lumbar, torso rotation l/r, leg extension, leg curl, upright row, chest press, biceps curl, triceps extension, leg press    See exercise log in patient folder for rate of exertion and repetitions completed.       Fitness Machine Education Key:  E=education on equipment initiated and further follow up and education needed  I=independent with  and exercise.  The patient:   Adjusts machines to his/her settings   Uses equipment levers, pins, weights safely   Maintains safe and correct posture while exercising   Moves  through exercise with correct pace and control   Gets on and off equipment safely      Lumbar/Cervical Ext. E Torso Rotation  Leg Press    Leg Extension E Seated Leg Curl E Chest Press    Seated Row E Hip ADD E Hip ABD E   Triceps Extension E Bicep Curl E Other:        Assessment:   Patient tolerated Patient tolerated MedX Core Lumbar Strength and all other peripheral exercises without an increase in symptoms. Patient warmed up on recumbent elliptical for 7 minutes, stretches, and iced low back for 7 minutes after the workout.    Plan:  Continue with established plan of care towards wellness goals.     Health  : Khalida Oneil  8/15/2022

## (undated) DEVICE — DIFFUSER

## (undated) DEVICE — ADHESIVE MASTISOL VIAL 48/BX

## (undated) DEVICE — SUT 2-0 12-18IN SILK

## (undated) DEVICE — DRESSING TRANS 2X2 TEGADERM

## (undated) DEVICE — DRAPE STERI-DRAPE 1000 17X11IN

## (undated) DEVICE — BLADE SURG CARBON STEEL SZ11

## (undated) DEVICE — STYLET AXIEM 23CM SINGLE COIL

## (undated) DEVICE — ADAPTER TUBING 18G

## (undated) DEVICE — DRAPE STERI INSTRUMENT 1018

## (undated) DEVICE — DRESSING TELFA N ADH 3X8

## (undated) DEVICE — SOL LR INJ 1000ML BG

## (undated) DEVICE — DRESSING LEUKOPLAST FLEX 1X3IN

## (undated) DEVICE — SET DECANTER MEDICHOICE

## (undated) DEVICE — APPLICATOR CHLORAPREP ORN 26ML

## (undated) DEVICE — CORD BIPOLAR 12 FOOT

## (undated) DEVICE — DRAPE INCISE IOBAN 2 23X17IN

## (undated) DEVICE — NDL STRAIGHT 4CM LEIBINGER

## (undated) DEVICE — SPRAY MASTISOL

## (undated) DEVICE — ADHESIVE DERMABOND ADVANCED

## (undated) DEVICE — TAPE SURG MEDIPORE 6X72IN

## (undated) DEVICE — GAUZE SPONGE 4X4 12PLY

## (undated) DEVICE — POINTER AXIEM CRANIAL TRACER

## (undated) DEVICE — TUBE FRAZIER 5MM 2FT SOFT TIP

## (undated) DEVICE — CARTRIDGE OIL

## (undated) DEVICE — MARKER SKIN STND TIP BLUE BARR

## (undated) DEVICE — STAPLER SKIN PROXIMATE WIDE

## (undated) DEVICE — SHEET EENT SPLIT

## (undated) DEVICE — TRACKER PATIENT NON INVASIVE

## (undated) DEVICE — TROCAR ENDOPATH XCEL 5MM 7.5CM

## (undated) DEVICE — DURAPREP SURG SCRUB 26ML

## (undated) DEVICE — SUT MCRYL PLUS 4-0 PS2 27IN

## (undated) DEVICE — CLIP MED TICALL

## (undated) DEVICE — SUT VICRYL 2 0 CT 2

## (undated) DEVICE — ELECTRODE REM PLYHSV RETURN 9

## (undated) DEVICE — SEE MEDLINE ITEM 156905